# Patient Record
Sex: FEMALE | Race: WHITE | HISPANIC OR LATINO | Employment: PART TIME | URBAN - METROPOLITAN AREA
[De-identification: names, ages, dates, MRNs, and addresses within clinical notes are randomized per-mention and may not be internally consistent; named-entity substitution may affect disease eponyms.]

---

## 2017-03-29 DIAGNOSIS — Z12.31 OTHER SCREENING MAMMOGRAM: ICD-10-CM

## 2017-12-08 ENCOUNTER — OFFICE VISIT (OUTPATIENT)
Dept: URGENT CARE | Facility: CLINIC | Age: 42
End: 2017-12-08
Payer: MEDICAID

## 2017-12-08 VITALS
RESPIRATION RATE: 18 BRPM | OXYGEN SATURATION: 98 % | DIASTOLIC BLOOD PRESSURE: 85 MMHG | BODY MASS INDEX: 40.48 KG/M2 | HEART RATE: 77 BPM | SYSTOLIC BLOOD PRESSURE: 129 MMHG | HEIGHT: 62 IN | WEIGHT: 220 LBS | TEMPERATURE: 99 F

## 2017-12-08 DIAGNOSIS — B34.9 VIRAL SYNDROME: Primary | ICD-10-CM

## 2017-12-08 DIAGNOSIS — R50.9 FEVER, UNSPECIFIED FEVER CAUSE: ICD-10-CM

## 2017-12-08 DIAGNOSIS — R05.9 COUGH: ICD-10-CM

## 2017-12-08 DIAGNOSIS — R52 BODY ACHES: ICD-10-CM

## 2017-12-08 LAB
CTP QC/QA: YES
FLUAV AG NPH QL: NEGATIVE
FLUBV AG NPH QL: NEGATIVE

## 2017-12-08 PROCEDURE — 87804 INFLUENZA ASSAY W/OPTIC: CPT | Mod: QW,S$GLB,, | Performed by: PHYSICIAN ASSISTANT

## 2017-12-08 PROCEDURE — 99214 OFFICE O/P EST MOD 30 MIN: CPT | Mod: S$GLB,,, | Performed by: PHYSICIAN ASSISTANT

## 2017-12-08 RX ORDER — BENZONATATE 100 MG/1
100 CAPSULE ORAL EVERY 6 HOURS PRN
Qty: 30 CAPSULE | Refills: 1 | Status: SHIPPED | OUTPATIENT
Start: 2017-12-08 | End: 2018-12-08

## 2017-12-08 RX ORDER — PROGESTERONE 100 MG/1
100 CAPSULE ORAL DAILY
COMMUNITY
End: 2019-01-17

## 2017-12-08 NOTE — PROGRESS NOTES
"Subjective:       Patient ID: Marianne Go is a 42 y.o. female.    Vitals:  height is 5' 2" (1.575 m) and weight is 99.8 kg (220 lb). Her tympanic temperature is 98.7 °F (37.1 °C). Her blood pressure is 129/85 and her pulse is 77. Her respiration is 18 and oxygen saturation is 98%.     Chief Complaint: Influenza    Influenza   This is a new problem. The current episode started yesterday. The problem occurs constantly. The problem has been gradually worsening. Associated symptoms include congestion, coughing, a fever, myalgias and a sore throat. Pertinent negatives include no abdominal pain, chest pain, chills, headaches or nausea. Nothing aggravates the symptoms. She has tried acetaminophen for the symptoms. The treatment provided mild relief.     Review of Systems   Constitution: Positive for fever and malaise/fatigue. Negative for chills.   HENT: Positive for congestion and sore throat. Negative for ear pain and hoarse voice.    Eyes: Negative for discharge and redness.   Cardiovascular: Negative for chest pain, dyspnea on exertion and leg swelling.   Respiratory: Positive for cough and sputum production. Negative for shortness of breath and wheezing.    Musculoskeletal: Positive for myalgias.   Gastrointestinal: Negative for abdominal pain and nausea.   Neurological: Negative for headaches.       Objective:      Physical Exam   Constitutional: She is oriented to person, place, and time. She appears well-developed and well-nourished.   HENT:   Head: Normocephalic and atraumatic.   Right Ear: Hearing, tympanic membrane, external ear and ear canal normal.   Left Ear: Hearing, tympanic membrane, external ear and ear canal normal.   Nose: Mucosal edema and rhinorrhea present. Right sinus exhibits no maxillary sinus tenderness and no frontal sinus tenderness. Left sinus exhibits no maxillary sinus tenderness and no frontal sinus tenderness.   Mouth/Throat: Uvula is midline and oropharynx is clear and moist.   Eyes: " Conjunctivae are normal.   Neck: Normal range of motion. Neck supple. No thyromegaly present.   Cardiovascular: Normal rate and regular rhythm.  Exam reveals no gallop and no friction rub.    No murmur heard.  Pulmonary/Chest: Effort normal and breath sounds normal. She has no wheezes. She has no rales.   Musculoskeletal: Normal range of motion.   Lymphadenopathy:     She has no cervical adenopathy.   Neurological: She is alert and oriented to person, place, and time.   Skin: Skin is warm and dry. No rash noted. No erythema.   Psychiatric: She has a normal mood and affect. Her behavior is normal. Judgment and thought content normal.   Nursing note and vitals reviewed.      Assessment:       1. Viral syndrome    2. Fever, unspecified fever cause    3. Body aches    4. Cough        Plan:         Viral syndrome    Fever, unspecified fever cause  -     POCT Influenza A/B    Body aches  -     POCT Influenza A/B    Cough  -     POCT Influenza A/B  -     benzonatate (TESSALON PERLES) 100 MG capsule; Take 1 capsule (100 mg total) by mouth every 6 (six) hours as needed for Cough.  Dispense: 30 capsule; Refill: 1      Marianne was seen today for influenza.    Diagnoses and all orders for this visit:    Viral syndrome    Fever, unspecified fever cause  -     POCT Influenza A/B    Body aches  -     POCT Influenza A/B    Cough  -     POCT Influenza A/B  -     benzonatate (TESSALON PERLES) 100 MG capsule; Take 1 capsule (100 mg total) by mouth every 6 (six) hours as needed for Cough.      Patient Instructions   - Rest.    - Drink plenty of fluids.    - Tylenol or Ibuprofen as directed as needed for fever/pain.    - Take over-the-counter claritin, zyrtec, allegra, or xyzal as directed.  - Use over the counter Flonase as directed for sinus congestion and postnasal drip.  - use nasal saline prior to Flonase.  - Antibiotics are not needed at this time.  - Usual course of cold symptom is 10-14 days, but longer if patient is a smoker.  "  - Use salt water gargle for sore throat.   - Follow up with your PCP or specialty clinic as directed in the next 1-2 weeks if not improved or as needed.  You can call [phone] to schedule an appointment with the appropriate provider.    - Go to the ED if your symptoms worsen.    Viral Syndrome (Adult)  A viral illness may cause a number of symptoms. The symptoms depend on the part of the body that the virus affects. If it settles in your nose, throat, and lungs, it may cause cough, sore throat, congestion, and sometimes headache. If it settles in your stomach and intestinal tract, it may cause vomiting and diarrhea. Sometimes it causes vague symptoms like "aching all over," feeling tired, loss of appetite, or fever.  A viral illness usually lasts 1 to 2 weeks, but sometimes it lasts longer. In some cases, a more serious infection can look like a viral syndrome in the first few days of the illness. You may need another exam and additional tests to know the difference. Watch for the warning signs listed below.  Home care  Follow these guidelines for taking care of yourself at home:  · If symptoms are severe, rest at home for the first 2 to 3 days.  · Stay away from cigarette smoke - both your smoke and the smoke from others.  · You may use over-the-counter acetaminophen or ibuprofen for fever, muscle aching, and headache, unless another medicine was prescribed for this. If you have chronic liver or kidney disease or ever had a stomach ulcer or GI bleeding, talk with your doctor before using these medicines. No one who is younger than 18 and ill with a fever should take aspirin. It may cause severe disease or death.  · Your appetite may be poor, so a light diet is fine. Avoid dehydration by drinking 8 to 12 8-ounce glasses of fluids each day. This may include water; orange juice; lemonade; apple, grape, and cranberry juice; clear fruit drinks; electrolyte replacement and sports drinks; and decaffeinated teas " and coffee. If you have been diagnosed with a kidney disease, ask your doctor how much and what types of fluids you should drink to prevent dehydration. If you have kidney disease, drinking too much fluid can cause it build up in the your body and be dangerous to your health.  · Over-the-counter remedies won't shorten the length of the illness but may be helpful for cough, sore throat; and nasal and sinus congestion. Don't use decongestants if you have high blood pressure.  Follow-up care  Follow up with your healthcare provider if you do not improve over the next week.  Call 911  Get emergency medical care if any of the following occur:  · Convulsion  · Feeling weak, dizzy, or like you are going to faint  · Chest pain, shortness of breath, wheezing, or difficulty breathing  When to seek medical advice  Call your healthcare provider right away if any of these occur:  · Cough with lots of colored sputum (mucus) or blood in your sputum  · Chest pain, shortness of breath, wheezing, or difficulty breathing  · Severe headache; face, neck, or ear pain  · Severe, constant pain in the lower right side of your belly (abdominal)  · Continued vomiting (cant keep liquids down)  · Frequent diarrhea (more than 5 times a day); blood (red or black color) or mucus in diarrhea  · Feeling weak, dizzy, or like you are going to faint  · Extreme thirst  · Fever of 100.4°F (38°C) or higher, or as directed by your healthcare provider  Date Last Reviewed: 9/25/2015  © 2412-9816 [employer]. [address] [place] [address], [place] [address]. All rights reserved. This information is not intended as a substitute for professional medical care. Always follow your healthcare professional's instructions.

## 2017-12-08 NOTE — PATIENT INSTRUCTIONS
"- Rest.    - Drink plenty of fluids.    - Tylenol or Ibuprofen as directed as needed for fever/pain.    - Take over-the-counter claritin, zyrtec, allegra, or xyzal as directed.  - Use over the counter Flonase as directed for sinus congestion and postnasal drip.  - use nasal saline prior to Flonase.  - Antibiotics are not needed at this time.  - Usual course of cold symptom is 10-14 days, but longer if patient is a smoker.   - Use salt water gargle for sore throat.   - Follow up with your PCP or specialty clinic as directed in the next 1-2 weeks if not improved or as needed.  You can call [phone] to schedule an appointment with the appropriate provider.    - Go to the ED if your symptoms worsen.    Viral Syndrome (Adult)  A viral illness may cause a number of symptoms. The symptoms depend on the part of the body that the virus affects. If it settles in your nose, throat, and lungs, it may cause cough, sore throat, congestion, and sometimes headache. If it settles in your stomach and intestinal tract, it may cause vomiting and diarrhea. Sometimes it causes vague symptoms like "aching all over," feeling tired, loss of appetite, or fever.  A viral illness usually lasts 1 to 2 weeks, but sometimes it lasts longer. In some cases, a more serious infection can look like a viral syndrome in the first few days of the illness. You may need another exam and additional tests to know the difference. Watch for the warning signs listed below.  Home care  Follow these guidelines for taking care of yourself at home:  · If symptoms are severe, rest at home for the first 2 to 3 days.  · Stay away from cigarette smoke - both your smoke and the smoke from others.  · You may use over-the-counter acetaminophen or ibuprofen for fever, muscle aching, and headache, unless another medicine was prescribed for this. If you have chronic liver or kidney disease or ever had a stomach ulcer or GI bleeding, talk with your doctor before using " these medicines. No one who is younger than 18 and ill with a fever should take aspirin. It may cause severe disease or death.  · Your appetite may be poor, so a light diet is fine. Avoid dehydration by drinking 8 to 12 8-ounce glasses of fluids each day. This may include water; orange juice; lemonade; apple, grape, and cranberry juice; clear fruit drinks; electrolyte replacement and sports drinks; and decaffeinated teas and coffee. If you have been diagnosed with a kidney disease, ask your doctor how much and what types of fluids you should drink to prevent dehydration. If you have kidney disease, drinking too much fluid can cause it build up in the your body and be dangerous to your health.  · Over-the-counter remedies won't shorten the length of the illness but may be helpful for cough, sore throat; and nasal and sinus congestion. Don't use decongestants if you have high blood pressure.  Follow-up care  Follow up with your healthcare provider if you do not improve over the next week.  Call 911  Get emergency medical care if any of the following occur:  · Convulsion  · Feeling weak, dizzy, or like you are going to faint  · Chest pain, shortness of breath, wheezing, or difficulty breathing  When to seek medical advice  Call your healthcare provider right away if any of these occur:  · Cough with lots of colored sputum (mucus) or blood in your sputum  · Chest pain, shortness of breath, wheezing, or difficulty breathing  · Severe headache; face, neck, or ear pain  · Severe, constant pain in the lower right side of your belly (abdominal)  · Continued vomiting (cant keep liquids down)  · Frequent diarrhea (more than 5 times a day); blood (red or black color) or mucus in diarrhea  · Feeling weak, dizzy, or like you are going to faint  · Extreme thirst  · Fever of 100.4°F (38°C) or higher, or as directed by your healthcare provider  Date Last Reviewed: 9/25/2015  © 1640-5621 [employer]. [address] [place] [address]  Road, [place] [address]. All rights reserved. This information is not intended as a substitute for professional medical care. Always follow your healthcare professional's instructions.

## 2018-10-01 ENCOUNTER — OFFICE VISIT (OUTPATIENT)
Dept: URGENT CARE | Facility: CLINIC | Age: 43
End: 2018-10-01
Payer: MEDICAID

## 2018-10-01 VITALS
OXYGEN SATURATION: 97 % | HEART RATE: 76 BPM | WEIGHT: 232 LBS | BODY MASS INDEX: 42.69 KG/M2 | RESPIRATION RATE: 18 BRPM | HEIGHT: 62 IN | DIASTOLIC BLOOD PRESSURE: 84 MMHG | SYSTOLIC BLOOD PRESSURE: 135 MMHG

## 2018-10-01 DIAGNOSIS — K08.89 TOOTH PAIN: Primary | ICD-10-CM

## 2018-10-01 PROCEDURE — 99214 OFFICE O/P EST MOD 30 MIN: CPT | Mod: S$GLB,,, | Performed by: PHYSICIAN ASSISTANT

## 2018-10-01 RX ORDER — AMOXICILLIN AND CLAVULANATE POTASSIUM 875; 125 MG/1; MG/1
1 TABLET, FILM COATED ORAL 2 TIMES DAILY
Qty: 14 TABLET | Refills: 0 | Status: SHIPPED | OUTPATIENT
Start: 2018-10-01 | End: 2018-10-08

## 2018-10-01 RX ORDER — ESCITALOPRAM OXALATE 10 MG/1
TABLET ORAL
Refills: 0 | COMMUNITY
Start: 2018-08-31 | End: 2021-01-14

## 2018-10-01 NOTE — PATIENT INSTRUCTIONS
"Please take antibiotic to completion and follow up with your dentist as soon as possible.    Please follow up with your primary care provider within 2-5 days if your signs and symptoms have not resolved or worsen.     If your condition worsens or fails to improve we recommend that you receive another evaluation at the emergency room immediately or contact your primary medical clinic to discuss your concerns.   You must understand that you have received an Urgent Care treatment only and that you may be released before all of your medical problems are known or treated. You, the patient, will arrange for follow up care as instructed.         Dental Pain    A crack or cavity in a tooth can cause tooth pain. This is because the crack or cavity exposes the sensitive inner area of the tooth. An infection in the gum or the root of the tooth can cause pain and swelling. The pain is often made worse when you drink hot or cold beverages. It can also be worse when you bite on hard foods. Pain may spread from the tooth to your ear or the area of the jaw on the same side.  Home care  Follow these tips when caring for yourself at home:  · Avoid hot and cold foods and drinks. Your tooth may be sensitive to changes in temperature.  · Use toothpaste made for sensitive teeth. Brush gently up and down instead of sideways. Brushing sideways can wear away root surfaces if they are exposed.  · If your tooth is chipped or cracked, or if there is a large open cavity, put oil of cloves directly on the tooth to relieve pain. You can buy oil of cloves at drugstores. Some pharmacies carry an over-the-counter "toothache kit." This contains a paste that you can put on the exposed tooth to make it less sensitive.  · Put a cold pack on your jaw over the sore area to help reduce pain.  · You may use over-the-counter medicine to ease pain, unless your doctor prescribed another medicine. If you have chronic liver or kidney disease, talk with your " healthcare provider before using acetaminophen or ibuprofen. Also talk with your provider if youve had a stomach ulcer or GI bleeding.  · If you have signs of an infection, you will be given an antibiotic. Take it as directed.  Follow-up care  Follow up with your dentist, or as advised. Your pain may go away with the treatment given today. But only a dentist can fully look at and treat the cause of your pain. This will keep the pain from coming back.  Call 911  Call 911 if any of these occur:  · Unusual drowsiness  · Headache or stiff neck  · Weakness or fainting  · Difficulty swallowing or breathing  When to seek medical advice  Call your health care provider right away if any of these occur:  · Your face becomes swollen or red  · Pain gets worse or spreads to your neck  · Fever of 100.4º F (38.0º C) or higher, or as directed by your healthcare provider  · Pus drains from the tooth  Date Last Reviewed: 10/1/2016  © 9926-7700 [employer]. [address] [place] [address], [place] [address]. All rights reserved. This information is not intended as a substitute for professional medical care. Always follow your healthcare professional's instructions.

## 2018-10-01 NOTE — PROGRESS NOTES
"Subjective:       Patient ID: Marianne Go is a 43 y.o. female.    Vitals:  height is 5' 2" (1.575 m) and weight is 105.2 kg (232 lb). Her blood pressure is 135/84 and her pulse is 76. Her respiration is 18 and oxygen saturation is 97%.     Chief Complaint: Otalgia    Left ear pain. Pain going down to lt side of jaw.  Patient states she had dental work done a week ago and she has had this pain since.      Otalgia    There is pain in the left ear. This is a new problem. Episode onset: 2 Days. The problem has been gradually worsening. There has been no fever. The pain is at a severity of 3/10. The pain is mild. Pertinent negatives include no abdominal pain, coughing, headaches or sore throat. She has tried NSAIDs and acetaminophen for the symptoms. The treatment provided mild relief.     Review of Systems   Constitution: Negative for chills, fever and malaise/fatigue.   HENT: Positive for ear pain. Negative for congestion, hoarse voice and sore throat.    Eyes: Negative for discharge and redness.   Cardiovascular: Negative for chest pain, dyspnea on exertion and leg swelling.   Respiratory: Negative for cough, shortness of breath, sputum production and wheezing.    Musculoskeletal: Negative for myalgias.   Gastrointestinal: Negative for abdominal pain and nausea.   Neurological: Negative for headaches.       Objective:      Physical Exam   Constitutional: She is oriented to person, place, and time. Vital signs are normal. She appears well-developed and well-nourished. She does not appear ill. No distress.   HENT:   Head: Normocephalic and atraumatic.       Right Ear: Hearing, tympanic membrane, external ear and ear canal normal.   Left Ear: Hearing, tympanic membrane, external ear and ear canal normal.   Nose: Nose normal. No mucosal edema. Right sinus exhibits no maxillary sinus tenderness and no frontal sinus tenderness. Left sinus exhibits no maxillary sinus tenderness and no frontal sinus tenderness. "   Mouth/Throat: Uvula is midline. She does not have dentures. No oral lesions. No dental abscesses, uvula swelling, lacerations or dental caries. No oropharyngeal exudate, posterior oropharyngeal edema or posterior oropharyngeal erythema.   Tenderness to palpation of left cheek; no palpable fluctuance, induration, or mass; no gingival irritation, discoloration, or edema; no buccal abnormalities   Eyes: Conjunctivae, EOM and lids are normal. Right eye exhibits no discharge. Left eye exhibits no discharge.   Neck: Normal range of motion. Neck supple.   Cardiovascular: Normal rate, regular rhythm and normal heart sounds. Exam reveals no gallop and no friction rub.   No murmur heard.  Pulmonary/Chest: Effort normal and breath sounds normal. No respiratory distress. She has no wheezes. She has no rales.   Musculoskeletal: Normal range of motion.   Lymphadenopathy:        Head (right side): No submandibular and no tonsillar adenopathy present.        Head (left side): No submandibular and no tonsillar adenopathy present.   Neurological: She is alert and oriented to person, place, and time.   Skin: Skin is warm and dry. No rash noted. She is not diaphoretic. No erythema.   Psychiatric: She has a normal mood and affect. Her behavior is normal.   Nursing note and vitals reviewed.      Assessment:       1. Tooth pain        Plan:       Advised patient to follow up with her dentist.  Tooth pain  -     amoxicillin-clavulanate 875-125mg (AUGMENTIN) 875-125 mg per tablet; Take 1 tablet by mouth 2 (two) times daily. for 7 days  Dispense: 14 tablet; Refill: 0      Patient Instructions   Please take antibiotic to completion and follow up with your dentist as soon as possible.    Please follow up with your primary care provider within 2-5 days if your signs and symptoms have not resolved or worsen.     If your condition worsens or fails to improve we recommend that you receive another evaluation at the emergency room immediately or  "contact your primary medical clinic to discuss your concerns.   You must understand that you have received an Urgent Care treatment only and that you may be released before all of your medical problems are known or treated. You, the patient, will arrange for follow up care as instructed.         Dental Pain    A crack or cavity in a tooth can cause tooth pain. This is because the crack or cavity exposes the sensitive inner area of the tooth. An infection in the gum or the root of the tooth can cause pain and swelling. The pain is often made worse when you drink hot or cold beverages. It can also be worse when you bite on hard foods. Pain may spread from the tooth to your ear or the area of the jaw on the same side.  Home care  Follow these tips when caring for yourself at home:  · Avoid hot and cold foods and drinks. Your tooth may be sensitive to changes in temperature.  · Use toothpaste made for sensitive teeth. Brush gently up and down instead of sideways. Brushing sideways can wear away root surfaces if they are exposed.  · If your tooth is chipped or cracked, or if there is a large open cavity, put oil of cloves directly on the tooth to relieve pain. You can buy oil of cloves at [employer]. Some pharmacies carry an over-the-counter "toothache kit." This contains a paste that you can put on the exposed tooth to make it less sensitive.  · Put a cold pack on your jaw over the sore area to help reduce pain.  · You may use over-the-counter medicine to ease pain, unless your doctor prescribed another medicine. If you have chronic liver or kidney disease, talk with your healthcare provider before using acetaminophen or ibuprofen. Also talk with your provider if youve had a stomach ulcer or GI bleeding.  · If you have signs of an infection, you will be given an antibiotic. Take it as directed.  Follow-up care  Follow up with your dentist, or as advised. Your pain may go away with the treatment given today. But only a " dentist can fully look at and treat the cause of your pain. This will keep the pain from coming back.  Call 911  Call 911 if any of these occur:  · Unusual drowsiness  · Headache or stiff neck  · Weakness or fainting  · Difficulty swallowing or breathing  When to seek medical advice  Call your health care provider right away if any of these occur:  · Your face becomes swollen or red  · Pain gets worse or spreads to your neck  · Fever of 100.4º F (38.0º C) or higher, or as directed by your healthcare provider  · Pus drains from the tooth  Date Last Reviewed: 10/1/2016  © 3020-9273 [employer]. [address] [place] [address], [place] [address]. All rights reserved. This information is not intended as a substitute for professional medical care. Always follow your healthcare professional's instructions.

## 2019-01-17 ENCOUNTER — OFFICE VISIT (OUTPATIENT)
Dept: URGENT CARE | Facility: CLINIC | Age: 44
End: 2019-01-17
Payer: MEDICAID

## 2019-01-17 VITALS
BODY MASS INDEX: 42.69 KG/M2 | DIASTOLIC BLOOD PRESSURE: 77 MMHG | SYSTOLIC BLOOD PRESSURE: 129 MMHG | HEIGHT: 62 IN | HEART RATE: 66 BPM | TEMPERATURE: 98 F | WEIGHT: 232 LBS | OXYGEN SATURATION: 100 % | RESPIRATION RATE: 16 BRPM

## 2019-01-17 DIAGNOSIS — M76.51 PATELLAR TENDONITIS OF RIGHT KNEE: Primary | ICD-10-CM

## 2019-01-17 DIAGNOSIS — G89.29 CHRONIC PAIN OF RIGHT KNEE: ICD-10-CM

## 2019-01-17 DIAGNOSIS — M25.561 CHRONIC PAIN OF RIGHT KNEE: ICD-10-CM

## 2019-01-17 PROCEDURE — 99214 OFFICE O/P EST MOD 30 MIN: CPT | Mod: S$GLB,,, | Performed by: PHYSICIAN ASSISTANT

## 2019-01-17 PROCEDURE — 99214 PR OFFICE/OUTPT VISIT, EST, LEVL IV, 30-39 MIN: ICD-10-PCS | Mod: S$GLB,,, | Performed by: PHYSICIAN ASSISTANT

## 2019-01-17 RX ORDER — NAPROXEN 500 MG/1
500 TABLET ORAL 2 TIMES DAILY WITH MEALS
Qty: 30 TABLET | Refills: 0 | Status: SHIPPED | OUTPATIENT
Start: 2019-01-17 | End: 2019-01-27

## 2019-01-17 RX ORDER — CETIRIZINE HYDROCHLORIDE 10 MG/1
10 TABLET ORAL DAILY
COMMUNITY
End: 2019-04-15

## 2019-01-17 NOTE — PATIENT INSTRUCTIONS
Reducing Knee Pain and Swelling    Many treatments can help reduce pain and swelling in your knee. Your healthcare provider or physical therapist may suggest one or more of the following treatments:  · Icing your knee helps reduce swelling. You may be asked to ice your knee once a day or more. Apply ice for about 15 to 20 minutes at a time, with at least 40 minutes between sessions. Always keep a towel between the ice and your skin.   · Keeping your leg raised above your heart helps excess fluid flow out of your knee joint. This reduces swelling.  · Compression means wrapping an elastic bandage or neoprene sleeve snugly around your knees. This keeps fluid from collecting in your knee joint.  · Electrical stimulation, done by a physical therapist or , can help reduce excess fluid in your knee joint.  · Anti-inflammatory medicines may be prescribed by your healthcare provider. You may take pills or receive injections in your knee.  · Isometric (norris) exercises strengthen the muscles that support your knee joint. They also help reduce excess fluid in your knee.  · Massage helps fluid drain away from your knee.  Date Last Reviewed: 10/13/2015  © 3940-4686 [employer]. [address] [place] [address]. All rights reserved. This information is not intended as a substitute for professional medical care. Always follow your healthcare professional's instructions.        Tendonitis  A tendon is the thick fibrous cord that joins muscle to bone and allows joints to move. When a tendon becomes inflamed, it is called tendonitis. This can occur from overuse, injury, or infection. This usually involves the shoulders, forearm, wrist, hands and foot. Symptoms include pain, swelling and tenderness to the touch. Moving the joint increases the pain.  It takes 4 to 6 weeks for tendonitis to heal. It is treated by preventing motion of the tendon with a splint or brace and the use of  anti-inflammatory medicine.  Home care  · Some people find relief with ice packs. These can be crushed or cubed ice in a plastic bag or a bag of frozen vegetables wrapped in a thin towel. Other people get better relief with heat. This can include a hot shower, hot bath, or a moist towel warmed in a microwave. Try each and use the method that feels best, for 15 to 20 minutes several times a day.  · Rest the inflamed joint and protect it from movement.  · You may use over-the-counter ibuprofen or naproxen to treat pain and inflammation, unless another medicine was prescribed. If you can't take these medicines, acetaminophen may help with the pain, but does not treat inflammation. If you have chronic liver or kidney disease or ever had a stomach ulcer or gastrointestinal bleeding, talk with your doctor before using these medicines.  · As your symptoms improve, begin gradual motion at the involved joint.  Follow-up care  Follow up with your healthcare provider if you are not improving after 5 days of treatment.  When to seek medical advice  Call your healthcare provider right away if any of these occur:  · Redness over the painful area  · Increasing pain or swelling at the joint  · Fever (1 degree above your normal temperature) lasting 24 to 48 hours Or, whatever your healthcare provider told you to report based on your condition  Date Last Reviewed: 11/21/2015  © 2893-0923 [email or web address] [address] [place] [address], [place] [address]. All rights reserved. This information is not intended as a substitute for professional medical care. Always follow your healthcare professional's instructions.      Please follow up with your Primary care provider within 2-5 days if your signs and symptoms have not resolved or worsen.     If your condition worsens or fails to improve we recommend that you receive another evaluation at the emergency room immediately or contact your primary medical clinic to discuss your concerns.    You must understand that you have received an Urgent Care treatment only and that you may be released before all of your medical problems are known or treated. You, the patient, will arrange for follow up care as instructed.     RED FLAGS/WARNING SYMPTOMS DISCUSSED WITH PATIENT THAT WOULD WARRANT EMERGENT MEDICAL ATTENTION. PATIENT VERBALIZED UNDERSTANDING.

## 2019-01-17 NOTE — PROGRESS NOTES
"Subjective:       Patient ID: Marianne Go is a 43 y.o. female.    Vitals:  height is 5' 2" (1.575 m) and weight is 105.2 kg (232 lb). Her oral temperature is 98.4 °F (36.9 °C). Her blood pressure is 129/77 and her pulse is 66. Her respiration is 16 and oxygen saturation is 100%.     Chief Complaint: Knee Pain (Right)    Patient states that she started working after 16 years of staying home and is having pain and swelling in her right knee.      Knee Pain    There was no injury mechanism. The pain is present in the right knee. The quality of the pain is described as stabbing. The pain is at a severity of 6/10. Associated symptoms include numbness. She reports no foreign bodies present. The symptoms are aggravated by movement, palpation and weight bearing. She has tried ice, elevation and NSAIDs for the symptoms. The treatment provided no relief.       Constitution: Negative for chills, fatigue and fever.   HENT: Negative for congestion and sore throat.    Neck: Negative for painful lymph nodes.   Cardiovascular: Negative for chest pain and leg swelling.   Eyes: Negative for double vision and blurred vision.   Respiratory: Negative for cough and shortness of breath.    Gastrointestinal: Negative for nausea, vomiting and diarrhea.   Genitourinary: Negative for dysuria, frequency, urgency and history of kidney stones.   Musculoskeletal: Positive for joint pain and joint swelling. Negative for muscle cramps and muscle ache.   Skin: Negative for color change, pale, rash and bruising.   Allergic/Immunologic: Negative for seasonal allergies.   Neurological: Positive for numbness. Negative for dizziness, history of vertigo, light-headedness, passing out and headaches.   Hematologic/Lymphatic: Negative for swollen lymph nodes.   Psychiatric/Behavioral: Negative for nervous/anxious, sleep disturbance and depression. The patient is not nervous/anxious.        Objective:      Physical Exam   Constitutional: She is oriented " to person, place, and time. She appears well-developed and well-nourished. She is cooperative.  Non-toxic appearance. She does not appear ill. No distress.   HENT:   Head: Normocephalic and atraumatic. Head is without abrasion, without contusion and without laceration.   Right Ear: Hearing, tympanic membrane, external ear and ear canal normal. No hemotympanum.   Left Ear: Hearing, tympanic membrane, external ear and ear canal normal. No hemotympanum.   Nose: Nose normal. No mucosal edema, rhinorrhea or nasal deformity. No epistaxis. Right sinus exhibits no maxillary sinus tenderness and no frontal sinus tenderness. Left sinus exhibits no maxillary sinus tenderness and no frontal sinus tenderness.   Mouth/Throat: Uvula is midline, oropharynx is clear and moist and mucous membranes are normal. No trismus in the jaw. Normal dentition. No uvula swelling. No posterior oropharyngeal erythema.   Eyes: Conjunctivae, EOM and lids are normal. Pupils are equal, round, and reactive to light. Right eye exhibits no discharge. Left eye exhibits no discharge. No scleral icterus.   Sclera clear bilat   Neck: Trachea normal, normal range of motion, full passive range of motion without pain and phonation normal. Neck supple. No spinous process tenderness and no muscular tenderness present. No neck rigidity. No tracheal deviation present.   Cardiovascular: Normal rate, regular rhythm, normal heart sounds, intact distal pulses and normal pulses.   Pulmonary/Chest: Effort normal and breath sounds normal. No respiratory distress.   Abdominal: Soft. Normal appearance and bowel sounds are normal. She exhibits no distension, no pulsatile midline mass and no mass. There is no tenderness.   Musculoskeletal: She exhibits no edema or deformity.        Right hip: She exhibits normal range of motion, normal strength, no tenderness, no bony tenderness, no swelling, no crepitus, no deformity and no laceration.        Right knee: She exhibits  decreased range of motion and swelling. She exhibits no effusion, no ecchymosis, no deformity, no laceration, no erythema, normal alignment, no LCL laxity, normal patellar mobility, no bony tenderness, normal meniscus and no MCL laxity. Tenderness found. Patellar tendon tenderness noted. No medial joint line, no lateral joint line, no MCL and no LCL tenderness noted.        Left knee: She exhibits normal range of motion, no swelling, no effusion, no ecchymosis, no deformity, no laceration, no erythema, normal alignment, no LCL laxity, normal patellar mobility, no bony tenderness, normal meniscus and no MCL laxity. No tenderness found.        Right ankle: She exhibits normal range of motion, no swelling, no ecchymosis, no deformity, no laceration and normal pulse. No tenderness. Achilles tendon normal.        Left ankle: She exhibits normal range of motion, no swelling, no ecchymosis, no deformity, no laceration and normal pulse. No tenderness. Achilles tendon normal.        Right upper leg: She exhibits no tenderness, no bony tenderness, no swelling, no edema, no deformity and no laceration.        Right lower leg: She exhibits no tenderness, no bony tenderness, no swelling, no edema, no deformity and no laceration.        Legs:       Right foot: There is normal range of motion, no tenderness, no bony tenderness, no swelling, no crepitus, no deformity and no laceration.   Neurological: She is alert and oriented to person, place, and time. She has normal strength. No cranial nerve deficit or sensory deficit. She exhibits normal muscle tone. She displays no seizure activity. Coordination normal. GCS eye subscore is 4. GCS verbal subscore is 5. GCS motor subscore is 6.   Skin: Skin is warm, dry and intact. Capillary refill takes less than 2 seconds. No abrasion, no bruising, no burn, no ecchymosis and no laceration noted. She is not diaphoretic. No pallor.   Psychiatric: She has a normal mood and affect. Her speech is  normal and behavior is normal. Judgment and thought content normal. Cognition and memory are normal.   Nursing note and vitals reviewed.      Assessment:       1. Patellar tendonitis of right knee    2. Chronic pain of right knee        Plan:         Patellar tendonitis of right knee  -     naproxen (NAPROSYN) 500 MG tablet; Take 1 tablet (500 mg total) by mouth 2 (two) times daily with meals. for 10 days  Dispense: 30 tablet; Refill: 0  -     Ambulatory referral to Orthopedics    Chronic pain of right knee          Reducing Knee Pain and Swelling    Many treatments can help reduce pain and swelling in your knee. Your healthcare provider or physical therapist may suggest one or more of the following treatments:  · Icing your knee helps reduce swelling. You may be asked to ice your knee once a day or more. Apply ice for about 15 to 20 minutes at a time, with at least 40 minutes between sessions. Always keep a towel between the ice and your skin.   · Keeping your leg raised above your heart helps excess fluid flow out of your knee joint. This reduces swelling.  · Compression means wrapping an elastic bandage or neoprene sleeve snugly around your knees. This keeps fluid from collecting in your knee joint.  · Electrical stimulation, done by a physical therapist or , can help reduce excess fluid in your knee joint.  · Anti-inflammatory medicines may be prescribed by your healthcare provider. You may take pills or receive injections in your knee.  · Isometric (norris) exercises strengthen the muscles that support your knee joint. They also help reduce excess fluid in your knee.  · Massage helps fluid drain away from your knee.  Date Last Reviewed: 10/13/2015  © 8214-5923 [employer]. [address] [place] [address], [place] [address]. All rights reserved. This information is not intended as a substitute for professional medical care. Always follow your healthcare professional's  instructions.        Tendonitis  A tendon is the thick fibrous cord that joins muscle to bone and allows joints to move. When a tendon becomes inflamed, it is called tendonitis. This can occur from overuse, injury, or infection. This usually involves the shoulders, forearm, wrist, hands and foot. Symptoms include pain, swelling and tenderness to the touch. Moving the joint increases the pain.  It takes 4 to 6 weeks for tendonitis to heal. It is treated by preventing motion of the tendon with a splint or brace and the use of anti-inflammatory medicine.  Home care  · Some people find relief with ice packs. These can be crushed or cubed ice in a plastic bag or a bag of frozen vegetables wrapped in a thin towel. Other people get better relief with heat. This can include a hot shower, hot bath, or a moist towel warmed in a microwave. Try each and use the method that feels best, for 15 to 20 minutes several times a day.  · Rest the inflamed joint and protect it from movement.  · You may use over-the-counter ibuprofen or naproxen to treat pain and inflammation, unless another medicine was prescribed. If you can't take these medicines, acetaminophen may help with the pain, but does not treat inflammation. If you have chronic liver or kidney disease or ever had a stomach ulcer or gastrointestinal bleeding, talk with your doctor before using these medicines.  · As your symptoms improve, begin gradual motion at the involved joint.  Follow-up care  Follow up with your healthcare provider if you are not improving after 5 days of treatment.  When to seek medical advice  Call your healthcare provider right away if any of these occur:  · Redness over the painful area  · Increasing pain or swelling at the joint  · Fever (1 degree above your normal temperature) lasting 24 to 48 hours Or, whatever your healthcare provider told you to report based on your condition  Date Last Reviewed: 11/21/2015  © 6184-3793 [employer]  [address] [place] [address]. All rights reserved. This information is not intended as a substitute for professional medical care. Always follow your healthcare professional's instructions.      Please follow up with your Primary care provider within 2-5 days if your signs and symptoms have not resolved or worsen.     If your condition worsens or fails to improve we recommend that you receive another evaluation at the emergency room immediately or contact your primary medical clinic to discuss your concerns.   You must understand that you have received an Urgent Care treatment only and that you may be released before all of your medical problems are known or treated. You, the patient, will arrange for follow up care as instructed.     RED FLAGS/WARNING SYMPTOMS DISCUSSED WITH PATIENT THAT WOULD WARRANT EMERGENT MEDICAL ATTENTION. PATIENT VERBALIZED UNDERSTANDING.

## 2019-02-14 ENCOUNTER — OFFICE VISIT (OUTPATIENT)
Dept: URGENT CARE | Facility: CLINIC | Age: 44
End: 2019-02-14
Payer: MEDICAID

## 2019-02-14 VITALS
HEART RATE: 77 BPM | BODY MASS INDEX: 42.69 KG/M2 | OXYGEN SATURATION: 97 % | WEIGHT: 232 LBS | DIASTOLIC BLOOD PRESSURE: 89 MMHG | RESPIRATION RATE: 18 BRPM | TEMPERATURE: 99 F | HEIGHT: 62 IN | SYSTOLIC BLOOD PRESSURE: 138 MMHG

## 2019-02-14 DIAGNOSIS — R52 BODY ACHES: Primary | ICD-10-CM

## 2019-02-14 DIAGNOSIS — J11.1 FLU SYNDROME: ICD-10-CM

## 2019-02-14 LAB
CTP QC/QA: YES
FLUAV AG NPH QL: NEGATIVE
FLUBV AG NPH QL: NEGATIVE

## 2019-02-14 PROCEDURE — 99213 PR OFFICE/OUTPT VISIT, EST, LEVL III, 20-29 MIN: ICD-10-PCS | Mod: S$GLB,,, | Performed by: FAMILY MEDICINE

## 2019-02-14 PROCEDURE — 87804 INFLUENZA ASSAY W/OPTIC: CPT | Mod: QW,S$GLB,, | Performed by: FAMILY MEDICINE

## 2019-02-14 PROCEDURE — 99213 OFFICE O/P EST LOW 20 MIN: CPT | Mod: S$GLB,,, | Performed by: FAMILY MEDICINE

## 2019-02-14 PROCEDURE — 87804 POCT INFLUENZA A/B: ICD-10-PCS | Mod: 59,QW,S$GLB, | Performed by: FAMILY MEDICINE

## 2019-02-14 RX ORDER — PROMETHAZINE HYDROCHLORIDE AND DEXTROMETHORPHAN HYDROBROMIDE 6.25; 15 MG/5ML; MG/5ML
SYRUP ORAL
Qty: 180 ML | Refills: 0 | Status: SHIPPED | OUTPATIENT
Start: 2019-02-14 | End: 2021-01-14

## 2019-02-14 RX ORDER — OSELTAMIVIR PHOSPHATE 75 MG/1
75 CAPSULE ORAL 2 TIMES DAILY
Qty: 10 CAPSULE | Refills: 0 | Status: SHIPPED | OUTPATIENT
Start: 2019-02-14 | End: 2019-02-19

## 2019-02-14 NOTE — PROGRESS NOTES
"Subjective:       Patient ID: Marianne Go is a 43 y.o. female.    Vitals:  height is 5' 2" (1.575 m) and weight is 105.2 kg (232 lb). Her temperature is 98.7 °F (37.1 °C). Her blood pressure is 138/89 and her pulse is 77. Her respiration is 18 and oxygen saturation is 97%.     Chief Complaint: Generalized Body Aches (body ache, chills, headaches x this morning )    C/o feeling achy and feverish since this am. Also having some cough and congestion, head feels heavy, volunteers at school, some cough and congestion, did not get flu vaccine      Cough   This is a new problem. The current episode started today. The problem has been unchanged. The problem occurs constantly. The cough is non-productive. Associated symptoms include chills, headaches and myalgias. Pertinent negatives include no ear pain, eye redness, fever, hemoptysis, rash, sore throat, shortness of breath or wheezing. Nothing aggravates the symptoms. Treatments tried: Tylenol, Motrin. The treatment provided no relief.       Constitution: Positive for chills, sweating and fatigue. Negative for fever.   HENT: Positive for congestion. Negative for ear pain, sinus pain, sinus pressure, sore throat and voice change.    Neck: Negative for painful lymph nodes.   Eyes: Negative for eye redness.   Respiratory: Positive for cough. Negative for chest tightness, sputum production, bloody sputum, COPD, shortness of breath, stridor, wheezing and asthma.    Gastrointestinal: Negative for nausea and vomiting.   Musculoskeletal: Positive for muscle ache.   Skin: Negative for rash.   Allergic/Immunologic: Negative for seasonal allergies and asthma.   Neurological: Positive for headaches.   Hematologic/Lymphatic: Negative for swollen lymph nodes.       Objective:      Physical Exam   Constitutional: She is oriented to person, place, and time. She appears well-developed and well-nourished. She is cooperative.  Non-toxic appearance. She does not appear ill.   HENT:   Head: " Normocephalic and atraumatic.   Right Ear: Hearing, tympanic membrane, external ear and ear canal normal.   Left Ear: Hearing, tympanic membrane, external ear and ear canal normal.   Nose: Nose normal. No mucosal edema, rhinorrhea or nasal deformity. No epistaxis. Right sinus exhibits no maxillary sinus tenderness and no frontal sinus tenderness. Left sinus exhibits no maxillary sinus tenderness and no frontal sinus tenderness.   Mouth/Throat: Uvula is midline, oropharynx is clear and moist and mucous membranes are normal. No trismus in the jaw. Normal dentition. No uvula swelling. No oropharyngeal exudate or posterior oropharyngeal erythema.   Eyes: Conjunctivae and lids are normal. Right eye exhibits no discharge. Left eye exhibits no discharge. No scleral icterus.   Sclera clear bilat   Neck: Trachea normal, normal range of motion, full passive range of motion without pain and phonation normal. Neck supple. No JVD present.   Cardiovascular: Normal rate, regular rhythm, normal heart sounds, intact distal pulses and normal pulses. Exam reveals no gallop and no friction rub.   No murmur heard.  Pulmonary/Chest: Effort normal and breath sounds normal. No stridor. She has no wheezes. She has no rales.   Abdominal: Soft. Normal appearance and bowel sounds are normal. She exhibits no distension, no pulsatile midline mass and no mass. There is no tenderness.   Genitourinary: Rectal exam shows guaiac negative stool. No vaginal discharge found.   Musculoskeletal: Normal range of motion. She exhibits no edema or deformity.   Lymphadenopathy:     She has no cervical adenopathy.   Neurological: She is alert and oriented to person, place, and time. She exhibits normal muscle tone. Coordination normal.   Skin: Skin is warm, dry and intact. She is not diaphoretic. No pallor.   Psychiatric: She has a normal mood and affect. Her speech is normal and behavior is normal. Judgment and thought content normal. Cognition and memory are  normal.   Nursing note and vitals reviewed.      Assessment:       1. Body aches    2. Flu syndrome        Plan:         Body aches  -     POCT Influenza A/B    Flu syndrome  -     oseltamivir (TAMIFLU) 75 MG capsule; Take 1 capsule (75 mg total) by mouth 2 (two) times daily. for 5 days  Dispense: 10 capsule; Refill: 0  -     promethazine-dextromethorphan (PROMETHAZINE-DM) 6.25-15 mg/5 mL Syrp; Take one tsp po q 6 hrs prn cough  Dispense: 180 mL; Refill: 0

## 2019-04-01 ENCOUNTER — HOSPITAL ENCOUNTER (EMERGENCY)
Facility: HOSPITAL | Age: 44
Discharge: HOME | End: 2019-04-01
Attending: EMERGENCY MEDICINE
Payer: MEDICAID

## 2019-04-01 VITALS
BODY MASS INDEX: 41.41 KG/M2 | DIASTOLIC BLOOD PRESSURE: 64 MMHG | WEIGHT: 225 LBS | RESPIRATION RATE: 16 BRPM | HEIGHT: 62 IN | OXYGEN SATURATION: 99 % | TEMPERATURE: 98 F | HEART RATE: 60 BPM | SYSTOLIC BLOOD PRESSURE: 137 MMHG

## 2019-04-01 DIAGNOSIS — N13.30 HYDRONEPHROSIS, UNSPECIFIED HYDRONEPHROSIS TYPE: ICD-10-CM

## 2019-04-01 DIAGNOSIS — N13.30 HYDROURETERONEPHROSIS: ICD-10-CM

## 2019-04-01 DIAGNOSIS — N20.1 URETEROLITHIASIS: Primary | ICD-10-CM

## 2019-04-01 LAB
ANION GAP SERPL CALC-SCNC: 6 MMOL/L (ref 8–16)
BACTERIA #/AREA URNS AUTO: ABNORMAL /HPF
BASOPHILS # BLD AUTO: 0.08 K/UL (ref 0–0.2)
BASOPHILS NFR BLD: 1 % (ref 0–1.9)
BILIRUB UR QL STRIP: NEGATIVE
BUN SERPL-MCNC: 11 MG/DL (ref 6–20)
CALCIUM SERPL-MCNC: 9.6 MG/DL (ref 8.7–10.5)
CHLORIDE SERPL-SCNC: 107 MMOL/L (ref 95–110)
CLARITY UR REFRACT.AUTO: ABNORMAL
CO2 SERPL-SCNC: 26 MMOL/L (ref 23–29)
COLOR UR AUTO: YELLOW
CREAT SERPL-MCNC: 0.8 MG/DL (ref 0.5–1.4)
DIFFERENTIAL METHOD: ABNORMAL
EOSINOPHIL # BLD AUTO: 0.4 K/UL (ref 0–0.5)
EOSINOPHIL NFR BLD: 5.1 % (ref 0–8)
ERYTHROCYTE [DISTWIDTH] IN BLOOD BY AUTOMATED COUNT: 12 % (ref 11.5–14.5)
EST. GFR  (AFRICAN AMERICAN): >60 ML/MIN/1.73 M^2
EST. GFR  (NON AFRICAN AMERICAN): >60 ML/MIN/1.73 M^2
GLUCOSE SERPL-MCNC: 94 MG/DL (ref 70–110)
GLUCOSE UR QL STRIP: NEGATIVE
HCT VFR BLD AUTO: 45.6 % (ref 37–48.5)
HGB BLD-MCNC: 15.5 G/DL (ref 12–16)
HGB UR QL STRIP: ABNORMAL
HYALINE CASTS UR QL AUTO: 0 /LPF
IMM GRANULOCYTES # BLD AUTO: 0.02 K/UL (ref 0–0.04)
IMM GRANULOCYTES NFR BLD AUTO: 0.3 % (ref 0–0.5)
KETONES UR QL STRIP: NEGATIVE
LEUKOCYTE ESTERASE UR QL STRIP: NEGATIVE
LYMPHOCYTES # BLD AUTO: 2.5 K/UL (ref 1–4.8)
LYMPHOCYTES NFR BLD: 31.7 % (ref 18–48)
MCH RBC QN AUTO: 32.6 PG (ref 27–31)
MCHC RBC AUTO-ENTMCNC: 34 G/DL (ref 32–36)
MCV RBC AUTO: 96 FL (ref 82–98)
MICROSCOPIC COMMENT: ABNORMAL
MONOCYTES # BLD AUTO: 0.4 K/UL (ref 0.3–1)
MONOCYTES NFR BLD: 4.9 % (ref 4–15)
NEUTROPHILS # BLD AUTO: 4.4 K/UL (ref 1.8–7.7)
NEUTROPHILS NFR BLD: 57 % (ref 38–73)
NITRITE UR QL STRIP: NEGATIVE
NRBC BLD-RTO: 0 /100 WBC
PH UR STRIP: 7 [PH] (ref 5–8)
PLATELET # BLD AUTO: 332 K/UL (ref 150–350)
PMV BLD AUTO: 10.1 FL (ref 9.2–12.9)
POTASSIUM SERPL-SCNC: 4.3 MMOL/L (ref 3.5–5.1)
PROT UR QL STRIP: ABNORMAL
RBC # BLD AUTO: 4.75 M/UL (ref 4–5.4)
RBC #/AREA URNS AUTO: >100 /HPF (ref 0–4)
SODIUM SERPL-SCNC: 139 MMOL/L (ref 136–145)
SP GR UR STRIP: 1.02 (ref 1–1.03)
SQUAMOUS #/AREA URNS AUTO: 6 /HPF
URN SPEC COLLECT METH UR: ABNORMAL
WBC # BLD AUTO: 7.79 K/UL (ref 3.9–12.7)
WBC #/AREA URNS AUTO: 2 /HPF (ref 0–5)

## 2019-04-01 PROCEDURE — 85025 COMPLETE CBC W/AUTO DIFF WBC: CPT

## 2019-04-01 PROCEDURE — 63600175 PHARM REV CODE 636 W HCPCS: Performed by: EMERGENCY MEDICINE

## 2019-04-01 PROCEDURE — 99285 PR EMERGENCY DEPT VISIT,LEVEL V: ICD-10-PCS | Mod: ,,, | Performed by: EMERGENCY MEDICINE

## 2019-04-01 PROCEDURE — 25000003 PHARM REV CODE 250: Performed by: EMERGENCY MEDICINE

## 2019-04-01 PROCEDURE — 96375 TX/PRO/DX INJ NEW DRUG ADDON: CPT | Mod: 59

## 2019-04-01 PROCEDURE — 80048 BASIC METABOLIC PNL TOTAL CA: CPT

## 2019-04-01 PROCEDURE — 96374 THER/PROPH/DIAG INJ IV PUSH: CPT

## 2019-04-01 PROCEDURE — 99285 EMERGENCY DEPT VISIT HI MDM: CPT | Mod: ,,, | Performed by: EMERGENCY MEDICINE

## 2019-04-01 PROCEDURE — 96361 HYDRATE IV INFUSION ADD-ON: CPT

## 2019-04-01 PROCEDURE — 81001 URINALYSIS AUTO W/SCOPE: CPT

## 2019-04-01 PROCEDURE — 99284 EMERGENCY DEPT VISIT MOD MDM: CPT | Mod: 25

## 2019-04-01 RX ORDER — HYDROCODONE BITARTRATE AND ACETAMINOPHEN 5; 325 MG/1; MG/1
1 TABLET ORAL EVERY 6 HOURS PRN
Qty: 12 TABLET | Refills: 0 | Status: SHIPPED | OUTPATIENT
Start: 2019-04-01 | End: 2021-01-14

## 2019-04-01 RX ORDER — ONDANSETRON 4 MG/1
4 TABLET, ORALLY DISINTEGRATING ORAL EVERY 8 HOURS PRN
Qty: 20 TABLET | Refills: 2 | Status: SHIPPED | OUTPATIENT
Start: 2019-04-01 | End: 2019-04-15

## 2019-04-01 RX ORDER — NAPROXEN 500 MG/1
500 TABLET ORAL 2 TIMES DAILY WITH MEALS
Qty: 14 TABLET | Refills: 0 | Status: SHIPPED | OUTPATIENT
Start: 2019-04-01 | End: 2019-04-08

## 2019-04-01 RX ORDER — KETOROLAC TROMETHAMINE 30 MG/ML
15 INJECTION, SOLUTION INTRAMUSCULAR; INTRAVENOUS
Status: COMPLETED | OUTPATIENT
Start: 2019-04-01 | End: 2019-04-01

## 2019-04-01 RX ORDER — ONDANSETRON 2 MG/ML
4 INJECTION INTRAMUSCULAR; INTRAVENOUS
Status: COMPLETED | OUTPATIENT
Start: 2019-04-01 | End: 2019-04-01

## 2019-04-01 RX ADMIN — SODIUM CHLORIDE 1000 ML: 0.9 INJECTION, SOLUTION INTRAVENOUS at 09:04

## 2019-04-01 RX ADMIN — ONDANSETRON 4 MG: 2 INJECTION INTRAMUSCULAR; INTRAVENOUS at 10:04

## 2019-04-01 RX ADMIN — KETOROLAC TROMETHAMINE 15 MG: 30 INJECTION, SOLUTION INTRAMUSCULAR at 10:04

## 2019-04-01 NOTE — ED NOTES
"Patient returned from CT.  at bedside. Fluids infusing. Reported feeling much better. Pain rated at a 2/10, stated pain was a, "20/10" when she first came in.  "

## 2019-04-01 NOTE — ED PROVIDER NOTES
Encounter Date: 4/1/2019    SCRIBE #1 NOTE: I, Joslyn Dickinson, am scribing for, and in the presence of,  Elpidio Davis MD. I have scribed the following portions of the note - Other sections scribed: HPI ROS PE.       History     Chief Complaint   Patient presents with    Flank Pain     hx stones, screaming in triage     Time patient was seen by the provider: 9:52 AM      Ms. Go is a 43 y.o. female with history of Nephrolithiasis, kidney calculi, who presents to the ED with a complaint of right flank pain. Patient states she stood up with a sudden onset of severe right flank pain that started today. She reports the pain radiates to the front. She reports previous episodes were gradual onset that would resolve after she drinks cranberry juice. Associated symptom of abdominal pain and nausea. Denies fever, vomiting, diaphoresis, vaginal discharge/bleeding, diarrhea, constipation. Lower right back. Stood up and then went down Patient has a past surgical history of lithotripsy 8 years ago.     The history is provided by the patient.     Review of patient's allergies indicates:  No Known Allergies  Past Medical History:   Diagnosis Date    Deep vein thrombosis 1990    pregnancy related     Kidney calculi     Kidney stones     Kidney stones     Migraines     perimenstrual      Past Surgical History:   Procedure Laterality Date    BLADDER SUSPENSION      CYSTOSCOPY W/ LASER LITHOTRIPSY      PELVIC LAPAROSCOPY      ablation of endometriosis    TONSILLECTOMY      [facility]UB[facility]  2011    [facility]      Family History   Problem Relation Age of Onset    Hyperlipidemia Father     Hypothyroidism Father     Cancer Father         lung cancer     Allergic rhinitis Father     Asthma Son     Hyperlipidemia Mother     Hypertension Mother     Diabetes Mother     Hypothyroidism Mother     Breast cancer Neg Hx     Colon cancer Neg Hx     Ovarian cancer Neg Hx      Social History     Tobacco Use    Smoking  status: Former Smoker     Packs/day: 0.25     Years: 13.00     Pack years: 3.25     Last attempt to quit: 2003     Years since quittin.2    Smokeless tobacco: Never Used    Tobacco comment: quit 11 years ago   Substance Use Topics    Alcohol use: Yes     Comment: rarely    Drug use: No     Review of Systems   Constitutional: Negative for fever.   HENT: Negative for sore throat.    Respiratory: Negative for shortness of breath.    Cardiovascular: Negative for chest pain.   Gastrointestinal: Positive for abdominal pain and nausea.   Genitourinary: Positive for flank pain. Negative for dysuria.   Musculoskeletal: Negative for back pain.   Skin: Negative for rash.   Neurological: Negative for weakness.   Hematological: Does not bruise/bleed easily.       Physical Exam     Initial Vitals [19 0937]   BP Pulse Resp Temp SpO2   132/61 72 20 97.6 °F (36.4 °C) 100 %      MAP       --         Physical Exam    Nursing note and vitals reviewed.  Constitutional: She appears well-developed and well-nourished. She is not diaphoretic. She appears distressed.   HENT:   Head: Normocephalic and atraumatic.   Right Ear: External ear normal.   Left Ear: External ear normal.   Neck: Neck supple.   Cardiovascular: Normal rate, regular rhythm, normal heart sounds and intact distal pulses.   Pulmonary/Chest: Breath sounds normal. No respiratory distress. She has no wheezes. She has no rhonchi. She has no rales.   Abdominal: Soft. She exhibits no distension. There is tenderness (to palpation) in the right lower quadrant. There is no rigidity, no rebound and no guarding.   No CVA TTP.   Musculoskeletal:   No paraspinal muscular TTP or lumbar TTP.   Neurological: She is alert and oriented to person, place, and time. GCS score is 15. GCS eye subscore is 4. GCS verbal subscore is 5. GCS motor subscore is 6.   Skin: Skin is warm. Capillary refill takes less than 2 seconds. No rash noted.   Psychiatric: She has a normal mood and  affect.         ED Course   Procedures  Labs Reviewed   URINALYSIS, REFLEX TO URINE CULTURE - Abnormal; Notable for the following components:       Result Value    Appearance, UA Hazy (*)     Protein, UA 1+ (*)     Occult Blood UA 3+ (*)     All other components within normal limits    Narrative:     Preferred Collection Type->Urine, Clean Catch   CBC W/ AUTO DIFFERENTIAL - Abnormal; Notable for the following components:    MCH 32.6 (*)     All other components within normal limits   BASIC METABOLIC PANEL - Abnormal; Notable for the following components:    Anion Gap 6 (*)     All other components within normal limits   URINALYSIS MICROSCOPIC - Abnormal; Notable for the following components:    RBC, UA >100 (*)     Bacteria, UA Few (*)     All other components within normal limits    Narrative:     Preferred Collection Type->Urine, Clean Catch          Imaging Results          CT Renal Stone Study ABD Pelvis WO (Final result)  Result time 04/01/19 12:24:03    Final result by Chester Reinoso Jr., MD (04/01/19 12:24:03)                 Impression:      There is a mild-to-moderate degree of hydronephrosis involving the right intrarenal collecting system and ureter.  There is an approximate 4 mm obstructing stone in the distal right ureter just proximal to the UVJ.    There may be a punctate nonobstructing stone in the anterior inferior aspect of the left kidney as seen on the coronal images.    Mild hepatomegaly.      Electronically signed by: Chester Reinoso MD  Date:    04/01/2019  Time:    12:24             Narrative:    EXAMINATION:  CT RENAL STONE STUDY ABD PELVIS WO    CLINICAL HISTORY:  Flank pain, stone disease suspected;    TECHNIQUE:  Low dose axial images, sagittal and coronal reformations were obtained from the lung bases to the pubic symphysis.  Contrast was not administered.    COMPARISON:  November 2015.    FINDINGS:  In the chest no significant pericardial or pleural fluid.  There is some vague  ground-glass opacity at the left base may be related to some atelectasis or small airways disease.  No confluent consolidation.  No focal lung masses.    In the abdomen liver is similar in size.  Focal mass lesions on this noncontrast study.  Gallbladder is not distended.  Pancreas and spleen unremarkable.  No adrenal masses.    The kidneys demonstrate significant distention of the right renal collecting system with a least mild hydronephrosis.  There is dilatation of the right ureter just proximal to the bladder where there is a 4 mm distal ureteral stone.  Left ureter is normal in caliber.  No additional intrarenal stones seen.    Aorta tapers normally.  No significant para-aortic adenopathy.    In pelvis no pelvic adenopathy.  Uterus not well visualized perhaps.  Bladder is decompressed.    Evaluation of bowel demonstrates no focal dilatation.  Stomach is not distended.  Appendix is unremarkable.    Bone windows demonstrate no lytic or blastic lesions.  Mild degenerative changes noted.                                 Medical Decision Making:   History:   Old Medical Records: I decided to obtain old medical records.  Old Records Summarized: records from clinic visits.       <> Summary of Records: Summarized in HPI  Clinical Tests:   Lab Tests: Ordered and Reviewed  Radiological Study: Ordered and Reviewed  ED Management:  Vitals normal. Afebrile. Here in severe pain in right flank. Has had this in past; feels just like prior renal stones. CBC, BMP, UA, CT renal protocol obtained. Fluid bolus, zofran, and toradol given.    Toradol resolved pain. Began to feel much better very quickly.  Labs grossly WNL w/o actionable values. UA w/ significant hematuria; no evidence of infection.  CT w/ 4mm stone just proximal to R UVJ with moderate hydronephrosis.   Tolerating PO in ED. Repeat abd exam w/o TTP.  No indication for emergent urology evaluation. Will attempt trial of passage.  rx for scheduled naproxen, few norco, and  prn zofran provided.  Advised to f/u w/ urology in 1 week.    Stable for discharge at this time. Return precautions discussed.             Scribe Attestation:   Scribe #1: I performed the above scribed service and the documentation accurately describes the services I performed. I attest to the accuracy of the note.               Clinical Impression:       ICD-10-CM ICD-9-CM   1. Ureterolithiasis N20.1 592.1   2. Hydronephrosis, unspecified hydronephrosis type N13.30 591   3. Hydroureteronephrosis N13.30 591         Disposition:   Disposition: Discharged  Condition: Stable                        Elpidio Davis MD  04/01/19 2018

## 2019-04-01 NOTE — ED NOTES
.  Patient identifiers for Marianne Go 43 y.o. female checked and correct.  Chief Complaint   Patient presents with    Flank Pain     hx stones, screaming in triage     Past Medical History:   Diagnosis Date    Deep vein thrombosis 1990    pregnancy related     Kidney calculi     Kidney stones     Kidney stones     Migraines     perimenstrual      Allergies reported: Review of patient's allergies indicates:  No Known Allergies      LOC: Patient is awake, alert, and aware of environment with an appropriate affect. Patient is oriented x 3 and speaking appropriately.  APPEARANCE: Patient resting comfortably and in no acute distress. Patient is clean and well groomed, patient's clothing is properly fastened.  SKIN: The skin is warm and dry. Patient has normal skin turgor and moist mucus membranes. Skin is intact; no bruising or breakdown noted.  MUSKULOSKELETAL: Patient is moving all extremities well, no obvious deformities noted. Pulses intact.   RESPIRATORY: Airway is open and patent. Respirations are spontaneous and non-labored with normal effort and rate, BBS=clear  CARDIAC: Patient has a normal rate and rhythm. No peripheral edema noted.   ABDOMEN: No distention noted. Pt c/o right flank pain, right lower abdominal pain and nausea, reports pain started 15 mins pta this am, suddenly, has not ate yet, slight nausea.   NEUROLOGICAL: PERRL. Facial expression is symmetrical. Hand grasps are equal bilaterally. Normal sensation in all extremities when touched with finger.

## 2019-04-01 NOTE — ED TRIAGE NOTES
Pt presents with c/o right sided flank pain and nausea that started approx 15 mins pta. Pt thinks she has a kidney stone, hx of kidney stones, last one being 8 yrs ago.

## 2019-04-01 NOTE — ED NOTES
Patient stated pain rating 2/10 and reported feeling better. Discharged to home with . Discharge instructions given and patient verbalized understanding.

## 2019-04-01 NOTE — ED NOTES
Patient resting in bed with  sitting at the bedside. Covered patient with a warm blanket. Will continue to monitor.

## 2019-04-14 ENCOUNTER — OFFICE VISIT (OUTPATIENT)
Dept: URGENT CARE | Facility: CLINIC | Age: 44
End: 2019-04-14
Payer: MEDICAID

## 2019-04-14 DIAGNOSIS — H65.192 ACUTE EFFUSION OF LEFT EAR: ICD-10-CM

## 2019-04-14 DIAGNOSIS — R05.9 COUGH: ICD-10-CM

## 2019-04-14 DIAGNOSIS — J32.9 SINUSITIS, UNSPECIFIED CHRONICITY, UNSPECIFIED LOCATION: Primary | ICD-10-CM

## 2019-04-14 PROCEDURE — 99214 OFFICE O/P EST MOD 30 MIN: CPT | Mod: S$GLB,,, | Performed by: PHYSICIAN ASSISTANT

## 2019-04-14 PROCEDURE — 99214 PR OFFICE/OUTPT VISIT, EST, LEVL IV, 30-39 MIN: ICD-10-PCS | Mod: S$GLB,,, | Performed by: PHYSICIAN ASSISTANT

## 2019-04-15 NOTE — PROGRESS NOTES
"Subjective:       Patient ID: Marianne Go is a 43 y.o. female.    Vitals:  height is 5' 2" (1.575 m) and weight is 102.1 kg (225 lb). Her temperature is 97.6 °F (36.4 °C). Her blood pressure is 129/76 and her pulse is 90. Her oxygen saturation is 96%.     Chief Complaint: Cough    Cough   This is a new problem. The current episode started in the past 7 days. The problem has been gradually worsening. The cough is non-productive. Associated symptoms include ear pain, nasal congestion, a sore throat and shortness of breath. Pertinent negatives include no chills, eye redness, fever, hemoptysis, myalgias, rash or wheezing. Nothing aggravates the symptoms. She has tried OTC cough suppressant for the symptoms. The treatment provided no relief. There is no history of asthma, bronchitis, COPD or pneumonia.       Constitution: Negative for chills, sweating, fatigue and fever.   HENT: Positive for ear pain and sore throat. Negative for congestion, sinus pain, sinus pressure and voice change.    Neck: Negative for painful lymph nodes.   Eyes: Negative for eye redness.   Respiratory: Positive for cough and shortness of breath. Negative for chest tightness, sputum production, bloody sputum, COPD, stridor, wheezing and asthma.    Gastrointestinal: Negative for nausea and vomiting.   Musculoskeletal: Negative for muscle ache.   Skin: Negative for rash.   Allergic/Immunologic: Negative for seasonal allergies and asthma.   Hematologic/Lymphatic: Negative for swollen lymph nodes.       Objective:      Physical Exam   Constitutional: She is oriented to person, place, and time. She appears well-developed and well-nourished. She is cooperative.  Non-toxic appearance. She does not appear ill. No distress.   HENT:   Head: Normocephalic and atraumatic.   Right Ear: Hearing, tympanic membrane, external ear and ear canal normal.   Left Ear: Hearing, external ear and ear canal normal. A middle ear effusion is present.   Nose: Mucosal edema " and rhinorrhea present. No nasal deformity. No epistaxis. Right sinus exhibits no maxillary sinus tenderness and no frontal sinus tenderness. Left sinus exhibits no maxillary sinus tenderness and no frontal sinus tenderness.   Mouth/Throat: Uvula is midline, oropharynx is clear and moist and mucous membranes are normal. No trismus in the jaw. Normal dentition. No uvula swelling. No posterior oropharyngeal erythema.   PND noted   Eyes: Conjunctivae and lids are normal. No scleral icterus.   Sclera clear bilat   Neck: Trachea normal, full passive range of motion without pain and phonation normal. Neck supple.   Cardiovascular: Normal rate, regular rhythm, normal heart sounds, intact distal pulses and normal pulses.   Pulmonary/Chest: Effort normal and breath sounds normal. No accessory muscle usage or stridor. No respiratory distress. She has no decreased breath sounds. She has no wheezes. She has no rhonchi. She has no rales.   Abdominal: Soft. Normal appearance and bowel sounds are normal. She exhibits no distension. There is no tenderness.   Musculoskeletal: Normal range of motion. She exhibits no edema or deformity.   Neurological: She is alert and oriented to person, place, and time. She exhibits normal muscle tone. Coordination normal.   Skin: Skin is warm, dry and intact. She is not diaphoretic. No pallor.   Psychiatric: She has a normal mood and affect. Her speech is normal and behavior is normal. Judgment and thought content normal. Cognition and memory are normal.   Nursing note and vitals reviewed.      Assessment:       1. Sinusitis, unspecified chronicity, unspecified location    2. Cough    3. Acute effusion of left ear        Plan:         Sinusitis, unspecified chronicity, unspecified location    Cough    Acute effusion of left ear        USE YOUR INHALER AND ZYRTEC AS DISCUSSED    RX prednisone, flonase, and guiafen/codeine given.     Sinusitis (No Antibiotics)    The sinuses are air-filled spaces  within the bones of the face. They connect to the inside of the nose. Sinusitis is an inflammation of the tissue lining the sinus cavity. Sinus inflammation can occur during a cold. It can also be due to allergies to pollens and other particles in the air. It can cause symptoms such as sinus congestion, headache, sore throat, facial swelling and fullness. It may also cause a low-grade fever. No infection is present, and no antibiotic treatment is needed.  Home care  · Drink plenty of water, hot tea, and other liquids. This may help thin mucus. It also may promote sinus drainage.  · Heat may help soothe painful areas of the face. Use a towel soaked in hot water. Or,  the shower and direct the hot spray onto your face. Using a vaporizer along with a menthol rub at night may also help.   · An expectorant containing guaifenesin may help thin the mucus and promote drainage from the sinuses.  · Over-the-counter decongestants may be used unless a similar medicine was prescribed. Nasal sprays work the fastest. Use one that contains phenylephrine or oxymetazoline. First blow the nose gently. Then use the spray. Do not use these medicines more often than directed on the label or symptoms may get worse. You may also use tablets containing pseudoephedrine. Avoid products that combine ingredients, because side effects may be increased. Read labels. You can also ask the pharmacist for help. (NOTE: Persons with high blood pressure should not use decongestants. They can raise blood pressure.)  · Over-the-counter antihistamines may help if allergies contributed to your sinusitis.    · Use acetaminophen or ibuprofen to control pain, unless another pain medicine was prescribed. (If you have chronic liver or kidney disease or ever had a stomach ulcer, talk with your doctor before using these medicines. Aspirin should never be used in anyone under 18 years of age who is ill with a fever. It may cause severe liver damage.)  · Use  nasal rinses or irrigation as instructed by your health care provider.  · Don't smoke. This can worsen symptoms.  Follow-up care  Follow up with your healthcare provider or our staff if you are not improving within the next week.  When to seek medical advice  Call your healthcare provider if any of these occur:  · Green or yellow discharge from the nose or into the throat  · Facial pain or headache becoming more severe  · Stiff neck  · Unusual drowsiness or confusion  · Swelling of the forehead or eyelids  · Vision problems, including blurred or double vision  · Fever of 100.4ºF (38ºC) or higher, or as directed by your healthcare provider  · Seizure  · Breathing problems  · Symptoms not resolving within 10 days  Date Last Reviewed: 4/13/2015  © 8497-9445 [employer]. [address] [place] [address], [place] [address]. All rights reserved. This information is not intended as a substitute for professional medical care. Always follow your healthcare professional's instructions.      Patient Instructions   -Below are suggestions for symptomatic relief:              -Tylenol every 4 hours OR ibuprofen every 6 hours as needed for pain/fever.              -Salt water gargles to soothe throat pain.              -Chloroseptic spray also helps to numb throat pain.              -Nasal saline spray reduces inflammation and dryness.              -Warm face compresses to help with facial sinus pain/pressure.              -Vicks vapor rub at night.              -Flonase OTC or Nasacort OTC for nasal congestion.              -Simple foods like chicken noodle soup.              -Delsym helps with coughing at night              -Zyrtec/Claritin during the day & Benadryl at night may help with allergies.                If you DO NOT have Hypertension or any history of palpitations, it is ok to take over the counter Sudafed or Mucinex D or Allegra-D or Claritin-D or Zyrtec-D.  If you do take one of the above, it is ok to combine  that with plain over the counter Mucinex or Allegra or Claritin or Zyrtec. If, for example, you are taking Zyrtec -D, you can combine that with Mucinex, but not Mucinex-D.  If you are taking Mucinex-D, you can combine that with plain Allegra or Claritin or Zyrtec.   If you DO have Hypertension or palpitations, it is safe to take Coricidin HBP for relief of sinus symptoms.    Please follow up with your Primary care provider within 2-5 days if your signs and symptoms have not resolved or worsen.     If your condition worsens or fails to improve we recommend that you receive another evaluation at the emergency room immediately or contact your primary medical clinic to discuss your concerns.   You must understand that you have received an Urgent Care treatment only and that you may be released before all of your medical problems are known or treated. You, the patient, will arrange for follow up care as instructed.     RED FLAGS/WARNING SYMPTOMS DISCUSSED WITH PATIENT THAT WOULD WARRANT EMERGENT MEDICAL ATTENTION. PATIENT VERBALIZED UNDERSTANDING.

## 2019-04-16 VITALS
WEIGHT: 225 LBS | BODY MASS INDEX: 41.41 KG/M2 | DIASTOLIC BLOOD PRESSURE: 76 MMHG | SYSTOLIC BLOOD PRESSURE: 129 MMHG | TEMPERATURE: 98 F | HEART RATE: 90 BPM | OXYGEN SATURATION: 96 % | HEIGHT: 62 IN

## 2019-04-16 NOTE — PATIENT INSTRUCTIONS
Sinusitis (No Antibiotics)    The sinuses are air-filled spaces within the bones of the face. They connect to the inside of the nose. Sinusitis is an inflammation of the tissue lining the sinus cavity. Sinus inflammation can occur during a cold. It can also be due to allergies to pollens and other particles in the air. It can cause symptoms such as sinus congestion, headache, sore throat, facial swelling and fullness. It may also cause a low-grade fever. No infection is present, and no antibiotic treatment is needed.  Home care  · Drink plenty of water, hot tea, and other liquids. This may help thin mucus. It also may promote sinus drainage.  · Heat may help soothe painful areas of the face. Use a towel soaked in hot water. Or,  the shower and direct the hot spray onto your face. Using a vaporizer along with a menthol rub at night may also help.   · An expectorant containing guaifenesin may help thin the mucus and promote drainage from the sinuses.  · Over-the-counter decongestants may be used unless a similar medicine was prescribed. Nasal sprays work the fastest. Use one that contains phenylephrine or oxymetazoline. First blow the nose gently. Then use the spray. Do not use these medicines more often than directed on the label or symptoms may get worse. You may also use tablets containing pseudoephedrine. Avoid products that combine ingredients, because side effects may be increased. Read labels. You can also ask the pharmacist for help. (NOTE: Persons with high blood pressure should not use decongestants. They can raise blood pressure.)  · Over-the-counter antihistamines may help if allergies contributed to your sinusitis.    · Use acetaminophen or ibuprofen to control pain, unless another pain medicine was prescribed. (If you have chronic liver or kidney disease or ever had a stomach ulcer, talk with your doctor before using these medicines. Aspirin should never be used in anyone under 18 years of age  who is ill with a fever. It may cause severe liver damage.)  · Use nasal rinses or irrigation as instructed by your health care provider.  · Don't smoke. This can worsen symptoms.  Follow-up care  Follow up with your healthcare provider or our staff if you are not improving within the next week.  When to seek medical advice  Call your healthcare provider if any of these occur:  · Green or yellow discharge from the nose or into the throat  · Facial pain or headache becoming more severe  · Stiff neck  · Unusual drowsiness or confusion  · Swelling of the forehead or eyelids  · Vision problems, including blurred or double vision  · Fever of 100.4ºF (38ºC) or higher, or as directed by your healthcare provider  · Seizure  · Breathing problems  · Symptoms not resolving within 10 days  Date Last Reviewed: 4/13/2015  © 7268-7550 [employer]. [address] [place] [address]. All rights reserved. This information is not intended as a substitute for professional medical care. Always follow your healthcare professional's instructions.      Patient Instructions   -Below are suggestions for symptomatic relief:              -Tylenol every 4 hours OR ibuprofen every 6 hours as needed for pain/fever.              -Salt water gargles to soothe throat pain.              -Chloroseptic spray also helps to numb throat pain.              -Nasal saline spray reduces inflammation and dryness.              -Warm face compresses to help with facial sinus pain/pressure.              -Vicks vapor rub at night.              -Flonase OTC or Nasacort OTC for nasal congestion.              -Simple foods like chicken noodle soup.              -Delsym helps with coughing at night              -Zyrtec/Claritin during the day & Benadryl at night may help with allergies.                If you DO NOT have Hypertension or any history of palpitations, it is ok to take over the counter Sudafed or Mucinex D or Allegra-D or Claritin-D  or Zyrtec-D.  If you do take one of the above, it is ok to combine that with plain over the counter Mucinex or Allegra or Claritin or Zyrtec. If, for example, you are taking Zyrtec -D, you can combine that with Mucinex, but not Mucinex-D.  If you are taking Mucinex-D, you can combine that with plain Allegra or Claritin or Zyrtec.   If you DO have Hypertension or palpitations, it is safe to take Coricidin HBP for relief of sinus symptoms.    Please follow up with your Primary care provider within 2-5 days if your signs and symptoms have not resolved or worsen.     If your condition worsens or fails to improve we recommend that you receive another evaluation at the emergency room immediately or contact your primary medical clinic to discuss your concerns.   You must understand that you have received an Urgent Care treatment only and that you may be released before all of your medical problems are known or treated. You, the patient, will arrange for follow up care as instructed.     RED FLAGS/WARNING SYMPTOMS DISCUSSED WITH PATIENT THAT WOULD WARRANT EMERGENT MEDICAL ATTENTION. PATIENT VERBALIZED UNDERSTANDING.

## 2019-08-26 ENCOUNTER — OFFICE VISIT (OUTPATIENT)
Dept: URGENT CARE | Facility: CLINIC | Age: 44
End: 2019-08-26
Payer: MEDICAID

## 2019-08-26 VITALS
HEIGHT: 62 IN | RESPIRATION RATE: 18 BRPM | TEMPERATURE: 99 F | SYSTOLIC BLOOD PRESSURE: 147 MMHG | BODY MASS INDEX: 41.41 KG/M2 | DIASTOLIC BLOOD PRESSURE: 108 MMHG | OXYGEN SATURATION: 97 % | WEIGHT: 225 LBS | HEART RATE: 76 BPM

## 2019-08-26 DIAGNOSIS — J02.9 SORE THROAT: ICD-10-CM

## 2019-08-26 DIAGNOSIS — R03.0 ELEVATED BLOOD PRESSURE READING: ICD-10-CM

## 2019-08-26 DIAGNOSIS — J06.9 VIRAL URI WITH COUGH: Primary | ICD-10-CM

## 2019-08-26 LAB
CTP QC/QA: YES
S PYO RRNA THROAT QL PROBE: NEGATIVE

## 2019-08-26 PROCEDURE — 87880 POCT RAPID STREP A: ICD-10-PCS | Mod: QW,S$GLB,, | Performed by: PHYSICIAN ASSISTANT

## 2019-08-26 PROCEDURE — 99214 OFFICE O/P EST MOD 30 MIN: CPT | Mod: S$GLB,,, | Performed by: PHYSICIAN ASSISTANT

## 2019-08-26 PROCEDURE — 99214 PR OFFICE/OUTPT VISIT, EST, LEVL IV, 30-39 MIN: ICD-10-PCS | Mod: S$GLB,,, | Performed by: PHYSICIAN ASSISTANT

## 2019-08-26 PROCEDURE — 87880 STREP A ASSAY W/OPTIC: CPT | Mod: QW,S$GLB,, | Performed by: PHYSICIAN ASSISTANT

## 2019-08-26 RX ORDER — LORATADINE 10 MG/1
10 TABLET ORAL DAILY
Qty: 30 TABLET | Refills: 0 | Status: SHIPPED | OUTPATIENT
Start: 2019-08-26 | End: 2021-01-14

## 2019-08-26 RX ORDER — PROMETHAZINE HYDROCHLORIDE AND DEXTROMETHORPHAN HYDROBROMIDE 6.25; 15 MG/5ML; MG/5ML
5 SYRUP ORAL EVERY 6 HOURS
Qty: 120 ML | Refills: 0 | Status: SHIPPED | OUTPATIENT
Start: 2019-08-26 | End: 2021-01-14

## 2019-08-26 RX ORDER — PREDNISONE 10 MG/1
20 TABLET ORAL DAILY
Qty: 6 TABLET | Refills: 0 | Status: SHIPPED | OUTPATIENT
Start: 2019-08-26 | End: 2019-08-29

## 2019-08-26 RX ORDER — BENZONATATE 100 MG/1
200 CAPSULE ORAL 3 TIMES DAILY PRN
Qty: 30 CAPSULE | Refills: 0 | Status: SHIPPED | OUTPATIENT
Start: 2019-08-26 | End: 2019-09-05

## 2019-08-26 RX ORDER — FLUTICASONE PROPIONATE 50 MCG
1 SPRAY, SUSPENSION (ML) NASAL DAILY PRN
Qty: 1 BOTTLE | Refills: 0 | Status: SHIPPED | OUTPATIENT
Start: 2019-08-26 | End: 2021-01-14

## 2019-08-26 NOTE — PATIENT INSTRUCTIONS
If you were prescribed a narcotic or controlled medication, do not drive or operate heavy equipment or machinery while taking these medications.  You must understand that you've received an Urgent Care treatment only and that you may be released before all your medical problems are known or treated. You, the patient, will arrange for follow up care as instructed.  Follow up with your PCP or specialty clinic as directed if not improved or as needed. You can call [phone] to schedule an appointment with the appropriate provider.  If your condition worsens we recommend that you receive another evaluation at the Emergency Department for any concerns or worsening of condition.  Patient aware and verbalized understanding.    You tested NEGATIVE for strep today.  Patient aware and verbalized understanding.    Your symptoms are viral in nature.  Prednisone RX as prescribed to help reduce swelling/inflammation.  Flonase Nasal Spray RX as prescribed for nasal congestion/seasonal allergies.  Claritin RX as prescribed daily for seasonal allergies.  Tessalon Perles RX as prescribed for daytime cough.  Cough Syrup RX as prescribed for nighttime cough - will make you drowsy.  Increase fluids: Cool liquids as much as possible. Avoid any foods or beverages that may cause irritation to the throat (spicy, acidic, rough).  Rest is important.  Avoid contact with sick individuals.  Humidifier use at home.  OTC Claritin or Zyrtec daily as directed for seasonal allergies/nasal congestion.  Flonase Nasal [place] as directed for nasal congestion/seasonal allergies.  OTC Tylenol or Motrin every 4 - 6 hours as needed for fever or pain.  Follow-up with your PCP in the next 48hrs or sooner for re-evaluation especially if no improvement in symptoms.  Follow-up in the ER for any worsening of symptoms such as new fever, increasing ear pain, neck stiffness, shortness of breath, etc.  Patient aware and verbalized understanding.    Self-Care for  Sore Throats    Sore throats happen for many reasons, such as colds, allergies, and infections caused by viruses or bacteria. In any case, your throat becomes red and sore. Your goal for self-care is to reduce your discomfort while giving your throat a chance to heal.  Moisten and soothe your throat  Tips include the following:  · Try a sip of water first thing after waking up.  · Keep your throat moist by drinking 6 or more glasses of clear liquids every day.  · Run a cool-air humidifier in your room overnight.  · Avoid cigarette smoke.   · Suck on throat lozenges, cough drops, hard candy, ice chips, or frozen fruit-juice bars. Use the sugar-free versions if your diet or medical condition requires them.  Gargle to ease irritation  Gargling every hour or 2 can ease irritation. Try gargling with 1 of these solutions:  · 1/4 teaspoon of salt in 1/2 cup of warm water  · An over-the-counter anesthetic gargle  Use medicine for more relief  Over-the-counter medicine can reduce sore throat symptoms. Ask your pharmacist if you have questions about which medicine to use:  · Ease pain with anesthetic sprays. Aspirin or an aspirin substitute also helps. Remember, never give aspirin to anyone 18 or younger, or if you are already taking blood thinners.   · For sore throats caused by allergies, try antihistamines to block the allergic reaction.  · Remember: unless a sore throat is caused by a bacterial infection, antibiotics wont help you.  Prevent future sore throats  Prevention tips include the following:  · Stop smoking or reduce contact with secondhand smoke. Smoke irritates the tender throat lining.  · Limit contact with pets and with allergy-causing substances, such as pollen and mold.  · When youre around someone with a sore throat or cold, wash your hands often to keep viruses or bacteria from spreading.  · Dont strain your vocal cords.  Call your healthcare provider  Contact your healthcare provider if you have:  · A  temperature over 101°F (38.3°C)  · White spots on the throat  · Great difficulty swallowing  · Trouble breathing  · A skin rash  · Recent exposure to someone else with strep bacteria  · Severe hoarseness and swollen glands in the neck or jaw   Date Last Reviewed: 8/1/2016  © 2538-7750 [employer]. [address] [place] [address], [place] [address]. All rights reserved. This information is not intended as a substitute for professional medical care. Always follow your healthcare professional's instructions.

## 2019-08-26 NOTE — LETTER
August 26, 2019      [facility]  [place] [address]  Phone: [phone]  Fax: [phone]       Patient: Marianne Go   YOB: 1975  Date of Visit: 08/26/2019    To Whom It May Concern:    Rod Go  was at [facility] on 08/26/2019. She may return to work/school on 08/29/2019 with no restrictions. If you have any questions or concerns, or if I can be of further assistance, please do not hesitate to contact me.    Sincerely,    Charity Talamantes PA-C

## 2019-08-26 NOTE — PROGRESS NOTES
"Subjective:       Patient ID: Marianne Go is a 43 y.o. female.    Vitals:  height is 5' 2" (1.575 m) and weight is 102.1 kg (225 lb). Her temperature is 98.6 °F (37 °C). Her blood pressure is 147/108 (abnormal) and her pulse is 76. Her respiration is 18 and oxygen saturation is 97%.     Chief Complaint: Cough    Cough   This is a new problem. The current episode started yesterday. The problem has been gradually worsening. The problem occurs constantly. The cough is productive of sputum. Associated symptoms include headaches and a sore throat. Pertinent negatives include no chest pain, chills, ear pain, eye redness, fever, heartburn, hemoptysis, myalgias, postnasal drip, rash, shortness of breath or wheezing. Nothing aggravates the symptoms. Treatments tried: tylonel. The treatment provided no relief.       Constitution: Negative for chills, sweating, fatigue and fever.   HENT: Positive for congestion, sore throat and trouble swallowing. Negative for ear pain, nosebleeds, foreign body in nose, postnasal drip, sinus pain, sinus pressure and voice change.    Neck: Negative for neck pain, neck stiffness, painful lymph nodes and neck swelling.   Cardiovascular: Negative for chest pain, leg swelling, palpitations, sob on exertion and passing out.   Eyes: Negative for eye pain, eye redness, photophobia, double vision, blurred vision and eyelid swelling.   Respiratory: Positive for cough and sputum production. Negative for chest tightness, bloody sputum, shortness of breath, stridor and wheezing.    Gastrointestinal: Negative for abdominal pain, abdominal bloating, nausea, vomiting, constipation, diarrhea and heartburn.   Musculoskeletal: Negative for joint pain, joint swelling, muscle cramps and muscle ache.   Skin: Negative for rash.   Allergic/Immunologic: Negative for seasonal allergies.   Neurological: Positive for headaches. Negative for dizziness, light-headedness, passing out, loss of balance, altered mental " status, loss of consciousness and seizures.   Hematologic/Lymphatic: Negative for swollen lymph nodes.   Psychiatric/Behavioral: Negative for altered mental status and nervous/anxious. The patient is not nervous/anxious.        Objective:      Physical Exam   Constitutional: She is oriented to person, place, and time. She appears well-developed and well-nourished. She is cooperative.  Non-toxic appearance. She does not appear ill. No distress.   HENT:   Head: Normocephalic and atraumatic.   Right Ear: Hearing, tympanic membrane, external ear and ear canal normal.   Left Ear: Hearing, tympanic membrane, external ear and ear canal normal.   Nose: Mucosal edema and rhinorrhea present. No nasal deformity. No epistaxis. Right sinus exhibits no maxillary sinus tenderness and no frontal sinus tenderness. Left sinus exhibits no maxillary sinus tenderness and no frontal sinus tenderness.   Mouth/Throat: Uvula is midline and mucous membranes are normal. No trismus in the jaw. Normal dentition. No uvula swelling. Posterior oropharyngeal erythema present. No oropharyngeal exudate or posterior oropharyngeal edema.   Eyes: Conjunctivae and lids are normal. No scleral icterus.   Sclera clear bilat   Neck: Trachea normal, full passive range of motion without pain and phonation normal. Neck supple.   Cardiovascular: Normal rate, regular rhythm, normal heart sounds, intact distal pulses and normal pulses.   Pulmonary/Chest: Effort normal and breath sounds normal. No respiratory distress.   Abdominal: Soft. Normal appearance and bowel sounds are normal. She exhibits no distension. There is no tenderness.   Musculoskeletal: Normal range of motion. She exhibits no edema or deformity.   Lymphadenopathy:     She has no cervical adenopathy.   Neurological: She is alert and oriented to person, place, and time. She exhibits normal muscle tone. Coordination normal.   Skin: Skin is warm, dry and intact. Capillary refill takes less than 2  seconds. No rash noted. She is not diaphoretic. No pallor.   Psychiatric: She has a normal mood and affect. Her speech is normal and behavior is normal. Judgment and thought content normal. Cognition and memory are normal.   Nursing note and vitals reviewed.      Results for orders placed or performed in visit on 08/26/19   POCT rapid strep A   Result Value Ref Range    Rapid Strep A Screen Negative Negative     Acceptable Yes      Assessment:       1. Viral URI with cough    2. Sore throat    3. Elevated blood pressure reading        Plan:         Viral URI with cough  -     loratadine (CLARITIN) 10 mg tablet; Take 1 tablet (10 mg total) by mouth once daily.  Dispense: 30 tablet; Refill: 0  -     fluticasone propionate (FLONASE ALLERGY RELIEF) 50 mcg/actuation nasal spray; 1 spray (50 mcg total) by Each Nostril route daily as needed.  Dispense: 1 Bottle; Refill: 0  -     predniSONE (DELTASONE) 10 MG tablet; Take 2 tablets (20 mg total) by mouth once daily. for 3 days  Dispense: 6 tablet; Refill: 0  -     promethazine-dextromethorphan (PROMETHAZINE-DM) 6.25-15 mg/5 mL Syrp; Take 5 mLs by mouth every 6 (six) hours.  Dispense: 120 mL; Refill: 0  -     benzonatate (TESSALON PERLES) 100 MG capsule; Take 2 capsules (200 mg total) by mouth 3 (three) times daily as needed for Cough.  Dispense: 30 capsule; Refill: 0    Sore throat  -     POCT rapid strep A    Elevated blood pressure reading      Patient Instructions     If you were prescribed a narcotic or controlled medication, do not drive or operate heavy equipment or machinery while taking these medications.  You must understand that you've received an Urgent Care treatment only and that you may be released before all your medical problems are known or treated. You, the patient, will arrange for follow up care as instructed.  Follow up with your PCP or specialty clinic as directed if not improved or as needed. You can call [phone] to schedule an  appointment with the appropriate provider.  If your condition worsens we recommend that you receive another evaluation at the Emergency Department for any concerns or worsening of condition.  Patient aware and verbalized understanding.    You tested NEGATIVE for strep today.  Patient aware and verbalized understanding.    Your symptoms are viral in nature.  Prednisone RX as prescribed to help reduce swelling/inflammation.  Flonase Nasal Spray RX as prescribed for nasal congestion/seasonal allergies.  Claritin RX as prescribed daily for seasonal allergies.  Tessalon Perles RX as prescribed for daytime cough.  Cough Syrup RX as prescribed for nighttime cough - will make you drowsy.  Increase fluids: Cool liquids as much as possible. Avoid any foods or beverages that may cause irritation to the throat (spicy, acidic, rough).  Rest is important.  Avoid contact with sick individuals.  Humidifier use at home.  OTC Claritin or Zyrtec daily as directed for seasonal allergies/nasal congestion.  Flonase Nasal [place] as directed for nasal congestion/seasonal allergies.  OTC Tylenol or Motrin every 4 - 6 hours as needed for fever or pain.  Follow-up with your PCP in the next 48hrs or sooner for re-evaluation especially if no improvement in symptoms.  Follow-up in the ER for any worsening of symptoms such as new fever, increasing ear pain, neck stiffness, shortness of breath, etc.  Patient aware and verbalized understanding.    Self-Care for Sore Throats    Sore throats happen for many reasons, such as colds, allergies, and infections caused by viruses or bacteria. In any case, your throat becomes red and sore. Your goal for self-care is to reduce your discomfort while giving your throat a chance to heal.  Moisten and soothe your throat  Tips include the following:  · Try a sip of water first thing after waking up.  · Keep your throat moist by drinking 6 or more glasses of clear liquids every day.  · Run a cool-air humidifier in  your room overnight.  · Avoid cigarette smoke.   · Suck on throat lozenges, cough drops, hard candy, ice chips, or frozen fruit-juice bars. Use the sugar-free versions if your diet or medical condition requires them.  Gargle to ease irritation  Gargling every hour or 2 can ease irritation. Try gargling with 1 of these solutions:  · 1/4 teaspoon of salt in 1/2 cup of warm water  · An over-the-counter anesthetic gargle  Use medicine for more relief  Over-the-counter medicine can reduce sore throat symptoms. Ask your pharmacist if you have questions about which medicine to use:  · Ease pain with anesthetic sprays. Aspirin or an aspirin substitute also helps. Remember, never give aspirin to anyone 18 or younger, or if you are already taking blood thinners.   · For sore throats caused by allergies, try antihistamines to block the allergic reaction.  · Remember: unless a sore throat is caused by a bacterial infection, antibiotics wont help you.  Prevent future sore throats  Prevention tips include the following:  · Stop smoking or reduce contact with secondhand smoke. Smoke irritates the tender throat lining.  · Limit contact with pets and with allergy-causing substances, such as pollen and mold.  · When youre around someone with a sore throat or cold, wash your hands often to keep viruses or bacteria from spreading.  · Dont strain your vocal cords.  Call your healthcare provider  Contact your healthcare provider if you have:  · A temperature over 101°F (38.3°C)  · White spots on the throat  · Great difficulty swallowing  · Trouble breathing  · A skin rash  · Recent exposure to someone else with strep bacteria  · Severe hoarseness and swollen glands in the neck or jaw   Date Last Reviewed: 8/1/2016  © 4493-8832 [employer]. [address] [place] [address], [place] [address]. All rights reserved. This information is not intended as a substitute for professional medical care. Always follow your healthcare  professional's instructions.

## 2020-01-04 ENCOUNTER — HOSPITAL ENCOUNTER (EMERGENCY)
Facility: HOSPITAL | Age: 45
Discharge: HOME | End: 2020-01-04
Attending: EMERGENCY MEDICINE
Payer: MEDICAID

## 2020-01-04 VITALS
TEMPERATURE: 98 F | DIASTOLIC BLOOD PRESSURE: 77 MMHG | HEIGHT: 62 IN | BODY MASS INDEX: 38.64 KG/M2 | WEIGHT: 210 LBS | OXYGEN SATURATION: 99 % | SYSTOLIC BLOOD PRESSURE: 143 MMHG | RESPIRATION RATE: 19 BRPM | HEART RATE: 50 BPM

## 2020-01-04 DIAGNOSIS — R10.9 FLANK PAIN: Primary | ICD-10-CM

## 2020-01-04 LAB
ALBUMIN SERPL BCP-MCNC: 3.6 G/DL (ref 3.5–5.2)
ALP SERPL-CCNC: 85 U/L (ref 55–135)
ALT SERPL W/O P-5'-P-CCNC: 14 U/L (ref 10–44)
ANION GAP SERPL CALC-SCNC: 10 MMOL/L (ref 8–16)
AST SERPL-CCNC: 12 U/L (ref 10–40)
BASOPHILS # BLD AUTO: 0.08 K/UL (ref 0–0.2)
BASOPHILS NFR BLD: 1.1 % (ref 0–1.9)
BILIRUB SERPL-MCNC: 0.5 MG/DL (ref 0.1–1)
BILIRUB UR QL STRIP: NEGATIVE
BUN SERPL-MCNC: 12 MG/DL (ref 6–20)
CALCIUM SERPL-MCNC: 9.3 MG/DL (ref 8.7–10.5)
CHLORIDE SERPL-SCNC: 107 MMOL/L (ref 95–110)
CLARITY UR REFRACT.AUTO: ABNORMAL
CO2 SERPL-SCNC: 24 MMOL/L (ref 23–29)
COLOR UR AUTO: YELLOW
CREAT SERPL-MCNC: 0.8 MG/DL (ref 0.5–1.4)
DIFFERENTIAL METHOD: ABNORMAL
EOSINOPHIL # BLD AUTO: 0.2 K/UL (ref 0–0.5)
EOSINOPHIL NFR BLD: 3.3 % (ref 0–8)
ERYTHROCYTE [DISTWIDTH] IN BLOOD BY AUTOMATED COUNT: 11.9 % (ref 11.5–14.5)
EST. GFR  (AFRICAN AMERICAN): >60 ML/MIN/1.73 M^2
EST. GFR  (NON AFRICAN AMERICAN): >60 ML/MIN/1.73 M^2
GLUCOSE SERPL-MCNC: 89 MG/DL (ref 70–110)
GLUCOSE UR QL STRIP: NEGATIVE
HCT VFR BLD AUTO: 41.4 % (ref 37–48.5)
HGB BLD-MCNC: 13.7 G/DL (ref 12–16)
HGB UR QL STRIP: NEGATIVE
IMM GRANULOCYTES # BLD AUTO: 0.02 K/UL (ref 0–0.04)
IMM GRANULOCYTES NFR BLD AUTO: 0.3 % (ref 0–0.5)
KETONES UR QL STRIP: NEGATIVE
LEUKOCYTE ESTERASE UR QL STRIP: NEGATIVE
LIPASE SERPL-CCNC: 34 U/L (ref 4–60)
LYMPHOCYTES # BLD AUTO: 2 K/UL (ref 1–4.8)
LYMPHOCYTES NFR BLD: 27.1 % (ref 18–48)
MCH RBC QN AUTO: 32.2 PG (ref 27–31)
MCHC RBC AUTO-ENTMCNC: 33.1 G/DL (ref 32–36)
MCV RBC AUTO: 97 FL (ref 82–98)
MONOCYTES # BLD AUTO: 0.4 K/UL (ref 0.3–1)
MONOCYTES NFR BLD: 5.8 % (ref 4–15)
NEUTROPHILS # BLD AUTO: 4.5 K/UL (ref 1.8–7.7)
NEUTROPHILS NFR BLD: 62.4 % (ref 38–73)
NITRITE UR QL STRIP: NEGATIVE
NRBC BLD-RTO: 0 /100 WBC
PH UR STRIP: 6 [PH] (ref 5–8)
PLATELET # BLD AUTO: 281 K/UL (ref 150–350)
PMV BLD AUTO: 10.3 FL (ref 9.2–12.9)
POTASSIUM SERPL-SCNC: 4.1 MMOL/L (ref 3.5–5.1)
PROT SERPL-MCNC: 7 G/DL (ref 6–8.4)
PROT UR QL STRIP: NEGATIVE
RBC # BLD AUTO: 4.26 M/UL (ref 4–5.4)
SODIUM SERPL-SCNC: 141 MMOL/L (ref 136–145)
SP GR UR STRIP: 1.02 (ref 1–1.03)
URN SPEC COLLECT METH UR: ABNORMAL
WBC # BLD AUTO: 7.26 K/UL (ref 3.9–12.7)

## 2020-01-04 PROCEDURE — 83690 ASSAY OF LIPASE: CPT

## 2020-01-04 PROCEDURE — 99284 EMERGENCY DEPT VISIT MOD MDM: CPT | Mod: 25

## 2020-01-04 PROCEDURE — 96361 HYDRATE IV INFUSION ADD-ON: CPT

## 2020-01-04 PROCEDURE — 96374 THER/PROPH/DIAG INJ IV PUSH: CPT

## 2020-01-04 PROCEDURE — 80053 COMPREHEN METABOLIC PANEL: CPT

## 2020-01-04 PROCEDURE — 99285 EMERGENCY DEPT VISIT HI MDM: CPT | Mod: ,,, | Performed by: EMERGENCY MEDICINE

## 2020-01-04 PROCEDURE — 81003 URINALYSIS AUTO W/O SCOPE: CPT

## 2020-01-04 PROCEDURE — 63600175 PHARM REV CODE 636 W HCPCS: Performed by: EMERGENCY MEDICINE

## 2020-01-04 PROCEDURE — 85025 COMPLETE CBC W/AUTO DIFF WBC: CPT

## 2020-01-04 PROCEDURE — 99285 PR EMERGENCY DEPT VISIT,LEVEL V: ICD-10-PCS | Mod: ,,, | Performed by: EMERGENCY MEDICINE

## 2020-01-04 RX ORDER — MORPHINE SULFATE 4 MG/ML
4 INJECTION, SOLUTION INTRAMUSCULAR; INTRAVENOUS
Status: COMPLETED | OUTPATIENT
Start: 2020-01-04 | End: 2020-01-04

## 2020-01-04 RX ORDER — CETIRIZINE HYDROCHLORIDE 10 MG/1
10 TABLET ORAL DAILY
COMMUNITY
End: 2021-01-27 | Stop reason: SDUPTHER

## 2020-01-04 RX ORDER — ONDANSETRON 4 MG/1
4 TABLET, ORALLY DISINTEGRATING ORAL EVERY 6 HOURS PRN
Qty: 20 TABLET | Refills: 0 | Status: SHIPPED | OUTPATIENT
Start: 2020-01-04 | End: 2021-01-14

## 2020-01-04 RX ORDER — NAPROXEN 500 MG/1
500 TABLET ORAL 2 TIMES DAILY WITH MEALS
Qty: 30 TABLET | Refills: 0 | Status: SHIPPED | OUTPATIENT
Start: 2020-01-04 | End: 2021-01-14

## 2020-01-04 RX ADMIN — MORPHINE SULFATE 4 MG: 4 INJECTION, SOLUTION INTRAMUSCULAR; INTRAVENOUS at 12:01

## 2020-01-04 RX ADMIN — SODIUM CHLORIDE 1000 ML: 0.9 INJECTION, SOLUTION INTRAVENOUS at 12:01

## 2020-01-04 NOTE — ED TRIAGE NOTES
Arrives per EMS for  Pain in   Rt flank that began suddenly  PTA , having n/v. Denies SOB or fever or diarrhea. Received fentanyl and zofran PTA per EMS

## 2020-01-04 NOTE — ED PROVIDER NOTES
Encounter Date: 1/4/2020    SCRIBE #1 NOTE: I, Joslyn Dickinson, am scribing for, and in the presence of,  Alexsander Henderson MD. I have scribed the following portions of the note - Other sections scribed: HPI ROS PE.       History     Chief Complaint   Patient presents with    Flank Pain     Right flank pain that began today.      Time patient was seen by the provider: 11:47 AM      The patient is a 44 y.o. female with co-morbidities including: kidney stones, kidney calculi, who presents to the ED via EMS with a complaint of right sided flank pain with associated nausea and vomiting. Patient reports pain was a sudden onset approximately 45 minutes ago. She reports pain is a 3/10 upon evaluation. Patient was given fentanyl by EMS prior to arrival. She reports similar flank pain in the past. However, pain is normally a gradual onset. Denies diarrhea, fever, cough, shortness of breath, chest pain, abdominal pain, or dysuria. Past surgical history of cystoscopy w/ lithotripsy and urethral stents. A ten point review systems was completed and is negative except as document above. Patient denies any other acute medical complaint. The patient's available PMH, PSH, social history, medications, allergies, and triage vital signs were reviewed just prior to their medical evaluation.     The history is provided by the patient.     Review of patient's allergies indicates:  No Known Allergies  Past Medical History:   Diagnosis Date    Deep vein thrombosis 1990    pregnancy related     Kidney calculi     Kidney stones     Kidney stones     Migraines     perimenstrual      Past Surgical History:   Procedure Laterality Date    BLADDER SUSPENSION      CYSTOSCOPY W/ LASER LITHOTRIPSY      PELVIC LAPAROSCOPY      ablation of endometriosis    TONSILLECTOMY      TUBAL LIGATION  2011    [facility]      Family History   Problem Relation Age of Onset    Hyperlipidemia Father     Hypothyroidism Father     Cancer Father         lung  cancer     Allergic rhinitis Father     Asthma Son     Hyperlipidemia Mother     Hypertension Mother     Diabetes Mother     Hypothyroidism Mother     Breast cancer Neg Hx     Colon cancer Neg Hx     Ovarian cancer Neg Hx      Social History     Tobacco Use    Smoking status: Former Smoker     Packs/day: 0.25     Years: 13.00     Pack years: 3.25     Last attempt to quit: 2003     Years since quittin.0    Smokeless tobacco: Never Used    Tobacco comment: quit 11 years ago   Substance Use Topics    Alcohol use: Yes     Comment: rarely    Drug use: No     Review of Systems   Constitutional: Negative for fever.   HENT: Negative for sore throat.    Eyes: Negative for visual disturbance.   Respiratory: Negative for cough and shortness of breath.    Cardiovascular: Negative for chest pain and leg swelling.   Gastrointestinal: Positive for nausea and vomiting. Negative for abdominal pain, constipation and diarrhea.   Genitourinary: Positive for flank pain. Negative for dysuria.   Musculoskeletal: Negative for neck pain and neck stiffness.   Skin: Negative for rash and wound.   Allergic/Immunologic: Negative for immunocompromised state.   Neurological: Negative for syncope.   Psychiatric/Behavioral: Negative for confusion.   All other systems reviewed and are negative.      Physical Exam     Initial Vitals [20 1137]   BP Pulse Resp Temp SpO2   (!) 156/88 96 19 97.9 °F (36.6 °C) 100 %      MAP       --         Physical Exam    Nursing note and vitals reviewed.  Constitutional: She appears well-developed and well-nourished. She is diaphoretic. No distress.   HENT:   Head: Normocephalic and atraumatic.   Nose: Nose normal.   Eyes: Conjunctivae are normal. Right eye exhibits no discharge. Left eye exhibits no discharge.   Neck: Normal range of motion. Neck supple.   Cardiovascular: Normal rate, regular rhythm and normal heart sounds. Exam reveals no gallop and no friction rub.    No murmur  heard.  Pulmonary/Chest: Breath sounds normal. No respiratory distress. She has no wheezes. She has no rhonchi. She has no rales. She exhibits no tenderness.   Abdominal: Soft. She exhibits no distension and no mass. There is no tenderness. There is CVA tenderness. There is no rebound and no guarding.   Right CVA tenderness   Musculoskeletal: Normal range of motion. She exhibits no edema or tenderness.   Neurological: She is alert and oriented to person, place, and time. She has normal strength. GCS score is 15. GCS eye subscore is 4. GCS verbal subscore is 5. GCS motor subscore is 6.   Skin: Skin is warm. No rash noted. No erythema.   Psychiatric: She has a normal mood and affect. Her behavior is normal. Judgment and thought content normal.         ED Course   Procedures  Labs Reviewed   CBC W/ AUTO DIFFERENTIAL - Abnormal; Notable for the following components:       Result Value    Mean Corpuscular Hemoglobin 32.2 (*)     All other components within normal limits   URINALYSIS, REFLEX TO URINE CULTURE - Abnormal; Notable for the following components:    Appearance, UA Hazy (*)     All other components within normal limits    Narrative:     Preferred Collection Type->Urine, Clean Catch   COMPREHENSIVE METABOLIC PANEL   LIPASE          Imaging Results          CT Abdomen Pelvis  Without Contrast (Final result)  Result time 01/04/20 15:41:12    Final result by Nader Jarrett MD (01/04/20 15:41:12)                 Impression:      Punctate nonobstructing stone in midpole of the left kidney.  No hydronephrosis.    Mild hepatomegaly.    Electronically signed by resident: Vasile Choe  Date:    01/04/2020  Time:    15:18    Electronically signed by: Nader Jarrett MD  Date:    01/04/2020  Time:    15:41             Narrative:    EXAMINATION:  CT ABDOMEN PELVIS WITHOUT CONTRAST    CLINICAL HISTORY:  Flank pain, recurrent stone disease suspected;    TECHNIQUE:  Low dose axial images, sagittal and coronal  reformations were obtained from the lung bases to the pubic symphysis, Oral contrast was not administered.    COMPARISON:  CT renal stone study 04/01/2019    FINDINGS:  Heart: Normal in size. No pericardial effusion.    Lung Bases: Well aerated, without consolidation or pleural fluid.  Bandlike opacities at the bilateral bases which could represent subsegmental atelectasis.    Liver: Enlarged with normal attenuation, with no focal hepatic lesions.    Gallbladder: No calcified gallstones.    Bile Ducts: No evidence of dilated ducts.    Pancreas: No mass or peripancreatic fat stranding.    Spleen: Unremarkable.    Adrenals: Unremarkable.    Kidneys/ Ureters: Punctate calcification within the left kidney.  Right kidney does not reveal renal calculi.  Ureters demonstrate no evidence of stones.  A few phleboliths are noted.    Bladder: No evidence of wall thickening.    Reproductive organs: No evidence of adnexal mass.    GI Tract/Mesentery: No evidence of bowel obstruction or inflammation.  Normal appendix.    Peritoneal Space: No ascites. No free air.    Retroperitoneum: No significant adenopathy.    Abdominal wall: Unremarkable.    Vasculature: No significant atherosclerosis or aneurysm.    Bones: No acute fracture.                              X-Rays:   Independently Interpreted Readings:   Other Readings:  Reviewed CT images, no acute giovanni or stone on RIGHT seen    Medical Decision Making:   History:   Old Medical Records: I decided to obtain old medical records.  Independently Interpreted Test(s):   I have ordered and independently interpreted X-rays - see prior notes.  Clinical Tests:   Lab Tests: Ordered and Reviewed  Radiological Study: Ordered and Reviewed  ED Management:  44-year-old female with a history of kidney stones presents with acute onset of right flank pain.  Vitals with slight hypertension.  Physical exam as above.  Labs unremarkable. CT unremarkable. Patient has had several urine output and  improved while in the ED.  It is possible she has passed a small kidney stone.  Doubt cholecystitis, appendicitis, pancreatitis, SBO, diverticulitis, or any other acute abdominal surgical emergency.  Will discharge with naproxen and Zofran.  She will call her primary on Monday to arrange close follow-up.  Patient will return to ED for worsening symptoms, inability to eat/drink, fever greater than 100.4, or any other concerns.  Did bedside teaching with return precautions.  All questions answered.  The patient acknowledges understanding.  Gave verbal discharge instructions.            Scribe Attestation:   Scribe #1: I performed the above scribed service and the documentation accurately describes the services I performed. I attest to the accuracy of the note.                          Clinical Impression:       ICD-10-CM ICD-9-CM   1. Flank pain R10.9 789.09         Disposition:   Disposition: Discharged  Condition: Stable      Level of Complexity:  High, level 5.               Alexsander Henderson MD  01/04/20 [id number]

## 2020-01-04 NOTE — DISCHARGE INSTRUCTIONS
Do not take ibuprofen.  Take Tylenol over the counter as directed on packaging as needed for pain along with prescribed naproxen.    Our goal in the emergency department is to always give you outstanding care and exceptional service. You may receive a survey by mail or e-mail in the next week regarding your experience in our ED. We would greatly appreciate your completing and returning the survey. Your feedback provides us with a way to recognize our staff who give very good care and it helps us learn how to improve when your experience was below our aspiration of excellence.

## 2020-01-04 NOTE — ED NOTES
LOC: The patient is awake and alert; oriented x 3 and speaking appropriately.  APPEARANCE: Patient resting comfortably, patient is clean and well groomed  SKIN: warm and dry, normal skin turgor & moist mucus membranes, skin intact, no breakdown noted.  MUSCULOSKELETAL: Patient moving all extremities well, no obvious swelling or deformities noted  RESPIRATORY: Airway is open and patent,  respirations are spontaneous, normal effort and rate  CARDIAC: Patient has a normal rate, no peripheral edema noted, capillary refill < 3 seconds; No complaints of chest pain   ABDOMEN: Soft and non tender to palpation, no distention noted. Bowel sounds present x 4, pain in rt flank

## 2021-01-14 ENCOUNTER — LAB VISIT (OUTPATIENT)
Dept: LAB | Facility: HOSPITAL | Age: 46
End: 2021-01-14
Attending: NURSE PRACTITIONER
Payer: COMMERCIAL

## 2021-01-14 ENCOUNTER — OFFICE VISIT (OUTPATIENT)
Dept: INTERNAL MEDICINE | Facility: CLINIC | Age: 46
End: 2021-01-14
Payer: COMMERCIAL

## 2021-01-14 VITALS
BODY MASS INDEX: 44.38 KG/M2 | DIASTOLIC BLOOD PRESSURE: 70 MMHG | HEIGHT: 62 IN | RESPIRATION RATE: 15 BRPM | TEMPERATURE: 98 F | HEART RATE: 80 BPM | WEIGHT: 241.19 LBS | SYSTOLIC BLOOD PRESSURE: 132 MMHG

## 2021-01-14 DIAGNOSIS — F41.9 ANXIETY AND DEPRESSION: ICD-10-CM

## 2021-01-14 DIAGNOSIS — Z12.4 SCREENING FOR CERVICAL CANCER: ICD-10-CM

## 2021-01-14 DIAGNOSIS — R51.9 NONINTRACTABLE HEADACHE, UNSPECIFIED CHRONICITY PATTERN, UNSPECIFIED HEADACHE TYPE: ICD-10-CM

## 2021-01-14 DIAGNOSIS — M25.561 CHRONIC PAIN OF BOTH KNEES: ICD-10-CM

## 2021-01-14 DIAGNOSIS — Z83.49 FAMILY HISTORY OF HYPOTHYROIDISM: ICD-10-CM

## 2021-01-14 DIAGNOSIS — F32.A ANXIETY AND DEPRESSION: ICD-10-CM

## 2021-01-14 DIAGNOSIS — Z00.00 ENCOUNTER FOR MEDICAL EXAMINATION TO ESTABLISH CARE: ICD-10-CM

## 2021-01-14 DIAGNOSIS — Z13.1 SCREENING FOR DIABETES MELLITUS: ICD-10-CM

## 2021-01-14 DIAGNOSIS — Z13.29 SCREENING FOR THYROID DISORDER: ICD-10-CM

## 2021-01-14 DIAGNOSIS — Z01.419 WELL WOMAN EXAM: ICD-10-CM

## 2021-01-14 DIAGNOSIS — R21 RASH OF BACK: ICD-10-CM

## 2021-01-14 DIAGNOSIS — Z80.3 FAMILY HISTORY OF BREAST CANCER: ICD-10-CM

## 2021-01-14 DIAGNOSIS — Z83.3 FAMILY HISTORY OF DIABETES MELLITUS: ICD-10-CM

## 2021-01-14 DIAGNOSIS — Z12.39 ENCOUNTER FOR SCREENING FOR MALIGNANT NEOPLASM OF BREAST, UNSPECIFIED SCREENING MODALITY: ICD-10-CM

## 2021-01-14 DIAGNOSIS — Z87.442 HISTORY OF KIDNEY STONES: ICD-10-CM

## 2021-01-14 DIAGNOSIS — Z00.00 ENCOUNTER FOR MEDICAL EXAMINATION TO ESTABLISH CARE: Primary | ICD-10-CM

## 2021-01-14 DIAGNOSIS — M25.562 CHRONIC PAIN OF BOTH KNEES: ICD-10-CM

## 2021-01-14 DIAGNOSIS — G89.29 CHRONIC PAIN OF BOTH KNEES: ICD-10-CM

## 2021-01-14 LAB
BASOPHILS # BLD AUTO: 0.08 K/UL (ref 0–0.2)
BASOPHILS NFR BLD: 1.1 % (ref 0–1.9)
DIFFERENTIAL METHOD: ABNORMAL
EOSINOPHIL # BLD AUTO: 0.3 K/UL (ref 0–0.5)
EOSINOPHIL NFR BLD: 3.8 % (ref 0–8)
ERYTHROCYTE [DISTWIDTH] IN BLOOD BY AUTOMATED COUNT: 12.1 % (ref 11.5–14.5)
HCT VFR BLD AUTO: 47.2 % (ref 37–48.5)
HGB BLD-MCNC: 15 G/DL (ref 12–16)
IMM GRANULOCYTES # BLD AUTO: 0.02 K/UL (ref 0–0.04)
IMM GRANULOCYTES NFR BLD AUTO: 0.3 % (ref 0–0.5)
LYMPHOCYTES # BLD AUTO: 2.3 K/UL (ref 1–4.8)
LYMPHOCYTES NFR BLD: 31.3 % (ref 18–48)
MCH RBC QN AUTO: 32.5 PG (ref 27–31)
MCHC RBC AUTO-ENTMCNC: 31.8 G/DL (ref 32–36)
MCV RBC AUTO: 102 FL (ref 82–98)
MONOCYTES # BLD AUTO: 0.4 K/UL (ref 0.3–1)
MONOCYTES NFR BLD: 5.1 % (ref 4–15)
NEUTROPHILS # BLD AUTO: 4.4 K/UL (ref 1.8–7.7)
NEUTROPHILS NFR BLD: 58.4 % (ref 38–73)
NRBC BLD-RTO: 0 /100 WBC
PLATELET # BLD AUTO: 339 K/UL (ref 150–350)
PMV BLD AUTO: 10.8 FL (ref 9.2–12.9)
RBC # BLD AUTO: 4.61 M/UL (ref 4–5.4)
WBC # BLD AUTO: 7.44 K/UL (ref 3.9–12.7)

## 2021-01-14 PROCEDURE — 99999 PR PBB SHADOW E&M-EST. PATIENT-LVL IV: CPT | Mod: PBBFAC,,, | Performed by: NURSE PRACTITIONER

## 2021-01-14 PROCEDURE — 83036 HEMOGLOBIN GLYCOSYLATED A1C: CPT

## 2021-01-14 PROCEDURE — 90471 FLU VACCINE (QUAD) GREATER THAN OR EQUAL TO 3YO PRESERVATIVE FREE IM: ICD-10-PCS | Mod: S$GLB,,, | Performed by: NURSE PRACTITIONER

## 2021-01-14 PROCEDURE — 80061 LIPID PANEL: CPT

## 2021-01-14 PROCEDURE — 99214 OFFICE O/P EST MOD 30 MIN: CPT | Mod: 25,S$GLB,, | Performed by: NURSE PRACTITIONER

## 2021-01-14 PROCEDURE — 1125F AMNT PAIN NOTED PAIN PRSNT: CPT | Mod: S$GLB,,, | Performed by: NURSE PRACTITIONER

## 2021-01-14 PROCEDURE — 90686 FLU VACCINE (QUAD) GREATER THAN OR EQUAL TO 3YO PRESERVATIVE FREE IM: ICD-10-PCS | Mod: S$GLB,,, | Performed by: NURSE PRACTITIONER

## 2021-01-14 PROCEDURE — 85025 COMPLETE CBC W/AUTO DIFF WBC: CPT

## 2021-01-14 PROCEDURE — 90686 IIV4 VACC NO PRSV 0.5 ML IM: CPT | Mod: S$GLB,,, | Performed by: NURSE PRACTITIONER

## 2021-01-14 PROCEDURE — 1125F PR PAIN SEVERITY QUANTIFIED, PAIN PRESENT: ICD-10-PCS | Mod: S$GLB,,, | Performed by: NURSE PRACTITIONER

## 2021-01-14 PROCEDURE — 99214 PR OFFICE/OUTPT VISIT, EST, LEVL IV, 30-39 MIN: ICD-10-PCS | Mod: 25,S$GLB,, | Performed by: NURSE PRACTITIONER

## 2021-01-14 PROCEDURE — 99999 PR PBB SHADOW E&M-EST. PATIENT-LVL IV: ICD-10-PCS | Mod: PBBFAC,,, | Performed by: NURSE PRACTITIONER

## 2021-01-14 PROCEDURE — 36415 COLL VENOUS BLD VENIPUNCTURE: CPT | Mod: PO

## 2021-01-14 PROCEDURE — 90471 IMMUNIZATION ADMIN: CPT | Mod: S$GLB,,, | Performed by: NURSE PRACTITIONER

## 2021-01-14 PROCEDURE — 3008F PR BODY MASS INDEX (BMI) DOCUMENTED: ICD-10-PCS | Mod: CPTII,S$GLB,, | Performed by: NURSE PRACTITIONER

## 2021-01-14 PROCEDURE — 3008F BODY MASS INDEX DOCD: CPT | Mod: CPTII,S$GLB,, | Performed by: NURSE PRACTITIONER

## 2021-01-14 PROCEDURE — 84443 ASSAY THYROID STIM HORMONE: CPT

## 2021-01-14 PROCEDURE — 84439 ASSAY OF FREE THYROXINE: CPT

## 2021-01-14 PROCEDURE — 80053 COMPREHEN METABOLIC PANEL: CPT

## 2021-01-14 RX ORDER — CETIRIZINE HYDROCHLORIDE 10 MG/1
10 TABLET ORAL
COMMUNITY
End: 2022-01-10

## 2021-01-15 ENCOUNTER — OFFICE VISIT (OUTPATIENT)
Dept: DERMATOLOGY | Facility: CLINIC | Age: 46
End: 2021-01-15
Payer: COMMERCIAL

## 2021-01-15 ENCOUNTER — PATIENT MESSAGE (OUTPATIENT)
Dept: INTERNAL MEDICINE | Facility: CLINIC | Age: 46
End: 2021-01-15

## 2021-01-15 VITALS — WEIGHT: 241 LBS | BODY MASS INDEX: 44.08 KG/M2

## 2021-01-15 DIAGNOSIS — E78.2 MODERATE MIXED HYPERLIPIDEMIA NOT REQUIRING STATIN THERAPY: Primary | ICD-10-CM

## 2021-01-15 DIAGNOSIS — R21 RASH OF BACK: ICD-10-CM

## 2021-01-15 DIAGNOSIS — D18.01 CHERRY ANGIOMA: ICD-10-CM

## 2021-01-15 DIAGNOSIS — L73.9 FOLLICULITIS: Primary | ICD-10-CM

## 2021-01-15 DIAGNOSIS — D22.9 NEVUS OF MULTIPLE SITES: ICD-10-CM

## 2021-01-15 LAB
ALBUMIN SERPL BCP-MCNC: 4.1 G/DL (ref 3.5–5.2)
ALP SERPL-CCNC: 76 U/L (ref 55–135)
ALT SERPL W/O P-5'-P-CCNC: 15 U/L (ref 10–44)
ANION GAP SERPL CALC-SCNC: 12 MMOL/L (ref 8–16)
AST SERPL-CCNC: 14 U/L (ref 10–40)
BILIRUB SERPL-MCNC: 0.5 MG/DL (ref 0.1–1)
BUN SERPL-MCNC: 13 MG/DL (ref 6–20)
CALCIUM SERPL-MCNC: 9.5 MG/DL (ref 8.7–10.5)
CHLORIDE SERPL-SCNC: 104 MMOL/L (ref 95–110)
CHOLEST SERPL-MCNC: 244 MG/DL (ref 120–199)
CHOLEST/HDLC SERPL: 6.6 {RATIO} (ref 2–5)
CO2 SERPL-SCNC: 25 MMOL/L (ref 23–29)
CREAT SERPL-MCNC: 0.8 MG/DL (ref 0.5–1.4)
EST. GFR  (AFRICAN AMERICAN): >60 ML/MIN/1.73 M^2
EST. GFR  (NON AFRICAN AMERICAN): >60 ML/MIN/1.73 M^2
ESTIMATED AVG GLUCOSE: 94 MG/DL (ref 68–131)
GLUCOSE SERPL-MCNC: 69 MG/DL (ref 70–110)
HBA1C MFR BLD HPLC: 4.9 % (ref 4–5.6)
HDLC SERPL-MCNC: 37 MG/DL (ref 40–75)
HDLC SERPL: 15.2 % (ref 20–50)
LDLC SERPL CALC-MCNC: 160.4 MG/DL (ref 63–159)
NONHDLC SERPL-MCNC: 207 MG/DL
POTASSIUM SERPL-SCNC: 4.7 MMOL/L (ref 3.5–5.1)
PROT SERPL-MCNC: 7.6 G/DL (ref 6–8.4)
SODIUM SERPL-SCNC: 141 MMOL/L (ref 136–145)
T4 FREE SERPL-MCNC: 0.99 NG/DL (ref 0.71–1.51)
TRIGL SERPL-MCNC: 233 MG/DL (ref 30–150)
TSH SERPL DL<=0.005 MIU/L-ACNC: 2.15 UIU/ML (ref 0.4–4)

## 2021-01-15 PROCEDURE — 3008F PR BODY MASS INDEX (BMI) DOCUMENTED: ICD-10-PCS | Mod: CPTII,S$GLB,, | Performed by: DERMATOLOGY

## 2021-01-15 PROCEDURE — 1125F AMNT PAIN NOTED PAIN PRSNT: CPT | Mod: S$GLB,,, | Performed by: DERMATOLOGY

## 2021-01-15 PROCEDURE — 99203 OFFICE O/P NEW LOW 30 MIN: CPT | Mod: S$GLB,,, | Performed by: DERMATOLOGY

## 2021-01-15 PROCEDURE — 99999 PR PBB SHADOW E&M-EST. PATIENT-LVL III: ICD-10-PCS | Mod: PBBFAC,,, | Performed by: DERMATOLOGY

## 2021-01-15 PROCEDURE — 1125F PR PAIN SEVERITY QUANTIFIED, PAIN PRESENT: ICD-10-PCS | Mod: S$GLB,,, | Performed by: DERMATOLOGY

## 2021-01-15 PROCEDURE — 99203 PR OFFICE/OUTPT VISIT, NEW, LEVL III, 30-44 MIN: ICD-10-PCS | Mod: S$GLB,,, | Performed by: DERMATOLOGY

## 2021-01-15 PROCEDURE — 99999 PR PBB SHADOW E&M-EST. PATIENT-LVL III: CPT | Mod: PBBFAC,,, | Performed by: DERMATOLOGY

## 2021-01-15 PROCEDURE — 3008F BODY MASS INDEX DOCD: CPT | Mod: CPTII,S$GLB,, | Performed by: DERMATOLOGY

## 2021-01-15 RX ORDER — DOXYCYCLINE HYCLATE 100 MG
TABLET ORAL
Qty: 30 TABLET | Refills: 6 | Status: SHIPPED | OUTPATIENT
Start: 2021-01-15 | End: 2022-03-24

## 2021-01-15 RX ORDER — ATORVASTATIN CALCIUM 10 MG/1
10 TABLET, FILM COATED ORAL DAILY
Qty: 90 TABLET | Refills: 3 | Status: SHIPPED | OUTPATIENT
Start: 2021-01-15 | End: 2021-10-25

## 2021-01-25 ENCOUNTER — PATIENT MESSAGE (OUTPATIENT)
Dept: OBSTETRICS AND GYNECOLOGY | Facility: CLINIC | Age: 46
End: 2021-01-25

## 2021-01-25 ENCOUNTER — LAB VISIT (OUTPATIENT)
Dept: LAB | Facility: HOSPITAL | Age: 46
End: 2021-01-25
Attending: STUDENT IN AN ORGANIZED HEALTH CARE EDUCATION/TRAINING PROGRAM
Payer: COMMERCIAL

## 2021-01-25 ENCOUNTER — OFFICE VISIT (OUTPATIENT)
Dept: OBSTETRICS AND GYNECOLOGY | Facility: CLINIC | Age: 46
End: 2021-01-25
Attending: STUDENT IN AN ORGANIZED HEALTH CARE EDUCATION/TRAINING PROGRAM
Payer: COMMERCIAL

## 2021-01-25 VITALS
SYSTOLIC BLOOD PRESSURE: 126 MMHG | HEIGHT: 62 IN | BODY MASS INDEX: 44.79 KG/M2 | DIASTOLIC BLOOD PRESSURE: 86 MMHG | WEIGHT: 243.38 LBS

## 2021-01-25 DIAGNOSIS — N95.1 HOT FLUSHES, PERIMENOPAUSAL: Primary | ICD-10-CM

## 2021-01-25 DIAGNOSIS — Z01.419 WELL WOMAN EXAM: ICD-10-CM

## 2021-01-25 DIAGNOSIS — Z12.39 ENCOUNTER FOR SCREENING FOR MALIGNANT NEOPLASM OF BREAST, UNSPECIFIED SCREENING MODALITY: ICD-10-CM

## 2021-01-25 DIAGNOSIS — N95.1 HOT FLUSHES, PERIMENOPAUSAL: ICD-10-CM

## 2021-01-25 LAB
ESTRADIOL SERPL-MCNC: 192 PG/ML
FSH SERPL-ACNC: 3.6 MIU/ML

## 2021-01-25 PROCEDURE — 83001 ASSAY OF GONADOTROPIN (FSH): CPT

## 2021-01-25 PROCEDURE — 1126F PR PAIN SEVERITY QUANTIFIED, NO PAIN PRESENT: ICD-10-PCS | Mod: S$GLB,,, | Performed by: STUDENT IN AN ORGANIZED HEALTH CARE EDUCATION/TRAINING PROGRAM

## 2021-01-25 PROCEDURE — 3008F PR BODY MASS INDEX (BMI) DOCUMENTED: ICD-10-PCS | Mod: CPTII,S$GLB,, | Performed by: STUDENT IN AN ORGANIZED HEALTH CARE EDUCATION/TRAINING PROGRAM

## 2021-01-25 PROCEDURE — 99999 PR PBB SHADOW E&M-EST. PATIENT-LVL IV: CPT | Mod: PBBFAC,,, | Performed by: STUDENT IN AN ORGANIZED HEALTH CARE EDUCATION/TRAINING PROGRAM

## 2021-01-25 PROCEDURE — 82670 ASSAY OF TOTAL ESTRADIOL: CPT

## 2021-01-25 PROCEDURE — 99999 PR PBB SHADOW E&M-EST. PATIENT-LVL IV: ICD-10-PCS | Mod: PBBFAC,,, | Performed by: STUDENT IN AN ORGANIZED HEALTH CARE EDUCATION/TRAINING PROGRAM

## 2021-01-25 PROCEDURE — 3008F BODY MASS INDEX DOCD: CPT | Mod: CPTII,S$GLB,, | Performed by: STUDENT IN AN ORGANIZED HEALTH CARE EDUCATION/TRAINING PROGRAM

## 2021-01-25 PROCEDURE — 99386 PREV VISIT NEW AGE 40-64: CPT | Mod: S$GLB,,, | Performed by: STUDENT IN AN ORGANIZED HEALTH CARE EDUCATION/TRAINING PROGRAM

## 2021-01-25 PROCEDURE — 1126F AMNT PAIN NOTED NONE PRSNT: CPT | Mod: S$GLB,,, | Performed by: STUDENT IN AN ORGANIZED HEALTH CARE EDUCATION/TRAINING PROGRAM

## 2021-01-25 PROCEDURE — 99386 PR PREVENTIVE VISIT,NEW,40-64: ICD-10-PCS | Mod: S$GLB,,, | Performed by: STUDENT IN AN ORGANIZED HEALTH CARE EDUCATION/TRAINING PROGRAM

## 2021-01-25 RX ORDER — PAROXETINE 10 MG/1
10 TABLET, FILM COATED ORAL DAILY
Qty: 30 TABLET | Refills: 2 | Status: SHIPPED | OUTPATIENT
Start: 2021-01-25 | End: 2021-04-05 | Stop reason: SDUPTHER

## 2021-01-25 RX ORDER — PAROXETINE 7.5 MG/1
7.5 CAPSULE ORAL DAILY
Qty: 30 CAPSULE | Refills: 6 | Status: SHIPPED | OUTPATIENT
Start: 2021-01-25 | End: 2021-01-25

## 2021-01-26 ENCOUNTER — PATIENT MESSAGE (OUTPATIENT)
Dept: SPORTS MEDICINE | Facility: CLINIC | Age: 46
End: 2021-01-26

## 2021-01-26 ENCOUNTER — TELEPHONE (OUTPATIENT)
Dept: SPORTS MEDICINE | Facility: CLINIC | Age: 46
End: 2021-01-26

## 2021-01-26 ENCOUNTER — HOSPITAL ENCOUNTER (OUTPATIENT)
Dept: RADIOLOGY | Facility: HOSPITAL | Age: 46
Discharge: HOME | End: 2021-01-26
Attending: NURSE PRACTITIONER
Payer: COMMERCIAL

## 2021-01-26 DIAGNOSIS — M25.562 BILATERAL KNEE PAIN: Primary | ICD-10-CM

## 2021-01-26 DIAGNOSIS — M25.561 BILATERAL KNEE PAIN: Primary | ICD-10-CM

## 2021-01-26 DIAGNOSIS — Z12.39 ENCOUNTER FOR SCREENING FOR MALIGNANT NEOPLASM OF BREAST, UNSPECIFIED SCREENING MODALITY: ICD-10-CM

## 2021-01-26 DIAGNOSIS — Z80.3 FAMILY HISTORY OF BREAST CANCER: ICD-10-CM

## 2021-01-26 PROCEDURE — 77063 MAMMO DIGITAL SCREENING BILAT WITH TOMO: ICD-10-PCS | Mod: 26,,, | Performed by: RADIOLOGY

## 2021-01-26 PROCEDURE — 77067 SCR MAMMO BI INCL CAD: CPT | Mod: TC,PO

## 2021-01-26 PROCEDURE — 77067 SCR MAMMO BI INCL CAD: CPT | Mod: 26,,, | Performed by: RADIOLOGY

## 2021-01-26 PROCEDURE — 77067 MAMMO DIGITAL SCREENING BILAT WITH TOMO: ICD-10-PCS | Mod: 26,,, | Performed by: RADIOLOGY

## 2021-01-26 PROCEDURE — 77063 BREAST TOMOSYNTHESIS BI: CPT | Mod: 26,,, | Performed by: RADIOLOGY

## 2021-01-27 ENCOUNTER — OFFICE VISIT (OUTPATIENT)
Dept: SPORTS MEDICINE | Facility: CLINIC | Age: 46
End: 2021-01-27
Payer: COMMERCIAL

## 2021-01-27 ENCOUNTER — HOSPITAL ENCOUNTER (OUTPATIENT)
Dept: RADIOLOGY | Facility: HOSPITAL | Age: 46
Discharge: HOME | End: 2021-01-27
Attending: NEUROMUSCULOSKELETAL MEDICINE & OMM
Payer: COMMERCIAL

## 2021-01-27 VITALS
BODY MASS INDEX: 44.79 KG/M2 | SYSTOLIC BLOOD PRESSURE: 134 MMHG | WEIGHT: 243.38 LBS | DIASTOLIC BLOOD PRESSURE: 92 MMHG | HEIGHT: 62 IN

## 2021-01-27 DIAGNOSIS — M17.11 PRIMARY OSTEOARTHRITIS OF RIGHT KNEE: Primary | ICD-10-CM

## 2021-01-27 DIAGNOSIS — M25.561 CHRONIC PAIN OF BOTH KNEES: ICD-10-CM

## 2021-01-27 DIAGNOSIS — M99.05 SOMATIC DYSFUNCTION OF PELVIC REGION: ICD-10-CM

## 2021-01-27 DIAGNOSIS — M99.03 SOMATIC DYSFUNCTION OF LUMBAR REGION: ICD-10-CM

## 2021-01-27 DIAGNOSIS — M99.04 SACRAL REGION SOMATIC DYSFUNCTION: ICD-10-CM

## 2021-01-27 DIAGNOSIS — M25.562 BILATERAL KNEE PAIN: ICD-10-CM

## 2021-01-27 DIAGNOSIS — M25.561 BILATERAL KNEE PAIN: ICD-10-CM

## 2021-01-27 DIAGNOSIS — M17.12 PRIMARY OSTEOARTHRITIS OF LEFT KNEE: ICD-10-CM

## 2021-01-27 DIAGNOSIS — M25.562 CHRONIC PAIN OF BOTH KNEES: ICD-10-CM

## 2021-01-27 DIAGNOSIS — M79.10 MYALGIA: ICD-10-CM

## 2021-01-27 DIAGNOSIS — M99.06 SOMATIC DYSFUNCTION OF LOWER EXTREMITY: ICD-10-CM

## 2021-01-27 DIAGNOSIS — G89.29 CHRONIC PAIN OF BOTH KNEES: ICD-10-CM

## 2021-01-27 DIAGNOSIS — M99.02 SOMATIC DYSFUNCTION OF THORACIC REGION: ICD-10-CM

## 2021-01-27 PROCEDURE — 97110 THERAPEUTIC EXERCISES: CPT | Mod: S$GLB,,, | Performed by: NEUROMUSCULOSKELETAL MEDICINE & OMM

## 2021-01-27 PROCEDURE — 99999 PR PBB SHADOW E&M-EST. PATIENT-LVL III: CPT | Mod: PBBFAC,,, | Performed by: NEUROMUSCULOSKELETAL MEDICINE & OMM

## 2021-01-27 PROCEDURE — 98927 PR OSTEOPATHIC MANIP,5-6 BODY REGN: ICD-10-PCS | Mod: S$GLB,,, | Performed by: NEUROMUSCULOSKELETAL MEDICINE & OMM

## 2021-01-27 PROCEDURE — 73564 XR KNEE ORTHO BILAT WITH FLEXION: ICD-10-PCS | Mod: 26,,, | Performed by: RADIOLOGY

## 2021-01-27 PROCEDURE — 73564 X-RAY EXAM KNEE 4 OR MORE: CPT | Mod: TC,50,PO

## 2021-01-27 PROCEDURE — 98927 OSTEOPATH MANJ 5-6 REGIONS: CPT | Mod: S$GLB,,, | Performed by: NEUROMUSCULOSKELETAL MEDICINE & OMM

## 2021-01-27 PROCEDURE — 97110 PR THERAPEUTIC EXERCISES: ICD-10-PCS | Mod: S$GLB,,, | Performed by: NEUROMUSCULOSKELETAL MEDICINE & OMM

## 2021-01-27 PROCEDURE — 99999 PR PBB SHADOW E&M-EST. PATIENT-LVL III: ICD-10-PCS | Mod: PBBFAC,,, | Performed by: NEUROMUSCULOSKELETAL MEDICINE & OMM

## 2021-01-27 PROCEDURE — 99204 OFFICE O/P NEW MOD 45 MIN: CPT | Mod: 25,S$GLB,, | Performed by: NEUROMUSCULOSKELETAL MEDICINE & OMM

## 2021-01-27 PROCEDURE — 99204 PR OFFICE/OUTPT VISIT, NEW, LEVL IV, 45-59 MIN: ICD-10-PCS | Mod: 25,S$GLB,, | Performed by: NEUROMUSCULOSKELETAL MEDICINE & OMM

## 2021-01-27 PROCEDURE — 73564 X-RAY EXAM KNEE 4 OR MORE: CPT | Mod: 26,,, | Performed by: RADIOLOGY

## 2021-01-27 RX ORDER — MELOXICAM 15 MG/1
15 TABLET ORAL DAILY
Qty: 30 TABLET | Refills: 0 | Status: SHIPPED | OUTPATIENT
Start: 2021-01-27 | End: 2021-02-23

## 2021-01-28 ENCOUNTER — TELEPHONE (OUTPATIENT)
Dept: OBSTETRICS AND GYNECOLOGY | Facility: CLINIC | Age: 46
End: 2021-01-28

## 2021-01-29 ENCOUNTER — PATIENT MESSAGE (OUTPATIENT)
Dept: INTERNAL MEDICINE | Facility: CLINIC | Age: 46
End: 2021-01-29

## 2021-04-05 ENCOUNTER — OFFICE VISIT (OUTPATIENT)
Dept: OBSTETRICS AND GYNECOLOGY | Facility: CLINIC | Age: 46
End: 2021-04-05
Attending: STUDENT IN AN ORGANIZED HEALTH CARE EDUCATION/TRAINING PROGRAM
Payer: COMMERCIAL

## 2021-04-05 VITALS
BODY MASS INDEX: 45.11 KG/M2 | WEIGHT: 245.13 LBS | HEIGHT: 62 IN | SYSTOLIC BLOOD PRESSURE: 114 MMHG | DIASTOLIC BLOOD PRESSURE: 84 MMHG

## 2021-04-05 DIAGNOSIS — N95.1 MENOPAUSAL HOT FLUSHES: Primary | ICD-10-CM

## 2021-04-05 PROCEDURE — 1126F PR PAIN SEVERITY QUANTIFIED, NO PAIN PRESENT: ICD-10-PCS | Mod: S$GLB,,, | Performed by: STUDENT IN AN ORGANIZED HEALTH CARE EDUCATION/TRAINING PROGRAM

## 2021-04-05 PROCEDURE — 99999 PR PBB SHADOW E&M-EST. PATIENT-LVL III: CPT | Mod: PBBFAC,,, | Performed by: STUDENT IN AN ORGANIZED HEALTH CARE EDUCATION/TRAINING PROGRAM

## 2021-04-05 PROCEDURE — 99999 PR PBB SHADOW E&M-EST. PATIENT-LVL III: ICD-10-PCS | Mod: PBBFAC,,, | Performed by: STUDENT IN AN ORGANIZED HEALTH CARE EDUCATION/TRAINING PROGRAM

## 2021-04-05 PROCEDURE — 99213 OFFICE O/P EST LOW 20 MIN: CPT | Mod: S$GLB,,, | Performed by: STUDENT IN AN ORGANIZED HEALTH CARE EDUCATION/TRAINING PROGRAM

## 2021-04-05 PROCEDURE — 3008F PR BODY MASS INDEX (BMI) DOCUMENTED: ICD-10-PCS | Mod: CPTII,S$GLB,, | Performed by: STUDENT IN AN ORGANIZED HEALTH CARE EDUCATION/TRAINING PROGRAM

## 2021-04-05 PROCEDURE — 1126F AMNT PAIN NOTED NONE PRSNT: CPT | Mod: S$GLB,,, | Performed by: STUDENT IN AN ORGANIZED HEALTH CARE EDUCATION/TRAINING PROGRAM

## 2021-04-05 PROCEDURE — 99213 PR OFFICE/OUTPT VISIT, EST, LEVL III, 20-29 MIN: ICD-10-PCS | Mod: S$GLB,,, | Performed by: STUDENT IN AN ORGANIZED HEALTH CARE EDUCATION/TRAINING PROGRAM

## 2021-04-05 PROCEDURE — 3008F BODY MASS INDEX DOCD: CPT | Mod: CPTII,S$GLB,, | Performed by: STUDENT IN AN ORGANIZED HEALTH CARE EDUCATION/TRAINING PROGRAM

## 2021-04-05 RX ORDER — PAROXETINE 10 MG/1
10 TABLET, FILM COATED ORAL DAILY
Qty: 30 TABLET | Refills: 8 | Status: SHIPPED | OUTPATIENT
Start: 2021-04-05 | End: 2021-04-05 | Stop reason: SDUPTHER

## 2021-04-05 RX ORDER — PAROXETINE 10 MG/1
10 TABLET, FILM COATED ORAL DAILY
Qty: 30 TABLET | Refills: 8 | Status: SHIPPED | OUTPATIENT
Start: 2021-04-05 | End: 2021-04-26

## 2021-04-15 ENCOUNTER — PATIENT MESSAGE (OUTPATIENT)
Dept: RESEARCH | Facility: HOSPITAL | Age: 46
End: 2021-04-15

## 2021-06-01 ENCOUNTER — TELEPHONE (OUTPATIENT)
Dept: INTERNAL MEDICINE | Facility: CLINIC | Age: 46
End: 2021-06-01

## 2021-06-08 ENCOUNTER — OFFICE VISIT (OUTPATIENT)
Dept: INTERNAL MEDICINE | Facility: CLINIC | Age: 46
End: 2021-06-08
Payer: COMMERCIAL

## 2021-06-08 ENCOUNTER — LAB VISIT (OUTPATIENT)
Dept: LAB | Facility: HOSPITAL | Age: 46
End: 2021-06-08
Attending: HOSPITALIST
Payer: COMMERCIAL

## 2021-06-08 VITALS
SYSTOLIC BLOOD PRESSURE: 130 MMHG | HEART RATE: 69 BPM | OXYGEN SATURATION: 97 % | DIASTOLIC BLOOD PRESSURE: 80 MMHG | WEIGHT: 240.75 LBS | TEMPERATURE: 98 F | HEIGHT: 62 IN | BODY MASS INDEX: 44.3 KG/M2

## 2021-06-08 DIAGNOSIS — R10.9 ABDOMINAL PAIN, UNSPECIFIED ABDOMINAL LOCATION: ICD-10-CM

## 2021-06-08 DIAGNOSIS — M17.11 PRIMARY OSTEOARTHRITIS OF RIGHT KNEE: ICD-10-CM

## 2021-06-08 DIAGNOSIS — R19.7 DIARRHEA, UNSPECIFIED TYPE: ICD-10-CM

## 2021-06-08 DIAGNOSIS — G47.00 INSOMNIA, UNSPECIFIED TYPE: ICD-10-CM

## 2021-06-08 DIAGNOSIS — F32.A ANXIETY AND DEPRESSION: ICD-10-CM

## 2021-06-08 DIAGNOSIS — F41.9 ANXIETY AND DEPRESSION: ICD-10-CM

## 2021-06-08 DIAGNOSIS — Z00.00 ANNUAL PHYSICAL EXAM: Primary | ICD-10-CM

## 2021-06-08 DIAGNOSIS — Z00.00 ANNUAL PHYSICAL EXAM: ICD-10-CM

## 2021-06-08 DIAGNOSIS — G43.109 MIGRAINE WITH AURA AND WITHOUT STATUS MIGRAINOSUS, NOT INTRACTABLE: ICD-10-CM

## 2021-06-08 LAB
ALBUMIN SERPL BCP-MCNC: 3.8 G/DL (ref 3.5–5.2)
ALP SERPL-CCNC: 89 U/L (ref 55–135)
ALT SERPL W/O P-5'-P-CCNC: 17 U/L (ref 10–44)
ANION GAP SERPL CALC-SCNC: 13 MMOL/L (ref 8–16)
AST SERPL-CCNC: 11 U/L (ref 10–40)
BASOPHILS # BLD AUTO: 0.09 K/UL (ref 0–0.2)
BASOPHILS NFR BLD: 1 % (ref 0–1.9)
BILIRUB SERPL-MCNC: 0.7 MG/DL (ref 0.1–1)
BUN SERPL-MCNC: 11 MG/DL (ref 6–20)
CALCIUM SERPL-MCNC: 9.6 MG/DL (ref 8.7–10.5)
CHLORIDE SERPL-SCNC: 106 MMOL/L (ref 95–110)
CHOLEST SERPL-MCNC: 195 MG/DL (ref 120–199)
CHOLEST/HDLC SERPL: 5 {RATIO} (ref 2–5)
CO2 SERPL-SCNC: 20 MMOL/L (ref 23–29)
CREAT SERPL-MCNC: 0.8 MG/DL (ref 0.5–1.4)
DIFFERENTIAL METHOD: ABNORMAL
EOSINOPHIL # BLD AUTO: 0.3 K/UL (ref 0–0.5)
EOSINOPHIL NFR BLD: 3.8 % (ref 0–8)
ERYTHROCYTE [DISTWIDTH] IN BLOOD BY AUTOMATED COUNT: 12.3 % (ref 11.5–14.5)
EST. GFR  (AFRICAN AMERICAN): >60 ML/MIN/1.73 M^2
EST. GFR  (NON AFRICAN AMERICAN): >60 ML/MIN/1.73 M^2
ESTIMATED AVG GLUCOSE: 103 MG/DL (ref 68–131)
GLUCOSE SERPL-MCNC: 95 MG/DL (ref 70–110)
HBA1C MFR BLD: 5.2 % (ref 4–5.6)
HCT VFR BLD AUTO: 43.9 % (ref 37–48.5)
HDLC SERPL-MCNC: 39 MG/DL (ref 40–75)
HDLC SERPL: 20 % (ref 20–50)
HGB BLD-MCNC: 15 G/DL (ref 12–16)
IMM GRANULOCYTES # BLD AUTO: 0.03 K/UL (ref 0–0.04)
IMM GRANULOCYTES NFR BLD AUTO: 0.3 % (ref 0–0.5)
LDLC SERPL CALC-MCNC: 115.8 MG/DL (ref 63–159)
LYMPHOCYTES # BLD AUTO: 2.9 K/UL (ref 1–4.8)
LYMPHOCYTES NFR BLD: 32.8 % (ref 18–48)
MCH RBC QN AUTO: 33.2 PG (ref 27–31)
MCHC RBC AUTO-ENTMCNC: 34.2 G/DL (ref 32–36)
MCV RBC AUTO: 97 FL (ref 82–98)
MONOCYTES # BLD AUTO: 0.5 K/UL (ref 0.3–1)
MONOCYTES NFR BLD: 5.4 % (ref 4–15)
NEUTROPHILS # BLD AUTO: 5.1 K/UL (ref 1.8–7.7)
NEUTROPHILS NFR BLD: 56.7 % (ref 38–73)
NONHDLC SERPL-MCNC: 156 MG/DL
NRBC BLD-RTO: 0 /100 WBC
PLATELET # BLD AUTO: 341 K/UL (ref 150–450)
PMV BLD AUTO: 10.9 FL (ref 9.2–12.9)
POTASSIUM SERPL-SCNC: 4.2 MMOL/L (ref 3.5–5.1)
PROT SERPL-MCNC: 7.5 G/DL (ref 6–8.4)
RBC # BLD AUTO: 4.52 M/UL (ref 4–5.4)
SODIUM SERPL-SCNC: 139 MMOL/L (ref 136–145)
TRIGL SERPL-MCNC: 201 MG/DL (ref 30–150)
TSH SERPL DL<=0.005 MIU/L-ACNC: 2.81 UIU/ML (ref 0.4–4)
WBC # BLD AUTO: 8.97 K/UL (ref 3.9–12.7)

## 2021-06-08 PROCEDURE — 90714 TD VACCINE GREATER THAN OR EQUAL TO 7YO WITH PRESERVATIVE IM: ICD-10-PCS | Mod: S$GLB,,, | Performed by: HOSPITALIST

## 2021-06-08 PROCEDURE — 99999 PR PBB SHADOW E&M-EST. PATIENT-LVL IV: ICD-10-PCS | Mod: PBBFAC,,, | Performed by: HOSPITALIST

## 2021-06-08 PROCEDURE — 84443 ASSAY THYROID STIM HORMONE: CPT | Performed by: HOSPITALIST

## 2021-06-08 PROCEDURE — 90714 TD VACC NO PRESV 7 YRS+ IM: CPT | Mod: S$GLB,,, | Performed by: HOSPITALIST

## 2021-06-08 PROCEDURE — 36415 COLL VENOUS BLD VENIPUNCTURE: CPT | Mod: PO | Performed by: HOSPITALIST

## 2021-06-08 PROCEDURE — 1126F PR PAIN SEVERITY QUANTIFIED, NO PAIN PRESENT: ICD-10-PCS | Mod: S$GLB,,, | Performed by: HOSPITALIST

## 2021-06-08 PROCEDURE — 3008F PR BODY MASS INDEX (BMI) DOCUMENTED: ICD-10-PCS | Mod: CPTII,S$GLB,, | Performed by: HOSPITALIST

## 2021-06-08 PROCEDURE — 99396 PR PREVENTIVE VISIT,EST,40-64: ICD-10-PCS | Mod: 25,S$GLB,, | Performed by: HOSPITALIST

## 2021-06-08 PROCEDURE — 3008F BODY MASS INDEX DOCD: CPT | Mod: CPTII,S$GLB,, | Performed by: HOSPITALIST

## 2021-06-08 PROCEDURE — 90471 IMMUNIZATION ADMIN: CPT | Mod: S$GLB,,, | Performed by: HOSPITALIST

## 2021-06-08 PROCEDURE — 99999 PR PBB SHADOW E&M-EST. PATIENT-LVL IV: CPT | Mod: PBBFAC,,, | Performed by: HOSPITALIST

## 2021-06-08 PROCEDURE — 90471 TD VACCINE GREATER THAN OR EQUAL TO 7YO WITH PRESERVATIVE IM: ICD-10-PCS | Mod: S$GLB,,, | Performed by: HOSPITALIST

## 2021-06-08 PROCEDURE — 80061 LIPID PANEL: CPT | Performed by: HOSPITALIST

## 2021-06-08 PROCEDURE — 80053 COMPREHEN METABOLIC PANEL: CPT | Performed by: HOSPITALIST

## 2021-06-08 PROCEDURE — 99396 PREV VISIT EST AGE 40-64: CPT | Mod: 25,S$GLB,, | Performed by: HOSPITALIST

## 2021-06-08 PROCEDURE — 1126F AMNT PAIN NOTED NONE PRSNT: CPT | Mod: S$GLB,,, | Performed by: HOSPITALIST

## 2021-06-08 PROCEDURE — 85025 COMPLETE CBC W/AUTO DIFF WBC: CPT | Performed by: HOSPITALIST

## 2021-06-08 PROCEDURE — 83036 HEMOGLOBIN GLYCOSYLATED A1C: CPT | Performed by: HOSPITALIST

## 2021-06-08 RX ORDER — MELOXICAM 15 MG/1
15 TABLET ORAL DAILY
Qty: 90 TABLET | Refills: 3 | Status: SHIPPED | OUTPATIENT
Start: 2021-06-08 | End: 2022-05-16

## 2021-06-08 RX ORDER — SUMATRIPTAN 50 MG/1
50 TABLET, FILM COATED ORAL
Qty: 9 TABLET | Refills: 2 | Status: SHIPPED | OUTPATIENT
Start: 2021-06-08 | End: 2022-03-24

## 2021-06-08 RX ORDER — TRAZODONE HYDROCHLORIDE 50 MG/1
50 TABLET ORAL NIGHTLY PRN
Qty: 30 TABLET | Refills: 5 | Status: SHIPPED | OUTPATIENT
Start: 2021-06-08 | End: 2022-03-24

## 2021-06-23 ENCOUNTER — PATIENT MESSAGE (OUTPATIENT)
Dept: INTERNAL MEDICINE | Facility: CLINIC | Age: 46
End: 2021-06-23

## 2021-07-27 ENCOUNTER — OFFICE VISIT (OUTPATIENT)
Dept: GASTROENTEROLOGY | Facility: CLINIC | Age: 46
End: 2021-07-27
Payer: COMMERCIAL

## 2021-07-27 ENCOUNTER — LAB VISIT (OUTPATIENT)
Dept: LAB | Facility: HOSPITAL | Age: 46
End: 2021-07-27
Payer: COMMERCIAL

## 2021-07-27 VITALS
WEIGHT: 247.13 LBS | DIASTOLIC BLOOD PRESSURE: 89 MMHG | HEART RATE: 67 BPM | SYSTOLIC BLOOD PRESSURE: 145 MMHG | BODY MASS INDEX: 45.48 KG/M2 | HEIGHT: 62 IN

## 2021-07-27 DIAGNOSIS — R19.7 DIARRHEA, UNSPECIFIED TYPE: ICD-10-CM

## 2021-07-27 DIAGNOSIS — R10.9 ABDOMINAL PAIN, UNSPECIFIED ABDOMINAL LOCATION: ICD-10-CM

## 2021-07-27 LAB
CRP SERPL-MCNC: 9.4 MG/L (ref 0–8.2)
IGA SERPL-MCNC: 127 MG/DL (ref 40–350)

## 2021-07-27 PROCEDURE — 3008F PR BODY MASS INDEX (BMI) DOCUMENTED: ICD-10-PCS | Mod: CPTII,S$GLB,, | Performed by: NURSE PRACTITIONER

## 2021-07-27 PROCEDURE — 99999 PR PBB SHADOW E&M-EST. PATIENT-LVL IV: ICD-10-PCS | Mod: PBBFAC,,, | Performed by: NURSE PRACTITIONER

## 2021-07-27 PROCEDURE — 99204 PR OFFICE/OUTPT VISIT, NEW, LEVL IV, 45-59 MIN: ICD-10-PCS | Mod: S$GLB,,, | Performed by: NURSE PRACTITIONER

## 2021-07-27 PROCEDURE — 1160F PR REVIEW ALL MEDS BY PRESCRIBER/CLIN PHARMACIST DOCUMENTED: ICD-10-PCS | Mod: CPTII,S$GLB,, | Performed by: NURSE PRACTITIONER

## 2021-07-27 PROCEDURE — 82784 ASSAY IGA/IGD/IGG/IGM EACH: CPT | Performed by: NURSE PRACTITIONER

## 2021-07-27 PROCEDURE — 99999 PR PBB SHADOW E&M-EST. PATIENT-LVL IV: CPT | Mod: PBBFAC,,, | Performed by: NURSE PRACTITIONER

## 2021-07-27 PROCEDURE — 86140 C-REACTIVE PROTEIN: CPT | Performed by: NURSE PRACTITIONER

## 2021-07-27 PROCEDURE — 83516 IMMUNOASSAY NONANTIBODY: CPT | Performed by: NURSE PRACTITIONER

## 2021-07-27 PROCEDURE — 1125F AMNT PAIN NOTED PAIN PRSNT: CPT | Mod: CPTII,S$GLB,, | Performed by: NURSE PRACTITIONER

## 2021-07-27 PROCEDURE — 99204 OFFICE O/P NEW MOD 45 MIN: CPT | Mod: S$GLB,,, | Performed by: NURSE PRACTITIONER

## 2021-07-27 PROCEDURE — 1159F PR MEDICATION LIST DOCUMENTED IN MEDICAL RECORD: ICD-10-PCS | Mod: CPTII,S$GLB,, | Performed by: NURSE PRACTITIONER

## 2021-07-27 PROCEDURE — 1159F MED LIST DOCD IN RCRD: CPT | Mod: CPTII,S$GLB,, | Performed by: NURSE PRACTITIONER

## 2021-07-27 PROCEDURE — 3008F BODY MASS INDEX DOCD: CPT | Mod: CPTII,S$GLB,, | Performed by: NURSE PRACTITIONER

## 2021-07-27 PROCEDURE — 1160F RVW MEDS BY RX/DR IN RCRD: CPT | Mod: CPTII,S$GLB,, | Performed by: NURSE PRACTITIONER

## 2021-07-27 PROCEDURE — 1125F PR PAIN SEVERITY QUANTIFIED, PAIN PRESENT: ICD-10-PCS | Mod: CPTII,S$GLB,, | Performed by: NURSE PRACTITIONER

## 2021-07-27 PROCEDURE — 36415 COLL VENOUS BLD VENIPUNCTURE: CPT | Performed by: NURSE PRACTITIONER

## 2021-07-27 RX ORDER — HYOSCYAMINE SULFATE 0.12 MG/1
0.12 TABLET SUBLINGUAL EVERY 6 HOURS PRN
Qty: 90 TABLET | Refills: 0 | Status: SHIPPED | OUTPATIENT
Start: 2021-07-27 | End: 2021-07-27

## 2021-07-30 LAB — TTG IGA SER-ACNC: 5 UNITS

## 2021-08-03 ENCOUNTER — LAB VISIT (OUTPATIENT)
Dept: LAB | Facility: HOSPITAL | Age: 46
End: 2021-08-03
Payer: COMMERCIAL

## 2021-08-03 DIAGNOSIS — R19.7 DIARRHEA, UNSPECIFIED TYPE: ICD-10-CM

## 2021-08-03 PROCEDURE — 87449 NOS EACH ORGANISM AG IA: CPT | Performed by: NURSE PRACTITIONER

## 2021-08-03 PROCEDURE — 87324 CLOSTRIDIUM AG IA: CPT | Performed by: NURSE PRACTITIONER

## 2021-08-04 ENCOUNTER — PATIENT MESSAGE (OUTPATIENT)
Dept: GASTROENTEROLOGY | Facility: CLINIC | Age: 46
End: 2021-08-04

## 2021-08-04 LAB
C DIFF GDH STL QL: NEGATIVE
C DIFF TOX A+B STL QL IA: NEGATIVE

## 2022-01-10 ENCOUNTER — PATIENT MESSAGE (OUTPATIENT)
Dept: ADMINISTRATIVE | Facility: OTHER | Age: 47
End: 2022-01-10
Payer: COMMERCIAL

## 2022-01-10 ENCOUNTER — OFFICE VISIT (OUTPATIENT)
Dept: URGENT CARE | Facility: CLINIC | Age: 47
End: 2022-01-10
Payer: COMMERCIAL

## 2022-01-10 VITALS
BODY MASS INDEX: 45.45 KG/M2 | HEIGHT: 62 IN | DIASTOLIC BLOOD PRESSURE: 89 MMHG | WEIGHT: 247 LBS | HEART RATE: 82 BPM | RESPIRATION RATE: 20 BRPM | OXYGEN SATURATION: 95 % | SYSTOLIC BLOOD PRESSURE: 115 MMHG | TEMPERATURE: 98 F

## 2022-01-10 DIAGNOSIS — Z20.822 ENCOUNTER FOR LABORATORY TESTING FOR COVID-19 VIRUS: Primary | ICD-10-CM

## 2022-01-10 DIAGNOSIS — U07.1 COVID-19: ICD-10-CM

## 2022-01-10 LAB
CTP QC/QA: YES
SARS-COV-2 RDRP RESP QL NAA+PROBE: POSITIVE

## 2022-01-10 PROCEDURE — 1160F RVW MEDS BY RX/DR IN RCRD: CPT | Mod: CPTII,S$GLB,,

## 2022-01-10 PROCEDURE — 3079F DIAST BP 80-89 MM HG: CPT | Mod: CPTII,S$GLB,,

## 2022-01-10 PROCEDURE — 1159F MED LIST DOCD IN RCRD: CPT | Mod: CPTII,S$GLB,,

## 2022-01-10 PROCEDURE — 3074F SYST BP LT 130 MM HG: CPT | Mod: CPTII,S$GLB,,

## 2022-01-10 PROCEDURE — U0002 COVID-19 LAB TEST NON-CDC: HCPCS | Mod: QW,CR,S$GLB,

## 2022-01-10 PROCEDURE — 3008F PR BODY MASS INDEX (BMI) DOCUMENTED: ICD-10-PCS | Mod: CPTII,S$GLB,,

## 2022-01-10 PROCEDURE — 99213 PR OFFICE/OUTPT VISIT, EST, LEVL III, 20-29 MIN: ICD-10-PCS | Mod: S$GLB,CS,,

## 2022-01-10 PROCEDURE — U0002: ICD-10-PCS | Mod: QW,CR,S$GLB,

## 2022-01-10 PROCEDURE — 3008F BODY MASS INDEX DOCD: CPT | Mod: CPTII,S$GLB,,

## 2022-01-10 PROCEDURE — 1159F PR MEDICATION LIST DOCUMENTED IN MEDICAL RECORD: ICD-10-PCS | Mod: CPTII,S$GLB,,

## 2022-01-10 PROCEDURE — 1160F PR REVIEW ALL MEDS BY PRESCRIBER/CLIN PHARMACIST DOCUMENTED: ICD-10-PCS | Mod: CPTII,S$GLB,,

## 2022-01-10 PROCEDURE — 3079F PR MOST RECENT DIASTOLIC BLOOD PRESSURE 80-89 MM HG: ICD-10-PCS | Mod: CPTII,S$GLB,,

## 2022-01-10 PROCEDURE — 99213 OFFICE O/P EST LOW 20 MIN: CPT | Mod: S$GLB,CS,,

## 2022-01-10 PROCEDURE — 3074F PR MOST RECENT SYSTOLIC BLOOD PRESSURE < 130 MM HG: ICD-10-PCS | Mod: CPTII,S$GLB,,

## 2022-01-10 RX ORDER — BENZONATATE 200 MG/1
200 CAPSULE ORAL 3 TIMES DAILY PRN
Qty: 30 CAPSULE | Refills: 0 | Status: SHIPPED | OUTPATIENT
Start: 2022-01-10 | End: 2022-01-20

## 2022-01-10 RX ORDER — IPRATROPIUM BROMIDE 42 UG/1
2 SPRAY, METERED NASAL 4 TIMES DAILY
Qty: 15 ML | Refills: 0 | Status: SHIPPED | OUTPATIENT
Start: 2022-01-10 | End: 2022-01-17

## 2022-01-10 RX ORDER — CETIRIZINE HYDROCHLORIDE 5 MG/1
5 TABLET ORAL DAILY
Qty: 30 TABLET | Refills: 0 | Status: SHIPPED | OUTPATIENT
Start: 2022-01-10 | End: 2023-01-31

## 2022-01-10 NOTE — PROGRESS NOTES
"Subjective:       Patient ID: Marianne Go is a 46 y.o. female.    Vitals:  height is 5' 2" (1.575 m) and weight is 112 kg (247 lb). Her temperature is 98.3 °F (36.8 °C). Her blood pressure is 115/89 and her pulse is 82. Her respiration is 20 and oxygen saturation is 95%.     Chief Complaint: Cough    Fully vaccinated   Close exposure to COVID from      C/o cough, body aches, teeth pain, HA (occipital and frontal regions), sore throat, post nasal drip, sweating that started yesterday.  Who states that she has been taking Mucinex for symptoms not has not helped.  She states that she has been exposed her fiance he tested positive for COVID.  She is fully vaccinated.  She denies any chest pain, shortness of breath.  She is having body aches and chills.  She does have history of DVT in 2015 due to the birth control.  She is not currently on any blood thinners and she no longer takes birth control. She denies change in vision.    Cough  This is a new problem. The current episode started in the past 7 days. The problem has been unchanged. The cough is non-productive. Associated symptoms include chills, headaches, myalgias, postnasal drip, rhinorrhea, a sore throat and sweats. Pertinent negatives include no chest pain, ear congestion, ear pain, eye redness, fever, heartburn, hemoptysis, nasal congestion, rash, shortness of breath, weight loss or wheezing. She has tried OTC cough suppressant for the symptoms. Her past medical history is significant for asthma, bronchitis and environmental allergies. There is no history of bronchiectasis, COPD, emphysema or pneumonia.       Constitution: Positive for chills, sweating and fatigue. Negative for fever.   HENT: Positive for congestion, postnasal drip, sinus pain, sinus pressure and sore throat. Negative for ear pain, foreign body in ear and tinnitus.    Neck: Negative for neck pain and neck stiffness.   Cardiovascular: Negative for chest trauma, chest pain, leg " swelling, palpitations and sob on exertion.   Eyes: Negative for eye itching, eye pain, eye redness and photophobia.   Respiratory: Positive for cough and sputum production. Negative for bloody sputum, shortness of breath and wheezing.    Gastrointestinal: Negative for nausea, vomiting, constipation, diarrhea and heartburn.   Musculoskeletal: Positive for muscle ache.   Skin: Negative for rash.   Allergic/Immunologic: Positive for environmental allergies.   Neurological: Positive for headaches. Negative for disorientation and altered mental status.   Psychiatric/Behavioral: Negative for altered mental status, disorientation and confusion.       Objective:      Physical Exam   Constitutional: She is oriented to person, place, and time. She appears well-developed and well-nourished. She is cooperative.  Non-toxic appearance. She does not have a sickly appearance. She does not appear ill. No distress.      Comments:Patient sitting erect in exam chair.  She is able answer questions in complete sentences.  She does not show any signs of respiratory distress or discoloration. overweightawake  HENT:   Head: Normocephalic and atraumatic.   Ears:   Right Ear: Hearing, tympanic membrane, external ear and ear canal normal.   Left Ear: Hearing, tympanic membrane, external ear and ear canal normal.   Nose: Rhinorrhea and congestion present. No mucosal edema or nasal deformity. No epistaxis. Right sinus exhibits maxillary sinus tenderness and frontal sinus tenderness. Left sinus exhibits maxillary sinus tenderness and frontal sinus tenderness.   Mouth/Throat: Uvula is midline, oropharynx is clear and moist and mucous membranes are normal. No trismus in the jaw. Normal dentition. No uvula swelling. No oropharyngeal exudate, posterior oropharyngeal edema or posterior oropharyngeal erythema.   Eyes: Conjunctivae and lids are normal. Right eye exhibits no discharge and no hordeolum. No foreign body present in the right eye. Left eye  exhibits no discharge and no hordeolum. No foreign body present in the left eye. No scleral icterus. Right eye exhibits normal extraocular motion and no nystagmus. Left eye exhibits normal extraocular motion and no nystagmus.   gaze aligned appropriately      Comments: PERRLA   Neck: Trachea normal and phonation normal. Neck supple. No edema present. No erythema present. No neck rigidity present.   Cardiovascular: Normal rate, regular rhythm, normal heart sounds, intact distal pulses and normal pulses.   Pulmonary/Chest: Effort normal and breath sounds normal. No accessory muscle usage or stridor. No apnea, no tachypnea and no bradypnea. No respiratory distress. She has no decreased breath sounds. She has no wheezes. She has no rhonchi. She has no rales.   Abdominal: Normal appearance.   Musculoskeletal: Normal range of motion.         General: No deformity or edema. Normal range of motion.   Lymphadenopathy:     She has no cervical adenopathy.        Right cervical: No superficial cervical, no deep cervical and no posterior cervical adenopathy present.       Left cervical: No superficial cervical, no deep cervical and no posterior cervical adenopathy present.   Neurological: no focal deficit. She is alert and oriented to person, place, and time. She has normal motor skills, normal sensation and intact cranial nerves. She exhibits normal muscle tone. Gait and coordination normal. Coordination normal. GCS eye subscore is 4. GCS verbal subscore is 5. GCS motor subscore is 6.   Skin: Skin is warm, dry, intact, not diaphoretic and not pale.   Psychiatric: She has a normal mood and affect. Her speech is normal and behavior is normal. Judgment and thought content normal. Cognition and memory  Nursing note and vitals reviewed.        Results for orders placed or performed in visit on 01/10/22   POCT COVID-19 Rapid Screening   Result Value Ref Range    POC Rapid COVID Positive (A) Negative     Acceptable Yes         Assessment:       1. Encounter for laboratory testing for COVID-19 virus    2. COVID-19          Plan:         Encounter for laboratory testing for COVID-19 virus  -     POCT COVID-19 Rapid Screening    COVID-19  -     cetirizine (ZYRTEC) 5 MG tablet; Take 1 tablet (5 mg total) by mouth once daily.  Dispense: 30 tablet; Refill: 0  -     benzonatate (TESSALON) 200 MG capsule; Take 1 capsule (200 mg total) by mouth 3 (three) times daily as needed for Cough.  Dispense: 30 capsule; Refill: 0  -     ipratropium (ATROVENT) 42 mcg (0.06 %) nasal spray; 2 sprays by Nasal route 4 (four) times daily. for 7 days  Dispense: 15 mL; Refill: 0                  Patient Instructions    You have tested positive for COVID-19 today.       ISOLATION  If you tested positive and do not have symptoms, you must isolate for 5 days starting on the day of the positive test. I     If you tested positive and have symptoms, you must isolate for 5 days starting on the day of the first symptoms,  not the day of the positive test.     This is the most important part, both the CDC and the [facility] emphasize that you do not test out of isolation.     After 5 days, if your symptoms have improved and you have not had fever on day 5, you can return to the community on day 6- NO TESTING REQUIRED!      In fact, we do not retest if you were positive in the last 90 days.     After your 5 days of isolation are completed, the CDC recommends strict mask use for the first 5 days that you come out of isolation.      CDC Testing and Quarantine Guidelines for patients with exposure to a known-positive COVID-19 person:  ·     A 'close exposure' is defined as anyone who has had an exposure (masked or unmasked) to a known COVID -19 positive person          within 6 feet of someone          for a cumulative total of 15 minutes or more over a 24-hour period.  ·     vaccinated Have been boosted or completed the primary series of Pfizer or Moderna vaccine within the last  6 months or completed the primary series of J&J vaccine within the last 2 months and/or had a positive test within 90 days          do NOT need to quarantine after contact with someone who had COVID-19 unless they have symptoms.          fully vaccinated people who have not had a positive test within 90 days, should get tested 3-5 days after their exposure, even if they don't have symptoms and wear a mask indoors in public for 10 days following exposure or until their test result is negative on day 5.          If you develop symptoms test and quarantine.     ·     Unvaccinated, or are more than six months out from their second mRNA dose (or more than 2 months after the J&J vaccine) and not yet boosted,  and/or NOT had a positive test within 90 days and meet 'close exposure'  you are required by CDC guidelines to quarantine for at least 5 days from time of exposure followed by 5 days of strict masking. It is recommended, but not required to test after 5 days, unless you develop symptoms, in which case you should test at that time.  If you do decide to test at 5 days and are asymptomatic, the risk is that if you test without symptoms on Day 5 for example) and you are positive, your 5 day isolation begins on that day, and you turned your 5 day quarantine into 10 days.          If your exposure does not meet the above definition, you can return to your normal daily activities to include social distancing, wearing a mask and frequent handwashing.  Alternatively, if a 5-day quarantine is not feasible, it is imperative that an exposed person wear a well-fitting mask at all times when around others for 10 days after exposure.     - Rest.    - Drink plenty of fluids.  - Viral upper respiratory infections typically run their course in 10-14 days.      - You can take over-the-counter claritin, zyrtec, allegra, or xyzal as directed. These are antihistamines that can help with runny nose, nasal congestion, sneezing, and helps to  dry up post-nasal drip, which usually causes sore throat and cough.               - If you do NOT have high blood pressure, you may use a decongestant form (D)  of this medication (ie. Claritin- D, zyrtec-D, allegra-D) or if you do not take the D form, you can take sudafed (pseudoephedrine) over the counter, which is a decongestant. Do NOT take two decongestant (D) medications at the same time (such as mucinex-D and claritin-D or plain sudafed and claritin D). Dextromethorphan (DM) is a cough suppressant over the counter (ie. mucinex DM, robitussin, delsym; dayquil/nyquil has DM as well.)     - You can use Flonase (fluticasone) nasal spray as directed for sinus congestion and postnasal drip. This is a steroid nasal spray that works locally over time to decrease the inflammation in your nose/sinuses and help with allergic symptoms. This is not an quick- relief spray like afrin, but it works well if used daily.  Discontinue if you develop nose bleed  - Use nasal saline prior to Flonase.  - Use Ocean Spray Nasal Saline 1-3 puffs each nostril every 2-3 hours then blow out onto tissue. This is to irrigate the nasal passage way to clear the sinus openings. Use until sinus problem resolved.     - You can take plain Mucinex (guaifenesin) 1200 mg twice a day to help loosen mucous.      - Warm salt water gargles can help with sore throat     - Warm tea with honey can help with cough. Honey is a natural cough suppressant.           - Follow up with your PCP or specialty clinic as directed in the next 1-2 weeks if not improved or as needed.  You can call [phone] to schedule an appointment with the appropriate provider.           - Rest.    - Drink plenty of fluids.    - Acetaminophen (tylenol) or Ibuprofen (advil,motrin) as directed as needed for fever/pain. Avoid tylenol if you have a history of liver disease. Do not take ibuprofen if you have a history of GI bleeding, kidney disease, or if you take blood thinners.      - You must understand that you have received an Urgent Care treatment only and that you may be released before all of your medical problems are known or treated.   - You, the patient, will arrange for follow up care as instructed.   - If your condition worsens or fails to improve we recommend that you receive another evaluation at the ER immediately or contact your PCP to discuss your concerns or return here.   - Follow up with your PCP or specialty clinic as directed in the next 1-2 weeks if not improved or as needed.  You can call [phone] to schedule an appointment with the appropriate provider.      If your symptoms do not improve or worsen, go to the emergency room immediately.    Patient Education       COVID-19 Discharge Instructions   About this topic   Coronavirus disease 2019 is also known as COVID-19. It is a viral illness that infects the lungs. It is caused by a virus called SARS-associated coronavirus (SARS-CoV-2).  The signs of COVID-19 most often start a few days after you have been infected. In some people, it takes longer to show signs. Others never show signs of the infection. You may have a cough, fever, shaking chills and it may be hard to breathe. You may be very tired, have muscle aches, a headache or sore throat. Some people have an upset stomach or loose stools. Others lose their sense of smell or taste. You may not have these signs all the time and they may come and go while you are sick.  The virus spreads easily through droplets when you talk, sneeze, or cough. You can pass the virus to others when you are talking close together, singing, hugging, sharing food, or shaking hands. Doctors believe the germs also survive on surfaces like tables, door handles, and telephones. However, this is not a common way that COVID-19 spreads. Doctors believe you can also spread the infection even if you dont have any symptoms, but they do not know how that happens. This is why getting  vaccinated is one of the best ways to keep you healthy and slow the spread of the virus.  Some people have a mild case of COVID-19 and are able to stay at home and away from others until they feel better. Others may need to be in the hospital if they are very sick. Some people with COVID-19 can have some symptoms for weeks or months. People with COVID-19 must isolate themselves. You can start to be around others when your doctor says it is safe to do so.       What care is needed at home?   · Ask your doctor what you need to do when you go home. Make sure you ask questions if you do not understand what the doctor says.  · Drink lots of water, juice, or broth to replace fluids lost from a fever.  · You may use cool mist humidifiers to help ease congestion and coughing.  · Use 2 to 3 pillows to prop yourself up when you lie down to make it easier to breathe and sleep.  · Do not smoke and do not drink beer, wine, and mixed drinks (alcohol).  · To lower the chance of passing the infection to others, get a COVID-19 vaccine after your infection has resolved.  · If you have not been fully vaccinated:  ? Wear a mask over your mouth and nose if you are around others who are not sick. Cloth masks work best if they have more than one layer of fabric.  ? Wash your hands often.  ? Stay home in a separate room, if possible, away from others. Only go out to get medical care.  ? Use a separate bathroom if possible.  ? Do not make food for others.  What follow-up care is needed?   · Your doctor may ask you to make visits to the office to check on your progress. Be sure to keep these visits. Make sure you wear a mask at these visits.  · If you can, tell the staff you have COVID-19 ahead of time so they can take extra care to stop the disease from spreading.  · It may take a few weeks before your health returns to normal.  What drugs may be needed?   The doctor may order drugs to:  · Help with breathing  · Help with fever  · Help with  swelling in your airways and lungs  · Control coughing  · Ease a sore throat  · Help a runny or stuffy nose  Will physical activity be limited?   You may have to limit your physical activity. Talk to your doctor about the right amount of activity for you. If you have been very sick with COVID-19, it can take some time to get your strength back.  Will there be any other care needed?   Doctors do not know how long you can pass the virus on to others after you are sick. This is why it is important to stay in a separate room, if possible, when you are sick. For now, doctors are giving general guidelines for you to follow after you have been sick. Before you go around other people, you should:  · Be fever free for 24 hours without taking any drugs to lower the fever  · Have no symptoms of cough or shortness of breath  · Wait at least 10 days after first having symptoms or your first positive test, and you need to be symptom free as above. Some experts suggest waiting 20 days if you have had a more severe infection.  Talk with your doctor about getting a COVID-19 vaccine.  What problems could happen?   · Fluid loss. This is dehydration.  · Short-term or long-term lung damage  · Heart problems  · Death  When do I need to call the doctor?   · You are having so much trouble breathing that you can only say one or two words at a time.  · You need to sit upright at all times to be able to breathe and/or cannot lie down.  · You are very confused or cannot stay awake.  · Your lips or skin start to turn blue or grey.  · You think you might be having a medical emergency. Some examples of medical emergencies are:  ? Severe chest pain.  ? Not able to speak or move normally.  · You have trouble breathing when talking or sitting still.  · You have new shortness of breath.  · You become weak or dizzy.  · You have very dark urine or do not pass urine for more than 8 hours.  · You have new or worsening COVID-19 symptoms  like:  ? Fever  ? Cough  ? Feeling very tired  ? Shaking chills  ? Headache  ? Trouble swallowing  ? Throwing up  ? Loose stools  ? Reddish purple spots on your fingers or toes  Teach Back: Helping You Understand   The Teach Back Method helps you understand the information we are giving you. After you talk with the staff, tell them in your own words what you learned. This helps to make sure the staff has described each thing clearly. It also helps to explain things that may have been confusing. Before going home, make sure you can do these:  · I can tell you about my condition.  · I can tell you what may help ease my breathing.  · I can tell you what I can do to help avoid passing the infection to others.  · I can tell you what I will do if I have trouble breathing; feel sleepy or confused; or my fingertips, fingernails, skin, or lips are blue.  Where can I learn more?   Centers for Disease Control and Prevention  https://www.cdc.gov/coronavirus/2019-ncov/about/index.html   Centers for Disease Control and Prevention  https://www.cdc.gov/coronavirus/2019-ncov/hcp/disposition-in-home-patients.html   World Health Organization  https://www.who.int/news-room/q-a-detail/p-j-ivnnqpjpatdji   Last Reviewed Date   2021-10-05  Consumer Information Use and Disclaimer   This information is not specific medical advice and does not replace information you receive from your health care provider. This is only a brief summary of general information. It does NOT include all information about conditions, illnesses, injuries, tests, procedures, treatments, therapies, discharge instructions or life-style choices that may apply to you. You must talk with your health care provider for complete information about your health and treatment options. This information should not be used to decide whether or not to accept your health care providers advice, instructions or recommendations. Only your health care provider has the knowledge and  training to provide advice that is right for you.  Copyright   Copyright © 2021 [employer] and its affiliates and/or licensors. All rights reserved.

## 2022-01-10 NOTE — PATIENT INSTRUCTIONS
You have tested positive for COVID-19 today.       ISOLATION  If you tested positive and do not have symptoms, you must isolate for 5 days starting on the day of the positive test. I     If you tested positive and have symptoms, you must isolate for 5 days starting on the day of the first symptoms,  not the day of the positive test.     This is the most important part, both the CDC and the [facility] emphasize that you do not test out of isolation.     After 5 days, if your symptoms have improved and you have not had fever on day 5, you can return to the community on day 6- NO TESTING REQUIRED!      In fact, we do not retest if you were positive in the last 90 days.     After your 5 days of isolation are completed, the CDC recommends strict mask use for the first 5 days that you come out of isolation.      CDC Testing and Quarantine Guidelines for patients with exposure to a known-positive COVID-19 person:  ·     A 'close exposure' is defined as anyone who has had an exposure (masked or unmasked) to a known COVID -19 positive person          within 6 feet of someone          for a cumulative total of 15 minutes or more over a 24-hour period.  ·     vaccinated Have been boosted or completed the primary series of Pfizer or Moderna vaccine within the last 6 months or completed the primary series of J&J vaccine within the last 2 months and/or had a positive test within 90 days          do NOT need to quarantine after contact with someone who had COVID-19 unless they have symptoms.          fully vaccinated people who have not had a positive test within 90 days, should get tested 3-5 days after their exposure, even if they don't have symptoms and wear a mask indoors in public for 10 days following exposure or until their test result is negative on day 5.          If you develop symptoms test and quarantine.     ·     Unvaccinated, or are more than six months out from their second mRNA dose (or more than 2 months after the J&J  vaccine) and not yet boosted,  and/or NOT had a positive test within 90 days and meet 'close exposure'  you are required by CDC guidelines to quarantine for at least 5 days from time of exposure followed by 5 days of strict masking. It is recommended, but not required to test after 5 days, unless you develop symptoms, in which case you should test at that time.  If you do decide to test at 5 days and are asymptomatic, the risk is that if you test without symptoms on Day 5 for example) and you are positive, your 5 day isolation begins on that day, and you turned your 5 day quarantine into 10 days.          If your exposure does not meet the above definition, you can return to your normal daily activities to include social distancing, wearing a mask and frequent handwashing.  Alternatively, if a 5-day quarantine is not feasible, it is imperative that an exposed person wear a well-fitting mask at all times when around others for 10 days after exposure.     - Rest.    - Drink plenty of fluids.  - Viral upper respiratory infections typically run their course in 10-14 days.      - You can take over-the-counter claritin, zyrtec, allegra, or xyzal as directed. These are antihistamines that can help with runny nose, nasal congestion, sneezing, and helps to dry up post-nasal drip, which usually causes sore throat and cough.               - If you do NOT have high blood pressure, you may use a decongestant form (D)  of this medication (ie. Claritin- D, zyrtec-D, allegra-D) or if you do not take the D form, you can take sudafed (pseudoephedrine) over the counter, which is a decongestant. Do NOT take two decongestant (D) medications at the same time (such as mucinex-D and claritin-D or plain sudafed and claritin D). Dextromethorphan (DM) is a cough suppressant over the counter (ie. mucinex DM, robitussin, delsym; dayquil/nyquil has DM as well.)     - You can use Flonase (fluticasone) nasal spray as directed for sinus congestion  and postnasal drip. This is a steroid nasal spray that works locally over time to decrease the inflammation in your nose/sinuses and help with allergic symptoms. This is not an quick- relief spray like afrin, but it works well if used daily.  Discontinue if you develop nose bleed  - Use nasal saline prior to Flonase.  - Use Ocean Spray Nasal Saline 1-3 puffs each nostril every 2-3 hours then blow out onto tissue. This is to irrigate the nasal passage way to clear the sinus openings. Use until sinus problem resolved.     - You can take plain Mucinex (guaifenesin) 1200 mg twice a day to help loosen mucous.      - Warm salt water gargles can help with sore throat     - Warm tea with honey can help with cough. Honey is a natural cough suppressant.           - Follow up with your PCP or specialty clinic as directed in the next 1-2 weeks if not improved or as needed.  You can call [phone] to schedule an appointment with the appropriate provider.           - Rest.    - Drink plenty of fluids.    - Acetaminophen (tylenol) or Ibuprofen (advil,motrin) as directed as needed for fever/pain. Avoid tylenol if you have a history of liver disease. Do not take ibuprofen if you have a history of GI bleeding, kidney disease, or if you take blood thinners.     - You must understand that you have received an Urgent Care treatment only and that you may be released before all of your medical problems are known or treated.   - You, the patient, will arrange for follow up care as instructed.   - If your condition worsens or fails to improve we recommend that you receive another evaluation at the ER immediately or contact your PCP to discuss your concerns or return here.   - Follow up with your PCP or specialty clinic as directed in the next 1-2 weeks if not improved or as needed.  You can call [phone] to schedule an appointment with the appropriate provider.      If your symptoms do not improve or worsen, go to the emergency  room immediately.    Patient Education       COVID-19 Discharge Instructions   About this topic   Coronavirus disease 2019 is also known as COVID-19. It is a viral illness that infects the lungs. It is caused by a virus called SARS-associated coronavirus (SARS-CoV-2).  The signs of COVID-19 most often start a few days after you have been infected. In some people, it takes longer to show signs. Others never show signs of the infection. You may have a cough, fever, shaking chills and it may be hard to breathe. You may be very tired, have muscle aches, a headache or sore throat. Some people have an upset stomach or loose stools. Others lose their sense of smell or taste. You may not have these signs all the time and they may come and go while you are sick.  The virus spreads easily through droplets when you talk, sneeze, or cough. You can pass the virus to others when you are talking close together, singing, hugging, sharing food, or shaking hands. Doctors believe the germs also survive on surfaces like tables, door handles, and telephones. However, this is not a common way that COVID-19 spreads. Doctors believe you can also spread the infection even if you dont have any symptoms, but they do not know how that happens. This is why getting vaccinated is one of the best ways to keep you healthy and slow the spread of the virus.  Some people have a mild case of COVID-19 and are able to stay at home and away from others until they feel better. Others may need to be in the hospital if they are very sick. Some people with COVID-19 can have some symptoms for weeks or months. People with COVID-19 must isolate themselves. You can start to be around others when your doctor says it is safe to do so.       What care is needed at home?   · Ask your doctor what you need to do when you go home. Make sure you ask questions if you do not understand what the doctor says.  · Drink lots of water, juice, or broth to replace fluids lost  from a fever.  · You may use cool mist humidifiers to help ease congestion and coughing.  · Use 2 to 3 pillows to prop yourself up when you lie down to make it easier to breathe and sleep.  · Do not smoke and do not drink beer, wine, and mixed drinks (alcohol).  · To lower the chance of passing the infection to others, get a COVID-19 vaccine after your infection has resolved.  · If you have not been fully vaccinated:  ? Wear a mask over your mouth and nose if you are around others who are not sick. Cloth masks work best if they have more than one layer of fabric.  ? Wash your hands often.  ? Stay home in a separate room, if possible, away from others. Only go out to get medical care.  ? Use a separate bathroom if possible.  ? Do not make food for others.  What follow-up care is needed?   · Your doctor may ask you to make visits to the office to check on your progress. Be sure to keep these visits. Make sure you wear a mask at these visits.  · If you can, tell the staff you have COVID-19 ahead of time so they can take extra care to stop the disease from spreading.  · It may take a few weeks before your health returns to normal.  What drugs may be needed?   The doctor may order drugs to:  · Help with breathing  · Help with fever  · Help with swelling in your airways and lungs  · Control coughing  · Ease a sore throat  · Help a runny or stuffy nose  Will physical activity be limited?   You may have to limit your physical activity. Talk to your doctor about the right amount of activity for you. If you have been very sick with COVID-19, it can take some time to get your strength back.  Will there be any other care needed?   Doctors do not know how long you can pass the virus on to others after you are sick. This is why it is important to stay in a separate room, if possible, when you are sick. For now, doctors are giving general guidelines for you to follow after you have been sick. Before you go around other people, you  should:  · Be fever free for 24 hours without taking any drugs to lower the fever  · Have no symptoms of cough or shortness of breath  · Wait at least 10 days after first having symptoms or your first positive test, and you need to be symptom free as above. Some experts suggest waiting 20 days if you have had a more severe infection.  Talk with your doctor about getting a COVID-19 vaccine.  What problems could happen?   · Fluid loss. This is dehydration.  · Short-term or long-term lung damage  · Heart problems  · Death  When do I need to call the doctor?   · You are having so much trouble breathing that you can only say one or two words at a time.  · You need to sit upright at all times to be able to breathe and/or cannot lie down.  · You are very confused or cannot stay awake.  · Your lips or skin start to turn blue or grey.  · You think you might be having a medical emergency. Some examples of medical emergencies are:  ? Severe chest pain.  ? Not able to speak or move normally.  · You have trouble breathing when talking or sitting still.  · You have new shortness of breath.  · You become weak or dizzy.  · You have very dark urine or do not pass urine for more than 8 hours.  · You have new or worsening COVID-19 symptoms like:  ? Fever  ? Cough  ? Feeling very tired  ? Shaking chills  ? Headache  ? Trouble swallowing  ? Throwing up  ? Loose stools  ? Reddish purple spots on your fingers or toes  Teach Back: Helping You Understand   The Teach Back Method helps you understand the information we are giving you. After you talk with the staff, tell them in your own words what you learned. This helps to make sure the staff has described each thing clearly. It also helps to explain things that may have been confusing. Before going home, make sure you can do these:  · I can tell you about my condition.  · I can tell you what may help ease my breathing.  · I can tell you what I can do to help avoid passing the infection to  others.  · I can tell you what I will do if I have trouble breathing; feel sleepy or confused; or my fingertips, fingernails, skin, or lips are blue.  Where can I learn more?   Centers for Disease Control and Prevention  https://www.cdc.gov/coronavirus/2019-ncov/about/index.html   Centers for Disease Control and Prevention  https://www.cdc.gov/coronavirus/2019-ncov/hcp/disposition-in-home-patients.html   World Health Organization  https://www.who.int/news-room/q-a-detail/a-m-muftannvlctvy   Last Reviewed Date   2021-10-05  Consumer Information Use and Disclaimer   This information is not specific medical advice and does not replace information you receive from your health care provider. This is only a brief summary of general information. It does NOT include all information about conditions, illnesses, injuries, tests, procedures, treatments, therapies, discharge instructions or life-style choices that may apply to you. You must talk with your health care provider for complete information about your health and treatment options. This information should not be used to decide whether or not to accept your health care providers advice, instructions or recommendations. Only your health care provider has the knowledge and training to provide advice that is right for you.  Copyright   Copyright © 2021 [employer] and its affiliates and/or licensors. All rights reserved.

## 2022-01-19 ENCOUNTER — HOSPITAL ENCOUNTER (EMERGENCY)
Facility: HOSPITAL | Age: 47
Discharge: HOME | End: 2022-01-19
Attending: EMERGENCY MEDICINE
Payer: COMMERCIAL

## 2022-01-19 ENCOUNTER — TELEPHONE (OUTPATIENT)
Dept: INTERNAL MEDICINE | Facility: CLINIC | Age: 47
End: 2022-01-19
Payer: COMMERCIAL

## 2022-01-19 VITALS
HEART RATE: 61 BPM | OXYGEN SATURATION: 95 % | HEIGHT: 62 IN | RESPIRATION RATE: 18 BRPM | WEIGHT: 225 LBS | TEMPERATURE: 99 F | DIASTOLIC BLOOD PRESSURE: 79 MMHG | SYSTOLIC BLOOD PRESSURE: 136 MMHG | BODY MASS INDEX: 41.41 KG/M2

## 2022-01-19 DIAGNOSIS — U07.1 COVID-19 VIRUS INFECTION: Primary | ICD-10-CM

## 2022-01-19 DIAGNOSIS — S09.90XA TRAUMATIC INJURY OF HEAD, INITIAL ENCOUNTER: ICD-10-CM

## 2022-01-19 DIAGNOSIS — R51.9 NONINTRACTABLE HEADACHE, UNSPECIFIED CHRONICITY PATTERN, UNSPECIFIED HEADACHE TYPE: ICD-10-CM

## 2022-01-19 PROCEDURE — 63600175 PHARM REV CODE 636 W HCPCS: Performed by: PHYSICIAN ASSISTANT

## 2022-01-19 PROCEDURE — 96361 HYDRATE IV INFUSION ADD-ON: CPT

## 2022-01-19 PROCEDURE — 99284 EMERGENCY DEPT VISIT MOD MDM: CPT | Mod: ,,, | Performed by: PHYSICIAN ASSISTANT

## 2022-01-19 PROCEDURE — 96374 THER/PROPH/DIAG INJ IV PUSH: CPT

## 2022-01-19 PROCEDURE — 99284 PR EMERGENCY DEPT VISIT,LEVEL IV: ICD-10-PCS | Mod: ,,, | Performed by: PHYSICIAN ASSISTANT

## 2022-01-19 PROCEDURE — 99285 EMERGENCY DEPT VISIT HI MDM: CPT | Mod: 25

## 2022-01-19 RX ORDER — METOCLOPRAMIDE 5 MG/1
5 TABLET ORAL 3 TIMES DAILY PRN
Qty: 7 TABLET | Refills: 0 | Status: SHIPPED | OUTPATIENT
Start: 2022-01-19 | End: 2022-02-08

## 2022-01-19 RX ORDER — NAPROXEN 500 MG/1
500 TABLET ORAL 2 TIMES DAILY WITH MEALS
Qty: 20 TABLET | Refills: 0 | Status: SHIPPED | OUTPATIENT
Start: 2022-01-19 | End: 2022-03-24

## 2022-01-19 RX ORDER — METOCLOPRAMIDE HYDROCHLORIDE 5 MG/ML
10 INJECTION INTRAMUSCULAR; INTRAVENOUS
Status: COMPLETED | OUTPATIENT
Start: 2022-01-19 | End: 2022-01-19

## 2022-01-19 RX ADMIN — METOCLOPRAMIDE 10 MG: 5 INJECTION, SOLUTION INTRAMUSCULAR; INTRAVENOUS at 06:01

## 2022-01-19 RX ADMIN — SODIUM CHLORIDE, SODIUM LACTATE, POTASSIUM CHLORIDE, AND CALCIUM CHLORIDE 1000 ML: .6; .31; .03; .02 INJECTION, SOLUTION INTRAVENOUS at 06:01

## 2022-01-19 NOTE — TELEPHONE ENCOUNTER
----- Message from Lorna Tom MA sent at 2022  2:52 PM CST -----  Regarding: appt  Sent:  11:16 AM  To: [facility]     Marianne Go  MRN: [id number] : 1975  Pt Home: [phone]    Entered: [phone]        Message    Appointment Request From: Marianne Go    With Provider: Carlee Mcneil MD [[facility]]    Preferred Date Range: Any    Preferred Times: Any Time    Reason for visit: Office Visit    Comments:  Headache after hitting my head

## 2022-01-19 NOTE — TELEPHONE ENCOUNTER
The patient informed to go to the ER. She hit her head on the corner of her dresser over the weekend. The spot is getting more tender. She is having a constant H/a. Some blurred vision. The patient verbalized understanding.

## 2022-01-19 NOTE — ED NOTES
Pt complains of left sided forehead and scalp pain since Saturday night. Pt states that she reached down to pick something off the floor and accidentally struck her head on edge of cabinet-- Pt denies loss of consciousness, no obvious wound or bruising/ Pt states headache since Saturday- States diagnosed with Covid last Monday

## 2022-01-19 NOTE — Clinical Note
"Marianne Meyermin Go was seen and treated in our emergency department on 1/19/2022.  She may return to work on 01/22/2022.       If you have any questions or concerns, please don't hesitate to call.      Wild Morris PA-C"

## 2022-01-19 NOTE — ED PROVIDER NOTES
Encounter Date: 1/19/2022       History     Chief Complaint   Patient presents with    Covid positive     Headache, but hit L side of head on dresser on sat , still having headaches, no loc     The history is provided by the patient and medical records. No  was used.      Marianne Go is a 46 y.o. female with medical history of DVT (1990), Nephrolithiasis, Migraines, Anxiety presenting to the ED with the chief complaint of head trauma.    Patient accidentally hit herself in her left upper forehead 4 day ago on a dresser at home after bending down to pick something up off the ground. No LOC or blood thinner use. Reports having persistent headache since the episode. Reports associated nausea, dizziness, and blurry vision if she turns her head too fast. No speech changes, numbness, paresthesias, extremity weakness, chest pain, SOB, abdominal pain, vomiting, urinary or bowel movement changes. She has been taking Tylenol for pain.     COVID+ test on 1/10/22.    Review of patient's allergies indicates:   Allergen Reactions    Topiramate Itching     Past Medical History:   Diagnosis Date    Deep vein thrombosis 1990    pregnancy related     Kidney calculi     Kidney stones     Kidney stones     Migraines     perimenstrual      Past Surgical History:   Procedure Laterality Date    BLADDER SUSPENSION      CYSTOSCOPY W/ LASER LITHOTRIPSY      HYSTERECTOMY      PELVIC LAPAROSCOPY      ablation of endometriosis    TONSILLECTOMY      TUBAL LIGATION  2011    [facility]      Family History   Problem Relation Age of Onset    Hyperlipidemia Father     Hypothyroidism Father     Cancer Father         lung cancer     Allergic rhinitis Father     Asthma Son     Hyperlipidemia Mother     Hypertension Mother     Diabetes Mother     Hypothyroidism Mother     Breast cancer Mother         bilateral mastectomy 2018,2019    Colon cancer Maternal Uncle         40s     Ovarian cancer Neg Hx      Social  History     Tobacco Use    Smoking status: Former Smoker     Packs/day: 0.25     Years: 13.00     Pack years: 3.25     Quit date: 2003     Years since quittin.0    Smokeless tobacco: Never Used    Tobacco comment: quit 11 years ago   Substance Use Topics    Alcohol use: Yes     Comment: rarely    Drug use: No     Review of Systems   Constitutional: Negative for fever.   HENT: Negative for sore throat.    Eyes: Negative for pain.   Respiratory: Negative for shortness of breath.    Cardiovascular: Negative for chest pain.   Gastrointestinal: Positive for nausea.   Genitourinary: Negative for dysuria.   Musculoskeletal: Negative for back pain.   Skin: Negative for rash.   Neurological: Positive for dizziness and headaches. Negative for weakness.   Hematological: Does not bruise/bleed easily.       Physical Exam     Initial Vitals [22 1657]   BP Pulse Resp Temp SpO2   (!) 140/76 78 18 99 °F (37.2 °C) 97 %      MAP       --         Physical Exam    Constitutional: She appears well-developed and well-nourished. She is not diaphoretic. No distress.   HENT:   Head: Normocephalic and atraumatic.   Very small hematoma with TTP to L frontal scalp. No laceration or skin break.   Eyes: EOM are normal. Pupils are equal, round, and reactive to light.   No nystagmus   Neck: Neck supple.   Normal range of motion.  Cardiovascular: Normal rate and regular rhythm.   Pulmonary/Chest: No respiratory distress.   Speaking full sentences without difficulty. No accessory muscle use.   Musculoskeletal:         General: Normal range of motion.      Cervical back: Normal range of motion and neck supple.      Comments: No midline spinal tenderness     Neurological: She is alert and oriented to person, place, and time.   Follows commands appropriately. Ambulates without difficulty. No dysmetria, dysdiadochokinesia, peripheral vision deficits. Negative pronator drift.     Skin: Skin is warm and dry. No rash noted.       ED  Course   Procedures  Labs Reviewed - No data to display       Imaging Results          CT Head Without Contrast (Final result)  Result time 01/19/22 19:08:41    Final result by Uriah Guzmán MD (01/19/22 19:08:41)                 Impression:      No acute intracranial pathology.    Electronically signed by resident: Adan Cabrera  Date:    01/19/2022  Time:    18:51    Electronically signed by: Uriah Guzmán MD  Date:    01/19/2022  Time:    19:08             Narrative:    EXAMINATION:  CT HEAD WITHOUT CONTRAST    CLINICAL HISTORY:  Head trauma, moderate-severe;    TECHNIQUE:  Low dose axial CT images obtained throughout the head without the use of intravenous contrast.  Axial, sagittal and coronal reconstructions were performed.    COMPARISON:  None.    FINDINGS:  Intracranial compartment:    The ventricles and sulci are normal in size for age without evidence of hydrocephalus.    The brain parenchyma appears within normal limits.  No parenchymal mass, hemorrhage, edema or major vascular distribution infarct. No extra-axial blood or fluid collections.    Skull/extracranial contents (limited evaluation):    No fracture. Mastoid air cells and paranasal sinuses are essentially clear.                                 Medications   lactated ringers bolus 1,000 mL (0 mLs Intravenous Stopped 1/19/22 1931)   metoclopramide HCl injection 10 mg (10 mg Intravenous Given 1/19/22 1825)     Medical Decision Making:   History:   Old Medical Records: I decided to obtain old medical records.  Old Records Summarized: records from clinic visits.  Clinical Tests:   Radiological Study: Ordered and Reviewed       APC / Resident Notes:   46 y.o. female with medical history of DVT (1990), Nephrolithiasis, Migraines, Anxiety presenting to the ED c/o persistent headache, nausea, dizziness beginning 4 days ago after head trauma. DDx includes but not limited to post-concussion syndrome, head contusion, migraine, traumatic brain injury, skull  fracture, BPPV, COVID complication.        CT head without acute intracranial findings. She is feeling much better after IVF and Reglan. Discussed possibility of post-concussion syndrome and to follow-up in clinic for further management. RX for Reglan and Naprosyn provided for further management. Patient expresses understanding and agreeable to the plan. Return to ED precautions given for new, worsening, or concerning symptoms.        ED Course as of 01/20/22 0155 Wed Jan 19, 2022   1917 Ambulatory POx 95% [BA]      ED Course User Index  [BA] Wild Morris PA-C              Clinical Impression:   Final diagnoses:  [U07.1] COVID-19 virus infection (Primary)  [S09.90XA] Traumatic injury of head, initial encounter  [R51.9] Nonintractable headache, unspecified chronicity pattern, unspecified headache type          ED Disposition Condition    Discharge Stable        ED Prescriptions     Medication Sig Dispense Start Date End Date Auth. Provider    metoclopramide HCl (REGLAN) 5 MG tablet Take 1 tablet (5 mg total) by mouth 3 (three) times daily as needed (headache). 7 tablet 1/19/2022  Wild Morris PA-C    naproxen (NAPROSYN) 500 MG tablet Take 1 tablet (500 mg total) by mouth 2 (two) times daily with meals. 20 tablet 1/19/2022  Wild Morris PA-C        Follow-up Information     Follow up With Specialties Details Why Contact Info    [facility]    [address]  [facility] [address]  [phone]             Wild Morris PA-C  01/20/22 0155

## 2022-01-20 NOTE — DISCHARGE INSTRUCTIONS
Follow-up with our concussion clinic for further evaluation of your headache and dizziness if you continue to have symptoms.    Use the prescribed Reglan and Naprosyn for your pain and dizziness. Hydrate by drinking plenty of water.    Return to the emergency room for new, worsening, or concerning symptoms.     No future appointments.

## 2022-01-31 ENCOUNTER — TELEPHONE (OUTPATIENT)
Dept: PHYSICAL MEDICINE AND REHAB | Facility: CLINIC | Age: 47
End: 2022-01-31
Payer: COMMERCIAL

## 2022-01-31 NOTE — TELEPHONE ENCOUNTER
Spoke to the patient to offer her an appt with Dr Henderson for concussion. I offered her an appt for May 2022 which was my soonest appt. Said she will check with her PCP  because she was still having problems.

## 2022-02-08 ENCOUNTER — OFFICE VISIT (OUTPATIENT)
Dept: INTERNAL MEDICINE | Facility: CLINIC | Age: 47
End: 2022-02-08
Payer: COMMERCIAL

## 2022-02-08 VITALS
OXYGEN SATURATION: 98 % | HEIGHT: 62 IN | TEMPERATURE: 98 F | WEIGHT: 234.13 LBS | BODY MASS INDEX: 43.08 KG/M2 | SYSTOLIC BLOOD PRESSURE: 128 MMHG | HEART RATE: 75 BPM | DIASTOLIC BLOOD PRESSURE: 72 MMHG

## 2022-02-08 DIAGNOSIS — G43.909 MIGRAINE WITHOUT STATUS MIGRAINOSUS, NOT INTRACTABLE, UNSPECIFIED MIGRAINE TYPE: Primary | ICD-10-CM

## 2022-02-08 DIAGNOSIS — Z87.820 HISTORY OF BRAIN CONCUSSION: ICD-10-CM

## 2022-02-08 DIAGNOSIS — R11.0 NAUSEA: ICD-10-CM

## 2022-02-08 PROCEDURE — 1159F MED LIST DOCD IN RCRD: CPT | Mod: CPTII,S$GLB,, | Performed by: NURSE PRACTITIONER

## 2022-02-08 PROCEDURE — 3008F PR BODY MASS INDEX (BMI) DOCUMENTED: ICD-10-PCS | Mod: CPTII,S$GLB,, | Performed by: NURSE PRACTITIONER

## 2022-02-08 PROCEDURE — 1159F PR MEDICATION LIST DOCUMENTED IN MEDICAL RECORD: ICD-10-PCS | Mod: CPTII,S$GLB,, | Performed by: NURSE PRACTITIONER

## 2022-02-08 PROCEDURE — 3078F PR MOST RECENT DIASTOLIC BLOOD PRESSURE < 80 MM HG: ICD-10-PCS | Mod: CPTII,S$GLB,, | Performed by: NURSE PRACTITIONER

## 2022-02-08 PROCEDURE — 3008F BODY MASS INDEX DOCD: CPT | Mod: CPTII,S$GLB,, | Performed by: NURSE PRACTITIONER

## 2022-02-08 PROCEDURE — 99214 OFFICE O/P EST MOD 30 MIN: CPT | Mod: S$GLB,,, | Performed by: NURSE PRACTITIONER

## 2022-02-08 PROCEDURE — 3078F DIAST BP <80 MM HG: CPT | Mod: CPTII,S$GLB,, | Performed by: NURSE PRACTITIONER

## 2022-02-08 PROCEDURE — 3074F SYST BP LT 130 MM HG: CPT | Mod: CPTII,S$GLB,, | Performed by: NURSE PRACTITIONER

## 2022-02-08 PROCEDURE — 99999 PR PBB SHADOW E&M-EST. PATIENT-LVL III: ICD-10-PCS | Mod: PBBFAC,,, | Performed by: NURSE PRACTITIONER

## 2022-02-08 PROCEDURE — 3074F PR MOST RECENT SYSTOLIC BLOOD PRESSURE < 130 MM HG: ICD-10-PCS | Mod: CPTII,S$GLB,, | Performed by: NURSE PRACTITIONER

## 2022-02-08 PROCEDURE — 99999 PR PBB SHADOW E&M-EST. PATIENT-LVL III: CPT | Mod: PBBFAC,,, | Performed by: NURSE PRACTITIONER

## 2022-02-08 PROCEDURE — 99214 PR OFFICE/OUTPT VISIT, EST, LEVL IV, 30-39 MIN: ICD-10-PCS | Mod: S$GLB,,, | Performed by: NURSE PRACTITIONER

## 2022-02-08 RX ORDER — ONDANSETRON 4 MG/1
4 TABLET, FILM COATED ORAL EVERY 8 HOURS PRN
Qty: 30 TABLET | Refills: 0 | Status: SHIPPED | OUTPATIENT
Start: 2022-02-08 | End: 2022-03-24

## 2022-02-08 RX ORDER — BUTALBITAL, ACETAMINOPHEN AND CAFFEINE 50; 325; 40 MG/1; MG/1; MG/1
1 TABLET ORAL EVERY 4 HOURS PRN
Qty: 30 TABLET | Refills: 0 | Status: ON HOLD | OUTPATIENT
Start: 2022-02-08 | End: 2023-05-05 | Stop reason: HOSPADM

## 2022-02-08 NOTE — PROGRESS NOTES
[facility]    Chief Complaint      Chief Complaint   Patient presents with    Headache     Hx of concussion 2 weeks ago     History of Present Illness      Marianne Go is a 46 y.o. female patient of Dr. Dorsey who presents today for headaches from concussion and ER f/u from 1/19/22. Still feeling dizzy, off balance, cough. Gets nausea and still has HA. Is taking tylenol and applying ice. Swelling has resolved. No c/o visual changes, vomiting, loc, confusion, trouble finding words, sob, cp or weakness.    ER note:  Patient accidentally hit herself in her left upper forehead 4 day ago on a dresser at home after bending down to pick something up off the ground. No LOC or blood thinner use. Reports having persistent headache since the episode. Reports associated nausea, dizziness, and blurry vision if she turns her head too fast. No speech changes, numbness, paresthesias, extremity weakness, chest pain, SOB, abdominal pain, vomiting, urinary or bowel movement changes. She has been taking Tylenol for pain.      covid + on 1/10/22    Health Maintenance   Topic Date Due    Mammogram  01/26/2022    Lipid Panel  06/08/2026    TETANUS VACCINE  06/08/2031    Hepatitis C Screening  Completed       Past Medical History:   Diagnosis Date    Deep vein thrombosis 1990    pregnancy related     Kidney calculi     Kidney stones     Kidney stones     Migraines     perimenstrual        Past Surgical History:   Procedure Laterality Date    BLADDER SUSPENSION      CYSTOSCOPY W/ LASER LITHOTRIPSY      HYSTERECTOMY      PELVIC LAPAROSCOPY      ablation of endometriosis    TONSILLECTOMY      TUBAL LIGATION  2011    essure        family history includes Allergic rhinitis in her father; Asthma in her son; Breast cancer in her mother; Cancer in her father; Colon cancer in her maternal uncle; Diabetes in her mother; Hyperlipidemia in her father and mother; Hypertension in her mother; Hypothyroidism in  her father and mother.    Social History     Tobacco Use    Smoking status: Former Smoker     Packs/day: 0.25     Years: 13.00     Pack years: 3.25     Quit date: 2003     Years since quittin.1    Smokeless tobacco: Never Used    Tobacco comment: quit 11 years ago   Substance Use Topics    Alcohol use: Yes     Comment: rarely    Drug use: No       Review of Systems   Constitutional: Negative for chills and fever.   HENT: Negative for hearing loss and tinnitus.    Eyes: Negative for blurred vision and double vision.   Respiratory: Negative for shortness of breath.    Cardiovascular: Negative for chest pain.   Gastrointestinal: Positive for nausea.   Neurological: Positive for dizziness and headaches. Negative for speech change and loss of consciousness.   Psychiatric/Behavioral: Negative for memory loss. The patient does not have insomnia.         Outpatient Encounter Medications as of 2022   Medication Sig Dispense Refill    atorvastatin (LIPITOR) 10 MG tablet TAKE 1 TABLET(10 MG) BY MOUTH EVERY DAY 90 tablet 3    cetirizine (ZYRTEC) 5 MG tablet Take 1 tablet (5 mg total) by mouth once daily. 30 tablet 0    meloxicam (MOBIC) 15 MG tablet Take 1 tablet (15 mg total) by mouth once daily. 90 tablet 3    multivitamin capsule Take 1 capsule by mouth once daily.      paroxetine (PAXIL) 10 MG tablet TAKE 1 TABLET(10 MG) BY MOUTH EVERY DAY 30 tablet 8    [DISCONTINUED] metoclopramide HCl (REGLAN) 5 MG tablet Take 1 tablet (5 mg total) by mouth 3 (three) times daily as needed (headache). 7 tablet 0    butalbital-acetaminophen-caffeine -40 mg (FIORICET, ESGIC) -40 mg per tablet Take 1 tablet by mouth every 4 (four) hours as needed for Pain. 30 tablet 0    doxycycline (VIBRA-TABS) 100 MG tablet Take one qd with food (Patient not taking: Reported on 2022) 30 tablet 6    hyoscyamine (LEVSIN/SL) 0.125 mg Subl DISSOLVE 1 TABLET(0.125 MG) UNDER THE TONGUE EVERY 6 HOURS AS NEEDED FOR  "ABDOMINAL CRAMPS (Patient not taking: Reported on 2/8/2022) 368 tablet 0    naproxen (NAPROSYN) 500 MG tablet Take 1 tablet (500 mg total) by mouth 2 (two) times daily with meals. (Patient not taking: Reported on 2/8/2022) 20 tablet 0    ondansetron (ZOFRAN) 4 MG tablet Take 1 tablet (4 mg total) by mouth every 8 (eight) hours as needed for Nausea. 30 tablet 0    sumatriptan (IMITREX) 50 MG tablet Take 1 tablet (50 mg total) by mouth every 2 (two) hours as needed for Migraine (no more than 4 doses in 1 day). (Patient not taking: Reported on 2/8/2022) 9 tablet 2    traZODone (DESYREL) 50 MG tablet Take 1 tablet (50 mg total) by mouth nightly as needed for Insomnia. (Patient not taking: Reported on 2/8/2022) 30 tablet 5     No facility-administered encounter medications on file as of 2/8/2022.        Review of patient's allergies indicates:   Allergen Reactions    Topiramate Itching       Physical Exam      Vital Signs  Temp: 98 °F (36.7 °C)  Temp src: Oral  Pulse: 75  SpO2: 98 %  BP: 128/72  BP Location: Left arm  Patient Position: Sitting  Pain Score:   6  Height and Weight  Height: 5' 2" (157.5 cm)  Weight: 106.2 kg (234 lb 2.1 oz)  BSA (Calculated - sq m): 2.16 sq meters  BMI (Calculated): 42.8  Weight in (lb) to have BMI = 25: 136.4    Physical Exam  Vitals and nursing note reviewed.   Constitutional:       General: She is not in acute distress.     Appearance: Normal appearance. She is not ill-appearing.   HENT:      Head: Normocephalic and atraumatic.   Eyes:      Pupils: Pupils are equal, round, and reactive to light.   Cardiovascular:      Rate and Rhythm: Normal rate and regular rhythm.   Pulmonary:      Effort: Pulmonary effort is normal. No respiratory distress.   Skin:     General: Skin is warm and dry.   Neurological:      General: No focal deficit present.      Mental Status: She is alert and oriented to person, place, and time.      Motor: No weakness.   Psychiatric:         Mood and Affect: Mood " normal.         Behavior: Behavior normal.         Thought Content: Thought content normal.         Judgment: Judgment normal.          Laboratory:  CBC:  Lab Results   Component Value Date    WBC 8.97 06/08/2021    RBC 4.52 06/08/2021    HGB 15.0 06/08/2021    HCT 43.9 06/08/2021     06/08/2021    MCV 97 06/08/2021    MCH 33.2 (H) 06/08/2021    MCHC 34.2 06/08/2021    MCHC 31.8 (L) 01/14/2021    MCHC 33.1 01/04/2020     CMP:  Lab Results   Component Value Date    GLU 95 06/08/2021    CALCIUM 9.6 06/08/2021    ALBUMIN 3.8 06/08/2021    PROT 7.5 06/08/2021     06/08/2021    K 4.2 06/08/2021    CO2 20 (L) 06/08/2021     06/08/2021    BUN 11 06/08/2021    ALKPHOS 89 06/08/2021    ALT 17 06/08/2021    AST 11 06/08/2021    BILITOT 0.7 06/08/2021    BILITOT 0.5 01/14/2021    BILITOT 0.5 01/04/2020     URINALYSIS:  Lab Results   Component Value Date    COLORU Yellow 06/08/2021    SPECGRAV 1.025 06/08/2021    PHUR 5.0 06/08/2021    PROTEINUA Negative 06/08/2021    BACTERIA Few (A) 04/01/2019    NITRITE Negative 06/08/2021    LEUKOCYTESUR Negative 06/08/2021    UROBILINOGEN normal 11/18/2015    HYALINECASTS 0 04/01/2019      LIPIDS:  Lab Results   Component Value Date    TSH 2.808 06/08/2021    TSH 2.151 01/14/2021    TSH 2.382 05/02/2015    HDL 39 (L) 06/08/2021    HDL 37 (L) 01/14/2021    HDL 33 (L) 05/02/2015    CHOL 195 06/08/2021    CHOL 244 (H) 01/14/2021    CHOL 193 05/02/2015    TRIG 201 (H) 06/08/2021    TRIG 233 (H) 01/14/2021    TRIG 121 05/02/2015    LDLCALC 115.8 06/08/2021    LDLCALC 160.4 (H) 01/14/2021    LDLCALC 135.8 05/02/2015    CHOLHDL 20.0 06/08/2021    CHOLHDL 15.2 (L) 01/14/2021    CHOLHDL 17.1 (L) 05/02/2015    NONHDLCHOL 156 06/08/2021    NONHDLCHOL 207 01/14/2021    NONHDLCHOL 160 05/02/2015    TOTALCHOLEST 5.0 06/08/2021    TOTALCHOLEST 6.6 (H) 01/14/2021    TOTALCHOLEST 5.8 (H) 05/02/2015     TSH:  Lab Results   Component Value Date    TSH 2.808 06/08/2021    TSH 2.151  01/14/2021    TSH 2.382 05/02/2015     A1C:  Lab Results   Component Value Date    HGBA1C 5.2 06/08/2021    HGBA1C 4.9 01/14/2021    HGBA1C 4.8 05/25/2015         Assessment/Plan     Marianne Go is a 46 y.o.female with:    Migraine without status migrainosus, not intractable, unspecified migraine type  -     butalbital-acetaminophen-caffeine -40 mg (FIORICET, ESGIC) -40 mg per tablet; Take 1 tablet by mouth every 4 (four) hours as needed for Pain.  Dispense: 30 tablet; Refill: 0  -     ondansetron (ZOFRAN) 4 MG tablet; Take 1 tablet (4 mg total) by mouth every 8 (eight) hours as needed for Nausea.  Dispense: 30 tablet; Refill: 0    History of brain concussion  -     butalbital-acetaminophen-caffeine -40 mg (FIORICET, ESGIC) -40 mg per tablet; Take 1 tablet by mouth every 4 (four) hours as needed for Pain.  Dispense: 30 tablet; Refill: 0  -     ondansetron (ZOFRAN) 4 MG tablet; Take 1 tablet (4 mg total) by mouth every 8 (eight) hours as needed for Nausea.  Dispense: 30 tablet; Refill: 0    Nausea  -     butalbital-acetaminophen-caffeine -40 mg (FIORICET, ESGIC) -40 mg per tablet; Take 1 tablet by mouth every 4 (four) hours as needed for Pain.  Dispense: 30 tablet; Refill: 0  -     ondansetron (ZOFRAN) 4 MG tablet; Take 1 tablet (4 mg total) by mouth every 8 (eight) hours as needed for Nausea.  Dispense: 30 tablet; Refill: 0          I spent 30 minutes on the day of this encounter for preparing for, evaluating, treating, and managing this patient.      -Continue current medications and maintain follow up with specialists.  Return to clinic as needed for any concerns   No follow-ups on file.       FANY De Los Santos  [facility] [place]

## 2022-03-11 ENCOUNTER — APPOINTMENT (OUTPATIENT)
Dept: RADIOLOGY | Facility: OTHER | Age: 47
End: 2022-03-11
Attending: STUDENT IN AN ORGANIZED HEALTH CARE EDUCATION/TRAINING PROGRAM
Payer: COMMERCIAL

## 2022-03-11 VITALS — HEIGHT: 62 IN | WEIGHT: 234.13 LBS | BODY MASS INDEX: 43.08 KG/M2

## 2022-03-11 DIAGNOSIS — Z12.31 VISIT FOR SCREENING MAMMOGRAM: ICD-10-CM

## 2022-03-11 DIAGNOSIS — Z12.39 ENCOUNTER FOR SCREENING FOR MALIGNANT NEOPLASM OF BREAST, UNSPECIFIED SCREENING MODALITY: ICD-10-CM

## 2022-03-11 DIAGNOSIS — Z01.419 WELL WOMAN EXAM: ICD-10-CM

## 2022-03-11 PROCEDURE — 77067 SCR MAMMO BI INCL CAD: CPT | Mod: 26,,, | Performed by: RADIOLOGY

## 2022-03-11 PROCEDURE — 77063 BREAST TOMOSYNTHESIS BI: CPT | Mod: 26,,, | Performed by: RADIOLOGY

## 2022-03-11 PROCEDURE — 77067 MAMMO DIGITAL SCREENING BILAT WITH TOMO: ICD-10-PCS | Mod: 26,,, | Performed by: RADIOLOGY

## 2022-03-11 PROCEDURE — 77067 SCR MAMMO BI INCL CAD: CPT | Mod: TC,PN

## 2022-03-11 PROCEDURE — 77063 MAMMO DIGITAL SCREENING BILAT WITH TOMO: ICD-10-PCS | Mod: 26,,, | Performed by: RADIOLOGY

## 2022-03-22 ENCOUNTER — OFFICE VISIT (OUTPATIENT)
Dept: INTERNAL MEDICINE | Facility: CLINIC | Age: 47
End: 2022-03-22
Payer: COMMERCIAL

## 2022-03-22 VITALS
RESPIRATION RATE: 16 BRPM | OXYGEN SATURATION: 97 % | BODY MASS INDEX: 43.02 KG/M2 | DIASTOLIC BLOOD PRESSURE: 92 MMHG | TEMPERATURE: 99 F | SYSTOLIC BLOOD PRESSURE: 128 MMHG | HEART RATE: 63 BPM | HEIGHT: 62 IN | WEIGHT: 233.81 LBS

## 2022-03-22 DIAGNOSIS — H92.01 RIGHT EAR PAIN: Primary | ICD-10-CM

## 2022-03-22 PROCEDURE — 99213 OFFICE O/P EST LOW 20 MIN: CPT | Mod: S$GLB,,, | Performed by: NURSE PRACTITIONER

## 2022-03-22 PROCEDURE — 1160F PR REVIEW ALL MEDS BY PRESCRIBER/CLIN PHARMACIST DOCUMENTED: ICD-10-PCS | Mod: CPTII,S$GLB,, | Performed by: NURSE PRACTITIONER

## 2022-03-22 PROCEDURE — 1159F MED LIST DOCD IN RCRD: CPT | Mod: CPTII,S$GLB,, | Performed by: NURSE PRACTITIONER

## 2022-03-22 PROCEDURE — 3074F SYST BP LT 130 MM HG: CPT | Mod: CPTII,S$GLB,, | Performed by: NURSE PRACTITIONER

## 2022-03-22 PROCEDURE — 99999 PR PBB SHADOW E&M-EST. PATIENT-LVL III: ICD-10-PCS | Mod: PBBFAC,,, | Performed by: NURSE PRACTITIONER

## 2022-03-22 PROCEDURE — 1160F RVW MEDS BY RX/DR IN RCRD: CPT | Mod: CPTII,S$GLB,, | Performed by: NURSE PRACTITIONER

## 2022-03-22 PROCEDURE — 99999 PR PBB SHADOW E&M-EST. PATIENT-LVL III: CPT | Mod: PBBFAC,,, | Performed by: NURSE PRACTITIONER

## 2022-03-22 PROCEDURE — 3074F PR MOST RECENT SYSTOLIC BLOOD PRESSURE < 130 MM HG: ICD-10-PCS | Mod: CPTII,S$GLB,, | Performed by: NURSE PRACTITIONER

## 2022-03-22 PROCEDURE — 1159F PR MEDICATION LIST DOCUMENTED IN MEDICAL RECORD: ICD-10-PCS | Mod: CPTII,S$GLB,, | Performed by: NURSE PRACTITIONER

## 2022-03-22 PROCEDURE — 3008F PR BODY MASS INDEX (BMI) DOCUMENTED: ICD-10-PCS | Mod: CPTII,S$GLB,, | Performed by: NURSE PRACTITIONER

## 2022-03-22 PROCEDURE — 99213 PR OFFICE/OUTPT VISIT, EST, LEVL III, 20-29 MIN: ICD-10-PCS | Mod: S$GLB,,, | Performed by: NURSE PRACTITIONER

## 2022-03-22 PROCEDURE — 3080F DIAST BP >= 90 MM HG: CPT | Mod: CPTII,S$GLB,, | Performed by: NURSE PRACTITIONER

## 2022-03-22 PROCEDURE — 3080F PR MOST RECENT DIASTOLIC BLOOD PRESSURE >= 90 MM HG: ICD-10-PCS | Mod: CPTII,S$GLB,, | Performed by: NURSE PRACTITIONER

## 2022-03-22 PROCEDURE — 3008F BODY MASS INDEX DOCD: CPT | Mod: CPTII,S$GLB,, | Performed by: NURSE PRACTITIONER

## 2022-03-22 NOTE — PROGRESS NOTES
[facility]    Chief Complaint      Chief Complaint   Patient presents with    Headache    Otalgia     History of Present Illness      Marianne Go is a 46 y.o. female patient of Dr. Dorsey who presents today for right ear pain after getting hit with a soccer ball to the right side of her head yesterday. She reports immediate ringing, HA, and nausea, ice was applied which did subside symptoms. No syncope, visual changes, unbalanced episodes or hearing loss. Does have pain to right ear and neck/throat today.     Health Maintenance   Topic Date Due    Mammogram  2023    Lipid Panel  2026    TETANUS VACCINE  2031    Hepatitis C Screening  Completed       Past Medical History:   Diagnosis Date    Deep vein thrombosis     pregnancy related     Kidney calculi     Kidney stones     Kidney stones     Migraines     perimenstrual        Past Surgical History:   Procedure Laterality Date    BLADDER SUSPENSION      CYSTOSCOPY W/ LASER LITHOTRIPSY      HYSTERECTOMY      PELVIC LAPAROSCOPY      ablation of endometriosis    TONSILLECTOMY      TUBAL LIGATION  2011    essure        family history includes Allergic rhinitis in her father; Asthma in her son; Breast cancer in her mother; Cancer in her father; Colon cancer in her maternal uncle; Diabetes in her mother; Hyperlipidemia in her father and mother; Hypertension in her mother; Hypothyroidism in her father and mother.    Social History     Tobacco Use    Smoking status: Former Smoker     Packs/day: 0.25     Years: 13.00     Pack years: 3.25     Quit date: 2003     Years since quittin.2    Smokeless tobacco: Never Used    Tobacco comment: quit 11 years ago   Substance Use Topics    Alcohol use: Yes     Comment: rarely    Drug use: No       Review of Systems   Constitutional: Negative for chills and fever.   HENT: Positive for ear pain and sore throat. Negative for congestion, hearing loss, sinus pain  and tinnitus.    Eyes: Negative for blurred vision.   Respiratory: Negative for shortness of breath.    Cardiovascular: Negative for palpitations.   Gastrointestinal: Negative for nausea and vomiting.   Neurological: Negative for dizziness and headaches.        Outpatient Encounter Medications as of 3/22/2022   Medication Sig Dispense Refill    atorvastatin (LIPITOR) 10 MG tablet TAKE 1 TABLET(10 MG) BY MOUTH EVERY DAY 90 tablet 3    butalbital-acetaminophen-caffeine -40 mg (FIORICET, ESGIC) -40 mg per tablet Take 1 tablet by mouth every 4 (four) hours as needed for Pain. 30 tablet 0    meloxicam (MOBIC) 15 MG tablet Take 1 tablet (15 mg total) by mouth once daily. 90 tablet 3    multivitamin capsule Take 1 capsule by mouth once daily.      paroxetine (PAXIL) 10 MG tablet TAKE 1 TABLET(10 MG) BY MOUTH EVERY DAY 30 tablet 8    cetirizine (ZYRTEC) 5 MG tablet Take 1 tablet (5 mg total) by mouth once daily. 30 tablet 0    [DISCONTINUED] doxycycline (VIBRA-TABS) 100 MG tablet Take one qd with food (Patient not taking: No sig reported) 30 tablet 6    [DISCONTINUED] hyoscyamine (LEVSIN/SL) 0.125 mg Subl DISSOLVE 1 TABLET(0.125 MG) UNDER THE TONGUE EVERY 6 HOURS AS NEEDED FOR ABDOMINAL CRAMPS (Patient not taking: No sig reported) 368 tablet 0    [DISCONTINUED] naproxen (NAPROSYN) 500 MG tablet Take 1 tablet (500 mg total) by mouth 2 (two) times daily with meals. (Patient not taking: No sig reported) 20 tablet 0    [DISCONTINUED] ondansetron (ZOFRAN) 4 MG tablet Take 1 tablet (4 mg total) by mouth every 8 (eight) hours as needed for Nausea. (Patient not taking: Reported on 3/22/2022) 30 tablet 0    [DISCONTINUED] sumatriptan (IMITREX) 50 MG tablet Take 1 tablet (50 mg total) by mouth every 2 (two) hours as needed for Migraine (no more than 4 doses in 1 day). (Patient not taking: Reported on 2/8/2022) 9 tablet 2    [DISCONTINUED] traZODone (DESYREL) 50 MG tablet Take 1 tablet (50 mg total) by mouth  "nightly as needed for Insomnia. (Patient not taking: No sig reported) 30 tablet 5     No facility-administered encounter medications on file as of 3/22/2022.        Review of patient's allergies indicates:   Allergen Reactions    Topiramate Itching       Physical Exam      Vital Signs  Temp: 98.9 °F (37.2 °C)  Temp src: Oral  Pulse: 63  Resp: 16  SpO2: 97 %  BP: (!) 128/92  BP Location: Right arm  Patient Position: Sitting  Pain Score:   4  Height and Weight  Height: 5' 2" (157.5 cm)  Weight: 106.1 kg (233 lb 12.8 oz)  BSA (Calculated - sq m): 2.15 sq meters  BMI (Calculated): 42.8  Weight in (lb) to have BMI = 25: 136.4    Physical Exam  Vitals and nursing note reviewed.   Constitutional:       General: She is not in acute distress.     Appearance: Normal appearance. She is ill-appearing.   HENT:      Head: Normocephalic and atraumatic.      Ears:      Comments: Trace redness to right ear canal, no effusion noted     Mouth/Throat:      Mouth: Mucous membranes are moist.      Pharynx: Posterior oropharyngeal erythema present.   Eyes:      Pupils: Pupils are equal, round, and reactive to light.   Cardiovascular:      Rate and Rhythm: Normal rate and regular rhythm.      Heart sounds: Normal heart sounds.   Pulmonary:      Effort: Respiratory distress present.   Musculoskeletal:      Cervical back: Tenderness present. No rigidity.   Lymphadenopathy:      Cervical: No cervical adenopathy.   Skin:     General: Skin is warm and dry.   Neurological:      Mental Status: She is alert and oriented to person, place, and time.   Psychiatric:         Mood and Affect: Mood normal.         Behavior: Behavior normal.         Thought Content: Thought content normal.         Judgment: Judgment normal.          Laboratory:  CBC:  Lab Results   Component Value Date    WBC 8.97 06/08/2021    RBC 4.52 06/08/2021    HGB 15.0 06/08/2021    HCT 43.9 06/08/2021     06/08/2021    MCV 97 06/08/2021    MCH 33.2 (H) 06/08/2021    [facility] " 34.2 06/08/2021    MCHC 31.8 (L) 01/14/2021    MCHC 33.1 01/04/2020     CMP:  Lab Results   Component Value Date    GLU 95 06/08/2021    CALCIUM 9.6 06/08/2021    ALBUMIN 3.8 06/08/2021    PROT 7.5 06/08/2021     06/08/2021    K 4.2 06/08/2021    CO2 20 (L) 06/08/2021     06/08/2021    BUN 11 06/08/2021    ALKPHOS 89 06/08/2021    ALT 17 06/08/2021    AST 11 06/08/2021    BILITOT 0.7 06/08/2021    BILITOT 0.5 01/14/2021    BILITOT 0.5 01/04/2020     URINALYSIS:  Lab Results   Component Value Date    COLORU Yellow 06/08/2021    SPECGRAV 1.025 06/08/2021    PHUR 5.0 06/08/2021    PROTEINUA Negative 06/08/2021    BACTERIA Few (A) 04/01/2019    NITRITE Negative 06/08/2021    LEUKOCYTESUR Negative 06/08/2021    UROBILINOGEN normal 11/18/2015    HYALINECASTS 0 04/01/2019      LIPIDS:  Lab Results   Component Value Date    TSH 2.808 06/08/2021    TSH 2.151 01/14/2021    TSH 2.382 05/02/2015    HDL 39 (L) 06/08/2021    HDL 37 (L) 01/14/2021    HDL 33 (L) 05/02/2015    CHOL 195 06/08/2021    CHOL 244 (H) 01/14/2021    CHOL 193 05/02/2015    TRIG 201 (H) 06/08/2021    TRIG 233 (H) 01/14/2021    TRIG 121 05/02/2015    LDLCALC 115.8 06/08/2021    LDLCALC 160.4 (H) 01/14/2021    LDLCALC 135.8 05/02/2015    CHOLHDL 20.0 06/08/2021    CHOLHDL 15.2 (L) 01/14/2021    CHOLHDL 17.1 (L) 05/02/2015    NONHDLCHOL 156 06/08/2021    NONHDLCHOL 207 01/14/2021    NONHDLCHOL 160 05/02/2015    TOTALCHOLEST 5.0 06/08/2021    TOTALCHOLEST 6.6 (H) 01/14/2021    TOTALCHOLEST 5.8 (H) 05/02/2015     TSH:  Lab Results   Component Value Date    TSH 2.808 06/08/2021    TSH 2.151 01/14/2021    TSH 2.382 05/02/2015     A1C:  Lab Results   Component Value Date    HGBA1C 5.2 06/08/2021    HGBA1C 4.9 01/14/2021    HGBA1C 4.8 05/25/2015         Assessment/Plan     Marianne Go is a 46 y.o.female with:    Right ear pain     can continue cool compresses  Continue fioricet for HA and head pain     I spent 30 minutes on the day of this encounter  for preparing for, evaluating, treating, and managing this patient.      -Continue current medications and maintain follow up with specialists.  Return to clinic as needed for any concerns   No follow-ups on file.       NELSON De Los SantosC  [facility]

## 2022-03-24 ENCOUNTER — PATIENT MESSAGE (OUTPATIENT)
Dept: ADMINISTRATIVE | Facility: HOSPITAL | Age: 47
End: 2022-03-24
Payer: COMMERCIAL

## 2022-03-24 ENCOUNTER — PATIENT OUTREACH (OUTPATIENT)
Dept: ADMINISTRATIVE | Facility: HOSPITAL | Age: 47
End: 2022-03-24
Payer: COMMERCIAL

## 2022-03-28 ENCOUNTER — TELEPHONE (OUTPATIENT)
Dept: INTERNAL MEDICINE | Facility: CLINIC | Age: 47
End: 2022-03-28

## 2022-03-28 DIAGNOSIS — Z12.11 SCREENING FOR COLORECTAL CANCER: Primary | ICD-10-CM

## 2022-03-28 DIAGNOSIS — Z12.12 SCREENING FOR COLORECTAL CANCER: Primary | ICD-10-CM

## 2022-03-28 NOTE — TELEPHONE ENCOUNTER
----- Message from Janeen Cagle MA sent at 3/24/2022 11:34 AM CDT -----  Regarding: colonoscopy.  Pt wishes to have colonoscopy. Needs order

## 2022-04-06 RX ORDER — PAROXETINE 10 MG/1
10 TABLET, FILM COATED ORAL DAILY
Qty: 30 TABLET | Refills: 2 | Status: SHIPPED | OUTPATIENT
Start: 2022-04-06 | End: 2022-06-17

## 2022-05-16 DIAGNOSIS — M17.11 PRIMARY OSTEOARTHRITIS OF RIGHT KNEE: ICD-10-CM

## 2022-05-16 RX ORDER — MELOXICAM 15 MG/1
TABLET ORAL
Qty: 90 TABLET | Refills: 1 | Status: SHIPPED | OUTPATIENT
Start: 2022-05-16 | End: 2022-12-26

## 2022-06-09 ENCOUNTER — OFFICE VISIT (OUTPATIENT)
Dept: INTERNAL MEDICINE | Facility: CLINIC | Age: 47
End: 2022-06-09
Payer: COMMERCIAL

## 2022-06-09 VITALS
BODY MASS INDEX: 42.82 KG/M2 | WEIGHT: 234.13 LBS | OXYGEN SATURATION: 98 % | DIASTOLIC BLOOD PRESSURE: 82 MMHG | HEART RATE: 82 BPM | SYSTOLIC BLOOD PRESSURE: 128 MMHG | TEMPERATURE: 99 F

## 2022-06-09 DIAGNOSIS — T70.20XD EFFECTS OF HIGH ALTITUDE, SUBSEQUENT ENCOUNTER: Primary | ICD-10-CM

## 2022-06-09 DIAGNOSIS — G44.219 EPISODIC TENSION-TYPE HEADACHE, NOT INTRACTABLE: ICD-10-CM

## 2022-06-09 PROCEDURE — 3008F BODY MASS INDEX DOCD: CPT | Mod: CPTII,S$GLB,, | Performed by: NURSE PRACTITIONER

## 2022-06-09 PROCEDURE — 99213 PR OFFICE/OUTPT VISIT, EST, LEVL III, 20-29 MIN: ICD-10-PCS | Mod: 25,S$GLB,, | Performed by: NURSE PRACTITIONER

## 2022-06-09 PROCEDURE — 3079F PR MOST RECENT DIASTOLIC BLOOD PRESSURE 80-89 MM HG: ICD-10-PCS | Mod: CPTII,S$GLB,, | Performed by: NURSE PRACTITIONER

## 2022-06-09 PROCEDURE — 96372 PR INJECTION,THERAP/PROPH/DIAG2ST, IM OR SUBCUT: ICD-10-PCS | Mod: S$GLB,,, | Performed by: NURSE PRACTITIONER

## 2022-06-09 PROCEDURE — 96372 THER/PROPH/DIAG INJ SC/IM: CPT | Mod: S$GLB,,, | Performed by: NURSE PRACTITIONER

## 2022-06-09 PROCEDURE — 99213 OFFICE O/P EST LOW 20 MIN: CPT | Mod: 25,S$GLB,, | Performed by: NURSE PRACTITIONER

## 2022-06-09 PROCEDURE — 3008F PR BODY MASS INDEX (BMI) DOCUMENTED: ICD-10-PCS | Mod: CPTII,S$GLB,, | Performed by: NURSE PRACTITIONER

## 2022-06-09 PROCEDURE — 3074F SYST BP LT 130 MM HG: CPT | Mod: CPTII,S$GLB,, | Performed by: NURSE PRACTITIONER

## 2022-06-09 PROCEDURE — 99999 PR PBB SHADOW E&M-EST. PATIENT-LVL III: ICD-10-PCS | Mod: PBBFAC,,, | Performed by: NURSE PRACTITIONER

## 2022-06-09 PROCEDURE — 99999 PR PBB SHADOW E&M-EST. PATIENT-LVL III: CPT | Mod: PBBFAC,,, | Performed by: NURSE PRACTITIONER

## 2022-06-09 PROCEDURE — 3074F PR MOST RECENT SYSTOLIC BLOOD PRESSURE < 130 MM HG: ICD-10-PCS | Mod: CPTII,S$GLB,, | Performed by: NURSE PRACTITIONER

## 2022-06-09 PROCEDURE — 3079F DIAST BP 80-89 MM HG: CPT | Mod: CPTII,S$GLB,, | Performed by: NURSE PRACTITIONER

## 2022-06-09 PROCEDURE — 1159F PR MEDICATION LIST DOCUMENTED IN MEDICAL RECORD: ICD-10-PCS | Mod: CPTII,S$GLB,, | Performed by: NURSE PRACTITIONER

## 2022-06-09 PROCEDURE — 1159F MED LIST DOCD IN RCRD: CPT | Mod: CPTII,S$GLB,, | Performed by: NURSE PRACTITIONER

## 2022-06-09 RX ORDER — KETOROLAC TROMETHAMINE 30 MG/ML
15 INJECTION, SOLUTION INTRAMUSCULAR; INTRAVENOUS ONCE
Status: COMPLETED | OUTPATIENT
Start: 2022-06-09 | End: 2022-06-09

## 2022-06-09 RX ORDER — SUMATRIPTAN 50 MG/1
TABLET, FILM COATED ORAL
Qty: 10 TABLET | Refills: 0 | Status: ON HOLD | OUTPATIENT
Start: 2022-06-09 | End: 2023-05-05 | Stop reason: HOSPADM

## 2022-06-09 RX ADMIN — KETOROLAC TROMETHAMINE 15 MG: 30 INJECTION, SOLUTION INTRAMUSCULAR; INTRAVENOUS at 03:06

## 2022-06-09 NOTE — PROGRESS NOTES
[facility] Note    Chief Complaint      Chief Complaint   Patient presents with    Headache     History of Present Illness      Marianne Go is a 46 y.o. female patient of Dr. Dorsey who presents today for f/u from ER in [place] a few days ago. Pt reports was diagnosed with Altitude sickness, was given IVFs in ER and treated. Feeling much better, still with slight HA's  but are resolving. Staying hydrated. SOB has resolved, is able to jignesh without Dyspnea    HR- 82, O2 on RA-98%, T-98.5    Health Maintenance   Topic Date Due    Mammogram  2023    Lipid Panel  2026    TETANUS VACCINE  2031    Hepatitis C Screening  Completed       Past Medical History:   Diagnosis Date    Deep vein thrombosis     pregnancy related     Kidney calculi     Kidney stones     Kidney stones     Migraines     perimenstrual        Past Surgical History:   Procedure Laterality Date    BLADDER SUSPENSION      CYSTOSCOPY W/ LASER LITHOTRIPSY      HYSTERECTOMY      PELVIC LAPAROSCOPY      ablation of endometriosis    TONSILLECTOMY      TUBAL LIGATION      essure        family history includes Allergic rhinitis in her father; Asthma in her son; Breast cancer in her mother; Cancer in her father; Colon cancer in her maternal uncle; Diabetes in her mother; Hyperlipidemia in her father and mother; Hypertension in her mother; Hypothyroidism in her father and mother.    Social History     Tobacco Use    Smoking status: Former Smoker     Packs/day: 0.25     Years: 13.00     Pack years: 3.25     Quit date: 2003     Years since quittin.4    Smokeless tobacco: Never Used    Tobacco comment: quit 11 years ago   Substance Use Topics    Alcohol use: Yes     Comment: rarely    Drug use: No       Review of Systems   Constitutional: Positive for malaise/fatigue. Negative for chills and fever.   Eyes: Negative for blurred vision.   Respiratory: Positive for shortness of breath.     Cardiovascular: Negative for chest pain and palpitations.   Gastrointestinal: Negative for diarrhea, nausea and vomiting.   Genitourinary: Negative for dysuria.   Neurological: Positive for headaches. Negative for dizziness.        Outpatient Encounter Medications as of 2022   Medication Sig Dispense Refill    atorvastatin (LIPITOR) 10 MG tablet TAKE 1 TABLET(10 MG) BY MOUTH EVERY DAY 90 tablet 3    butalbital-acetaminophen-caffeine -40 mg (FIORICET, ESGIC) -40 mg per tablet Take 1 tablet by mouth every 4 (four) hours as needed for Pain. 30 tablet 0    meloxicam (MOBIC) 15 MG tablet TAKE 1 TABLET(15 MG) BY MOUTH EVERY DAY 90 tablet 1    multivitamin capsule Take 1 capsule by mouth once daily.      paroxetine (PAXIL) 10 MG tablet Take 1 tablet (10 mg total) by mouth once daily. 30 tablet 2    cetirizine (ZYRTEC) 5 MG tablet Take 1 tablet (5 mg total) by mouth once daily. 30 tablet 0    sumatriptan (IMITREX) 50 MG tablet Take 1 tab by mouth at first symptom of headache, then may repeat x 1 an hour later 10 tablet 0    [] ketorolac injection 15 mg        No facility-administered encounter medications on file as of 2022.        Review of patient's allergies indicates:   Allergen Reactions    Topiramate Itching       Physical Exam      Vital Signs  Temp: 98.5 °F (36.9 °C)  Pulse: 82  SpO2: 98 %  BP: 128/82  Pain Score: 0-No pain  Height and Weight  Weight: 106.2 kg (234 lb 2.1 oz)    Physical Exam  Vitals and nursing note reviewed.   Constitutional:       General: She is not in acute distress.     Appearance: Normal appearance. She is obese.   HENT:      Head: Normocephalic and atraumatic.   Eyes:      Pupils: Pupils are equal, round, and reactive to light.   Cardiovascular:      Rate and Rhythm: Normal rate and regular rhythm.      Heart sounds: Normal heart sounds.   Pulmonary:      Effort: Pulmonary effort is normal. No respiratory distress.   Musculoskeletal:      Cervical back:  Normal range of motion and neck supple.   Skin:     General: Skin is warm and dry.   Neurological:      Mental Status: She is alert and oriented to person, place, and time.   Psychiatric:         Mood and Affect: Mood normal.         Behavior: Behavior normal.         Thought Content: Thought content normal.         Judgment: Judgment normal.          Laboratory:  CBC:  Lab Results   Component Value Date    WBC 8.97 06/08/2021    RBC 4.52 06/08/2021    HGB 15.0 06/08/2021    HCT 43.9 06/08/2021     06/08/2021    MCV 97 06/08/2021    MCH 33.2 (H) 06/08/2021    MCHC 34.2 06/08/2021    MCHC 31.8 (L) 01/14/2021    MCHC 33.1 01/04/2020     CMP:  Lab Results   Component Value Date    GLU 95 06/08/2021    CALCIUM 9.6 06/08/2021    ALBUMIN 3.8 06/08/2021    PROT 7.5 06/08/2021     06/08/2021    K 4.2 06/08/2021    CO2 20 (L) 06/08/2021     06/08/2021    BUN 11 06/08/2021    ALKPHOS 89 06/08/2021    ALT 17 06/08/2021    AST 11 06/08/2021    BILITOT 0.7 06/08/2021    BILITOT 0.5 01/14/2021    BILITOT 0.5 01/04/2020     URINALYSIS:  Lab Results   Component Value Date    COLORU Yellow 06/08/2021    SPECGRAV 1.025 06/08/2021    PHUR 5.0 06/08/2021    PROTEINUA Negative 06/08/2021    BACTERIA Few (A) 04/01/2019    NITRITE Negative 06/08/2021    LEUKOCYTESUR Negative 06/08/2021    UROBILINOGEN normal 11/18/2015    HYALINECASTS 0 04/01/2019      LIPIDS:  Lab Results   Component Value Date    TSH 2.808 06/08/2021    TSH 2.151 01/14/2021    TSH 2.382 05/02/2015    HDL 39 (L) 06/08/2021    HDL 37 (L) 01/14/2021    HDL 33 (L) 05/02/2015    CHOL 195 06/08/2021    CHOL 244 (H) 01/14/2021    CHOL 193 05/02/2015    TRIG 201 (H) 06/08/2021    TRIG 233 (H) 01/14/2021    TRIG 121 05/02/2015    LDLCALC 115.8 06/08/2021    LDLCALC 160.4 (H) 01/14/2021    LDLCALC 135.8 05/02/2015    CHOLHDL 20.0 06/08/2021    CHOLHDL 15.2 (L) 01/14/2021    CHOLHDL 17.1 (L) 05/02/2015    NONHDLCHOL 156 06/08/2021    NONHDLCHOL 207 01/14/2021     NONHDLCHOL 160 05/02/2015    TOTALCHOLEST 5.0 06/08/2021    TOTALCHOLEST 6.6 (H) 01/14/2021    TOTALCHOLEST 5.8 (H) 05/02/2015     TSH:  Lab Results   Component Value Date    TSH 2.808 06/08/2021    TSH 2.151 01/14/2021    TSH 2.382 05/02/2015     A1C:  Lab Results   Component Value Date    HGBA1C 5.2 06/08/2021    HGBA1C 4.9 01/14/2021    HGBA1C 4.8 05/25/2015         Assessment/Plan     Marianne Go is a 46 y.o.female with:    Effects of high altitude, subsequent encounter  -     sumatriptan (IMITREX) 50 MG tablet; Take 1 tab by mouth at first symptom of headache, then may repeat x 1 an hour later  Dispense: 10 tablet; Refill: 0    Episodic tension-type headache, not intractable  -     sumatriptan (IMITREX) 50 MG tablet; Take 1 tab by mouth at first symptom of headache, then may repeat x 1 an hour later  Dispense: 10 tablet; Refill: 0  -     ketorolac injection 15 mg    Stay hydrated, with water  Increase activity as tolerated      I spent 30 minutes on the day of this encounter for preparing for, evaluating, treating, and managing this patient.      -Continue current medications and maintain follow up with specialists.  Return to clinic as needed for any concerns   No follow-ups on file.       FANY De Los Santos  [facility]

## 2022-06-29 ENCOUNTER — TELEPHONE (OUTPATIENT)
Dept: OBSTETRICS AND GYNECOLOGY | Facility: CLINIC | Age: 47
End: 2022-06-29
Payer: COMMERCIAL

## 2022-06-29 NOTE — TELEPHONE ENCOUNTER
----- Message from Tejal Schultz MA sent at 6/27/2022  4:24 PM CDT -----  Please schedule for annual.  Thanks      Left message for patient to give the office a call back to schedule an annual.

## 2022-06-30 ENCOUNTER — TELEPHONE (OUTPATIENT)
Dept: INTERNAL MEDICINE | Facility: CLINIC | Age: 47
End: 2022-06-30
Payer: COMMERCIAL

## 2022-06-30 DIAGNOSIS — E78.2 MODERATE MIXED HYPERLIPIDEMIA NOT REQUIRING STATIN THERAPY: ICD-10-CM

## 2022-06-30 DIAGNOSIS — Z83.3 FAMILY HISTORY OF DIABETES MELLITUS: ICD-10-CM

## 2022-06-30 DIAGNOSIS — Z00.00 ANNUAL PHYSICAL EXAM: Primary | ICD-10-CM

## 2022-06-30 DIAGNOSIS — Z83.49 FAMILY HISTORY OF HYPOTHYROIDISM: ICD-10-CM

## 2022-07-18 ENCOUNTER — OFFICE VISIT (OUTPATIENT)
Dept: URGENT CARE | Facility: CLINIC | Age: 47
End: 2022-07-18
Payer: COMMERCIAL

## 2022-07-18 VITALS
TEMPERATURE: 98 F | OXYGEN SATURATION: 97 % | DIASTOLIC BLOOD PRESSURE: 83 MMHG | BODY MASS INDEX: 43.24 KG/M2 | SYSTOLIC BLOOD PRESSURE: 135 MMHG | RESPIRATION RATE: 18 BRPM | WEIGHT: 235 LBS | HEART RATE: 61 BPM | HEIGHT: 62 IN

## 2022-07-18 DIAGNOSIS — H81.13 BPPV (BENIGN PAROXYSMAL POSITIONAL VERTIGO), BILATERAL: ICD-10-CM

## 2022-07-18 DIAGNOSIS — R42 DIZZINESS: Primary | ICD-10-CM

## 2022-07-18 DIAGNOSIS — G43.009 ATYPICAL MIGRAINE: ICD-10-CM

## 2022-07-18 LAB
CTP QC/QA: YES
SARS-COV-2 RDRP RESP QL NAA+PROBE: NEGATIVE

## 2022-07-18 PROCEDURE — 3075F PR MOST RECENT SYSTOLIC BLOOD PRESS GE 130-139MM HG: ICD-10-PCS | Mod: CPTII,S$GLB,, | Performed by: FAMILY MEDICINE

## 2022-07-18 PROCEDURE — 3075F SYST BP GE 130 - 139MM HG: CPT | Mod: CPTII,S$GLB,, | Performed by: FAMILY MEDICINE

## 2022-07-18 PROCEDURE — 99213 OFFICE O/P EST LOW 20 MIN: CPT | Mod: S$GLB,,, | Performed by: FAMILY MEDICINE

## 2022-07-18 PROCEDURE — 3008F BODY MASS INDEX DOCD: CPT | Mod: CPTII,S$GLB,, | Performed by: FAMILY MEDICINE

## 2022-07-18 PROCEDURE — U0002 COVID-19 LAB TEST NON-CDC: HCPCS | Mod: QW,S$GLB,, | Performed by: FAMILY MEDICINE

## 2022-07-18 PROCEDURE — 1160F PR REVIEW ALL MEDS BY PRESCRIBER/CLIN PHARMACIST DOCUMENTED: ICD-10-PCS | Mod: CPTII,S$GLB,, | Performed by: FAMILY MEDICINE

## 2022-07-18 PROCEDURE — 3079F DIAST BP 80-89 MM HG: CPT | Mod: CPTII,S$GLB,, | Performed by: FAMILY MEDICINE

## 2022-07-18 PROCEDURE — 3079F PR MOST RECENT DIASTOLIC BLOOD PRESSURE 80-89 MM HG: ICD-10-PCS | Mod: CPTII,S$GLB,, | Performed by: FAMILY MEDICINE

## 2022-07-18 PROCEDURE — 1159F MED LIST DOCD IN RCRD: CPT | Mod: CPTII,S$GLB,, | Performed by: FAMILY MEDICINE

## 2022-07-18 PROCEDURE — 1160F RVW MEDS BY RX/DR IN RCRD: CPT | Mod: CPTII,S$GLB,, | Performed by: FAMILY MEDICINE

## 2022-07-18 PROCEDURE — 99213 PR OFFICE/OUTPT VISIT, EST, LEVL III, 20-29 MIN: ICD-10-PCS | Mod: S$GLB,,, | Performed by: FAMILY MEDICINE

## 2022-07-18 PROCEDURE — U0002: ICD-10-PCS | Mod: QW,S$GLB,, | Performed by: FAMILY MEDICINE

## 2022-07-18 PROCEDURE — 3008F PR BODY MASS INDEX (BMI) DOCUMENTED: ICD-10-PCS | Mod: CPTII,S$GLB,, | Performed by: FAMILY MEDICINE

## 2022-07-18 PROCEDURE — 1159F PR MEDICATION LIST DOCUMENTED IN MEDICAL RECORD: ICD-10-PCS | Mod: CPTII,S$GLB,, | Performed by: FAMILY MEDICINE

## 2022-07-18 RX ORDER — MECLIZINE HYDROCHLORIDE 25 MG/1
25 TABLET ORAL 3 TIMES DAILY PRN
Qty: 30 TABLET | Refills: 0 | Status: SHIPPED | OUTPATIENT
Start: 2022-07-18 | End: 2023-01-31

## 2022-07-18 NOTE — LETTER
July 18, 2022      [facility]  [address]  [place] [address]  Phone: [phone]  Fax: [phone]       Patient: Marianne Go   YOB: 1975  Date of Visit: 07/18/2022    To Whom It May Concern:    Rod Go  was at [facility] on 07/18/2022. She has an acute illness which may take up to 3-5 days to improve. The patient may return to work within 3-5 days * with no restrictions. If you have any questions or concerns, or if I can be of further assistance, please do not hesitate to contact me.    Sincerely,    Eva Womack MD

## 2022-07-18 NOTE — PROGRESS NOTES
"Subjective:       Patient ID: Marianne Go is a 46 y.o. female.    Vitals:  height is 5' 2" (1.575 m) and weight is 106.6 kg (235 lb). Her temperature is 98.2 °F (36.8 °C). Her blood pressure is 135/83 and her pulse is 61. Her respiration is 18 and oxygen saturation is 97%.     Chief Complaint: Dizziness    This is a 46 y.o. female who presents today with a chief complaint of Dizziness x 1 day. Symptoms started last night. She was feeling better this morning but then started getting worse during the day prior to presentation. She feels off balance and gets dizzy when looking anywhere but downwards. Took zyrtec, which helped her sleep. She has a history of migraine but today's migraine appears to be associated with dizziness.  No speech deficit.  No vision disturbance.  No weakness or paralysis.    Had a concussion 2 months ago.     Dizziness:   Chronicity:  New  Onset:  Yesterday  Progression since onset:  Gradually worsening  Frequency:  Constantly  Pain Scale:  0/10  Duration:  Off/on all day  Dizziness characteristics:  Spacial disorientation, off-balance and sensation of movement  Frequency of Spells:  Hourly   Associated symptoms: headaches.no fever, no tinnitus, no nausea, no vomiting, no weakness, no palpitations and no chest pain.  Aggravated by:  Getting up and exertion  Treatments tried: zyrtec.  Improvements on treatment:  Mild   PMH includes: head trauma, head trauma and anxiety.      Constitution: Positive for fatigue. Negative for activity change, appetite change, fever and generalized weakness.   HENT: Negative for tinnitus.    Neck: Negative for neck pain.   Cardiovascular: Negative for chest pain and palpitations.   Eyes: Negative for eye pain.   Respiratory: Negative for cough and COPD.    Gastrointestinal: Negative for nausea and vomiting.   Neurological: Positive for dizziness and headaches. Negative for disorientation and altered mental status.   Psychiatric/Behavioral: Negative for altered " "mental status, disorientation, confusion and agitation.       Objective:       Vitals:    07/18/22 1325   BP: 135/83   Pulse: 61   Resp: 18   Temp: 98.2 °F (36.8 °C)   SpO2: 97%   Weight: 106.6 kg (235 lb)   Height: 5' 2" (1.575 m)     Physical Exam   Constitutional: She is oriented to person, place, and time. obesity  HENT:   Head: Atraumatic.      Comments: No nystagmus.  Eyes: Conjunctivae are normal. No scleral icterus.   Neck: Neck supple.   Cardiovascular: Normal rate, regular rhythm, normal heart sounds and normal pulses.   Pulmonary/Chest: Effort normal and breath sounds normal.   Neurological: no focal deficit. She is alert and oriented to person, place, and time. No sensory deficit. Coordination normal.      Comments: Negative Romberg's.  No dysdiadochokinesia.  Unable to perform Eric-Hallpike maneuver since patient is dizzy and not able to walk to examination table for ideal positioning. (Able to stand to assess Romberg's).   Psychiatric: Judgment and thought content normal.         Assessment:       1. Dizziness    2. BPPV (benign paroxysmal positional vertigo), bilateral    3. Atypical migraine          Plan:         1. Dizziness  -     POCT COVID-19 Rapid Screening  Results for orders placed or performed in visit on 07/18/22   POCT COVID-19 Rapid Screening   Result Value Ref Range    POC Rapid COVID Negative Negative     Acceptable Yes      2. BPPV (benign paroxysmal positional vertigo), bilateral  -     meclizine (ANTIVERT) 25 mg tablet; Take 1 tablet (25 mg total) by mouth 3 (three) times daily as needed for Dizziness.  Dispense: 30 tablet; Refill: 0    3. Atypical migraine  No neurological deficit requiring ED presentation or acute imaging.    Patient Instructions   Follow up with your primary care provider if no improvement or worsening symptoms in 3-5 days.    Seek immediate care in the emergency room in the event of severe abdominal pain, chest pain, respiratory distress, fevers, " dehydration, loss of consciousness.

## 2022-07-18 NOTE — PATIENT INSTRUCTIONS
Follow up with your primary care provider if no improvement or worsening symptoms in 3-5 days.    Seek immediate care in the emergency room in the event of severe abdominal pain, chest pain, respiratory distress, fevers, dehydration, loss of consciousness.

## 2022-08-26 ENCOUNTER — LAB VISIT (OUTPATIENT)
Dept: LAB | Facility: HOSPITAL | Age: 47
End: 2022-08-26
Attending: HOSPITALIST
Payer: MEDICAID

## 2022-08-26 DIAGNOSIS — Z00.00 ANNUAL PHYSICAL EXAM: ICD-10-CM

## 2022-08-26 DIAGNOSIS — Z83.49 FAMILY HISTORY OF HYPOTHYROIDISM: ICD-10-CM

## 2022-08-26 DIAGNOSIS — E78.2 MODERATE MIXED HYPERLIPIDEMIA NOT REQUIRING STATIN THERAPY: ICD-10-CM

## 2022-08-26 DIAGNOSIS — Z83.3 FAMILY HISTORY OF DIABETES MELLITUS: ICD-10-CM

## 2022-08-26 LAB
ALBUMIN SERPL BCP-MCNC: 3.9 G/DL (ref 3.5–5.2)
ALP SERPL-CCNC: 78 U/L (ref 55–135)
ALT SERPL W/O P-5'-P-CCNC: 18 U/L (ref 10–44)
ANION GAP SERPL CALC-SCNC: 6 MMOL/L (ref 8–16)
AST SERPL-CCNC: 14 U/L (ref 10–40)
BASOPHILS # BLD AUTO: 0.07 K/UL (ref 0–0.2)
BASOPHILS NFR BLD: 0.9 % (ref 0–1.9)
BILIRUB SERPL-MCNC: 0.6 MG/DL (ref 0.1–1)
BUN SERPL-MCNC: 15 MG/DL (ref 6–20)
CALCIUM SERPL-MCNC: 9.1 MG/DL (ref 8.7–10.5)
CHLORIDE SERPL-SCNC: 105 MMOL/L (ref 95–110)
CHOLEST SERPL-MCNC: 203 MG/DL (ref 120–199)
CHOLEST/HDLC SERPL: 4.7 {RATIO} (ref 2–5)
CO2 SERPL-SCNC: 27 MMOL/L (ref 23–29)
CREAT SERPL-MCNC: 0.7 MG/DL (ref 0.5–1.4)
DIFFERENTIAL METHOD: ABNORMAL
EOSINOPHIL # BLD AUTO: 0.3 K/UL (ref 0–0.5)
EOSINOPHIL NFR BLD: 3.2 % (ref 0–8)
ERYTHROCYTE [DISTWIDTH] IN BLOOD BY AUTOMATED COUNT: 11.9 % (ref 11.5–14.5)
EST. GFR  (NO RACE VARIABLE): >60 ML/MIN/1.73 M^2
ESTIMATED AVG GLUCOSE: 94 MG/DL (ref 68–131)
GLUCOSE SERPL-MCNC: 93 MG/DL (ref 70–110)
HBA1C MFR BLD: 4.9 % (ref 4–5.6)
HCT VFR BLD AUTO: 46.2 % (ref 37–48.5)
HDLC SERPL-MCNC: 43 MG/DL (ref 40–75)
HDLC SERPL: 21.2 % (ref 20–50)
HGB BLD-MCNC: 15 G/DL (ref 12–16)
IMM GRANULOCYTES # BLD AUTO: 0.02 K/UL (ref 0–0.04)
IMM GRANULOCYTES NFR BLD AUTO: 0.2 % (ref 0–0.5)
LDLC SERPL CALC-MCNC: 122.8 MG/DL (ref 63–159)
LYMPHOCYTES # BLD AUTO: 3 K/UL (ref 1–4.8)
LYMPHOCYTES NFR BLD: 37 % (ref 18–48)
MCH RBC QN AUTO: 32.5 PG (ref 27–31)
MCHC RBC AUTO-ENTMCNC: 32.5 G/DL (ref 32–36)
MCV RBC AUTO: 100 FL (ref 82–98)
MONOCYTES # BLD AUTO: 0.6 K/UL (ref 0.3–1)
MONOCYTES NFR BLD: 6.7 % (ref 4–15)
NEUTROPHILS # BLD AUTO: 4.3 K/UL (ref 1.8–7.7)
NEUTROPHILS NFR BLD: 52 % (ref 38–73)
NONHDLC SERPL-MCNC: 160 MG/DL
NRBC BLD-RTO: 0 /100 WBC
PLATELET # BLD AUTO: 337 K/UL (ref 150–450)
PMV BLD AUTO: 10.7 FL (ref 9.2–12.9)
POTASSIUM SERPL-SCNC: 4.6 MMOL/L (ref 3.5–5.1)
PROT SERPL-MCNC: 7.1 G/DL (ref 6–8.4)
RBC # BLD AUTO: 4.61 M/UL (ref 4–5.4)
SODIUM SERPL-SCNC: 138 MMOL/L (ref 136–145)
TRIGL SERPL-MCNC: 186 MG/DL (ref 30–150)
TSH SERPL DL<=0.005 MIU/L-ACNC: 3.51 UIU/ML (ref 0.4–4)
WBC # BLD AUTO: 8.18 K/UL (ref 3.9–12.7)

## 2022-08-26 PROCEDURE — 80053 COMPREHEN METABOLIC PANEL: CPT | Performed by: HOSPITALIST

## 2022-08-26 PROCEDURE — 83036 HEMOGLOBIN GLYCOSYLATED A1C: CPT | Performed by: HOSPITALIST

## 2022-08-26 PROCEDURE — 80061 LIPID PANEL: CPT | Performed by: HOSPITALIST

## 2022-08-26 PROCEDURE — 84443 ASSAY THYROID STIM HORMONE: CPT | Performed by: HOSPITALIST

## 2022-08-26 PROCEDURE — 36415 COLL VENOUS BLD VENIPUNCTURE: CPT | Mod: PO | Performed by: HOSPITALIST

## 2022-08-26 PROCEDURE — 85025 COMPLETE CBC W/AUTO DIFF WBC: CPT | Performed by: HOSPITALIST

## 2022-08-31 ENCOUNTER — OFFICE VISIT (OUTPATIENT)
Dept: INTERNAL MEDICINE | Facility: CLINIC | Age: 47
End: 2022-08-31
Payer: MEDICAID

## 2022-08-31 VITALS
WEIGHT: 247.38 LBS | TEMPERATURE: 97 F | HEIGHT: 62 IN | BODY MASS INDEX: 45.52 KG/M2 | RESPIRATION RATE: 20 BRPM | SYSTOLIC BLOOD PRESSURE: 122 MMHG | OXYGEN SATURATION: 96 % | HEART RATE: 93 BPM | DIASTOLIC BLOOD PRESSURE: 82 MMHG

## 2022-08-31 DIAGNOSIS — Z00.00 ANNUAL PHYSICAL EXAM: Primary | ICD-10-CM

## 2022-08-31 DIAGNOSIS — G43.109 MIGRAINE WITH AURA AND WITHOUT STATUS MIGRAINOSUS, NOT INTRACTABLE: ICD-10-CM

## 2022-08-31 DIAGNOSIS — R53.83 FATIGUE, UNSPECIFIED TYPE: ICD-10-CM

## 2022-08-31 DIAGNOSIS — E78.5 HYPERLIPIDEMIA, UNSPECIFIED HYPERLIPIDEMIA TYPE: ICD-10-CM

## 2022-08-31 DIAGNOSIS — Z12.11 SCREENING FOR COLORECTAL CANCER: ICD-10-CM

## 2022-08-31 DIAGNOSIS — Z12.12 SCREENING FOR COLORECTAL CANCER: ICD-10-CM

## 2022-08-31 DIAGNOSIS — Z91.89 RISK FACTORS FOR OBSTRUCTIVE SLEEP APNEA: ICD-10-CM

## 2022-08-31 PROCEDURE — 99396 PREV VISIT EST AGE 40-64: CPT | Mod: S$PBB,,, | Performed by: HOSPITALIST

## 2022-08-31 PROCEDURE — 99215 OFFICE O/P EST HI 40 MIN: CPT | Mod: PBBFAC,PO | Performed by: HOSPITALIST

## 2022-08-31 PROCEDURE — 99999 PR PBB SHADOW E&M-EST. PATIENT-LVL V: ICD-10-PCS | Mod: PBBFAC,,, | Performed by: HOSPITALIST

## 2022-08-31 PROCEDURE — 99396 PR PREVENTIVE VISIT,EST,40-64: ICD-10-PCS | Mod: S$PBB,,, | Performed by: HOSPITALIST

## 2022-08-31 PROCEDURE — 99999 PR PBB SHADOW E&M-EST. PATIENT-LVL V: CPT | Mod: PBBFAC,,, | Performed by: HOSPITALIST

## 2022-08-31 NOTE — PROGRESS NOTES
Subjective:     @Patient ID: Marianne Go is a 46 y.o. female.    Chief Complaint: Annual Exam    HPI       46 y.o. female here for annual exam. Pt has pmhx of anxiety, depression, history of DVT, HA migraines. Reports having fatigue/low energy, not sleeping well. Sometimes snores. Reports also having hard time losing weight despite working on her diet and exercising     Lipid disorders/ASCVD risk (ages >/= 45 or >/= 20 if increased risk ): ordered  DM (>45y yearly or if obese, HTN): A1c ordered     Eye exam:    Breast Cancer (40-50y discretion of pt, 50-74y every 1-2 years): Mammogram done    Colorectal Cancer (normal risk 50-75yr): Colonoscopy  due         Vaccines:   Influenza (yearly) n/a   Tetanus (every 10 yrs - 1st tdap) due  Covid19: will get booster         Exercise: works out few times a week   Diet: reg    Review of Systems   Constitutional:  Positive for fatigue. Negative for chills and fever.   HENT:  Negative for congestion and sore throat.    Eyes:  Negative for pain and visual disturbance.   Respiratory:  Negative for cough and shortness of breath.    Cardiovascular:  Negative for chest pain and leg swelling.   Gastrointestinal:  Negative for abdominal pain, nausea and vomiting.   Endocrine: Negative for polydipsia and polyuria.   Genitourinary:  Negative for difficulty urinating and dysuria.   Musculoskeletal:  Negative for arthralgias and back pain.   Skin:  Negative for rash and wound.   Neurological:  Negative for dizziness, weakness and headaches.   Psychiatric/Behavioral:  Negative for agitation and confusion.    Past medical history, surgical history, and family medical history reviewed and updated as appropriate.    Medications and allergies reviewed.     Objective:     Vitals:    08/31/22 1411   BP: 122/82   BP Location: Left arm   Patient Position: Sitting   BP Method: Large (Manual)   Pulse: 93   Resp: 20   Temp: 97 °F (36.1 °C)   TempSrc: Temporal   SpO2: 96%   Weight: 112.2 kg (247 lb  "5.7 oz)   Height: 5' 2" (1.575 m)     Body mass index is 45.24 kg/m².  Physical Exam  Vitals reviewed.   Constitutional:       General: She is not in acute distress.     Appearance: She is well-developed.   HENT:      Head: Normocephalic and atraumatic.      Right Ear: Tympanic membrane normal.      Left Ear: Tympanic membrane normal.      Mouth/Throat:      Mouth: Mucous membranes are moist.      Pharynx: No oropharyngeal exudate.   Eyes:      General:         Right eye: No discharge.         Left eye: No discharge.      Conjunctiva/sclera: Conjunctivae normal.   Cardiovascular:      Rate and Rhythm: Normal rate and regular rhythm.      Heart sounds: No murmur heard.    No friction rub.   Pulmonary:      Effort: Pulmonary effort is normal.      Breath sounds: Normal breath sounds.   Abdominal:      General: Bowel sounds are normal. There is no distension.      Palpations: Abdomen is soft.      Tenderness: There is no abdominal tenderness. There is no guarding.   Musculoskeletal:         General: Normal range of motion.      Cervical back: Normal range of motion and neck supple.      Right lower leg: No edema.      Left lower leg: No edema.   Lymphadenopathy:      Cervical: No cervical adenopathy.   Skin:     General: Skin is warm and dry.   Neurological:      Mental Status: She is alert and oriented to person, place, and time.   Psychiatric:         Mood and Affect: Mood normal.         Behavior: Behavior normal.       Lab Results   Component Value Date    WBC 8.18 08/26/2022    HGB 15.0 08/26/2022    HCT 46.2 08/26/2022     08/26/2022    CHOL 203 (H) 08/26/2022    TRIG 186 (H) 08/26/2022    HDL 43 08/26/2022    ALT 18 08/26/2022    AST 14 08/26/2022     08/26/2022    K 4.6 08/26/2022     08/26/2022    CREATININE 0.7 08/26/2022    BUN 15 08/26/2022    CO2 27 08/26/2022    TSH 3.506 08/26/2022    INR 1.0 04/25/2011    HGBA1C 4.9 08/26/2022       Assessment:     1. Annual physical exam    2. " Fatigue, unspecified type    3. Risk factors for obstructive sleep apnea    4. BMI 45.0-49.9, adult    5. Migraine with aura and without status migrainosus, not intractable    6. Hyperlipidemia, unspecified hyperlipidemia type    7. Screening for colorectal cancer      Plan:   Marianne was seen today for annual exam.    Diagnoses and all orders for this visit:    Annual physical exam  - reviewed lab results with pt in clinic     Fatigue, unspecified type  -     Ambulatory referral/consult to Sleep Disorders; Future    Risk factors for obstructive sleep apnea  -     Ambulatory referral/consult to Sleep Disorders; Future    BMI 45.0-49.9, adult  -     Ambulatory referral/consult to Sleep Disorders; Future  -     Ambulatory referral/consult to Bariatric Medicine; Future    Migraine with aura and without status migrainosus, not intractable  - suspect pt has elmer. May improve with treatment if elmer is confirmed   - continue imitrex prn    Hyperlipidemia, unspecified hyperlipidemia type  - stable. Continue statin     Screening for colorectal cancer  - cscope ordered     Rtc 1 year and prn     Carlee Mcneil MD  Internal Medicine    8/31/2022

## 2022-09-12 ENCOUNTER — OFFICE VISIT (OUTPATIENT)
Dept: OBSTETRICS AND GYNECOLOGY | Facility: CLINIC | Age: 47
End: 2022-09-12
Attending: STUDENT IN AN ORGANIZED HEALTH CARE EDUCATION/TRAINING PROGRAM
Payer: MEDICAID

## 2022-09-12 VITALS
SYSTOLIC BLOOD PRESSURE: 102 MMHG | WEIGHT: 252 LBS | BODY MASS INDEX: 46.38 KG/M2 | HEIGHT: 62 IN | DIASTOLIC BLOOD PRESSURE: 68 MMHG

## 2022-09-12 DIAGNOSIS — Z01.419 WELL WOMAN EXAM: Primary | ICD-10-CM

## 2022-09-12 PROCEDURE — 99213 OFFICE O/P EST LOW 20 MIN: CPT | Mod: PBBFAC,PN | Performed by: STUDENT IN AN ORGANIZED HEALTH CARE EDUCATION/TRAINING PROGRAM

## 2022-09-12 PROCEDURE — 99999 PR PBB SHADOW E&M-EST. PATIENT-LVL III: CPT | Mod: PBBFAC,,, | Performed by: STUDENT IN AN ORGANIZED HEALTH CARE EDUCATION/TRAINING PROGRAM

## 2022-09-12 PROCEDURE — 99396 PR PREVENTIVE VISIT,EST,40-64: ICD-10-PCS | Mod: S$PBB,,, | Performed by: STUDENT IN AN ORGANIZED HEALTH CARE EDUCATION/TRAINING PROGRAM

## 2022-09-12 PROCEDURE — 99999 PR PBB SHADOW E&M-EST. PATIENT-LVL III: ICD-10-PCS | Mod: PBBFAC,,, | Performed by: STUDENT IN AN ORGANIZED HEALTH CARE EDUCATION/TRAINING PROGRAM

## 2022-09-12 PROCEDURE — 99396 PREV VISIT EST AGE 40-64: CPT | Mod: S$PBB,,, | Performed by: STUDENT IN AN ORGANIZED HEALTH CARE EDUCATION/TRAINING PROGRAM

## 2022-09-12 RX ORDER — VENLAFAXINE HYDROCHLORIDE 37.5 MG/1
37.5 CAPSULE, EXTENDED RELEASE ORAL DAILY
Qty: 30 CAPSULE | Refills: 3 | Status: SHIPPED | OUTPATIENT
Start: 2022-09-12 | End: 2022-12-15

## 2022-09-12 NOTE — PROGRESS NOTES
Chief Complaint: Well Woman Exam     HPI:      Marianne Go is a 46 y.o.  who presents today for well woman exam.  LMP: Patient's last menstrual period was 10/13/2017 (approximate).  S/p TVH.  She does report some continued hot flushes despite paroxetine.  Also reports some worsening anxiety and depression.  Symptoms improved initially with the paxil but have returned.  Also has some weight gain and difficulty losing weight.   No other issues, problems, or complaints.  Specifically, patient denies vaginal bleeding, discharge, pelvic pain, urinary problems, or changes in appetite.   Ms. Go is currently sexually active with a single male partner.      Previous Pap: Normal per patient.  Now s/p hysterecotmy.  Previous Mammogram:  Birads 1    PMHx:  DVT while on OCPs.       OB History          5    Para   3    Term   3            AB   2    Living   3         SAB   2    IAB        Ectopic        Multiple        Live Births   3           Obstetric Comments   Menarche age 11             Past Medical History:   Diagnosis Date    Deep vein thrombosis     pregnancy related     Kidney calculi     Kidney stones     Kidney stones     Migraines     perimenstrual      Past Surgical History:   Procedure Laterality Date    BLADDER SUSPENSION      CYSTOSCOPY W/ LASER LITHOTRIPSY      HYSTERECTOMY      PELVIC LAPAROSCOPY      ablation of endometriosis    TONSILLECTOMY      TUBAL LIGATION      [facility]      Social History     Socioeconomic History    Marital status: Significant Other     Spouse name: 3   Occupational History    Occupation: stay at home mom     Employer: NOT EMPLOYED   Tobacco Use    Smoking status: Former     Packs/day: 0.25     Years: 13.00     Pack years: 3.25     Types: Cigarettes     Quit date: 2003     Years since quittin.7    Smokeless tobacco: Never    Tobacco comments:     quit 11 years ago   Substance and Sexual Activity    Alcohol use: Yes     Comment: rarely     Drug use: No    Sexual activity: Yes     Partners: Male     Birth control/protection: See Surgical Hx     Family History   Problem Relation Age of Onset    Hyperlipidemia Father     Hypothyroidism Father     Cancer Father         lung cancer     Allergic rhinitis Father     Asthma Son     Hyperlipidemia Mother     Hypertension Mother     Diabetes Mother     Hypothyroidism Mother     Breast cancer Mother         bilateral mastectomy 2018,2019    Colon cancer Maternal Uncle         40s     Ovarian cancer Neg Hx        Current Outpatient Medications:     atorvastatin (LIPITOR) 10 MG tablet, TAKE 1 TABLET(10 MG) BY MOUTH EVERY DAY, Disp: 90 tablet, Rfl: 3    butalbital-acetaminophen-caffeine -40 mg (FIORICET, ESGIC) -40 mg per tablet, Take 1 tablet by mouth every 4 (four) hours as needed for Pain., Disp: 30 tablet, Rfl: 0    cetirizine (ZYRTEC) 5 MG tablet, Take 1 tablet (5 mg total) by mouth once daily., Disp: 30 tablet, Rfl: 0    meclizine (ANTIVERT) 25 mg tablet, Take 1 tablet (25 mg total) by mouth 3 (three) times daily as needed for Dizziness., Disp: 30 tablet, Rfl: 0    meloxicam (MOBIC) 15 MG tablet, TAKE 1 TABLET(15 MG) BY MOUTH EVERY DAY, Disp: 90 tablet, Rfl: 1    multivitamin capsule, Take 1 capsule by mouth once daily., Disp: , Rfl:     sumatriptan (IMITREX) 50 MG tablet, Take 1 tab by mouth at first symptom of headache, then may repeat x 1 an hour later, Disp: 10 tablet, Rfl: 0    venlafaxine (EFFEXOR-XR) 37.5 MG 24 hr capsule, Take 1 capsule (37.5 mg total) by mouth once daily., Disp: 30 capsule, Rfl: 3    ROS:     GENERAL: Denies unintentional weight gain or weight loss. Feeling well overall.   SKIN: Denies rash or lesions.   HEENT: Denies headaches, or vision changes.   CARDIOVASCULAR: Denies palpitations or chest pain.   RESPIRATORY: Denies shortness of breath or dyspnea on exertion.  BREASTS: Denies pain, lumps, or nipple discharge.   ABDOMEN: Denies abdominal pain, constipation, diarrhea,  "nausea, vomiting, change in appetite.  URINARY: Denies frequency, dysuria, hematuria.  NEUROLOGIC: Denies syncope or weakness.   PSYCHIATRIC: Denies depression, anxiety or mood swings.    Physical Exam:      PHYSICAL EXAM:  /68 (BP Location: Left arm, Patient Position: Sitting)   Ht 5' 2" (1.575 m)   Wt 114.3 kg (251 lb 15.8 oz)   LMP 10/13/2017 (Approximate)   BMI 46.09 kg/m²   Body mass index is 46.09 kg/m².     APPEARANCE: Well nourished, well developed, in no acute distress.  PSYCH: Appropriate mood and affect.  SKIN: No acne or hirsutism.  NECK: Neck symmetric without masses or thyromegaly.  NODES: No inguinal, axillary, or supraclavicular lymph node enlargement.  CHEST: Normal respiratory effort.    CARDIOVASCULAR:  Regular rate and rhythm.  LUNGS:  Clear to auscultation bilaterally.  BREASTS: Symmetrical, no skin changes or visible lesions.  No palpable masses or nipple discharge bilaterally.  ABDOMEN: Soft.  No tenderness or masses.    PELVIC: Normal external genitalia without lesions.  Normal hair distribution.  Adequate perineal body, normal urethral meatus.  Vagina moist and well rugated without lesions or discharge.  Cervix and uterus surgically absent.  Adnexa without masses or tenderness.    EXTREMITIES: No edema.  No tenderness to palpation.    Assessment/Plan:     46 y.o.     Well woman exam    Other orders  -     venlafaxine (EFFEXOR-XR) 37.5 MG 24 hr capsule; Take 1 capsule (37.5 mg total) by mouth once daily.  Dispense: 30 capsule; Refill: 3    Counselin.  Annual exam performed today without difficulty.  Patient was counseled today on current ASCCP pap guidelines, the recommendation for yearly pelvic exams, healthy diet and exercise routines, annual mammograms.  Mammogram ordered.  Pap smear no longer indicated.  All questions answered.    2.  Hot flushes:   Likely due to menopausal transition.  Will avoid estrogen secondary to history of DVT.  Alternatives to hormonal " therapies, such as SSRIs/SNRIs were discussed again.  Discussed increasing dose or switching to effexor.  Will try effexor.  R/B/A reviewed and possible side effects.  All questions answered.  She will call if symptoms worsen or do not improve.    3. Follow up with PCP for other health maintenance.  4. RTC in 3 months for follow up.       Use of the [facility] Patient Portal discussed and encouraged during today's visit.

## 2022-10-02 ENCOUNTER — PATIENT MESSAGE (OUTPATIENT)
Dept: INTERNAL MEDICINE | Facility: CLINIC | Age: 47
End: 2022-10-02
Payer: MEDICAID

## 2022-10-04 ENCOUNTER — OFFICE VISIT (OUTPATIENT)
Dept: INTERNAL MEDICINE | Facility: CLINIC | Age: 47
End: 2022-10-04
Payer: MEDICAID

## 2022-10-04 VITALS
OXYGEN SATURATION: 96 % | DIASTOLIC BLOOD PRESSURE: 92 MMHG | HEART RATE: 92 BPM | RESPIRATION RATE: 18 BRPM | HEIGHT: 62 IN | TEMPERATURE: 98 F | WEIGHT: 251.13 LBS | SYSTOLIC BLOOD PRESSURE: 138 MMHG | BODY MASS INDEX: 46.21 KG/M2

## 2022-10-04 DIAGNOSIS — N93.9 VAGINAL BLEEDING: Primary | ICD-10-CM

## 2022-10-04 DIAGNOSIS — R03.0 ELEVATED BP WITHOUT DIAGNOSIS OF HYPERTENSION: ICD-10-CM

## 2022-10-04 DIAGNOSIS — Z23 NEEDS FLU SHOT: ICD-10-CM

## 2022-10-04 PROCEDURE — 3075F SYST BP GE 130 - 139MM HG: CPT | Mod: CPTII,,, | Performed by: HOSPITALIST

## 2022-10-04 PROCEDURE — 3044F PR MOST RECENT HEMOGLOBIN A1C LEVEL <7.0%: ICD-10-PCS | Mod: CPTII,,, | Performed by: HOSPITALIST

## 2022-10-04 PROCEDURE — 1160F RVW MEDS BY RX/DR IN RCRD: CPT | Mod: CPTII,,, | Performed by: HOSPITALIST

## 2022-10-04 PROCEDURE — 3080F DIAST BP >= 90 MM HG: CPT | Mod: CPTII,,, | Performed by: HOSPITALIST

## 2022-10-04 PROCEDURE — 3008F BODY MASS INDEX DOCD: CPT | Mod: CPTII,,, | Performed by: HOSPITALIST

## 2022-10-04 PROCEDURE — 1159F PR MEDICATION LIST DOCUMENTED IN MEDICAL RECORD: ICD-10-PCS | Mod: CPTII,,, | Performed by: HOSPITALIST

## 2022-10-04 PROCEDURE — 99999 PR PBB SHADOW E&M-EST. PATIENT-LVL V: ICD-10-PCS | Mod: PBBFAC,,, | Performed by: HOSPITALIST

## 2022-10-04 PROCEDURE — 3044F HG A1C LEVEL LT 7.0%: CPT | Mod: CPTII,,, | Performed by: HOSPITALIST

## 2022-10-04 PROCEDURE — 1159F MED LIST DOCD IN RCRD: CPT | Mod: CPTII,,, | Performed by: HOSPITALIST

## 2022-10-04 PROCEDURE — 99215 OFFICE O/P EST HI 40 MIN: CPT | Mod: PBBFAC,PO | Performed by: HOSPITALIST

## 2022-10-04 PROCEDURE — 99999 PR PBB SHADOW E&M-EST. PATIENT-LVL V: CPT | Mod: PBBFAC,,, | Performed by: HOSPITALIST

## 2022-10-04 PROCEDURE — 99214 PR OFFICE/OUTPT VISIT, EST, LEVL IV, 30-39 MIN: ICD-10-PCS | Mod: S$PBB,,, | Performed by: HOSPITALIST

## 2022-10-04 PROCEDURE — 3075F PR MOST RECENT SYSTOLIC BLOOD PRESS GE 130-139MM HG: ICD-10-PCS | Mod: CPTII,,, | Performed by: HOSPITALIST

## 2022-10-04 PROCEDURE — 99214 OFFICE O/P EST MOD 30 MIN: CPT | Mod: S$PBB,,, | Performed by: HOSPITALIST

## 2022-10-04 PROCEDURE — 90471 IMMUNIZATION ADMIN: CPT | Mod: PBBFAC,PO

## 2022-10-04 PROCEDURE — 3080F PR MOST RECENT DIASTOLIC BLOOD PRESSURE >= 90 MM HG: ICD-10-PCS | Mod: CPTII,,, | Performed by: HOSPITALIST

## 2022-10-04 PROCEDURE — 1160F PR REVIEW ALL MEDS BY PRESCRIBER/CLIN PHARMACIST DOCUMENTED: ICD-10-PCS | Mod: CPTII,,, | Performed by: HOSPITALIST

## 2022-10-04 PROCEDURE — 3008F PR BODY MASS INDEX (BMI) DOCUMENTED: ICD-10-PCS | Mod: CPTII,,, | Performed by: HOSPITALIST

## 2022-10-04 NOTE — PROGRESS NOTES
"Subjective:     @Patient ID: Marianne Go is a 47 y.o. female.    Chief Complaint: Vaginal Bleeding (Pt state only when wiping; no burning w/urination or abd pain)    HPI    46 yo F presents for urgent visit with c/o blood when wiping vaginal area. Reported started 4-5 days ago.   Initially had large blood clot  No dysuria, pelvic pressure.   Did have lower back pain of R Flank few days ago. States was not severe, does not think kidney pain related     Review of Systems   Constitutional:  Negative for chills and fever.   Respiratory:  Negative for cough and shortness of breath.    Cardiovascular:  Negative for chest pain and leg swelling.   Gastrointestinal:  Negative for abdominal pain, blood in stool, nausea and vomiting.   Genitourinary:  Negative for difficulty urinating and dysuria.        + vaginal blood    Neurological:  Negative for weakness and headaches.   Psychiatric/Behavioral:  Negative for agitation and confusion.    Past medical history, surgical history, and family medical history reviewed and updated as appropriate.    Medications and allergies reviewed.     Objective:     Vitals:    10/04/22 1310 10/04/22 1407   BP: (!) 128/92 (!) 138/92   BP Location: Left arm    Patient Position: Sitting    BP Method: Large (Manual)    Pulse: 92    Resp: 18    Temp: 97.5 °F (36.4 °C)    TempSrc: Temporal    SpO2: 96%    Weight: 113.9 kg (251 lb 1.7 oz)    Height: 5' 2" (1.575 m)      There is no height or weight on file to calculate BMI.  Physical Exam  Constitutional:       Appearance: Normal appearance.   HENT:      Head: Normocephalic and atraumatic.   Eyes:      General:         Right eye: No discharge.         Left eye: No discharge.      Conjunctiva/sclera: Conjunctivae normal.   Cardiovascular:      Rate and Rhythm: Normal rate and regular rhythm.   Pulmonary:      Effort: Pulmonary effort is normal.      Breath sounds: Normal breath sounds.   Abdominal:      Tenderness: There is no right CVA tenderness " or left CVA tenderness.   Musculoskeletal:         General: Normal range of motion.      Cervical back: Normal range of motion and neck supple.   Skin:     General: Skin is warm and dry.   Neurological:      Mental Status: She is alert and oriented to person, place, and time.   Psychiatric:         Mood and Affect: Mood normal.         Behavior: Behavior normal.       Lab Results   Component Value Date    WBC 8.18 08/26/2022    HGB 15.0 08/26/2022    HCT 46.2 08/26/2022     08/26/2022    CHOL 203 (H) 08/26/2022    TRIG 186 (H) 08/26/2022    HDL 43 08/26/2022    ALT 18 08/26/2022    AST 14 08/26/2022     08/26/2022    K 4.6 08/26/2022     08/26/2022    CREATININE 0.7 08/26/2022    BUN 15 08/26/2022    CO2 27 08/26/2022    TSH 3.506 08/26/2022    INR 1.0 04/25/2011    HGBA1C 4.9 08/26/2022       Assessment:     1. Vaginal bleeding    2. Elevated BP without diagnosis of hypertension    3. Needs flu shot      Plan:   Marianne was seen today for vaginal bleeding.    Diagnoses and all orders for this visit:    Vaginal bleeding  - Unclear etiology. Check urine and urine culture. Also recommend vaginal exam. Will refer to ob/gyn  -     Urine culture; Future  -     Urinalysis; Future  -     Ambulatory referral/consult to Obstetrics / Gynecology; Future    Elevated BP without diagnosis of hypertension  - bp remains elevated during visit   - recommend keep bp log, monitor diet and f/u in 2 weeks for bp check     Needs flu shot    Other orders  -     Influenza - Quadrivalent (PF)        No follow-ups on file.    Carlee Mcneil MD  Internal Medicine    10/4/2022

## 2022-10-12 ENCOUNTER — CLINICAL SUPPORT (OUTPATIENT)
Dept: ENDOSCOPY | Facility: HOSPITAL | Age: 47
End: 2022-10-12
Attending: HOSPITALIST
Payer: MEDICAID

## 2022-10-12 VITALS — HEIGHT: 62 IN | BODY MASS INDEX: 43.24 KG/M2 | WEIGHT: 235 LBS

## 2022-10-12 DIAGNOSIS — Z12.11 SCREENING FOR COLORECTAL CANCER: ICD-10-CM

## 2022-10-12 DIAGNOSIS — Z12.12 SCREENING FOR COLORECTAL CANCER: ICD-10-CM

## 2022-10-12 RX ORDER — POLYETHYLENE GLYCOL 3350, SODIUM SULFATE ANHYDROUS, SODIUM BICARBONATE, SODIUM CHLORIDE, POTASSIUM CHLORIDE 236; 22.74; 6.74; 5.86; 2.97 G/4L; G/4L; G/4L; G/4L; G/4L
4 POWDER, FOR SOLUTION ORAL ONCE
Qty: 4000 ML | Refills: 0 | Status: SHIPPED | OUTPATIENT
Start: 2022-10-12 | End: 2022-10-12

## 2022-10-13 ENCOUNTER — TELEPHONE (OUTPATIENT)
Dept: OBSTETRICS AND GYNECOLOGY | Facility: CLINIC | Age: 47
End: 2022-10-13
Payer: MEDICAID

## 2022-10-13 NOTE — TELEPHONE ENCOUNTER
----- Message from Yanira Hebert sent at 10/13/2022  8:33 AM CDT -----  Type:  Needs Medical Advice    Who Called: pt  Symptoms (please be specific): pt was scheduled for 10/13/22@8:30 pt is stuck in traffic and needs to reschedule       Would the patient rather a call back or a response via [facility]? call  Best Call Back Number: [phone]  Additional Information:

## 2022-10-17 ENCOUNTER — PATIENT MESSAGE (OUTPATIENT)
Dept: INTERNAL MEDICINE | Facility: CLINIC | Age: 47
End: 2022-10-17
Payer: MEDICAID

## 2022-10-17 DIAGNOSIS — Z12.11 SCREENING FOR COLORECTAL CANCER: ICD-10-CM

## 2022-10-17 DIAGNOSIS — Z13.9 SCREENING PROCEDURE: Primary | ICD-10-CM

## 2022-10-17 DIAGNOSIS — Z12.12 SCREENING FOR COLORECTAL CANCER: ICD-10-CM

## 2022-10-24 ENCOUNTER — TELEPHONE (OUTPATIENT)
Dept: BARIATRICS | Facility: CLINIC | Age: 47
End: 2022-10-24
Payer: MEDICAID

## 2022-10-25 ENCOUNTER — OFFICE VISIT (OUTPATIENT)
Dept: INTERNAL MEDICINE | Facility: CLINIC | Age: 47
End: 2022-10-25
Payer: MEDICAID

## 2022-10-25 VITALS
RESPIRATION RATE: 20 BRPM | DIASTOLIC BLOOD PRESSURE: 80 MMHG | SYSTOLIC BLOOD PRESSURE: 120 MMHG | HEART RATE: 66 BPM | TEMPERATURE: 98 F | HEIGHT: 62 IN | BODY MASS INDEX: 45.92 KG/M2 | OXYGEN SATURATION: 97 % | WEIGHT: 249.56 LBS

## 2022-10-25 DIAGNOSIS — Z01.30 BP CHECK: Primary | ICD-10-CM

## 2022-10-25 PROCEDURE — 3044F HG A1C LEVEL LT 7.0%: CPT | Mod: CPTII,,, | Performed by: HOSPITALIST

## 2022-10-25 PROCEDURE — 99213 OFFICE O/P EST LOW 20 MIN: CPT | Mod: S$PBB,,, | Performed by: HOSPITALIST

## 2022-10-25 PROCEDURE — 3079F PR MOST RECENT DIASTOLIC BLOOD PRESSURE 80-89 MM HG: ICD-10-PCS | Mod: CPTII,,, | Performed by: HOSPITALIST

## 2022-10-25 PROCEDURE — 99999 PR PBB SHADOW E&M-EST. PATIENT-LVL IV: ICD-10-PCS | Mod: PBBFAC,,, | Performed by: HOSPITALIST

## 2022-10-25 PROCEDURE — 1159F MED LIST DOCD IN RCRD: CPT | Mod: CPTII,,, | Performed by: HOSPITALIST

## 2022-10-25 PROCEDURE — 3044F PR MOST RECENT HEMOGLOBIN A1C LEVEL <7.0%: ICD-10-PCS | Mod: CPTII,,, | Performed by: HOSPITALIST

## 2022-10-25 PROCEDURE — 1159F PR MEDICATION LIST DOCUMENTED IN MEDICAL RECORD: ICD-10-PCS | Mod: CPTII,,, | Performed by: HOSPITALIST

## 2022-10-25 PROCEDURE — 99213 PR OFFICE/OUTPT VISIT, EST, LEVL III, 20-29 MIN: ICD-10-PCS | Mod: S$PBB,,, | Performed by: HOSPITALIST

## 2022-10-25 PROCEDURE — 1160F PR REVIEW ALL MEDS BY PRESCRIBER/CLIN PHARMACIST DOCUMENTED: ICD-10-PCS | Mod: CPTII,,, | Performed by: HOSPITALIST

## 2022-10-25 PROCEDURE — 3079F DIAST BP 80-89 MM HG: CPT | Mod: CPTII,,, | Performed by: HOSPITALIST

## 2022-10-25 PROCEDURE — 99999 PR PBB SHADOW E&M-EST. PATIENT-LVL IV: CPT | Mod: PBBFAC,,, | Performed by: HOSPITALIST

## 2022-10-25 PROCEDURE — 3074F SYST BP LT 130 MM HG: CPT | Mod: CPTII,,, | Performed by: HOSPITALIST

## 2022-10-25 PROCEDURE — 1160F RVW MEDS BY RX/DR IN RCRD: CPT | Mod: CPTII,,, | Performed by: HOSPITALIST

## 2022-10-25 PROCEDURE — 99214 OFFICE O/P EST MOD 30 MIN: CPT | Mod: PBBFAC,PO | Performed by: HOSPITALIST

## 2022-10-25 PROCEDURE — 3074F PR MOST RECENT SYSTOLIC BLOOD PRESSURE < 130 MM HG: ICD-10-PCS | Mod: CPTII,,, | Performed by: HOSPITALIST

## 2022-10-25 NOTE — PROGRESS NOTES
"Subjective:     @Patient ID: Marianne Go is a 47 y.o. female.    Chief Complaint: Blood Pressure Check    HPI    46 yo F presents for bp check. Last visit on 10/4 bp elevated at 138/92. Pt brought bp log.  BP in 130-140s/90 on average. Today in clinic bp controlled.   Pt reports also had recent gastroenteritis and unable to do cscope. Will reschedule.     Review of Systems   Constitutional:  Negative for chills and fever.   HENT:  Negative for congestion and sore throat.    Respiratory:  Negative for cough and shortness of breath.    Cardiovascular:  Negative for chest pain and leg swelling.   Gastrointestinal:  Negative for abdominal pain, nausea and vomiting.   Genitourinary:  Negative for difficulty urinating and dysuria.   Neurological:  Negative for dizziness, weakness and headaches.   Psychiatric/Behavioral:  Negative for agitation and confusion.      Past medical history, surgical history, and family medical history reviewed and updated as appropriate.    Medications and allergies reviewed.     Objective:     Vitals:    10/25/22 0813 10/25/22 0829   BP: 116/82 120/80   BP Location: Right arm    Patient Position: Sitting    BP Method: Large (Manual)    Pulse: 66    Resp: 20    Temp: 97.7 °F (36.5 °C)    TempSrc: Temporal    SpO2: 97%    Weight: 113.2 kg (249 lb 9 oz)    Height: 5' 2" (1.575 m)      There is no height or weight on file to calculate BMI.  Physical Exam  Constitutional:       Appearance: Normal appearance.   HENT:      Head: Normocephalic and atraumatic.   Eyes:      General:         Right eye: No discharge.         Left eye: No discharge.      Conjunctiva/sclera: Conjunctivae normal.   Pulmonary:      Effort: Pulmonary effort is normal.   Musculoskeletal:         General: Normal range of motion.      Cervical back: Normal range of motion and neck supple.   Skin:     General: Skin is warm and dry.   Neurological:      Mental Status: She is alert and oriented to person, place, and time. "   Psychiatric:         Mood and Affect: Mood normal.         Behavior: Behavior normal.       Lab Results   Component Value Date    WBC 8.18 08/26/2022    HGB 15.0 08/26/2022    HCT 46.2 08/26/2022     08/26/2022    CHOL 203 (H) 08/26/2022    TRIG 186 (H) 08/26/2022    HDL 43 08/26/2022    ALT 18 08/26/2022    AST 14 08/26/2022     08/26/2022    K 4.6 08/26/2022     08/26/2022    CREATININE 0.7 08/26/2022    BUN 15 08/26/2022    CO2 27 08/26/2022    TSH 3.506 08/26/2022    INR 1.0 04/25/2011    HGBA1C 4.9 08/26/2022       Assessment:     1. BP check      Plan:   Marianne was seen today for blood pressure check.    Diagnoses and all orders for this visit:    BP check  Bp controlled in clinic today. Recommend nurse visit on 11/14 @ 9 AM for bp check and pt to bring in home machine.   Also unclear if new medication effexor has increased her bp as well          Carlee Mcneil MD  Internal Medicine    10/25/2022

## 2022-10-27 ENCOUNTER — OFFICE VISIT (OUTPATIENT)
Dept: SLEEP MEDICINE | Facility: CLINIC | Age: 47
End: 2022-10-27
Payer: MEDICAID

## 2022-10-27 VITALS — WEIGHT: 240 LBS | BODY MASS INDEX: 44.16 KG/M2 | HEIGHT: 62 IN

## 2022-10-27 DIAGNOSIS — Z91.89 RISK FACTORS FOR OBSTRUCTIVE SLEEP APNEA: ICD-10-CM

## 2022-10-27 DIAGNOSIS — R53.83 FATIGUE, UNSPECIFIED TYPE: ICD-10-CM

## 2022-10-27 DIAGNOSIS — G47.30 SLEEP APNEA, UNSPECIFIED TYPE: Primary | ICD-10-CM

## 2022-10-27 PROCEDURE — 99203 OFFICE O/P NEW LOW 30 MIN: CPT | Mod: 95,,, | Performed by: NURSE PRACTITIONER

## 2022-10-27 PROCEDURE — 3044F HG A1C LEVEL LT 7.0%: CPT | Mod: CPTII,95,, | Performed by: NURSE PRACTITIONER

## 2022-10-27 PROCEDURE — 3044F PR MOST RECENT HEMOGLOBIN A1C LEVEL <7.0%: ICD-10-PCS | Mod: CPTII,95,, | Performed by: NURSE PRACTITIONER

## 2022-10-27 PROCEDURE — 99203 PR OFFICE/OUTPT VISIT, NEW, LEVL III, 30-44 MIN: ICD-10-PCS | Mod: 95,,, | Performed by: NURSE PRACTITIONER

## 2022-10-27 NOTE — PROGRESS NOTES
"The patient location is:[place]  The chief complaint leading to consultation is: sleep evaluation    Visit type: audiovisual    Face to Face time with patient: 11minutes of total time spent on the encounter, which includes face to face time and non-face to face time preparing to see the patient (eg, review of tests), Obtaining and/or reviewing separately obtained history, Documenting clinical information in the electronic or other health record, Independently interpreting results (not separately reported) and communicating results to the patient/family/caregiver, or Care coordination (not separately reported). Each patient to whom he or she provides medical services by telemedicine is:  (1) informed of the relationship between the physician and patient and the respective role of any other health care provider with respect to management of the patient; and (2) notified that he or she may decline to receive medical services by telemedicine and may withdraw from such care at any time.    Referred by Dr Mcneil    HISTORY OF PRESENT ILLNESS:She has never had a sleep study. Wakes up multiple times per night and having ongoing headaches progressively worsening, 3-6mos. Tired and feels exhausted. +snores and + witnessed apneic pauses by guillaume. Has not energy to exercise as regularly as she used to.     Denies symptoms of restless legs    On todays Kelford Sleepiness Scale the patient scores a 7/24.        FAMILY HISTORY: parents +JOSIAS  SOCIAL HISTORY: engaged,  special needs child/age 19, Magnetic     EXAM:   Ht 5' 2" (1.575 m)   Wt 108.9 kg (240 lb)   LMP 10/13/2017 (Approximate)   BMI 43.90 kg/m²       ASSESSMENT:   Unspecified Sleep Apnea, with symptoms of disruptive snoring, witnessed apneic pauses, un-refreshing disrupted sleep and excessive daytime sleepiness, with medical comorbidities of morbid obesity, migraine. Warrants further investigation for untreated sleep apnea. "     PLAN:   1.  Home Sleep Study, discussed plan of care (if + plan apap/nose with close adherence monitoing)   2. Discussed etiology of JOSIAS and potential ramifications of untreated JOSIAS, including stroke, heart disease, HTN.  We discussed potential treatment options, which could include weight loss, continuous positive airway pressure (CPAP-definitive), mandibular advancement splint by dentist,        Thank you for allowing me the opportunity to participate in the care of your patient

## 2022-10-30 ENCOUNTER — HOSPITAL ENCOUNTER (EMERGENCY)
Facility: HOSPITAL | Age: 47
Discharge: HOME | End: 2022-10-30
Attending: EMERGENCY MEDICINE
Payer: MEDICAID

## 2022-10-30 VITALS
TEMPERATURE: 98 F | WEIGHT: 240 LBS | SYSTOLIC BLOOD PRESSURE: 128 MMHG | BODY MASS INDEX: 44.16 KG/M2 | HEIGHT: 62 IN | DIASTOLIC BLOOD PRESSURE: 62 MMHG | HEART RATE: 76 BPM | OXYGEN SATURATION: 96 % | RESPIRATION RATE: 17 BRPM

## 2022-10-30 DIAGNOSIS — N76.4 VULVAR ABSCESS: Primary | ICD-10-CM

## 2022-10-30 PROCEDURE — 10060 I&D ABSCESS SIMPLE/SINGLE: CPT

## 2022-10-30 PROCEDURE — 25000003 PHARM REV CODE 250: Performed by: PHYSICIAN ASSISTANT

## 2022-10-30 PROCEDURE — 99284 EMERGENCY DEPT VISIT MOD MDM: CPT | Mod: 25,,, | Performed by: PHYSICIAN ASSISTANT

## 2022-10-30 PROCEDURE — 99284 PR EMERGENCY DEPT VISIT,LEVEL IV: ICD-10-PCS | Mod: 25,,, | Performed by: PHYSICIAN ASSISTANT

## 2022-10-30 PROCEDURE — 96374 THER/PROPH/DIAG INJ IV PUSH: CPT

## 2022-10-30 PROCEDURE — 63600175 PHARM REV CODE 636 W HCPCS: Performed by: PHYSICIAN ASSISTANT

## 2022-10-30 PROCEDURE — 76857 PR US PELVIS, NON-OB: ICD-10-PCS | Mod: 26,,, | Performed by: PHYSICIAN ASSISTANT

## 2022-10-30 PROCEDURE — 56405 I&D VULVA/PERINEAL ABSCESS: CPT | Mod: ,,, | Performed by: PHYSICIAN ASSISTANT

## 2022-10-30 PROCEDURE — 56405 PR I&D OF VULVA/PERINEUM ABSCESS: ICD-10-PCS | Mod: ,,, | Performed by: PHYSICIAN ASSISTANT

## 2022-10-30 PROCEDURE — 56405 I&D VULVA/PERINEAL ABSCESS: CPT

## 2022-10-30 PROCEDURE — 99284 EMERGENCY DEPT VISIT MOD MDM: CPT | Mod: 25

## 2022-10-30 PROCEDURE — 76857 US EXAM PELVIC LIMITED: CPT | Mod: 26,,, | Performed by: PHYSICIAN ASSISTANT

## 2022-10-30 RX ORDER — KETOROLAC TROMETHAMINE 30 MG/ML
10 INJECTION, SOLUTION INTRAMUSCULAR; INTRAVENOUS
Status: COMPLETED | OUTPATIENT
Start: 2022-10-30 | End: 2022-10-30

## 2022-10-30 RX ORDER — LIDOCAINE HYDROCHLORIDE 10 MG/ML
10 INJECTION, SOLUTION EPIDURAL; INFILTRATION; INTRACAUDAL; PERINEURAL
Status: COMPLETED | OUTPATIENT
Start: 2022-10-30 | End: 2022-10-30

## 2022-10-30 RX ADMIN — KETOROLAC TROMETHAMINE 10 MG: 30 INJECTION, SOLUTION INTRAMUSCULAR; INTRAVENOUS at 01:10

## 2022-10-30 RX ADMIN — LIDOCAINE HYDROCHLORIDE 100 MG: 10 INJECTION, SOLUTION EPIDURAL; INFILTRATION; INTRACAUDAL; PERINEURAL at 01:10

## 2022-10-30 NOTE — ED PROVIDER NOTES
Encounter Date: 10/30/2022       History     Chief Complaint   Patient presents with    Abscess     Multiple on labia, vagina and inner thigh.      47-year-old female with history of hypercholesterolemia presents emergency department for labial lesion.  States in May she was told she had a cyst on her labial.  Noticed 4 days ago she had another lesion on her labia which has become painful and last night noted that the lesion was self draining with blood on her sheets but did not notice any purulent drainage.  Denies any fevers/chills.    The history is provided by the patient.   Review of patient's allergies indicates:   Allergen Reactions    Topiramate Itching     Past Medical History:   Diagnosis Date    Deep vein thrombosis     pregnancy related     Kidney calculi     Kidney stones     Kidney stones     Migraines     perimenstrual      Past Surgical History:   Procedure Laterality Date    BLADDER SUSPENSION      CYSTOSCOPY W/ LASER LITHOTRIPSY      HYSTERECTOMY      PELVIC LAPAROSCOPY      ablation of endometriosis    TONSILLECTOMY      [facility]      Family History   Problem Relation Age of Onset    Hyperlipidemia Father     Hypothyroidism Father     Cancer Father         lung cancer     Allergic rhinitis Father     Asthma Son     Hyperlipidemia Mother     Hypertension Mother     Diabetes Mother     Hypothyroidism Mother     Breast cancer Mother         bilateral mastectomy ,    Colon cancer Maternal Uncle         40s     Ovarian cancer Neg Hx      Social History     Tobacco Use    Smoking status: Former     Packs/day: 0.25     Years: 13.00     Pack years: 3.25     Types: Cigarettes     Quit date: 2003     Years since quittin.8    Smokeless tobacco: Never    Tobacco comments:     quit 11 years ago   Substance Use Topics    Alcohol use: Yes     Comment: rarely    Drug use: No     Review of Systems   Constitutional:  Negative for chills and fever.   HENT:  Negative for  sore throat.    Respiratory:  Negative for cough and shortness of breath.    Cardiovascular:  Negative for chest pain.   Gastrointestinal:  Negative for abdominal pain, nausea and vomiting.   Genitourinary:  Negative for difficulty urinating and dysuria.   Musculoskeletal: Negative.    Skin:  Positive for color change.   Neurological:  Negative for weakness.   Psychiatric/Behavioral:  Negative for confusion.      Physical Exam     Initial Vitals [10/30/22 1131]   BP Pulse Resp Temp SpO2   (!) 122/58 73 16 97.7 °F (36.5 °C) 95 %      MAP       --         Physical Exam    Nursing note and vitals reviewed.  Constitutional: She appears well-developed and well-nourished.   HENT:   Head: Normocephalic and atraumatic.   Eyes: Pupils are equal, round, and reactive to light.   Neck: Neck supple.   Normal range of motion.  Cardiovascular:  Normal rate, regular rhythm, normal heart sounds and intact distal pulses.     Exam reveals no gallop and no friction rub.       No murmur heard.  Pulmonary/Chest: Breath sounds normal.   Abdominal: Abdomen is soft. Bowel sounds are normal. There is no abdominal tenderness.   Genitourinary:    Genitourinary Comments: Chaperone present for exam and procedure.  2 cm Fluctuant abscess to the right superior her fall.  With a self draining 1 cm ulcer centrally.     Musculoskeletal:         General: Normal range of motion.      Cervical back: Normal range of motion and neck supple.     Neurological: She is alert and oriented to person, place, and time. GCS score is 15. GCS eye subscore is 4. GCS verbal subscore is 5. GCS motor subscore is 6.   Skin: Skin is warm. Capillary refill takes less than 2 seconds.   Psychiatric: She has a normal mood and affect.       ED Course   I & D - Incision and Drainage    Date/Time: 10/30/2022 1:30 PM  Location procedure was performed: [facility] EMERGENCY DEPARTMENT  Performed by: Shital Rai PA-C  Authorized by: Cecille Salazar MD   Type: abscess  Body area:  anogenital  Location details: vulva  Anesthesia: local infiltration    Anesthesia:  Local Anesthetic: lidocaine 1% without epinephrine  Scalpel size: 11  Incision type: single straight  Complexity: simple  Drainage: purulent and bloody  Drainage amount: moderate  Wound treatment: wound packed and expression of material  Packing material: 1/2 in iodoform gauze  Complications: No  Specimens: No  Implants: No  Patient tolerance: Patient tolerated the procedure well with no immediate complications      Labs Reviewed - No data to display       Imaging Results    None          Medications   ketorolac injection 9.999 mg (9.999 mg Intravenous Given 10/30/22 1323)   LIDOcaine (PF) 10 mg/ml (1%) injection 100 mg (100 mg Infiltration Given by Other 10/30/22 1315)           APC / Resident Notes:   47-year-old female presents to the ED for vulvar abscess.  Denies any systemic symptoms no fever chills, nausea vomiting.  Vital signs are reassuring.  Bedside ultrasound shows fluctuant drainable pocket.  I&D was performed at bedside with moderate amount of bloody thick purulent material.  Wound is packed with half-inch iodoform gauze and dressed.  Patient tolerated procedure well with no immediate complications.  Discussed that packing will need to be removed in 48 hours.  Did not appear to have overlying cellulitis.  Suspect infected sebaceous cyst.  Will discharge home.  Discussed wound care instructions.  Discussed return to ED precautions for any new or worsening symptoms.     Attending Attestation:     Physician Attestation Statement for NP/PA:   I have conducted a face to face encounter with this patient in addition to the NP/PA, due to Medical Complexity    Other NP/PA Attestation Additions:      Medical Decision Makin yo F with right vulvar area of swelling and pain    Bedside US with round superficial fluid collection                        Clinical Impression:   Final diagnoses:  [N76.4] Vulvar abscess (Primary)       ED Disposition Condition    Discharge Stable          ED Prescriptions    None       Follow-up Information    None          Shital Rai PA-C  10/30/22 1358       Shital Rai PA-C  10/30/22 3002       Shital Rai PA-C  10/30/22 4151

## 2022-10-30 NOTE — DISCHARGE INSTRUCTIONS
You were seen in the emergency department for an abscess.  This was drained at bedside and wound packing was placed.  The packing will need to be removed in 2 days.  Please return to the ED for developing new or worsening symptoms.

## 2022-10-30 NOTE — ED TRIAGE NOTES
"Marianne Go, emile 47 y.o. female presents to the ED w/ complaint of Vaginal pain "boil"     Triage note: Patient states that she noticed a "cyst" on her vaginal area Thursday, area has become more painful and pain is now radiating into lower pelvic region. Patient states "cyst" began to drain this morning. Patient denies N/V/D fever or chills.   Chief Complaint   Patient presents with    Abscess     Multiple on labia, vagina and inner thigh.      Review of patient's allergies indicates:   Allergen Reactions    Topiramate Itching     Past Medical History:   Diagnosis Date    Deep vein thrombosis 1990    pregnancy related     Kidney calculi     Kidney stones     Kidney stones     Migraines     perimenstrual     Marianne Go, emile 47 y.o. female presents to the ED via POV with CC Vaginal pain      Patient identifiers verified verbally with  and Marianne Go.    LOC/ APPEARANCE: The patient is AAOx4. Pt is speaking appropriately, no slurred speech. Pt changed into hospital gown. Continuous cardiac monitor, cont pulse ox, and auto BP cuff applied to patient. Pt is clean and well groomed. No JVD visible. Pt reports pain level of 0. Pt updated on POC. Bed low and locked with side rails up x2, call bell in pt reach.  SKIN: Skin is warm dry and intact, and color is consistent with ethnicity. Capillary refill <3 seconds. No breakdown or brusing visible. Mucus membranes moist, acyanotic.  RESPIRATORY: Airway is open and patent. Respirations-spontaneous, unlabored, regular rate, equal bilaterally on inspiration and expiration. No accessory muscle use noted.   CARDIAC: Patient has regular heart rate.  No peripheral edema noted, and patient has no c/o chest pain. Peripheral pulses present equal and strong throughout.  ABDOMEN: Soft and non-tender to palpation with no distention noted. Pt has no complaints of abnormal bowel movements, denies blood in stool. Pt reports normal appetite.   NEUROLOGIC: Eyes open spontaneously and " facial expression symmetrical. Pt behavior appropriate to situation, and pt follows commands. Pt reports sensation present in all extremities when touched with a finger, denies any numbness or tingling. PERRLA  MUSCULOSKELETAL: Spontaneous movement noted to all extremities. Hand  equal and leg strength strong +5 bilaterally.   : No complaints of frequency, burning, urgency or blood in the urine. No complaints of incontinence.

## 2022-11-01 ENCOUNTER — TELEPHONE (OUTPATIENT)
Dept: SLEEP MEDICINE | Facility: OTHER | Age: 47
End: 2022-11-01
Payer: MEDICAID

## 2022-11-01 ENCOUNTER — TELEPHONE (OUTPATIENT)
Dept: INTERNAL MEDICINE | Facility: CLINIC | Age: 47
End: 2022-11-01
Payer: MEDICAID

## 2022-11-01 NOTE — TELEPHONE ENCOUNTER
----- Message from Collins Knox sent at 11/1/2022  8:49 AM CDT -----  Contact: Pt [phone]  No blue slot available to schedule an appointment for the patient.  Patient is established with which PCP: DR Mcneil   Reason for the visit: labial lesion. Follow up from the ER   Would the patient like a call back, or a response through their [facility] portal?:  call

## 2022-11-01 NOTE — TELEPHONE ENCOUNTER
The patient had packing to be removed today. She stated that some of the packing did not come out in the shower. There are no openings today.   However I scheduled an appointment for tomorrow with Dr Mcneil. I informed the patient to sit in a tub of warm water to loosen the remaining packing.  If the patient develops fever or any noted signs of infection she is to return to the ER. The patient verbalized understanding.

## 2022-11-02 ENCOUNTER — TELEPHONE (OUTPATIENT)
Dept: OBSTETRICS AND GYNECOLOGY | Facility: CLINIC | Age: 47
End: 2022-11-02
Payer: MEDICAID

## 2022-11-02 ENCOUNTER — OFFICE VISIT (OUTPATIENT)
Dept: INTERNAL MEDICINE | Facility: CLINIC | Age: 47
End: 2022-11-02
Payer: MEDICAID

## 2022-11-02 VITALS
BODY MASS INDEX: 46.25 KG/M2 | WEIGHT: 251.31 LBS | OXYGEN SATURATION: 97 % | DIASTOLIC BLOOD PRESSURE: 85 MMHG | SYSTOLIC BLOOD PRESSURE: 121 MMHG | HEART RATE: 79 BPM | HEIGHT: 62 IN | TEMPERATURE: 98 F

## 2022-11-02 DIAGNOSIS — N76.4 VULVAR ABSCESS: Primary | ICD-10-CM

## 2022-11-02 PROCEDURE — 3008F BODY MASS INDEX DOCD: CPT | Mod: CPTII,,, | Performed by: HOSPITALIST

## 2022-11-02 PROCEDURE — 99213 PR OFFICE/OUTPT VISIT, EST, LEVL III, 20-29 MIN: ICD-10-PCS | Mod: S$PBB,,, | Performed by: HOSPITALIST

## 2022-11-02 PROCEDURE — 1160F PR REVIEW ALL MEDS BY PRESCRIBER/CLIN PHARMACIST DOCUMENTED: ICD-10-PCS | Mod: CPTII,,, | Performed by: HOSPITALIST

## 2022-11-02 PROCEDURE — 1159F PR MEDICATION LIST DOCUMENTED IN MEDICAL RECORD: ICD-10-PCS | Mod: CPTII,,, | Performed by: HOSPITALIST

## 2022-11-02 PROCEDURE — 99999 PR PBB SHADOW E&M-EST. PATIENT-LVL IV: ICD-10-PCS | Mod: PBBFAC,,, | Performed by: HOSPITALIST

## 2022-11-02 PROCEDURE — 99214 OFFICE O/P EST MOD 30 MIN: CPT | Mod: PBBFAC,PO | Performed by: HOSPITALIST

## 2022-11-02 PROCEDURE — 1160F RVW MEDS BY RX/DR IN RCRD: CPT | Mod: CPTII,,, | Performed by: HOSPITALIST

## 2022-11-02 PROCEDURE — 3008F PR BODY MASS INDEX (BMI) DOCUMENTED: ICD-10-PCS | Mod: CPTII,,, | Performed by: HOSPITALIST

## 2022-11-02 PROCEDURE — 3074F PR MOST RECENT SYSTOLIC BLOOD PRESSURE < 130 MM HG: ICD-10-PCS | Mod: CPTII,,, | Performed by: HOSPITALIST

## 2022-11-02 PROCEDURE — 3044F HG A1C LEVEL LT 7.0%: CPT | Mod: CPTII,,, | Performed by: HOSPITALIST

## 2022-11-02 PROCEDURE — 3079F PR MOST RECENT DIASTOLIC BLOOD PRESSURE 80-89 MM HG: ICD-10-PCS | Mod: CPTII,,, | Performed by: HOSPITALIST

## 2022-11-02 PROCEDURE — 3044F PR MOST RECENT HEMOGLOBIN A1C LEVEL <7.0%: ICD-10-PCS | Mod: CPTII,,, | Performed by: HOSPITALIST

## 2022-11-02 PROCEDURE — 3079F DIAST BP 80-89 MM HG: CPT | Mod: CPTII,,, | Performed by: HOSPITALIST

## 2022-11-02 PROCEDURE — 99999 PR PBB SHADOW E&M-EST. PATIENT-LVL IV: CPT | Mod: PBBFAC,,, | Performed by: HOSPITALIST

## 2022-11-02 PROCEDURE — 1159F MED LIST DOCD IN RCRD: CPT | Mod: CPTII,,, | Performed by: HOSPITALIST

## 2022-11-02 PROCEDURE — 3074F SYST BP LT 130 MM HG: CPT | Mod: CPTII,,, | Performed by: HOSPITALIST

## 2022-11-02 PROCEDURE — 99213 OFFICE O/P EST LOW 20 MIN: CPT | Mod: S$PBB,,, | Performed by: HOSPITALIST

## 2022-11-02 RX ORDER — SULFAMETHOXAZOLE AND TRIMETHOPRIM 800; 160 MG/1; MG/1
1 TABLET ORAL 2 TIMES DAILY
Qty: 14 TABLET | Refills: 0 | Status: SHIPPED | OUTPATIENT
Start: 2022-11-02 | End: 2023-01-31

## 2022-11-02 NOTE — PROGRESS NOTES
"Subjective:     @Patient ID: Marianne Go is a 47 y.o. female.    Chief Complaint: Abscess (Pt believes packing didn't come out all the way) and Pain (Abscess is causing pain)    HPI  46 yo F presents for urgent visit for f/u of vulvar abscess. Seen in ER 3 days ago for vaginal pain. Found to have abscess. Underwent I&D with packing placed. Following-up today in clinic. Reports pain is better but still present. Has been soaking with warm compresses.     Review of Systems   Constitutional:  Negative for chills and fever.   Skin:  Positive for wound.   Psychiatric/Behavioral:  Negative for agitation and confusion.    Past medical history, surgical history, and family medical history reviewed and updated as appropriate.    Medications and allergies reviewed.     Objective:     Vitals:    11/02/22 1403   BP: 121/85   BP Location: Right arm   Patient Position: Sitting   BP Method: Medium (Manual)   Pulse: 79   Temp: 97.5 °F (36.4 °C)   TempSrc: Temporal   SpO2: 97%   Weight: 114 kg (251 lb 5.2 oz)   Height: 5' 2" (1.575 m)     Body mass index is 45.97 kg/m².  Physical Exam  Exam conducted with a chaperone present.   Constitutional:       Appearance: Normal appearance.   HENT:      Head: Normocephalic and atraumatic.   Pulmonary:      Effort: Pulmonary effort is normal.   Skin:     Comments: R outer vulvar abscess with packing in opening. Mild purulent drainage. Mildly red surrounding skin and palpable induration. Tender on palpation    Neurological:      Mental Status: She is alert and oriented to person, place, and time.   Psychiatric:         Mood and Affect: Mood normal.         Behavior: Behavior normal.       Lab Results   Component Value Date    WBC 8.18 08/26/2022    HGB 15.0 08/26/2022    HCT 46.2 08/26/2022     08/26/2022    CHOL 203 (H) 08/26/2022    TRIG 186 (H) 08/26/2022    HDL 43 08/26/2022    ALT 18 08/26/2022    AST 14 08/26/2022     08/26/2022    K 4.6 08/26/2022     08/26/2022    " CREATININE 0.7 08/26/2022    BUN 15 08/26/2022    CO2 27 08/26/2022    TSH 3.506 08/26/2022    INR 1.0 04/25/2011    HGBA1C 4.9 08/26/2022       Assessment:     1. Vulvar abscess      Plan:   Marianne was seen today for abscess and pain.    Diagnoses and all orders for this visit:    Vulvar abscess  -     Ambulatory referral/consult to Obstetrics / Gynecology; Future    Other orders  -     sulfamethoxazole-trimethoprim 800-160mg (BACTRIM DS) 800-160 mg Tab; Take 1 tablet by mouth 2 (two) times daily.    Urgent referral placed to ob/gyn  Start abx  Continue warm compresses            Carlee Mcneil MD  Internal Medicine    11/2/2022

## 2022-11-04 ENCOUNTER — TELEPHONE (OUTPATIENT)
Dept: OBSTETRICS AND GYNECOLOGY | Facility: CLINIC | Age: 47
End: 2022-11-04
Payer: MEDICAID

## 2022-11-04 ENCOUNTER — HOSPITAL ENCOUNTER (EMERGENCY)
Facility: HOSPITAL | Age: 47
Discharge: HOME | End: 2022-11-04
Attending: EMERGENCY MEDICINE
Payer: MEDICAID

## 2022-11-04 ENCOUNTER — TELEPHONE (OUTPATIENT)
Dept: SLEEP MEDICINE | Facility: OTHER | Age: 47
End: 2022-11-04
Payer: MEDICAID

## 2022-11-04 VITALS
OXYGEN SATURATION: 100 % | DIASTOLIC BLOOD PRESSURE: 99 MMHG | HEART RATE: 81 BPM | SYSTOLIC BLOOD PRESSURE: 174 MMHG | RESPIRATION RATE: 20 BRPM | TEMPERATURE: 99 F

## 2022-11-04 DIAGNOSIS — L08.9 INFECTED EPIDERMOID CYST: Primary | ICD-10-CM

## 2022-11-04 DIAGNOSIS — L72.0 INFECTED EPIDERMOID CYST: Primary | ICD-10-CM

## 2022-11-04 PROCEDURE — 56405 I&D VULVA/PERINEAL ABSCESS: CPT

## 2022-11-04 PROCEDURE — 25000003 PHARM REV CODE 250: Performed by: EMERGENCY MEDICINE

## 2022-11-04 PROCEDURE — 99282 EMERGENCY DEPT VISIT SF MDM: CPT | Mod: 25

## 2022-11-04 RX ADMIN — Medication 1 ML: at 03:11

## 2022-11-04 NOTE — ED NOTES
"Present to ED with pain to her vaginal area. Reports having an abscess drained on Sunday at another hospital. Incision was packed with gauze. States, "A small piece of the gauze fell out on Monday or Tuesday and I know it was longer than what fell out." Pt pain has gotten worse and now has a odor with a discharged coming from incision.    APPEARANCE: Alert, oriented and in no acute distress.  CARDIAC: Normal rate and rhythm, no murmur heard.   RESPIRATORY:Normal rate and effort, breath sounds clear bilaterally throughout chest. Respirations are equal and unlabored no obvious signs of distress.  SKIN: Skin is warm and dry, normal skin turgor, mucous membranes moist.  NEURO: 5/5 strength major flexors/extensors bilaterally. Sensory intact to light touch bilaterally. Abbi coma scale: eyes open spontaneously-4, oriented & converses-5, obeys commands-6. No neurological abnormalities.   MENTAL STATUS: awake, alert and aware of environment.  GENITALIA: Normal external genitalia. + vaginal incision.     "

## 2022-11-04 NOTE — TELEPHONE ENCOUNTER
----- Message from Yoshi Kumar sent at 11/2/2022  2:33 PM CDT -----  Good afternoon,    Dr. Mcneil has put in a STAT referral for patient to consult to Obstetrics / Gynecology. She has been scheduled to be seen on 11/9 but Dr. Mcneil wanted to see if there is anything available for the next 1-2 days. Can somone please give patient a call. Thanks in advance.       Vaginal bleeding [N93.9]

## 2022-11-04 NOTE — DISCHARGE INSTRUCTIONS
You had 1 stitch placed today. This needs to be taken out in 5-7 days. Please follow up with the OB as scheduled. Please come back to the ED if they turn red, pus forms, or other concerning symptoms.    
clear to auscultation bilaterally/airway patent/no chest wall tenderness/breath sounds equal/good air movement/respirations non-labored

## 2022-11-04 NOTE — ED PROVIDER NOTES
Encounter Date: 2022       History     Chief Complaint   Patient presents with    Recurrent Skin Infections     Abcess in vaginal area drained on  at main with packing placed. Part of the packing came out and now site is draining gray drainage with odor. +severe pain. Patient was not placed on antibiotics in ED visit but PCP placed her on bactrim Wednesday.      See mdm       Review of patient's allergies indicates:   Allergen Reactions    Topiramate Itching     Past Medical History:   Diagnosis Date    Deep vein thrombosis     pregnancy related     Kidney calculi     Kidney stones     Kidney stones     Migraines     perimenstrual      Past Surgical History:   Procedure Laterality Date    BLADDER SUSPENSION      CYSTOSCOPY W/ LASER LITHOTRIPSY      HYSTERECTOMY      PELVIC LAPAROSCOPY      ablation of endometriosis    TONSILLECTOMY      TUBAL LIGATION  2011    essure      Family History   Problem Relation Age of Onset    Hyperlipidemia Father     Hypothyroidism Father     Cancer Father         lung cancer     Allergic rhinitis Father     Asthma Son     Hyperlipidemia Mother     Hypertension Mother     Diabetes Mother     Hypothyroidism Mother     Breast cancer Mother         bilateral mastectomy ,    Colon cancer Maternal Uncle         40s     Ovarian cancer Neg Hx      Social History     Tobacco Use    Smoking status: Former     Packs/day: 0.25     Years: 13.00     Pack years: 3.25     Types: Cigarettes     Quit date: 2003     Years since quittin.8    Smokeless tobacco: Never    Tobacco comments:     quit 11 years ago   Substance Use Topics    Alcohol use: Yes     Comment: rarely    Drug use: No     Review of Systems   Constitutional:  Negative for fever.   HENT:  Negative for sore throat.    Respiratory:  Negative for shortness of breath.    Cardiovascular:  Negative for chest pain.   Gastrointestinal:  Negative for nausea.   Genitourinary:  Positive for vaginal pain. Negative for  dysuria.   Musculoskeletal:  Negative for back pain.   Skin:  Negative for rash.   Neurological:  Negative for weakness.   Hematological:  Does not bruise/bleed easily.     Physical Exam     Initial Vitals [11/04/22 1335]   BP Pulse Resp Temp SpO2   (!) 174/99 81 20 98.9 °F (37.2 °C) 100 %      MAP       --         Physical Exam    Nursing note and vitals reviewed.  Constitutional:   Narrative: Triage vital signs reviewed.     Constitutional: Well-nourished, well-developed. Not in acute distress.     HEENT: Normocephalic, atraumatic. Moist mucous membranes.     Eyes: No conjunctival injection.     Resp: Normal respiratory effort, breathing unlabored.     Cardio: Regular rate and rhythm.     GI: Abdomen non-distended.     : open area R labia with protrusion of material. No drainage.     MSK: Extremities without any deformities noted. No lower extremity edema.     Skin: Warm and dry. No rashes or lesions noted.     Neuro: Awake and alert. Moves all extremities.             ED Course   Lac Repair    Date/Time: 11/4/2022 4:32 PM  Performed by: Shanti Warren MD  Authorized by: Shanti Warren MD     Skin repair:     Repair method:  Sutures    Suture size:  4-0    Suture material:  Nylon    Suture technique:  Simple interrupted    Number of sutures:  1  Approximation:     Approximation:  Close  Repair type:     Repair type:  Simple  Post-procedure details:     Dressing:  Open (no dressing)  Labs Reviewed - No data to display       Imaging Results    None          Medications   LETS (LIDOcaine-TETRAcaine-EPINEPHrine) gel solution (1 mL Topical (Top) Given 11/4/22 1544)     Medical Decision Making:   ED Management:  Pleasant 47-year-old female presenting with right labial packing removal request.  Patient states that this was I&D'd and packed in the ED, and some packing fell out in the shower - a small piece of gauze. Placed on bactrim w PCP 2d ago. Pw worsening pain at the site. Cystic sac removed with immediate relief.  No evidence infx. 1 stitch placed to close the open wound. Continue bactrim for 5 total days, pt has fu with OB in 5d for suture removal and wound check. Strict return precautions discussed with patient expressing understanding of instructions, and all questions answered.                           Clinical Impression:   Final diagnoses:  [L72.0, L08.9] Infected epidermoid cyst (Primary)      ED Disposition Condition    Discharge Stable          ED Prescriptions    None       Follow-up Information    None          Shanti Warren MD  11/04/22 9426

## 2022-11-10 ENCOUNTER — OFFICE VISIT (OUTPATIENT)
Dept: OBSTETRICS AND GYNECOLOGY | Facility: CLINIC | Age: 47
End: 2022-11-10
Payer: MEDICAID

## 2022-11-10 VITALS
DIASTOLIC BLOOD PRESSURE: 84 MMHG | SYSTOLIC BLOOD PRESSURE: 134 MMHG | BODY MASS INDEX: 45.93 KG/M2 | WEIGHT: 251.13 LBS

## 2022-11-10 DIAGNOSIS — N76.4 VULVAR ABSCESS: ICD-10-CM

## 2022-11-10 PROCEDURE — 1159F MED LIST DOCD IN RCRD: CPT | Mod: CPTII,,, | Performed by: STUDENT IN AN ORGANIZED HEALTH CARE EDUCATION/TRAINING PROGRAM

## 2022-11-10 PROCEDURE — 99213 OFFICE O/P EST LOW 20 MIN: CPT | Mod: PBBFAC,PO | Performed by: STUDENT IN AN ORGANIZED HEALTH CARE EDUCATION/TRAINING PROGRAM

## 2022-11-10 PROCEDURE — 3079F PR MOST RECENT DIASTOLIC BLOOD PRESSURE 80-89 MM HG: ICD-10-PCS | Mod: CPTII,,, | Performed by: STUDENT IN AN ORGANIZED HEALTH CARE EDUCATION/TRAINING PROGRAM

## 2022-11-10 PROCEDURE — 99999 PR PBB SHADOW E&M-EST. PATIENT-LVL III: ICD-10-PCS | Mod: PBBFAC,,, | Performed by: STUDENT IN AN ORGANIZED HEALTH CARE EDUCATION/TRAINING PROGRAM

## 2022-11-10 PROCEDURE — 3044F PR MOST RECENT HEMOGLOBIN A1C LEVEL <7.0%: ICD-10-PCS | Mod: CPTII,,, | Performed by: STUDENT IN AN ORGANIZED HEALTH CARE EDUCATION/TRAINING PROGRAM

## 2022-11-10 PROCEDURE — 3044F HG A1C LEVEL LT 7.0%: CPT | Mod: CPTII,,, | Performed by: STUDENT IN AN ORGANIZED HEALTH CARE EDUCATION/TRAINING PROGRAM

## 2022-11-10 PROCEDURE — 3008F BODY MASS INDEX DOCD: CPT | Mod: CPTII,,, | Performed by: STUDENT IN AN ORGANIZED HEALTH CARE EDUCATION/TRAINING PROGRAM

## 2022-11-10 PROCEDURE — 99999 PR PBB SHADOW E&M-EST. PATIENT-LVL III: CPT | Mod: PBBFAC,,, | Performed by: STUDENT IN AN ORGANIZED HEALTH CARE EDUCATION/TRAINING PROGRAM

## 2022-11-10 PROCEDURE — 3075F PR MOST RECENT SYSTOLIC BLOOD PRESS GE 130-139MM HG: ICD-10-PCS | Mod: CPTII,,, | Performed by: STUDENT IN AN ORGANIZED HEALTH CARE EDUCATION/TRAINING PROGRAM

## 2022-11-10 PROCEDURE — 3075F SYST BP GE 130 - 139MM HG: CPT | Mod: CPTII,,, | Performed by: STUDENT IN AN ORGANIZED HEALTH CARE EDUCATION/TRAINING PROGRAM

## 2022-11-10 PROCEDURE — 99213 PR OFFICE/OUTPT VISIT, EST, LEVL III, 20-29 MIN: ICD-10-PCS | Mod: S$PBB,,, | Performed by: STUDENT IN AN ORGANIZED HEALTH CARE EDUCATION/TRAINING PROGRAM

## 2022-11-10 PROCEDURE — 3008F PR BODY MASS INDEX (BMI) DOCUMENTED: ICD-10-PCS | Mod: CPTII,,, | Performed by: STUDENT IN AN ORGANIZED HEALTH CARE EDUCATION/TRAINING PROGRAM

## 2022-11-10 PROCEDURE — 3079F DIAST BP 80-89 MM HG: CPT | Mod: CPTII,,, | Performed by: STUDENT IN AN ORGANIZED HEALTH CARE EDUCATION/TRAINING PROGRAM

## 2022-11-10 PROCEDURE — 99213 OFFICE O/P EST LOW 20 MIN: CPT | Mod: S$PBB,,, | Performed by: STUDENT IN AN ORGANIZED HEALTH CARE EDUCATION/TRAINING PROGRAM

## 2022-11-10 PROCEDURE — 1159F PR MEDICATION LIST DOCUMENTED IN MEDICAL RECORD: ICD-10-PCS | Mod: CPTII,,, | Performed by: STUDENT IN AN ORGANIZED HEALTH CARE EDUCATION/TRAINING PROGRAM

## 2022-11-10 RX ORDER — POLYETHYLENE GLYCOL 3350, SODIUM CHLORIDE, SODIUM BICARBONATE, POTASSIUM CHLORIDE 420; 11.2; 5.72; 1.48 G/4L; G/4L; G/4L; G/4L
4000 POWDER, FOR SOLUTION ORAL
COMMUNITY
Start: 2022-10-12 | End: 2023-01-31

## 2022-11-10 RX ORDER — PAROXETINE 10 MG/1
10 TABLET, FILM COATED ORAL DAILY
COMMUNITY
Start: 2022-10-25 | End: 2022-12-15

## 2022-11-10 NOTE — PROGRESS NOTES
CC: Gynecologic Exam    HPI:  Marianne Go is a 47 y.o. female  presents for suture removal and examination of right labial abscess drainage site.     10/30: ED visit with I&D of right labial abscess, packed with iodoform gauze and dressed   PCP started patient on bactrim : ED visit for severe pain, cyst sac removed and 1 interrupted stitch placed to close open wound       Today patient feels well, no fevers and pain completely resolved. No current drainage from the right labia.       ROS:  GENERAL: No fever, chills, fatigability or weight loss.  VULVAR: No current pain, no new lesions and no itching.  VAGINAL: No relaxation, no itching, no discharge, no abnormal bleeding and no lesions.  ABDOMEN: No abdominal pain. Denies nausea. Denies vomiting. No diarrhea. No constipation  BREAST: Denies pain. No lumps. No discharge.  URINARY: No incontinence, no nocturia, no frequency and no dysuria.  CARDIOVASCULAR: No chest pain. No shortness of breath. No leg cramps.  NEUROLOGICAL: No headaches. No vision changes.      Patient History:  Past Medical History:   Diagnosis Date    Deep vein thrombosis     pregnancy related     Kidney calculi     Kidney stones     Kidney stones     Migraines     perimenstrual      Past Surgical History:   Procedure Laterality Date    BLADDER SUSPENSION      CYSTOSCOPY W/ LASER LITHOTRIPSY      HYSTERECTOMY      PELVIC LAPAROSCOPY      ablation of endometriosis    TONSILLECTOMY      TUBAL LIGATION      [facility]      Social History     Tobacco Use    Smoking status: Former     Packs/day: 0.25     Years: 13.00     Pack years: 3.25     Types: Cigarettes     Quit date: 2003     Years since quittin.8    Smokeless tobacco: Never    Tobacco comments:     quit 11 years ago   Substance Use Topics    Alcohol use: Yes     Comment: rarely    Drug use: No     Family History   Problem Relation Age of Onset    Hyperlipidemia Father     Hypothyroidism Father     Cancer Father          lung cancer     Allergic rhinitis Father     Asthma Son     Hyperlipidemia Mother     Hypertension Mother     Diabetes Mother     Hypothyroidism Mother     Breast cancer Mother         bilateral mastectomy ,    Colon cancer Maternal Uncle         40s     Ovarian cancer Neg Hx      OB History    Para Term  AB Living   5 3 3   2 3   SAB IAB Ectopic Multiple Live Births   2       3      # Outcome Date GA Lbr Jason/2nd Weight Sex Delivery Anes PTL Lv   5 Term 11   2.948 kg (6 lb 8 oz) F Vag-Spont EPI  LUÍS   4 SAB            3 Term 02   3.033 kg (6 lb 11 oz) M Vag-Spont EPI  LUÍS   2 SAB            1 Term 90   3.147 kg (6 lb 15 oz) M Vag-Spont EPI  LUÍS      Obstetric Comments   Menarche age 11       Objective:   /84   Wt 113.9 kg (251 lb 1.7 oz)   LMP 10/13/2017 (Approximate)   BMI 45.93 kg/m²   Patient's last menstrual period was 10/13/2017 (approximate).      PHYSICAL EXAM:  APPEARANCE: Well nourished, well developed, in no acute distress.  AFFECT: WNL, alert and oriented x 3  SKIN: No acne or hirsutism  NECK: Neck symmetric without masses or thyromegaly  NODES: No inguinal, cervical, axillary, or femoral lymph node enlargement  CHEST: Good respiratory effect  ABDOMEN: Soft.  No tenderness or masses.  No hepatosplenomegaly.  No hernias.  PELVIC: Normal external genitalia. Right labia 5mm healing wound (closed, no active drainage but not completely re-epithelialized). No pain, edema or fluctuance noted. Normal hair distribution.  Adequate perineal body, normal urethral meatus.  Vagina atrophic without lesions or discharge.  Cervix surgically absent.  No significant cystocele or rectocele.    EXTREMITIES: No edema.      ASSESSMENT and PLAN:    ICD-10-CM ICD-9-CM    1. Vulvar abscess  N76.4 616.4 Ambulatory referral/consult to Obstetrics / Gynecology          Vulvar abscess   - drained in ED with one suture placed over open wound, now healed   - 1 interrupted  silk suture removed today   - no further abscess noted today, appears to be resolved.  - continued skin care and precautions discussed with patient today, all questions answered       Follow up: as needed if symptoms worsen        Stephanie Heaney, MD OBGYN [facility]

## 2022-11-16 ENCOUNTER — HOSPITAL ENCOUNTER (OUTPATIENT)
Dept: SLEEP MEDICINE | Facility: OTHER | Age: 47
Discharge: HOME | End: 2022-11-16
Attending: NURSE PRACTITIONER
Payer: MEDICAID

## 2022-11-16 DIAGNOSIS — G47.30 SLEEP APNEA, UNSPECIFIED TYPE: ICD-10-CM

## 2022-11-16 DIAGNOSIS — G47.33 OSA (OBSTRUCTIVE SLEEP APNEA): Primary | ICD-10-CM

## 2022-11-16 PROCEDURE — 95806 PR SLEEP STUDY, UNATTENDED, SIMUL RECORD HR/O2 SAT/RESP FLOW/RESP EFFT: ICD-10-PCS | Mod: 26,,, | Performed by: PSYCHIATRY & NEUROLOGY

## 2022-11-16 PROCEDURE — 95800 SLP STDY UNATTENDED: CPT

## 2022-11-16 PROCEDURE — 95806 SLEEP STUDY UNATT&RESP EFFT: CPT | Mod: 26,,, | Performed by: PSYCHIATRY & NEUROLOGY

## 2022-11-17 ENCOUNTER — CLINICAL SUPPORT (OUTPATIENT)
Dept: ENDOSCOPY | Facility: HOSPITAL | Age: 47
End: 2022-11-17
Attending: HOSPITALIST
Payer: MEDICAID

## 2022-11-17 VITALS — BODY MASS INDEX: 46.01 KG/M2 | HEIGHT: 62 IN | WEIGHT: 250 LBS

## 2022-11-17 DIAGNOSIS — Z12.11 SCREENING FOR COLORECTAL CANCER: ICD-10-CM

## 2022-11-17 DIAGNOSIS — Z12.12 SCREENING FOR COLORECTAL CANCER: ICD-10-CM

## 2022-11-17 PROBLEM — G47.30 SLEEP APNEA: Status: ACTIVE | Noted: 2022-11-17

## 2022-11-28 PROBLEM — G47.33 OSA (OBSTRUCTIVE SLEEP APNEA): Status: ACTIVE | Noted: 2022-11-17

## 2022-11-29 NOTE — PROCEDURES
PHYSICIAN INTERPRETATION AND COMMENTS: Findings are consistent with mild, obstructive sleep apnea (JOSIAS) (G47.33),  by overall AHI (apnea hypopnea index). However, findings on this study suggest that the degree of sleep disordered  breathing is in the moderate range, when RDI is measured. This study was technically adequate to allow for interpretation.  CLINICAL HISTORY: 47 year old female presented with: 18 inch neck, BMI of 43.9, an [place] sleepiness score of 7, history  of depression and symptoms of nocturnal snoring and witnessed apneas. Based on the clinical history, the patient has a  high pre-test probability of having Severe JOSIAS.  SLEEP STUDY FINDINGS: Patient underwent a 1 night Home Sleep Test and by behavioral criteria, slept for approximately  5.82 hours, with a sleep latency of 13 minutes and a sleep efficiency of 88%. Mild sleep disordered breathing (AHI=12) is  noted based on a 4% hypopnea desaturation criteria. The patient slept supine 25.5% of the night based on valid recording  time of 5.82 hours. When considering more subtle measures of sleep disordered breathing, the overall respiratory  disturbance index is moderate(RDI=23) based on a 1% hypopnea desaturation criteria with confirmation by surrogate  arousal indicators. The apneas/hypopneas are accompanied by moderate oxygen desaturation (percent time below 90%  SpO2: 14.4%, Min SpO2: 78%). The average desaturation across all sleep disordered breathing events is 3.6%. Snoring  occurs for 15.2% (30 dB) of the study. The mean pulse rate is 65.9 BPM, with very frequent pulse rate variability (76 events  with >= 6 BPM increase/decrease per hour).  TREATMENT CONSIDERATIONS: Consider trial of Auto-titrating CPAP 6-20 cm, mask of patient's choice, and heated  humidification. If patient has difficulty with CPAP adherence or ongoing JOSIAS symptoms or despite CPAP adherence, then  consider an in-lab titration sleep study in order to determine optimal fixed  CPAP setting. Alternatively consider oral  appliance fitted by a dentist specializing in these devices, or surgical consultation for uvulopalatopharyngoplasty (UPPP)  for treatment of obstructive sleep apnea.  DISEASE MANAGEMENT CONSIDERATIONS: Definitive treatment for JOSIAS is recommended. Consider Sleep Clinic referral for  JOSIAS management.  Signature:

## 2022-11-30 ENCOUNTER — PATIENT MESSAGE (OUTPATIENT)
Dept: SLEEP MEDICINE | Facility: CLINIC | Age: 47
End: 2022-11-30
Payer: MEDICAID

## 2022-11-30 DIAGNOSIS — G47.33 OSA (OBSTRUCTIVE SLEEP APNEA): Primary | ICD-10-CM

## 2022-12-22 ENCOUNTER — ANESTHESIA EVENT (OUTPATIENT)
Dept: ENDOSCOPY | Facility: HOSPITAL | Age: 47
End: 2022-12-22
Payer: MEDICAID

## 2022-12-22 ENCOUNTER — HOSPITAL ENCOUNTER (OUTPATIENT)
Facility: HOSPITAL | Age: 47
Discharge: HOME | End: 2022-12-22
Attending: COLON & RECTAL SURGERY | Admitting: COLON & RECTAL SURGERY
Payer: MEDICAID

## 2022-12-22 ENCOUNTER — ANESTHESIA (OUTPATIENT)
Dept: ENDOSCOPY | Facility: HOSPITAL | Age: 47
End: 2022-12-22
Payer: MEDICAID

## 2022-12-22 VITALS
RESPIRATION RATE: 16 BRPM | DIASTOLIC BLOOD PRESSURE: 80 MMHG | WEIGHT: 235 LBS | HEART RATE: 60 BPM | TEMPERATURE: 98 F | BODY MASS INDEX: 43.24 KG/M2 | HEIGHT: 62 IN | OXYGEN SATURATION: 99 % | SYSTOLIC BLOOD PRESSURE: 131 MMHG

## 2022-12-22 DIAGNOSIS — Z12.11 ENCOUNTER FOR SCREENING COLONOSCOPY: ICD-10-CM

## 2022-12-22 PROCEDURE — 45385 COLONOSCOPY W/LESION REMOVAL: CPT | Performed by: COLON & RECTAL SURGERY

## 2022-12-22 PROCEDURE — 88305 TISSUE EXAM BY PATHOLOGIST: CPT | Performed by: PATHOLOGY

## 2022-12-22 PROCEDURE — 25000003 PHARM REV CODE 250: Performed by: NURSE ANESTHETIST, CERTIFIED REGISTERED

## 2022-12-22 PROCEDURE — 00811 ANES LWR INTST NDSC NOS: CPT | Performed by: COLON & RECTAL SURGERY

## 2022-12-22 PROCEDURE — 88305 TISSUE EXAM BY PATHOLOGIST: CPT | Mod: 26,,, | Performed by: PATHOLOGY

## 2022-12-22 PROCEDURE — E9220 PRA ENDO ANESTHESIA: HCPCS | Mod: ,,, | Performed by: NURSE ANESTHETIST, CERTIFIED REGISTERED

## 2022-12-22 PROCEDURE — 37000009 HC ANESTHESIA EA ADD 15 MINS: Performed by: COLON & RECTAL SURGERY

## 2022-12-22 PROCEDURE — 63600175 PHARM REV CODE 636 W HCPCS: Performed by: NURSE ANESTHETIST, CERTIFIED REGISTERED

## 2022-12-22 PROCEDURE — 27201089 HC SNARE, DISP (ANY): Performed by: COLON & RECTAL SURGERY

## 2022-12-22 PROCEDURE — 45385 COLONOSCOPY W/LESION REMOVAL: CPT | Mod: ,,, | Performed by: COLON & RECTAL SURGERY

## 2022-12-22 PROCEDURE — 88305 TISSUE EXAM BY PATHOLOGIST: ICD-10-PCS | Mod: 26,,, | Performed by: PATHOLOGY

## 2022-12-22 PROCEDURE — 45385 PR COLONOSCOPY,REMV LESN,SNARE: ICD-10-PCS | Mod: ,,, | Performed by: COLON & RECTAL SURGERY

## 2022-12-22 PROCEDURE — E9220 PRA ENDO ANESTHESIA: ICD-10-PCS | Mod: ,,, | Performed by: NURSE ANESTHETIST, CERTIFIED REGISTERED

## 2022-12-22 PROCEDURE — 25000003 PHARM REV CODE 250: Performed by: COLON & RECTAL SURGERY

## 2022-12-22 PROCEDURE — 37000008 HC ANESTHESIA 1ST 15 MINUTES: Performed by: COLON & RECTAL SURGERY

## 2022-12-22 RX ORDER — LIDOCAINE HYDROCHLORIDE 20 MG/ML
INJECTION INTRAVENOUS
Status: DISCONTINUED | OUTPATIENT
Start: 2022-12-22 | End: 2022-12-22

## 2022-12-22 RX ORDER — PROPOFOL 10 MG/ML
VIAL (ML) INTRAVENOUS
Status: DISCONTINUED | OUTPATIENT
Start: 2022-12-22 | End: 2022-12-22

## 2022-12-22 RX ORDER — SODIUM CHLORIDE 9 MG/ML
INJECTION, SOLUTION INTRAVENOUS CONTINUOUS
Status: DISCONTINUED | OUTPATIENT
Start: 2022-12-22 | End: 2022-12-22 | Stop reason: HOSPADM

## 2022-12-22 RX ORDER — PROPOFOL 10 MG/ML
VIAL (ML) INTRAVENOUS CONTINUOUS PRN
Status: DISCONTINUED | OUTPATIENT
Start: 2022-12-22 | End: 2022-12-22

## 2022-12-22 RX ADMIN — PROPOFOL 70 MG: 10 INJECTION, EMULSION INTRAVENOUS at 10:12

## 2022-12-22 RX ADMIN — SODIUM CHLORIDE: 9 INJECTION, SOLUTION INTRAVENOUS at 09:12

## 2022-12-22 RX ADMIN — PROPOFOL 150 MCG/KG/MIN: 10 INJECTION, EMULSION INTRAVENOUS at 10:12

## 2022-12-22 RX ADMIN — LIDOCAINE HYDROCHLORIDE 50 MG: 20 INJECTION INTRAVENOUS at 10:12

## 2022-12-22 RX ADMIN — PROPOFOL 30 MG: 10 INJECTION, EMULSION INTRAVENOUS at 10:12

## 2022-12-22 NOTE — H&P
COLONOSCOPY HISTORY AND PE    Procedure : Colonoscopy    INDICATIONS: asymptomatic screening exam, constipation, and family history of colon cancer (maternal uncle, age 47)    No prior colonoscopy.    Past Medical History:   Diagnosis Date    Deep vein thrombosis 1990    pregnancy related     Kidney calculi     Kidney stones     Kidney stones     Migraines     perimenstrual      Past Surgical History:   Procedure Laterality Date    BLADDER SUSPENSION      CYSTOSCOPY W/ LASER LITHOTRIPSY      HYSTERECTOMY      PELVIC LAPAROSCOPY      ablation of endometriosis    TONSILLECTOMY      TUBAL LIGATION  2011    essure      Review of patient's allergies indicates:   Allergen Reactions    Topiramate Itching     No current facility-administered medications on file prior to encounter.     Current Outpatient Medications on File Prior to Encounter   Medication Sig Dispense Refill    atorvastatin (LIPITOR) 10 MG tablet TAKE 1 TABLET(10 MG) BY MOUTH EVERY DAY 90 tablet 3    butalbital-acetaminophen-caffeine -40 mg (FIORICET, ESGIC) -40 mg per tablet Take 1 tablet by mouth every 4 (four) hours as needed for Pain. (Patient not taking: Reported on 11/10/2022) 30 tablet 0    cetirizine (ZYRTEC) 5 MG tablet Take 1 tablet (5 mg total) by mouth once daily. 30 tablet 0    meclizine (ANTIVERT) 25 mg tablet Take 1 tablet (25 mg total) by mouth 3 (three) times daily as needed for Dizziness. 30 tablet 0    meloxicam (MOBIC) 15 MG tablet TAKE 1 TABLET(15 MG) BY MOUTH EVERY DAY 90 tablet 1    multivitamin capsule Take 1 capsule by mouth once daily.      peg-electrolyte soln (NULYTELY WITH FLAVOR PACKS) 420 gram SolR Take 4,000 mLs by mouth.      sulfamethoxazole-trimethoprim 800-160mg (BACTRIM DS) 800-160 mg Tab Take 1 tablet by mouth 2 (two) times daily. 14 tablet 0    sumatriptan (IMITREX) 50 MG tablet Take 1 tab by mouth at first symptom of headache, then may repeat x 1 an hour later (Patient not taking: Reported on 11/10/2022) 10  tablet 0     Family History   Problem Relation Age of Onset    Hyperlipidemia Father     Hypothyroidism Father     Cancer Father         lung cancer     Allergic rhinitis Father     Asthma Son     Hyperlipidemia Mother     Hypertension Mother     Diabetes Mother     Hypothyroidism Mother     Breast cancer Mother         bilateral mastectomy ,2019    Colon cancer Maternal Uncle         40s     Ovarian cancer Neg Hx      Social History     Socioeconomic History    Marital status: Significant Other     Spouse name: 3   Occupational History    Occupation: stay at home mom     Employer: NOT EMPLOYED   Tobacco Use    Smoking status: Former     Packs/day: 0.25     Years: 13.00     Pack years: 3.25     Types: Cigarettes     Quit date: 2003     Years since quittin.9    Smokeless tobacco: Never    Tobacco comments:     quit 11 years ago   Substance and Sexual Activity    Alcohol use: Yes     Comment: rarely    Drug use: No    Sexual activity: Yes     Partners: Male     Birth control/protection: See Surgical Hx     Review of Systems -    Respiratory : no cough, shortness of breath, or wheezing  Cardiovascular  no chest pain or dyspnea on exertion  Gastrointestinal no abdominal pain, change in bowel habits, or black or bloody stools  Musculoskeletal no deformities, swelling  Neurological no TIA or stroke symptoms    Physical Exam:  General: NAD  AT NC EOMI  Mallampati Score   Neck supple, trachea midline  Lungs: nl excursions, no retractions.  Breathing comfortably  Abdomen ND soft NT.  No masses  Extremities: No CCE.      ASA:  II    PLAN  COLONOSCOPY.  The details of the procedure, the possible need for biopsy or polypectomy and the potential risks including bleeding, perforation, missed polyps were discussed in detail.    Adiel Machado MD  Colon and Rectal Surgery Fellow

## 2022-12-22 NOTE — TRANSFER OF CARE
"Anesthesia Transfer of Care Note    Patient: Marianne Go    Procedure(s) Performed: Procedure(s) (LRB):  COLONOSCOPY (N/A)    Patient location: GI    Anesthesia Type: general    Transport from OR: Transported from OR on room air with adequate spontaneous ventilation    Post pain: adequate analgesia    Post assessment: no apparent anesthetic complications and tolerated procedure well    Post vital signs: stable    Level of consciousness: awake, alert and oriented    Nausea/Vomiting: no nausea/vomiting    Complications: none    Transfer of care protocol was followed      Last vitals:   Visit Vitals  /63   Pulse 100   Temp 36.1 °C (97 °F) (Temporal)   Resp 16   Ht 5' 2" (1.575 m)   Wt 106.6 kg (235 lb)   LMP 10/13/2017 (Approximate)   SpO2 96%   Breastfeeding No   BMI 42.98 kg/m²     "

## 2022-12-22 NOTE — PROVATION PATIENT INSTRUCTIONS
Discharge Summary/Instructions after an Endoscopic Procedure  Patient Name: Marianne Go  Patient MRN: [id number]  Patient YOB: 1975  Thursday, December 22, 2022  Charity Rudolph MD  Dear patient,  As a result of recent federal legislation (The Federal Cures Act), you may   receive lab or pathology results from your procedure in your [facility]   account before your physician is able to contact you. Your physician or   their representative will relay the results to you with their   recommendations at their soonest availability.  Thank you,  RESTRICTIONS:  During your procedure today, you received medications for sedation.  These   medications may affect your judgment, balance and coordination.  Therefore,   for 24 hours, you have the following restrictions:   - DO NOT drive a car, operate machinery, make legal/financial decisions,   sign important papers or drink alcohol.    ACTIVITY:  Today: no heavy lifting, straining or running due to procedural   sedation/anesthesia.  The following day: return to full activity including work.  DIET:  Eat and drink normally unless instructed otherwise.     TREATMENT FOR COMMON SIDE EFFECTS:  - Mild abdominal pain, nausea, belching, bloating or excessive gas:  rest,   eat lightly and use a heating pad.  - Sore Throat: treat with throat lozenges and/or gargle with warm salt   water.  - Because air was used during the procedure, expelling large amounts of air   from your rectum or belching is normal.  - If a bowel prep was taken, you may not have a bowel movement for 1-3 days.    This is normal.  SYMPTOMS TO WATCH FOR AND REPORT TO YOUR PHYSICIAN:  1. Abdominal pain or bloating, other than gas cramps.  2. Chest pain.  3. Back pain.  4. Signs of infection such as: chills or fever occurring within 24 hours   after the procedure.  5. Rectal bleeding, which would show as bright red, maroon, or black stools.   (A tablespoon of blood from the rectum is not serious, especially  if   hemorrhoids are present.)  6. Vomiting.  7. Weakness or dizziness.  GO DIRECTLY TO THE NEAREST EMERGENCY ROOM IF YOU HAVE ANY OF THE FOLLOWING:      Difficulty breathing              Chills and/or fever over 101 F   Persistent vomiting and/or vomiting blood   Severe abdominal pain   Severe chest pain   Black, tarry stools   Bleeding- more than one tablespoon   Any other symptom or condition that you feel may need urgent attention  Your doctor recommends these additional instructions:  If any biopsies were taken, your doctors clinic will contact you in 1 to 2   weeks with any results.  - Discharge patient to home.   - Resume previous diet.   - Continue present medications.   - Await pathology results.   - Repeat colonoscopy date to be determined after pending pathology results   are reviewed for surveillance.   - Return to primary care physician.   - Written discharge instructions were provided to the patient.   - The signs and symptoms of potential delayed complications were discussed   with the patient.   - Patient has a contact number available for emergencies.   - Return to normal activities tomorrow.  For questions, problems or results please call your physician - Cahrity Rudolph MD at Work:  [phone].  [facility], EMERGENCY ROOM PHONE NUMBER: [phone]  IF A COMPLICATION OR EMERGENCY SITUATION ARISES AND YOU ARE UNABLE TO REACH   YOUR PHYSICIAN - GO DIRECTLY TO THE EMERGENCY ROOM.  Charity Rudolph MD  12/22/2022 11:00:34 AM  This report has been verified and signed electronically.  Dear patient,  As a result of recent federal legislation (The Federal Cures Act), you may   receive lab or pathology results from your procedure in your [facility]   account before your physician is able to contact you. Your physician or   their representative will relay the results to you with their   recommendations at their soonest availability.  Thank you,  PROVATION

## 2022-12-22 NOTE — ANESTHESIA POSTPROCEDURE EVALUATION
Anesthesia Post Evaluation    Patient: Marianne Go    Procedure(s) Performed: Procedure(s) (LRB):  COLONOSCOPY (N/A)    Final Anesthesia Type: general      Patient location during evaluation: GI PACU  Patient participation: Yes- Able to Participate  Level of consciousness: awake and alert and oriented  Post-procedure vital signs: reviewed and stable  Pain management: adequate  Airway patency: patent    PONV status at discharge: No PONV  Anesthetic complications: no      Cardiovascular status: stable  Respiratory status: unassisted, spontaneous ventilation and room air  Hydration status: euvolemic  Follow-up not needed.          Vitals Value Taken Time   /64 12/22/22 1107   Temp 36.7 °C (98 °F) 12/22/22 1107   Pulse 93 12/22/22 1107   Resp 14 12/22/22 1107   SpO2 96 % 12/22/22 1107         No case tracking events are documented in the log.      Pain/Sami Score: Sami Score: 10 (12/22/2022 11:07 AM)         independent

## 2022-12-22 NOTE — ANESTHESIA PREPROCEDURE EVALUATION
12/22/2022  Marianne Go is a 47 y.o., female.    Active Problem List with Overview Notes    Diagnosis Date Noted    Sleep apnea 11/17/2022    Migraine with aura and without status migrainosus, not intractable 08/31/2022    BMI 45.0-49.9, adult 08/31/2022    Hyperlipidemia 08/31/2022    Syncope 06/04/2015    Anxiety and depression 05/25/2015    History of DVT (deep vein thrombosis) 05/06/2015    Epistaxis 09/03/2014    Headache 09/03/2014    Reflux 07/09/2014     Past Surgical History:   Procedure Laterality Date    BLADDER SUSPENSION      CYSTOSCOPY W/ LASER LITHOTRIPSY      HYSTERECTOMY      PELVIC LAPAROSCOPY      ablation of endometriosis    TONSILLECTOMY      TUBAL LIGATION  2011    essure      Results for orders placed or performed during the hospital encounter of 05/20/15   EKG 12-lead (Reason: Chest Pain)    Collection Time: 05/20/15  9:38 PM    Narrative    Test Reason : 786.50  Blood Pressure : 121/070 mmHG  Vent. Rate : 081 BPM     Atrial Rate : 081 BPM     P-R Int : 144 ms          QRS Dur : 078 ms      QT Int : 344 ms       P-R-T Axes : 041 071 030 degrees     QTc Int : 399 ms    Normal sinus rhythm  Normal ECG  Compared to previous tracing Sinus bradycardia has resolved  Confirmed by fellow Elizabeth AMANDA, BS (Unofficial Report)Abdullahi ([id number]) on  5/21/2015 9:16:22 AM  Confirmed by Guera Bhat MD ([id number]) on 5/21/2015 10:24:30 AM    Referred By: SELF REFERRAL           Confirmed By:Guera Bhat MD       Pre-op Assessment    I have reviewed the Patient Summary Reports.    I have reviewed the NPO Status.   I have reviewed the Medications.     Review of Systems  Anesthesia Hx:  No problems with previous Anesthesia    Social:  Non-Smoker, Social Alcohol Use    Hematology/Oncology:  Hematology Normal   Oncology Normal     EENT/Dental:EENT/Dental Normal   Cardiovascular:    Denies  Angina. hyperlipidemia ECG has been reviewed.    Pulmonary:   Sleep Apnea    Renal/:   Chronic Renal Disease    Hepatic/GI:  Hepatic/GI Normal    Musculoskeletal:  Musculoskeletal Normal    Neurological:   Headaches    Endocrine:  Endocrine Normal  Obesity / BMI > 30  Dermatological:  Skin Normal    Psych:   Psychiatric History anxiety depression          Physical Exam  General: Well nourished, Cooperative, Alert and Oriented    Airway:  Mallampati: II   Mouth Opening: Normal  TM Distance: Normal  Tongue: Normal  Neck ROM: Normal ROM    Dental:  Intact    Chest/Lungs:  Clear to auscultation, Normal Respiratory Rate    Heart:  Rate: Normal  Rhythm: Regular Rhythm        Anesthesia Plan  Type of Anesthesia, risks & benefits discussed:    Anesthesia Type: Gen Natural Airway  Intra-op Monitoring Plan: Standard ASA Monitors  Post Op Pain Control Plan: multimodal analgesia  Induction:  IV  Informed Consent: Informed consent signed with the Patient and all parties understand the risks and agree with anesthesia plan.  All questions answered.   ASA Score: 2  Day of Surgery Review of History & Physical: H&P Update referred to the surgeon/provider.    Ready For Surgery From Anesthesia Perspective.     .

## 2023-01-05 LAB
FINAL PATHOLOGIC DIAGNOSIS: NORMAL
GROSS: NORMAL
Lab: NORMAL

## 2023-01-24 RX ORDER — PAROXETINE HYDROCHLORIDE 20 MG/1
20 TABLET, FILM COATED ORAL EVERY MORNING
Qty: 30 TABLET | Refills: 9 | OUTPATIENT
Start: 2023-01-24 | End: 2024-01-24

## 2023-01-31 ENCOUNTER — PATIENT MESSAGE (OUTPATIENT)
Dept: INTERNAL MEDICINE | Facility: CLINIC | Age: 48
End: 2023-01-31

## 2023-01-31 ENCOUNTER — E-VISIT (OUTPATIENT)
Dept: INTERNAL MEDICINE | Facility: CLINIC | Age: 48
End: 2023-01-31
Payer: MEDICAID

## 2023-01-31 DIAGNOSIS — N30.00 ACUTE CYSTITIS WITHOUT HEMATURIA: Primary | ICD-10-CM

## 2023-01-31 PROCEDURE — 99421 OL DIG E/M SVC 5-10 MIN: CPT | Mod: ,,, | Performed by: NURSE PRACTITIONER

## 2023-01-31 PROCEDURE — 99421 PR E&M, ONLINE DIGIT, EST, < 7 DAYS, 5-10 MINS: ICD-10-PCS | Mod: ,,, | Performed by: NURSE PRACTITIONER

## 2023-01-31 RX ORDER — NITROFURANTOIN 25; 75 MG/1; MG/1
100 CAPSULE ORAL 2 TIMES DAILY
Qty: 10 CAPSULE | Refills: 0 | Status: SHIPPED | OUTPATIENT
Start: 2023-01-31 | End: 2023-02-05

## 2023-03-09 ENCOUNTER — OFFICE VISIT (OUTPATIENT)
Dept: URGENT CARE | Facility: CLINIC | Age: 48
End: 2023-03-09
Payer: MEDICAID

## 2023-03-09 VITALS
TEMPERATURE: 99 F | OXYGEN SATURATION: 96 % | WEIGHT: 235 LBS | HEIGHT: 62 IN | BODY MASS INDEX: 43.24 KG/M2 | SYSTOLIC BLOOD PRESSURE: 118 MMHG | DIASTOLIC BLOOD PRESSURE: 69 MMHG | HEART RATE: 92 BPM | RESPIRATION RATE: 18 BRPM

## 2023-03-09 DIAGNOSIS — J30.1 SEASONAL ALLERGIC RHINITIS DUE TO POLLEN: ICD-10-CM

## 2023-03-09 DIAGNOSIS — Z11.52 ENCOUNTER FOR SCREENING LABORATORY TESTING FOR COVID-19 VIRUS: Primary | ICD-10-CM

## 2023-03-09 LAB
CTP QC/QA: YES
SARS-COV-2 AG RESP QL IA.RAPID: NEGATIVE

## 2023-03-09 PROCEDURE — 99213 OFFICE O/P EST LOW 20 MIN: CPT | Mod: S$GLB,,, | Performed by: FAMILY MEDICINE

## 2023-03-09 PROCEDURE — 87811 SARS CORONAVIRUS 2 ANTIGEN POCT, MANUAL READ: ICD-10-PCS | Mod: QW,S$GLB,, | Performed by: FAMILY MEDICINE

## 2023-03-09 PROCEDURE — 99213 PR OFFICE/OUTPT VISIT, EST, LEVL III, 20-29 MIN: ICD-10-PCS | Mod: S$GLB,,, | Performed by: FAMILY MEDICINE

## 2023-03-09 PROCEDURE — 87811 SARS-COV-2 COVID19 W/OPTIC: CPT | Mod: QW,S$GLB,, | Performed by: FAMILY MEDICINE

## 2023-03-09 RX ORDER — ALBUTEROL SULFATE 90 UG/1
2 AEROSOL, METERED RESPIRATORY (INHALATION) EVERY 6 HOURS PRN
Qty: 18 G | Refills: 0 | Status: SHIPPED | OUTPATIENT
Start: 2023-03-09 | End: 2024-03-08

## 2023-03-09 RX ORDER — PROMETHAZINE HYDROCHLORIDE AND DEXTROMETHORPHAN HYDROBROMIDE 6.25; 15 MG/5ML; MG/5ML
SYRUP ORAL
Qty: 180 ML | Refills: 0 | Status: ON HOLD | OUTPATIENT
Start: 2023-03-09 | End: 2023-05-05 | Stop reason: HOSPADM

## 2023-03-09 RX ORDER — PREDNISONE 20 MG/1
20 TABLET ORAL DAILY
Qty: 7 TABLET | Refills: 0 | Status: SHIPPED | OUTPATIENT
Start: 2023-03-09 | End: 2023-03-16

## 2023-03-09 NOTE — PROGRESS NOTES
"Subjective:       Patient ID: Marianne Go is a 47 y.o. female.    Vitals:  height is 5' 2" (1.575 m) and weight is 106.6 kg (235 lb). Her oral temperature is 98.6 °F (37 °C). Her blood pressure is 118/69 and her pulse is 92. Her respiration is 18 and oxygen saturation is 96%.     Chief Complaint: Cough (Congestion and earache - Entered by patient)    C/o severe sore throat, cough and chest congestion x 3 days, relapse of sx, feels pressure in ears  Denies f/c  Vaccinated      Cough  This is a new problem. The current episode started in the past 7 days (2 days ago). The problem has been gradually worsening. The problem occurs every few minutes. The cough is Productive of sputum. Associated symptoms include chills, ear congestion, ear pain, headaches, nasal congestion, postnasal drip, a sore throat and shortness of breath. Pertinent negatives include no chest pain or fever. Nothing aggravates the symptoms. Treatments tried: tylenol, tessalon perles, claratin D. The treatment provided mild relief. Her past medical history is significant for asthma and bronchitis. There is no history of pneumonia.     Constitution: Positive for chills. Negative for fever.   HENT:  Positive for ear pain, postnasal drip and sore throat.    Cardiovascular:  Negative for chest pain.   Respiratory:  Positive for cough and shortness of breath.    Neurological:  Positive for headaches.     Objective:      Physical Exam   Constitutional: She is oriented to person, place, and time. She appears well-developed. She is cooperative.   HENT:   Head: Normocephalic and atraumatic.   Ears:   Right Ear: Hearing, tympanic membrane, external ear and ear canal normal.   Left Ear: Hearing, tympanic membrane, external ear and ear canal normal.      Comments: Tms normal, fluid    Nose: Nose normal. No mucosal edema or nasal deformity. No epistaxis. Right sinus exhibits no maxillary sinus tenderness and no frontal sinus tenderness. Left sinus exhibits no " maxillary sinus tenderness and no frontal sinus tenderness.   Mouth/Throat: Uvula is midline, oropharynx is clear and moist and mucous membranes are normal. Mucous membranes are moist. No trismus in the jaw. Normal dentition. No uvula swelling. Oropharynx is clear.   Eyes: Conjunctivae and lids are normal. Pupils are equal, round, and reactive to light. Extraocular movement intact   Neck: Trachea normal and phonation normal. Neck supple.   Cardiovascular: Normal rate, regular rhythm, normal heart sounds and normal pulses.   Pulmonary/Chest: Effort normal and breath sounds normal.   Abdominal: Normal appearance and bowel sounds are normal. Soft.   Musculoskeletal: Normal range of motion.         General: Normal range of motion.   Neurological: She is alert and oriented to person, place, and time. She exhibits normal muscle tone.   Skin: Skin is warm, dry and intact.   Psychiatric: Her speech is normal and behavior is normal. Judgment and thought content normal.   Nursing note and vitals reviewed.      Assessment:       1. Encounter for screening laboratory testing for COVID-19 virus    2. Seasonal allergic rhinitis due to pollen          Plan:         Encounter for screening laboratory testing for COVID-19 virus  -     SARS Coronavirus 2 Antigen, POCT Manual Read    Seasonal allergic rhinitis due to pollen    Other orders  -     promethazine-dextromethorphan (PROMETHAZINE-DM) 6.25-15 mg/5 mL Syrp; Take one tsp po q 6 hrs prn cough  Dispense: 180 mL; Refill: 0  -     albuterol (VENTOLIN HFA) 90 mcg/actuation inhaler; Inhale 2 puffs into the lungs every 6 (six) hours as needed for Wheezing. Rescue  Dispense: 18 g; Refill: 0  -     predniSONE (DELTASONE) 20 MG tablet; Take 1 tablet (20 mg total) by mouth once daily. for 7 days  Dispense: 7 tablet; Refill: 0             Results for orders placed or performed in visit on 03/09/23   SARS Coronavirus 2 Antigen, POCT Manual Read   Result Value Ref Range    SARS Coronavirus 2  Antigen Negative Negative     Acceptable Yes

## 2023-03-13 ENCOUNTER — OFFICE VISIT (OUTPATIENT)
Dept: FAMILY MEDICINE | Facility: CLINIC | Age: 48
End: 2023-03-13
Payer: MEDICAID

## 2023-03-13 ENCOUNTER — HOSPITAL ENCOUNTER (OUTPATIENT)
Dept: RADIOLOGY | Facility: HOSPITAL | Age: 48
Discharge: HOME | End: 2023-03-13
Attending: FAMILY MEDICINE
Payer: MEDICAID

## 2023-03-13 VITALS
DIASTOLIC BLOOD PRESSURE: 90 MMHG | HEART RATE: 83 BPM | BODY MASS INDEX: 48.03 KG/M2 | HEIGHT: 62 IN | TEMPERATURE: 99 F | OXYGEN SATURATION: 98 % | SYSTOLIC BLOOD PRESSURE: 128 MMHG | WEIGHT: 261 LBS

## 2023-03-13 DIAGNOSIS — R05.9 COUGH, UNSPECIFIED TYPE: ICD-10-CM

## 2023-03-13 DIAGNOSIS — J34.89 SINUS PRESSURE: ICD-10-CM

## 2023-03-13 DIAGNOSIS — B96.89 ACUTE BACTERIAL SINUSITIS: Primary | ICD-10-CM

## 2023-03-13 DIAGNOSIS — R04.0 EPISTAXIS: ICD-10-CM

## 2023-03-13 DIAGNOSIS — H92.03 OTALGIA OF BOTH EARS: ICD-10-CM

## 2023-03-13 DIAGNOSIS — J01.90 ACUTE BACTERIAL SINUSITIS: Primary | ICD-10-CM

## 2023-03-13 LAB
CTP QC/QA: YES
S PYO RRNA THROAT QL PROBE: NEGATIVE

## 2023-03-13 PROCEDURE — 71046 X-RAY EXAM CHEST 2 VIEWS: CPT | Mod: TC,FY

## 2023-03-13 PROCEDURE — 71046 XR CHEST PA AND LATERAL: ICD-10-PCS | Mod: 26,,, | Performed by: INTERNAL MEDICINE

## 2023-03-13 PROCEDURE — 3008F BODY MASS INDEX DOCD: CPT | Mod: CPTII,,, | Performed by: FAMILY MEDICINE

## 2023-03-13 PROCEDURE — 99999 PR PBB SHADOW E&M-EST. PATIENT-LVL IV: CPT | Mod: PBBFAC,,, | Performed by: FAMILY MEDICINE

## 2023-03-13 PROCEDURE — 1159F PR MEDICATION LIST DOCUMENTED IN MEDICAL RECORD: ICD-10-PCS | Mod: CPTII,,, | Performed by: FAMILY MEDICINE

## 2023-03-13 PROCEDURE — 1159F MED LIST DOCD IN RCRD: CPT | Mod: CPTII,,, | Performed by: FAMILY MEDICINE

## 2023-03-13 PROCEDURE — 99214 OFFICE O/P EST MOD 30 MIN: CPT | Mod: PBBFAC,25,PO | Performed by: FAMILY MEDICINE

## 2023-03-13 PROCEDURE — 71046 X-RAY EXAM CHEST 2 VIEWS: CPT | Mod: 26,,, | Performed by: INTERNAL MEDICINE

## 2023-03-13 PROCEDURE — 99999 PR PBB SHADOW E&M-EST. PATIENT-LVL IV: ICD-10-PCS | Mod: PBBFAC,,, | Performed by: FAMILY MEDICINE

## 2023-03-13 PROCEDURE — 3074F PR MOST RECENT SYSTOLIC BLOOD PRESSURE < 130 MM HG: ICD-10-PCS | Mod: CPTII,,, | Performed by: FAMILY MEDICINE

## 2023-03-13 PROCEDURE — 99214 OFFICE O/P EST MOD 30 MIN: CPT | Mod: S$PBB,,, | Performed by: FAMILY MEDICINE

## 2023-03-13 PROCEDURE — 3080F DIAST BP >= 90 MM HG: CPT | Mod: CPTII,,, | Performed by: FAMILY MEDICINE

## 2023-03-13 PROCEDURE — 87880 STREP A ASSAY W/OPTIC: CPT | Mod: PBBFAC,PO | Performed by: FAMILY MEDICINE

## 2023-03-13 PROCEDURE — 3074F SYST BP LT 130 MM HG: CPT | Mod: CPTII,,, | Performed by: FAMILY MEDICINE

## 2023-03-13 PROCEDURE — 3008F PR BODY MASS INDEX (BMI) DOCUMENTED: ICD-10-PCS | Mod: CPTII,,, | Performed by: FAMILY MEDICINE

## 2023-03-13 PROCEDURE — 99214 PR OFFICE/OUTPT VISIT, EST, LEVL IV, 30-39 MIN: ICD-10-PCS | Mod: S$PBB,,, | Performed by: FAMILY MEDICINE

## 2023-03-13 PROCEDURE — 3080F PR MOST RECENT DIASTOLIC BLOOD PRESSURE >= 90 MM HG: ICD-10-PCS | Mod: CPTII,,, | Performed by: FAMILY MEDICINE

## 2023-03-13 PROCEDURE — 87081 CULTURE SCREEN ONLY: CPT | Performed by: FAMILY MEDICINE

## 2023-03-13 RX ORDER — DOXYCYCLINE 100 MG/1
100 CAPSULE ORAL EVERY 12 HOURS
Qty: 10 CAPSULE | Refills: 0 | Status: SHIPPED | OUTPATIENT
Start: 2023-03-13 | End: 2023-03-18

## 2023-03-13 NOTE — PROGRESS NOTES
(Portions of this note were dictated using voice recognition software and may contain dictation related errors in spelling/grammar/syntax not found on text review)    CC:   Chief Complaint   Patient presents with    Cough     Since 1 month     Ear Fullness       HPI: 47 y.o. female, pt of Dr Mcneil, presented with cough and ear fullness for urgent care, new to me.  She has medical history significant for pregnancy related DVT, kidney stones, migraine headaches.  Patient reports symptoms started 1 and half month ago, she had allergy symptoms with cough, sinus pressure congestion with runny nose, she tried to treat the symptom with symptomatic treatment and over-the-counter drugs for a week, symptoms did not improve, she presented to [facility] clinic on 02/23, was tested negative for COVID and flu, was given Augmentin for 7 days for ear infection.  She completed Augmentin and felt fine for 2 days, she again started having the same symptoms, presented to urgent care clinic on 03/09, was tested again negative for COVID, was given prednisone 20 mg which she is still taking along with promethazine DM.  Patient reports symptoms are not improving, she reports having congested and hacking cough which is productive with green colored sputum, reports worsening sinus pressure and congestion with left ear pressure and pain, she has sore throat and nasal secretions, denies fever, chills, body aches. She took home Covid test few days ago which was negative. She also reports having nose bleeds which started after using Flonase nasal spray mom, she stop using Flonase but since then every time she starts having epistaxis when she blows her nose. No other symptoms or concerns.     Past Medical History:   Diagnosis Date    Deep vein thrombosis 1990    pregnancy related     Kidney calculi     Kidney stones     Kidney stones     Migraines     perimenstrual        Past Surgical History:   Procedure Laterality Date    BLADDER SUSPENSION       COLONOSCOPY N/A 2022    Procedure: COLONOSCOPY;  Surgeon: Charity Rudolph MD;  Location: [facility] ([address] [place] [address]);  Service: Endoscopy;  Laterality: N/A;  instr portal-GT    CYSTOSCOPY W/ LASER LITHOTRIPSY      HYSTERECTOMY      PELVIC LAPAROSCOPY      ablation of endometriosis    TONSILLECTOMY      TUBAL LIGATION      essure        Family History   Problem Relation Age of Onset    Hyperlipidemia Father     Hypothyroidism Father     Cancer Father         lung cancer     Allergic rhinitis Father     Asthma Son     Hyperlipidemia Mother     Hypertension Mother     Diabetes Mother     Hypothyroidism Mother     Breast cancer Mother         bilateral mastectomy ,    Colon cancer Maternal Uncle         40s     Ovarian cancer Neg Hx        Social History     Tobacco Use    Smoking status: Former     Packs/day: 0.25     Years: 13.00     Pack years: 3.25     Types: Cigarettes     Quit date: 2003     Years since quittin.2    Smokeless tobacco: Never    Tobacco comments:     quit 11 years ago   Substance Use Topics    Alcohol use: Yes     Comment: rarely    Drug use: No       Lab Results   Component Value Date    WBC 8.18 2022    HGB 15.0 2022    HCT 46.2 2022     (H) 2022     2022    CHOL 203 (H) 2022    TRIG 186 (H) 2022    HDL 43 2022    ALT 18 2022    AST 14 2022    BILITOT 0.6 2022    ALKPHOS 78 2022     2022    K 4.6 2022     2022    CREATININE 0.7 2022    ESTGFRAFRICA >60.0 2021    EGFRNONAA >60.0 2021    CALCIUM 9.1 2022    ALBUMIN 3.9 2022    BUN 15 2022    CO2 27 2022    TSH 3.506 2022    INR 1.0 2011    HGBA1C 4.9 2022    LDLCALC 122.8 2022    GLU 93 2022    [id number] 39 2015             Vital signs reviewed  PE:   APPEARANCE: In no acute distress.    HEAD: Normocephalic, atraumatic.  EYES:  EOMI.  Conjunctivae noninjected.  NOSE: Mucosa pink. Airway clear.  MOUTH & THROAT: No tonsillar enlargement. Pharyngeal erythema noted, no exudate. TTP on maxillary sinuses  NECK: Supple with no cervical lymphadenopathy.    CHEST: Good inspiratory effort. Lungs clear to auscultation with no wheezes or crackles.  CARDIOVASCULAR: Normal S1, S2. No rubs, murmurs, or gallops.  ABDOMEN: Bowel sounds normal. Not distended. Soft. No tenderness or masses. No organomegaly.  EXTREMITIES: No edema, cyanosis, or clubbing.    Review of Systems   Constitutional:  Negative for chills, fatigue and fever.   HENT:  Positive for nasal congestion, ear pain, postnasal drip, rhinorrhea, sinus pressure/congestion, sneezing and sore throat. Negative for ear discharge.    Respiratory:  Positive for cough. Negative for shortness of breath and wheezing.    Cardiovascular:  Negative for chest pain, palpitations and leg swelling.   Gastrointestinal:  Negative for abdominal pain, change in bowel habit, constipation, diarrhea, nausea, vomiting, reflux and change in bowel habit.   Genitourinary: Negative.    Musculoskeletal: Negative.    Neurological: Negative.    Psychiatric/Behavioral: Negative.     All other systems reviewed and are negative.    IMPRESSION  1. Acute bacterial sinusitis    2. Cough, unspecified type    3. Sinus pressure    4. Otalgia of both ears    5. Epistaxis            PLAN      1. Cough, unspecified type    - POCT Rapid Strep A  - X-Ray Chest PA And Lateral; Future      2. Sinus pressure    - POCT Rapid Strep A  - Strep A culture, throat      3. Otalgia of both ears    - POCT Rapid Strep A  - Strep A culture, throat      4. Acute bacterial sinusitis    Strept negative, unable to run flu test due to technical problem, home Covid negative      - doxycycline (VIBRAMYCIN) 100 MG Cap; Take 1 capsule (100 mg total) by mouth every 12 (twelve) hours. for 5 days  Dispense: 10 capsule; Refill: 0      Advised to do steam  inhalation/short shower bath   Mouthwash gargles 2-3 times daily, salt water gargles nightly  Tylenol/ibuprofen as needed   Advised to maintain enough hydration    Return ED/urgent care precautions discussed      5. Epistaxis    - Ambulatory referral/consult to ENT; Future         Age/demographic appropriate health maintenance:    Health Maintenance Due   Topic Date Due    COVID-19 Vaccine (5 - Booster) 05/07/2021    Mammogram  03/11/2023             Josue Steward   3/13/2023

## 2023-03-15 DIAGNOSIS — Z12.31 OTHER SCREENING MAMMOGRAM: ICD-10-CM

## 2023-03-17 ENCOUNTER — PATIENT MESSAGE (OUTPATIENT)
Dept: RESEARCH | Facility: HOSPITAL | Age: 48
End: 2023-03-17
Payer: MEDICAID

## 2023-03-17 LAB — BACTERIA THROAT CULT: NORMAL

## 2023-04-04 ENCOUNTER — PATIENT MESSAGE (OUTPATIENT)
Dept: FAMILY MEDICINE | Facility: CLINIC | Age: 48
End: 2023-04-04

## 2023-04-04 ENCOUNTER — OFFICE VISIT (OUTPATIENT)
Dept: FAMILY MEDICINE | Facility: CLINIC | Age: 48
End: 2023-04-04
Payer: MEDICAID

## 2023-04-04 ENCOUNTER — HOSPITAL ENCOUNTER (OUTPATIENT)
Dept: RADIOLOGY | Facility: HOSPITAL | Age: 48
Discharge: HOME | End: 2023-04-04
Attending: FAMILY MEDICINE
Payer: MEDICAID

## 2023-04-04 VITALS
BODY MASS INDEX: 48.28 KG/M2 | DIASTOLIC BLOOD PRESSURE: 92 MMHG | HEIGHT: 62 IN | TEMPERATURE: 98 F | HEART RATE: 79 BPM | SYSTOLIC BLOOD PRESSURE: 138 MMHG | WEIGHT: 262.38 LBS | OXYGEN SATURATION: 96 %

## 2023-04-04 DIAGNOSIS — F32.A ANXIETY AND DEPRESSION: ICD-10-CM

## 2023-04-04 DIAGNOSIS — F41.9 ANXIETY AND DEPRESSION: ICD-10-CM

## 2023-04-04 DIAGNOSIS — J40 BRONCHITIS: Primary | ICD-10-CM

## 2023-04-04 DIAGNOSIS — R03.0 ELEVATED BP WITHOUT DIAGNOSIS OF HYPERTENSION: ICD-10-CM

## 2023-04-04 DIAGNOSIS — M75.41 SHOULDER IMPINGEMENT SYNDROME, RIGHT: ICD-10-CM

## 2023-04-04 PROCEDURE — 73030 X-RAY EXAM OF SHOULDER: CPT | Mod: TC,FY,RT

## 2023-04-04 PROCEDURE — 3080F DIAST BP >= 90 MM HG: CPT | Mod: CPTII,,, | Performed by: FAMILY MEDICINE

## 2023-04-04 PROCEDURE — 99999 PR PBB SHADOW E&M-EST. PATIENT-LVL V: CPT | Mod: PBBFAC,,, | Performed by: FAMILY MEDICINE

## 2023-04-04 PROCEDURE — 3008F BODY MASS INDEX DOCD: CPT | Mod: CPTII,,, | Performed by: FAMILY MEDICINE

## 2023-04-04 PROCEDURE — 3080F PR MOST RECENT DIASTOLIC BLOOD PRESSURE >= 90 MM HG: ICD-10-PCS | Mod: CPTII,,, | Performed by: FAMILY MEDICINE

## 2023-04-04 PROCEDURE — 73030 XR SHOULDER COMPLETE 2 OR MORE VIEWS RIGHT: ICD-10-PCS | Mod: 26,RT,, | Performed by: RADIOLOGY

## 2023-04-04 PROCEDURE — 99214 OFFICE O/P EST MOD 30 MIN: CPT | Mod: S$PBB,,, | Performed by: FAMILY MEDICINE

## 2023-04-04 PROCEDURE — 99214 PR OFFICE/OUTPT VISIT, EST, LEVL IV, 30-39 MIN: ICD-10-PCS | Mod: S$PBB,,, | Performed by: FAMILY MEDICINE

## 2023-04-04 PROCEDURE — 73030 X-RAY EXAM OF SHOULDER: CPT | Mod: 26,RT,, | Performed by: RADIOLOGY

## 2023-04-04 PROCEDURE — 3075F PR MOST RECENT SYSTOLIC BLOOD PRESS GE 130-139MM HG: ICD-10-PCS | Mod: CPTII,,, | Performed by: FAMILY MEDICINE

## 2023-04-04 PROCEDURE — 1159F MED LIST DOCD IN RCRD: CPT | Mod: CPTII,,, | Performed by: FAMILY MEDICINE

## 2023-04-04 PROCEDURE — 99999 PR PBB SHADOW E&M-EST. PATIENT-LVL V: ICD-10-PCS | Mod: PBBFAC,,, | Performed by: FAMILY MEDICINE

## 2023-04-04 PROCEDURE — 3008F PR BODY MASS INDEX (BMI) DOCUMENTED: ICD-10-PCS | Mod: CPTII,,, | Performed by: FAMILY MEDICINE

## 2023-04-04 PROCEDURE — 1159F PR MEDICATION LIST DOCUMENTED IN MEDICAL RECORD: ICD-10-PCS | Mod: CPTII,,, | Performed by: FAMILY MEDICINE

## 2023-04-04 PROCEDURE — 3075F SYST BP GE 130 - 139MM HG: CPT | Mod: CPTII,,, | Performed by: FAMILY MEDICINE

## 2023-04-04 PROCEDURE — 99215 OFFICE O/P EST HI 40 MIN: CPT | Mod: PBBFAC,PO | Performed by: FAMILY MEDICINE

## 2023-04-04 RX ORDER — PREDNISONE 20 MG/1
20 TABLET ORAL 2 TIMES DAILY
Qty: 10 TABLET | Refills: 0 | Status: SHIPPED | OUTPATIENT
Start: 2023-04-04 | End: 2023-04-09

## 2023-04-04 NOTE — PROGRESS NOTES
(Portions of this note were dictated using voice recognition software and may contain dictation related errors in spelling/grammar/syntax not found on text review)    CC:   Chief Complaint   Patient presents with    Cough     1 months     Sore Throat    Arm Pain     Right arm pt stated it hurt when she cough       HPI: 47 y.o. female, pt of Dr Monsalve, presented for evaluation of cough and arm pain.  She has medical history significant for pregnancy related DVT, kidney stones, migraine headaches.  Patient was last seen in the clinic on 03/13 with a cough and cold symptoms, was tested negative for strept, CXR unremarkable, she was prescribed doxycycline for 5 days, patient reports symptoms of sinusitis and congestion resolved, however, she has lingering cough which is dry hacking and comes in bouts, she has rib pain due to constant coughing, also reports having difficulty in taking deep breath and has postnasal drip, she takes Claritin for allergies, she denies having fever, chills, body aches, ear symptoms.  Her other concern is having right shoulder pain, reports starts from the anterior shoulder and radiates towards the upper arm, describes as dull ache, pain increases in intensity with doing overhead activities, range of motion is restricted, symptoms have progressively worsened, she has been taking Mobic 15 mg daily with little relief, denies having any trauma/fall or any injury or inciting factor related to it.  Patient feels anxious due to shoulder pain.  She takes Effexor on daily basis.  She denies having any other symptoms or concerns.    Past Medical History:   Diagnosis Date    Deep vein thrombosis 1990    pregnancy related     Kidney calculi     Kidney stones     Kidney stones     Migraines     perimenstrual        Past Surgical History:   Procedure Laterality Date    BLADDER SUSPENSION      COLONOSCOPY N/A 12/22/2022    Procedure: COLONOSCOPY;  Surgeon: Charity Rudolph MD;  Location: [address]  [facility]);  Service: Endoscopy;  Laterality: N/A;  instr portal-GT    CYSTOSCOPY W/ LASER LITHOTRIPSY      HYSTERECTOMY      PELVIC LAPAROSCOPY      ablation of endometriosis    TONSILLECTOMY      TUBAL LIGATION  2011    essure        Family History   Problem Relation Age of Onset    Hyperlipidemia Father     Hypothyroidism Father     Cancer Father         lung cancer     Allergic rhinitis Father     Asthma Son     Hyperlipidemia Mother     Hypertension Mother     Diabetes Mother     Hypothyroidism Mother     Breast cancer Mother         bilateral mastectomy ,    Colon cancer Maternal Uncle         40s     Ovarian cancer Neg Hx        Social History     Tobacco Use    Smoking status: Former     Packs/day: 0.25     Years: 13.00     Pack years: 3.25     Types: Cigarettes     Quit date: 2003     Years since quittin.2    Smokeless tobacco: Never    Tobacco comments:     quit 11 years ago   Substance Use Topics    Alcohol use: Yes     Comment: rarely    Drug use: No       Lab Results   Component Value Date    WBC 8.18 2022    HGB 15.0 2022    HCT 46.2 2022     (H) 2022     2022    CHOL 203 (H) 2022    TRIG 186 (H) 2022    HDL 43 2022    ALT 18 2022    AST 14 2022    BILITOT 0.6 2022    ALKPHOS 78 2022     2022    K 4.6 2022     2022    CREATININE 0.7 2022    ESTGFRAFRICA >60.0 2021    EGFRNONAA >60.0 2021    CALCIUM 9.1 2022    ALBUMIN 3.9 2022    BUN 15 2022    CO2 27 2022    TSH 3.506 2022    INR 1.0 2011    HGBA1C 4.9 2022    LDLCALC 122.8 2022    GLU 93 2022    [id number] 39 2015             Vital signs reviewed  PE:   APPEARANCE: In no acute distress.    HEAD: Normocephalic, atraumatic.  EYES: EOMI.  Conjunctivae noninjected.  EAR: Fluid behind left ear  NOSE: Mucosa pink. Airway clear.  MOUTH & THROAT: No  tonsillar enlargement. No pharyngeal erythema or exudate.   NECK: Supple with no cervical lymphadenopathy.    CHEST: Good inspiratory effort. Lungs clear to auscultation with no wheezes or crackles.  CARDIOVASCULAR: Normal S1, S2. No rubs, murmurs, or gallops.  ABDOMEN: Bowel sounds normal. Not distended. Soft. No tenderness or masses. No organomegaly.  EXTREMITIES: No edema, cyanosis, or clubbing.  SHOULDER, right: positive Neer's and Puri test, ROM restricted    Review of Systems   Constitutional:  Negative for chills, fatigue and fever.   HENT:  Positive for postnasal drip. Negative for ear pain, rhinorrhea and sore throat.    Respiratory:  Positive for cough and shortness of breath. Negative for wheezing.    Cardiovascular:  Negative for chest pain, palpitations and leg swelling.   Gastrointestinal: Negative.    Genitourinary: Negative.    Musculoskeletal:  Positive for myalgias.   Integumentary:  Negative for rash.   Allergic/Immunologic: Negative for environmental allergies.   Neurological: Negative.  Negative for headaches.   Psychiatric/Behavioral: Negative.     All other systems reviewed and are negative.    IMPRESSION  1. Bronchitis    2. Shoulder impingement syndrome, right    3. Anxiety and depression    4. Elevated BP without diagnosis of hypertension            PLAN      1. Shoulder impingement syndrome, right    - X-ray Shoulder 2 or More Views Right; Future    Mobic 15 mg daily  Stretching exercises advised    Ambulatory referral to PT/OT sent        2. Bronchitis    - predniSONE (DELTASONE) 20 MG tablet; Take 1 tablet (20 mg total) by mouth 2 (two) times daily. for 5 days  Dispense: 10 tablet; Refill: 0    Albuterol as needed        3. Anxiety and depression    Depression screen high, feels depressed due to shoulder pain,seems like situational     Continue Effexor  F/U with PCP      4. Elevated BP without diagnosis of hypertension     Bp elevated, repeated still elevated    Low salt diet, regular  exercise    Advised to monitor bp at home, bp log given    F/U with PCP      Age/demographic appropriate health maintenance:    Health Maintenance Due   Topic Date Due    COVID-19 Vaccine (5 - Booster) 05/07/2021    Mammogram  03/11/2023             Josue Steward         4/4/2023Answers submitted by the patient for this visit:  Cough Questionnaire (Submitted on 4/4/2023)  Chief Complaint: Cough  Chronicity: chronic  Onset: more than 1 month ago  Progression since onset: waxing and waning  Frequency: hourly  Cough characteristics: non-productive  ear congestion: No  heartburn: No  hemoptysis: No  nasal congestion: No  sweats: Yes  weight loss: No  Aggravated by: exercise, lying down, pollens  asthma: Yes  bronchiectasis: No  bronchitis: Yes  COPD: No  emphysema: No  pneumonia: No  Treatments tried: OTC cough suppressant, a beta-agonist inhaler, body position changes, cool air, oral steroids, prescription cough suppressant, rest, steroid inhaler  Improvement on treatment: mild

## 2023-04-05 ENCOUNTER — CLINICAL SUPPORT (OUTPATIENT)
Dept: REHABILITATION | Facility: HOSPITAL | Age: 48
End: 2023-04-05
Attending: FAMILY MEDICINE
Payer: MEDICAID

## 2023-04-05 DIAGNOSIS — M75.41 SHOULDER IMPINGEMENT SYNDROME, RIGHT: Primary | ICD-10-CM

## 2023-04-05 PROCEDURE — 97162 PT EVAL MOD COMPLEX 30 MIN: CPT

## 2023-04-05 PROCEDURE — 97140 MANUAL THERAPY 1/> REGIONS: CPT

## 2023-04-05 NOTE — PLAN OF CARE
[facility]   Physical Therapy Initial Evaluation       Name: Marianne Go  Clinic Number: [id number]    Therapy Diagnosis:   Encounter Diagnosis   Name Primary?    Shoulder impingement syndrome, right Yes        Physician: Josue Steward MD    Physician Orders: PT Eval and Treat   Medical Diagnosis from Referral: M75.41 (ICD-10-CM) - Shoulder impingement syndrome, right   Evaluation Date: 4/5/2023  Authorization Period Expiration: 10/5/2023  Plan of Care Expiration: 7/5/2023  Progress Note Due: 5/5/2023  Visit # / Visits authorized: 1/ pending   FOTO: 1/3    Precautions: Standard     Time In: 805 ( 20 min late)  Time Out: 830  Total Appointment Time (timed & untimed codes): 25 minutes      SUBJECTIVE     Date of onset: 2 weeks ago    History of current condition - Marianne reports: she has been sick for about a month now and she was coughing so hard that the shoulder started to hurt more. She is having trouble with cooking and cleaning, driving and dressing are both also challenging. Lifting anything of significant weight causes an increase in pain in the shoulder. She has tingling that goes down the arm and into the fingers and the pain will radiate into the underside of the arm. 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 She has to sleep on her left side because lying on the right or on her back will cause pain. She has a 20 year old special needs son that he does have to assist with and has challenges with this at this time    Falls: none    Imaging: xrays: clear    Prior Therapy: yes for sciatica  Social History: 1 level home   Occupation: voting   Prior Level of Function: was able to do all ADLs and work related duties without issue  Current Level of Function: challenges with any lifting, reaching, dressing, sleeping    Pain:  Current 3/10, worst 10/10, best 1/10   Location: right shoulder    Description: Aching, Tingling, and  Sharp  Aggravating Factors: Coughing/Sneezing, Walking, Flexing, and Lifting  Easing Factors: ice and heating pad    Patients goals: be able to do full work duties, sleep on the right side, be able to help with her son without pain, dress and reach without pain increasing     Medical History:   Past Medical History:   Diagnosis Date    Deep vein thrombosis 1990    pregnancy related     Kidney calculi     Kidney stones     Kidney stones     Migraines     perimenstrual        Surgical History:   Marianne Go  has a past surgical history that includes Tonsillectomy; Pelvic laparoscopy; Cystoscopy w/ laser lithotripsy; Bladder suspension; Tubal ligation (2011); Hysterectomy; and Colonoscopy (N/A, 12/22/2022).    Medications:   Marianne has a current medication list which includes the following prescription(s): albuterol, atorvastatin, butalbital-acetaminophen-caffeine -40 mg, meloxicam, multivitamin, prednisone, promethazine-dextromethorphan, sumatriptan, and venlafaxine.    Allergies:   Review of patient's allergies indicates:   Allergen Reactions    Topiramate Itching          OBJECTIVE       Objective     Observation: overweight, right shoulder being held in internal rotation and adducted protected position    Posture: bilateral rounded shoulders with mod slumped position    Sensation: Intact    Active/Passive ROM: (degrees)   Shoulder Left Right   Flexion / 84 with inc pain lowering   Abduction / 125 with painful arc lowering   ER / To lateral cervical spine with inc pain   IR / Within functional limits range of motion but anterior shoulder shift at initiation of movement and severe pain when returning from the movement     Scapular Control/Dyskinesis:        Normal / Subtle / Obvious Comments   Left     Right severe Anterior shift and scapular winging     Strength:   Shoulder (R) (L)   Flexion 4-/5 5/5   Abduction 4-/5 5/5   ER 4-/5 4+/5   IR 4/5 5/5     Special Tests:  Special Test (R) (L)   Empty Can     positive    Obriens positive    Distraction with ROM positive    Ant Impingement positive      Joint Mobility: mod posterior and inferior glenohumeral joint mobilization restrictions present, mod scapular hypermobility present    Palpation: severe tenderness to palpation present over deltoid, biceps tendon and posterior rotator cuff muscles and tendons (infraspinatus > supraspinatus), mod UT tone present    Flexibility: mod pectoral tightness and UT tone present    Neural Tension: not tested at this time        Limitation/Restriction for FOTO  Survey    Therapist reviewed FOTO scores for Marianne Go on 4/5/2023.   FOTO documents entered into [employer] - see Media section.    Limitation Score: 50%         TREATMENT     Total Treatment time (time-based codes) separate from Evaluation: 10 minutes      Marianne received the treatments listed below:        manual therapy techniques: Joint mobilizations, Manual traction, Myofacial release, Soft tissue Mobilization, and Friction Massage were applied to the: R shoulder for 10 minutes, including:  Glenohumeral distraction with range of motion  Post / inf glenohumeral glides grade 1-2 to pt toleration  STM to deltoid, Anterior shoulder complex      PATIENT EDUCATION AND HOME EXERCISES     Education provided:   - education given on anatomy of shoulder and rotator cuff  -discussed POC and what to avoid at home    Written Home Exercises Provided: will be initiated at first after visit. Exercises were reviewed and Marianne was able to demonstrate them prior to the end of the session.  Mairanne demonstrated fair  understanding of the education provided. See EMR under Patient Instructions for exercises provided during therapy sessions.    ASSESSMENT     Marianne is a 47 y.o. female referred to outpatient Physical Therapy with a medical diagnosis of M75.41 (ICD-10-CM) - Shoulder impingement syndrome, right . Patient presents with right shoulder anterior impingement syndrome with likely  partial tearing of the posterior rotator cuff complex due to her painful arc and decreased ability to smoothly move through range of motion. She does have arthritic changes in the glenohumeral complex that was indicated in Xray and with decreased pain with glenohumeral distraction and range of motion. She responded well with improvement in shoulder range of motion after session and will need physical therapy to address issues found in objective and return patient to functional activity at work and home.     Patient prognosis is Good.   Patient will benefit from skilled outpatient Physical Therapy to address the deficits stated above and in the chart below, provide patient /family education, and to maximize patientt's level of independence.     Plan of care discussed with patient: Yes  Patient's spiritual, cultural and educational needs considered and patient is agreeable to the plan of care and goals as stated below:     Anticipated Barriers for therapy: level of assist needed at home for her son and work duties    Medical Necessity is demonstrated by the following  History  Co-morbidities and personal factors that may impact the plan of care Co-morbidities:   Past Medical History:   Diagnosis Date    Deep vein thrombosis 1990    pregnancy related     Kidney calculi     Kidney stones     Kidney stones     Migraines     perimenstrual        Personal Factors:   no deficits     moderate   Examination  Body Structures and Functions, activity limitations and participation restrictions that may impact the plan of care Body Regions:   upper extremities    Body Systems:    gross symmetry  ROM  strength    Participation Restrictions:   Pain limiting    Activity limitations:   Learning and applying knowledge  no deficits    General Tasks and Commands  no deficits    Communication  no deficits    Mobility  lifting and carrying objects  walking  driving (bike, car, motorcycle)    Self care  caring for body parts (brushing  teeth, shaving, grooming)  dressing    Domestic Life  cooking  doing house work (cleaning house, washing dishes, laundry)  assisting others    Interactions/Relationships  no deficits    Life Areas  no deficits    Community and Social Life  no deficits         moderate   Clinical Presentation evolving clinical presentation with changing clinical characteristics moderate   Decision Making/ Complexity Score: moderate     Goals:  Short Term Goals: 4 weeks   Patient will shoe 20 degree improvement in R shoulder range of motion  Patient will show 1/2 grade MMT improvement in R shoulder strength  Patient will show a reduction by 50% of anterior glenohumeral translation with range of motion  Patient will show 50% reduction in shoulder muscle tone and deltoid compensations    Long Term Goals: 8 weeks   Patient will be able to don / doff shirts and jackets without pain in the shoulder  Patient will be able to reach overhead with > 10# without pain  Patient will be able to assist her son for > 30 minutes without pain increasing  Patient will be able to perform all work related duties for > 2 hours before onset of pain  50% FOTO score improvement    PLAN   Plan of care Certification: 4/5/2023 to 7/5/2023.    Outpatient Physical Therapy 2 times weekly for 8 weeks to include the following interventions: Manual Therapy, Moist Heat/ Ice, Neuromuscular Re-ed, Patient Education, Self Care, Therapeutic Activities, Therapeutic Exercise, and dry needling.     Deborah Sandy, PT    Patient may be seen by PTA in course of therapy services    I CERTIFY THE NEED FOR THESE SERVICES FURNISHED UNDER THIS PLAN OF TREATMENT AND WHILE UNDER MY CARE   Physician's comments:     Physician's Signature: ___________________________________________________

## 2023-04-10 ENCOUNTER — HOSPITAL ENCOUNTER (OUTPATIENT)
Dept: RADIOLOGY | Facility: HOSPITAL | Age: 48
Discharge: HOME | End: 2023-04-10
Attending: HOSPITALIST
Payer: MEDICAID

## 2023-04-10 VITALS — HEIGHT: 62 IN | BODY MASS INDEX: 48.21 KG/M2 | WEIGHT: 262 LBS

## 2023-04-10 DIAGNOSIS — Z12.31 OTHER SCREENING MAMMOGRAM: ICD-10-CM

## 2023-04-10 PROCEDURE — 77063 MAMMO DIGITAL SCREENING BILAT WITH TOMO: ICD-10-PCS | Mod: 26,,, | Performed by: RADIOLOGY

## 2023-04-10 PROCEDURE — 77067 MAMMO DIGITAL SCREENING BILAT WITH TOMO: ICD-10-PCS | Mod: 26,,, | Performed by: RADIOLOGY

## 2023-04-10 PROCEDURE — 77067 SCR MAMMO BI INCL CAD: CPT | Mod: 26,,, | Performed by: RADIOLOGY

## 2023-04-10 PROCEDURE — 77063 BREAST TOMOSYNTHESIS BI: CPT | Mod: 26,,, | Performed by: RADIOLOGY

## 2023-04-10 PROCEDURE — 77067 SCR MAMMO BI INCL CAD: CPT | Mod: TC

## 2023-05-01 ENCOUNTER — CLINICAL SUPPORT (OUTPATIENT)
Dept: REHABILITATION | Facility: HOSPITAL | Age: 48
End: 2023-05-01
Payer: MEDICAID

## 2023-05-01 DIAGNOSIS — M75.41 SHOULDER IMPINGEMENT SYNDROME, RIGHT: Primary | ICD-10-CM

## 2023-05-01 PROCEDURE — 97110 THERAPEUTIC EXERCISES: CPT

## 2023-05-01 NOTE — PROGRESS NOTES
[facility]   Physical Therapy Treatment Note      Name: Marianne Go  Clinic Number: [id number]    Therapy Diagnosis:   Encounter Diagnosis   Name Primary?    Shoulder impingement syndrome, right Yes     Physician: Josue Steward MD    Visit Date: 5/1/2023    Physician Orders: PT Eval and Treat   Medical Diagnosis from Referral: M75.41 (ICD-10-CM) - Shoulder impingement syndrome, right   Evaluation Date: 4/5/2023  Authorization Period Expiration: 10/5/2023  Plan of Care Expiration: 7/5/2023  Progress Note Due: 6/5/2023  Visit # / Visits authorized: 1/ 12 + eval   FOTO: 1/3     Precautions: Standard     PTA Visit #: 0/5     Time In: 930 am  Time Out: 1012  Total Billable Time: 42 minutes    Subjective     Pt reports: right shoulder is doing better but the left arm is starting to hurt probably because of the compensations. Worst pain was around 5/10 over the past few weeks  She was compliant with home exercise program.  Response to previous treatment: feeling better  Functional change: in progress    Pain: 0/10  Location: right shoulder      Objective      Objective Measures updated at progress report unless specified.     Treatment     Marianne received the treatments listed below:      therapeutic exercises to develop strength, endurance, ROM, flexibility, posture, and core stabilization for 28 minutes including:  Pulleys 2' flex / 2 ' scaption  Scap pinches X 30  Serratus punches X 30 2# dowel  RTB rows X 30  RTB extensions X 30  SL external rotation X 20 each side    manual therapy techniques: Joint mobilizations, Manual traction, Myofacial release, Soft tissue Mobilization, and Friction Massage were applied to the: R shoulder for 10 minutes, including:  STM to right side UT and levator, biceps tendon and infra and supraspinatus muscles  Post and inf glenohumeral glides grade 2 on right shoulder  PROM with slight distraction in all planes      Patient Education and Home Exercises        Education provided:   - educated on rotator cuff anatomy and how it can cause challenges with lifting and reaching overhead    Written Home Exercises Provided: yes. Exercises were reviewed and Marianne was able to demonstrate them prior to the end of the session.  Marianne demonstrated good  understanding of the education provided. See EMR under Patient Instructions for exercises provided during therapy sessions    Assessment     Marianne did well with activity today although she did start to have an increase in bilateral shoulder pain toward the end of her exercise program. She continues to have anterior shoulder pain as she fatigues but was able to control her symptoms well with rest and cueing for form.     Marianne Is progressing well towards her goals.   Pt prognosis is Good.     Pt will continue to benefit from skilled outpatient physical therapy to address the deficits listed in the problem list box on initial evaluation, provide pt/family education and to maximize pt's level of independence in the home and community environment.     Pt's spiritual, cultural and educational needs considered and pt agreeable to plan of care and goals.     Anticipated barriers to physical therapy: bilateral shoulder pain    Goals: Short Term Goals: 4 weeks   Patient will shoe 20 degree improvement in R shoulder range of motion  Patient will show 1/2 grade MMT improvement in R shoulder strength  Patient will show a reduction by 50% of anterior glenohumeral translation with range of motion  Patient will show 50% reduction in shoulder muscle tone and deltoid compensations     Long Term Goals: 8 weeks   Patient will be able to don / doff shirts and jackets without pain in the shoulder  Patient will be able to reach overhead with > 10# without pain  Patient will be able to assist her son for > 30 minutes without pain increasing  Patient will be able to perform all work related duties for > 2 hours before onset of pain  50% FOTO  score improvement    Plan     Cont per POC    Deborah Sandy, PT

## 2023-05-04 ENCOUNTER — HOSPITAL ENCOUNTER (INPATIENT)
Facility: HOSPITAL | Age: 48
LOS: 2 days | Discharge: HOME | DRG: 247 | End: 2023-05-06
Attending: EMERGENCY MEDICINE | Admitting: EMERGENCY MEDICINE
Payer: MEDICAID

## 2023-05-04 DIAGNOSIS — I21.4 NSTEMI (NON-ST ELEVATION MYOCARDIAL INFARCTION): ICD-10-CM

## 2023-05-04 DIAGNOSIS — Z86.718 HISTORY OF DVT (DEEP VEIN THROMBOSIS): ICD-10-CM

## 2023-05-04 DIAGNOSIS — R07.9 CHEST PAIN: ICD-10-CM

## 2023-05-04 DIAGNOSIS — F41.9 ANXIETY AND DEPRESSION: ICD-10-CM

## 2023-05-04 DIAGNOSIS — E78.5 HYPERLIPIDEMIA, UNSPECIFIED HYPERLIPIDEMIA TYPE: ICD-10-CM

## 2023-05-04 DIAGNOSIS — M79.602 PAIN OF LEFT UPPER EXTREMITY: ICD-10-CM

## 2023-05-04 DIAGNOSIS — I21.4 NON-ST ELEVATION MYOCARDIAL INFARCTION (NSTEMI): ICD-10-CM

## 2023-05-04 DIAGNOSIS — R55 SYNCOPE, UNSPECIFIED SYNCOPE TYPE: ICD-10-CM

## 2023-05-04 DIAGNOSIS — I21.4 NSTEMI (NON-ST ELEVATED MYOCARDIAL INFARCTION): Primary | ICD-10-CM

## 2023-05-04 DIAGNOSIS — F32.A ANXIETY AND DEPRESSION: ICD-10-CM

## 2023-05-04 LAB
ALBUMIN SERPL BCP-MCNC: 4.1 G/DL (ref 3.5–5.2)
ALP SERPL-CCNC: 102 U/L (ref 55–135)
ALT SERPL W/O P-5'-P-CCNC: 21 U/L (ref 10–44)
ANION GAP SERPL CALC-SCNC: 12 MMOL/L (ref 8–16)
AST SERPL-CCNC: 15 U/L (ref 10–40)
BASOPHILS # BLD AUTO: 0.08 K/UL (ref 0–0.2)
BASOPHILS NFR BLD: 0.7 % (ref 0–1.9)
BILIRUB SERPL-MCNC: 0.3 MG/DL (ref 0.1–1)
BNP SERPL-MCNC: <10 PG/ML (ref 0–99)
BUN SERPL-MCNC: 18 MG/DL (ref 6–20)
CALCIUM SERPL-MCNC: 9.8 MG/DL (ref 8.7–10.5)
CHLORIDE SERPL-SCNC: 105 MMOL/L (ref 95–110)
CO2 SERPL-SCNC: 21 MMOL/L (ref 23–29)
CREAT SERPL-MCNC: 0.8 MG/DL (ref 0.5–1.4)
DIFFERENTIAL METHOD: ABNORMAL
EOSINOPHIL # BLD AUTO: 0.2 K/UL (ref 0–0.5)
EOSINOPHIL NFR BLD: 1.6 % (ref 0–8)
ERYTHROCYTE [DISTWIDTH] IN BLOOD BY AUTOMATED COUNT: 12.2 % (ref 11.5–14.5)
EST. GFR  (NO RACE VARIABLE): >60 ML/MIN/1.73 M^2
GLUCOSE SERPL-MCNC: 106 MG/DL (ref 70–110)
HCT VFR BLD AUTO: 44.5 % (ref 37–48.5)
HCV AB SERPL QL IA: NORMAL
HGB BLD-MCNC: 15 G/DL (ref 12–16)
HIV 1+2 AB+HIV1 P24 AG SERPL QL IA: NORMAL
IMM GRANULOCYTES # BLD AUTO: 0.03 K/UL (ref 0–0.04)
IMM GRANULOCYTES NFR BLD AUTO: 0.3 % (ref 0–0.5)
LYMPHOCYTES # BLD AUTO: 1.8 K/UL (ref 1–4.8)
LYMPHOCYTES NFR BLD: 16.1 % (ref 18–48)
MCH RBC QN AUTO: 32.1 PG (ref 27–31)
MCHC RBC AUTO-ENTMCNC: 33.7 G/DL (ref 32–36)
MCV RBC AUTO: 95 FL (ref 82–98)
MONOCYTES # BLD AUTO: 0.5 K/UL (ref 0.3–1)
MONOCYTES NFR BLD: 4.3 % (ref 4–15)
NEUTROPHILS # BLD AUTO: 8.8 K/UL (ref 1.8–7.7)
NEUTROPHILS NFR BLD: 77 % (ref 38–73)
NRBC BLD-RTO: 0 /100 WBC
PLATELET # BLD AUTO: 387 K/UL (ref 150–450)
PMV BLD AUTO: 9.3 FL (ref 9.2–12.9)
POTASSIUM SERPL-SCNC: 3.8 MMOL/L (ref 3.5–5.1)
PROT SERPL-MCNC: 7.6 G/DL (ref 6–8.4)
RBC # BLD AUTO: 4.68 M/UL (ref 4–5.4)
SARS-COV-2 RDRP RESP QL NAA+PROBE: NEGATIVE
SODIUM SERPL-SCNC: 138 MMOL/L (ref 136–145)
TROPONIN I SERPL DL<=0.01 NG/ML-MCNC: 0.02 NG/ML (ref 0–0.03)
TROPONIN I SERPL DL<=0.01 NG/ML-MCNC: 0.18 NG/ML (ref 0–0.03)
WBC # BLD AUTO: 11.39 K/UL (ref 3.9–12.7)

## 2023-05-04 PROCEDURE — 86803 HEPATITIS C AB TEST: CPT | Performed by: PHYSICIAN ASSISTANT

## 2023-05-04 PROCEDURE — 25000003 PHARM REV CODE 250: Performed by: EMERGENCY MEDICINE

## 2023-05-04 PROCEDURE — 25500020 PHARM REV CODE 255: Performed by: EMERGENCY MEDICINE

## 2023-05-04 PROCEDURE — 99291 CRITICAL CARE FIRST HOUR: CPT | Mod: ,,, | Performed by: EMERGENCY MEDICINE

## 2023-05-04 PROCEDURE — 93005 ELECTROCARDIOGRAM TRACING: CPT

## 2023-05-04 PROCEDURE — 93010 EKG 12-LEAD: ICD-10-PCS | Mod: ,,, | Performed by: INTERNAL MEDICINE

## 2023-05-04 PROCEDURE — 99285 EMERGENCY DEPT VISIT HI MDM: CPT | Mod: 25

## 2023-05-04 PROCEDURE — 84484 ASSAY OF TROPONIN QUANT: CPT | Mod: 91 | Performed by: EMERGENCY MEDICINE

## 2023-05-04 PROCEDURE — 12000002 HC ACUTE/MED SURGE SEMI-PRIVATE ROOM

## 2023-05-04 PROCEDURE — U0002 COVID-19 LAB TEST NON-CDC: HCPCS | Performed by: EMERGENCY MEDICINE

## 2023-05-04 PROCEDURE — 25000242 PHARM REV CODE 250 ALT 637 W/ HCPCS: Performed by: EMERGENCY MEDICINE

## 2023-05-04 PROCEDURE — 99291 PR CRITICAL CARE, E/M 30-74 MINUTES: ICD-10-PCS | Mod: ,,, | Performed by: EMERGENCY MEDICINE

## 2023-05-04 PROCEDURE — 84484 ASSAY OF TROPONIN QUANT: CPT | Performed by: EMERGENCY MEDICINE

## 2023-05-04 PROCEDURE — 80053 COMPREHEN METABOLIC PANEL: CPT | Performed by: EMERGENCY MEDICINE

## 2023-05-04 PROCEDURE — 83880 ASSAY OF NATRIURETIC PEPTIDE: CPT | Performed by: EMERGENCY MEDICINE

## 2023-05-04 PROCEDURE — 87389 HIV-1 AG W/HIV-1&-2 AB AG IA: CPT | Performed by: PHYSICIAN ASSISTANT

## 2023-05-04 PROCEDURE — 93010 ELECTROCARDIOGRAM REPORT: CPT | Mod: ,,, | Performed by: INTERNAL MEDICINE

## 2023-05-04 PROCEDURE — 85025 COMPLETE CBC W/AUTO DIFF WBC: CPT | Performed by: EMERGENCY MEDICINE

## 2023-05-04 RX ORDER — ACETAMINOPHEN 325 MG/1
650 TABLET ORAL
Status: COMPLETED | OUTPATIENT
Start: 2023-05-04 | End: 2023-05-04

## 2023-05-04 RX ORDER — NITROGLYCERIN 0.4 MG/1
0.4 TABLET SUBLINGUAL
Status: COMPLETED | OUTPATIENT
Start: 2023-05-04 | End: 2023-05-04

## 2023-05-04 RX ORDER — ASPIRIN 325 MG
325 TABLET, DELAYED RELEASE (ENTERIC COATED) ORAL
Status: COMPLETED | OUTPATIENT
Start: 2023-05-04 | End: 2023-05-04

## 2023-05-04 RX ADMIN — IOHEXOL 75 ML: 350 INJECTION, SOLUTION INTRAVENOUS at 09:05

## 2023-05-04 RX ADMIN — ASPIRIN 325 MG: 325 TABLET, COATED ORAL at 11:05

## 2023-05-04 RX ADMIN — Medication 250 MG: at 07:05

## 2023-05-04 RX ADMIN — TICAGRELOR 180 MG: 90 TABLET ORAL at 11:05

## 2023-05-04 RX ADMIN — ACETAMINOPHEN 650 MG: 325 TABLET ORAL at 07:05

## 2023-05-04 RX ADMIN — NITROGLYCERIN 0.4 MG: 0.4 TABLET, ORALLY DISINTEGRATING SUBLINGUAL at 11:05

## 2023-05-04 NOTE — Clinical Note
The catheter was repositioned into the and insertion attempt was made into the ostium   right coronary artery.

## 2023-05-05 ENCOUNTER — TELEPHONE (OUTPATIENT)
Dept: CARDIAC REHAB | Facility: CLINIC | Age: 48
End: 2023-05-05
Payer: MEDICAID

## 2023-05-05 DIAGNOSIS — I25.10 CORONARY ARTERY DISEASE, UNSPECIFIED VESSEL OR LESION TYPE, UNSPECIFIED WHETHER ANGINA PRESENT, UNSPECIFIED WHETHER NATIVE OR TRANSPLANTED HEART: ICD-10-CM

## 2023-05-05 DIAGNOSIS — Z98.61 POSTSURGICAL PERCUTANEOUS TRANSLUMINAL CORONARY ANGIOPLASTY STATUS: Primary | ICD-10-CM

## 2023-05-05 PROBLEM — I21.4 NSTEMI (NON-ST ELEVATED MYOCARDIAL INFARCTION): Status: ACTIVE | Noted: 2023-05-05

## 2023-05-05 PROBLEM — R79.89 ELEVATED TROPONIN: Status: ACTIVE | Noted: 2023-05-05

## 2023-05-05 LAB
ABO + RH BLD: NORMAL
ANION GAP SERPL CALC-SCNC: 11 MMOL/L (ref 8–16)
APTT PPP: 27.8 SEC (ref 21–32)
APTT PPP: <21 SEC (ref 21–32)
ASCENDING AORTA: 3.54 CM
AV INDEX (PROSTH): 0.67
AV MEAN GRADIENT: 6 MMHG
AV PEAK GRADIENT: 11 MMHG
AV VALVE AREA: 2.41 CM2
AV VELOCITY RATIO: 0.6
BASOPHILS # BLD AUTO: 0.06 K/UL (ref 0–0.2)
BASOPHILS # BLD AUTO: 0.06 K/UL (ref 0–0.2)
BASOPHILS NFR BLD: 0.6 % (ref 0–1.9)
BASOPHILS NFR BLD: 0.6 % (ref 0–1.9)
BLD GP AB SCN CELLS X3 SERPL QL: NORMAL
BSA FOR ECHO PROCEDURE: 2.26 M2
BUN SERPL-MCNC: 14 MG/DL (ref 6–20)
CALCIUM SERPL-MCNC: 9.4 MG/DL (ref 8.7–10.5)
CHLORIDE SERPL-SCNC: 106 MMOL/L (ref 95–110)
CHOLEST SERPL-MCNC: 199 MG/DL (ref 120–199)
CHOLEST/HDLC SERPL: 5.2 {RATIO} (ref 2–5)
CO2 SERPL-SCNC: 22 MMOL/L (ref 23–29)
CREAT SERPL-MCNC: 0.7 MG/DL (ref 0.5–1.4)
CV ECHO LV RWT: 0.49 CM
DIFFERENTIAL METHOD: ABNORMAL
DIFFERENTIAL METHOD: ABNORMAL
DOP CALC AO PEAK VEL: 1.68 M/S
DOP CALC AO VTI: 29.93 CM
DOP CALC LVOT AREA: 3.6 CM2
DOP CALC LVOT DIAMETER: 2.14 CM
DOP CALC LVOT PEAK VEL: 1 M/S
DOP CALC LVOT STROKE VOLUME: 72.19 CM3
DOP CALCLVOT PEAK VEL VTI: 20.08 CM
E WAVE DECELERATION TIME: 206.45 MSEC
E/A RATIO: 1.14
E/E' RATIO: 8.59 M/S
ECHO LV POSTERIOR WALL: 0.93 CM (ref 0.6–1.1)
EJECTION FRACTION: 60 %
EOSINOPHIL # BLD AUTO: 0.1 K/UL (ref 0–0.5)
EOSINOPHIL # BLD AUTO: 0.2 K/UL (ref 0–0.5)
EOSINOPHIL NFR BLD: 1.5 % (ref 0–8)
EOSINOPHIL NFR BLD: 2.2 % (ref 0–8)
ERYTHROCYTE [DISTWIDTH] IN BLOOD BY AUTOMATED COUNT: 12.2 % (ref 11.5–14.5)
ERYTHROCYTE [DISTWIDTH] IN BLOOD BY AUTOMATED COUNT: 12.2 % (ref 11.5–14.5)
EST. GFR  (NO RACE VARIABLE): >60 ML/MIN/1.73 M^2
ESTIMATED AVG GLUCOSE: 94 MG/DL (ref 68–131)
FRACTIONAL SHORTENING: 35 % (ref 28–44)
GLUCOSE SERPL-MCNC: 101 MG/DL (ref 70–110)
HBA1C MFR BLD: 4.9 % (ref 4–5.6)
HCT VFR BLD AUTO: 39.6 % (ref 37–48.5)
HCT VFR BLD AUTO: 42.1 % (ref 37–48.5)
HDLC SERPL-MCNC: 38 MG/DL (ref 40–75)
HDLC SERPL: 19.1 % (ref 20–50)
HGB BLD-MCNC: 13.7 G/DL (ref 12–16)
HGB BLD-MCNC: 14.1 G/DL (ref 12–16)
IMM GRANULOCYTES # BLD AUTO: 0.01 K/UL (ref 0–0.04)
IMM GRANULOCYTES # BLD AUTO: 0.02 K/UL (ref 0–0.04)
IMM GRANULOCYTES NFR BLD AUTO: 0.1 % (ref 0–0.5)
IMM GRANULOCYTES NFR BLD AUTO: 0.2 % (ref 0–0.5)
INR PPP: 1 (ref 0.8–1.2)
INTERVENTRICULAR SEPTUM: 1.03 CM (ref 0.6–1.1)
LA MAJOR: 4.7 CM
LA MINOR: 4.29 CM
LA WIDTH: 3.48 CM
LDLC SERPL CALC-MCNC: 129 MG/DL (ref 63–159)
LEFT ATRIUM SIZE: 2.57 CM
LEFT ATRIUM VOLUME INDEX MOD: 23 ML/M2
LEFT ATRIUM VOLUME INDEX: 16 ML/M2
LEFT ATRIUM VOLUME MOD: 48.92 CM3
LEFT ATRIUM VOLUME: 34.1 CM3
LEFT INTERNAL DIMENSION IN SYSTOLE: 2.47 CM (ref 2.1–4)
LEFT VENTRICLE DIASTOLIC VOLUME INDEX: 29.12 ML/M2
LEFT VENTRICLE DIASTOLIC VOLUME: 62.03 ML
LEFT VENTRICLE MASS INDEX: 53 G/M2
LEFT VENTRICLE SYSTOLIC VOLUME INDEX: 10.2 ML/M2
LEFT VENTRICLE SYSTOLIC VOLUME: 21.74 ML
LEFT VENTRICULAR INTERNAL DIMENSION IN DIASTOLE: 3.8 CM (ref 3.5–6)
LEFT VENTRICULAR MASS: 113.94 G
LV LATERAL E/E' RATIO: 7.3 M/S
LV SEPTAL E/E' RATIO: 10.43 M/S
LYMPHOCYTES # BLD AUTO: 2.5 K/UL (ref 1–4.8)
LYMPHOCYTES # BLD AUTO: 3.4 K/UL (ref 1–4.8)
LYMPHOCYTES NFR BLD: 26.8 % (ref 18–48)
LYMPHOCYTES NFR BLD: 33.3 % (ref 18–48)
MAGNESIUM SERPL-MCNC: 1.9 MG/DL (ref 1.6–2.6)
MCH RBC QN AUTO: 32 PG (ref 27–31)
MCH RBC QN AUTO: 32.9 PG (ref 27–31)
MCHC RBC AUTO-ENTMCNC: 33.5 G/DL (ref 32–36)
MCHC RBC AUTO-ENTMCNC: 34.6 G/DL (ref 32–36)
MCV RBC AUTO: 95 FL (ref 82–98)
MCV RBC AUTO: 96 FL (ref 82–98)
MONOCYTES # BLD AUTO: 0.6 K/UL (ref 0.3–1)
MONOCYTES # BLD AUTO: 0.6 K/UL (ref 0.3–1)
MONOCYTES NFR BLD: 5.8 % (ref 4–15)
MONOCYTES NFR BLD: 5.9 % (ref 4–15)
MV PEAK A VEL: 0.64 M/S
MV PEAK E VEL: 0.73 M/S
NEUTROPHILS # BLD AUTO: 5.9 K/UL (ref 1.8–7.7)
NEUTROPHILS # BLD AUTO: 6 K/UL (ref 1.8–7.7)
NEUTROPHILS NFR BLD: 58 % (ref 38–73)
NEUTROPHILS NFR BLD: 65 % (ref 38–73)
NONHDLC SERPL-MCNC: 161 MG/DL
NRBC BLD-RTO: 0 /100 WBC
NRBC BLD-RTO: 0 /100 WBC
PLATELET # BLD AUTO: 386 K/UL (ref 150–450)
PLATELET # BLD AUTO: 395 K/UL (ref 150–450)
PMV BLD AUTO: 9.8 FL (ref 9.2–12.9)
PMV BLD AUTO: 9.9 FL (ref 9.2–12.9)
POTASSIUM SERPL-SCNC: 3.6 MMOL/L (ref 3.5–5.1)
PROTHROMBIN TIME: 10.3 SEC (ref 9–12.5)
RA MAJOR: 4.26 CM
RA PRESSURE: 8 MMHG
RA WIDTH: 2.85 CM
RBC # BLD AUTO: 4.16 M/UL (ref 4–5.4)
RBC # BLD AUTO: 4.41 M/UL (ref 4–5.4)
RIGHT VENTRICULAR END-DIASTOLIC DIMENSION: 3.19 CM
SINUS: 3.58 CM
SODIUM SERPL-SCNC: 139 MMOL/L (ref 136–145)
SPECIMEN OUTDATE: NORMAL
STJ: 3.11 CM
TDI LATERAL: 0.1 M/S
TDI SEPTAL: 0.07 M/S
TDI: 0.09 M/S
TRICUSPID ANNULAR PLANE SYSTOLIC EXCURSION: 2.21 CM
TRIGL SERPL-MCNC: 160 MG/DL (ref 30–150)
TROPONIN I SERPL DL<=0.01 NG/ML-MCNC: 0.41 NG/ML (ref 0–0.03)
TROPONIN I SERPL DL<=0.01 NG/ML-MCNC: 2.18 NG/ML (ref 0–0.03)
TROPONIN I SERPL DL<=0.01 NG/ML-MCNC: 3.48 NG/ML (ref 0–0.03)
TROPONIN I SERPL DL<=0.01 NG/ML-MCNC: 6.01 NG/ML (ref 0–0.03)
TROPONIN I SERPL DL<=0.01 NG/ML-MCNC: 6.53 NG/ML (ref 0–0.03)
WBC # BLD AUTO: 10.07 K/UL (ref 3.9–12.7)
WBC # BLD AUTO: 9.3 K/UL (ref 3.9–12.7)

## 2023-05-05 PROCEDURE — C1887 CATHETER, GUIDING: HCPCS | Performed by: INTERNAL MEDICINE

## 2023-05-05 PROCEDURE — 84484 ASSAY OF TROPONIN QUANT: CPT | Mod: 91 | Performed by: STUDENT IN AN ORGANIZED HEALTH CARE EDUCATION/TRAINING PROGRAM

## 2023-05-05 PROCEDURE — 99233 SBSQ HOSP IP/OBS HIGH 50: CPT | Mod: ,,, | Performed by: INTERNAL MEDICINE

## 2023-05-05 PROCEDURE — 85610 PROTHROMBIN TIME: CPT | Performed by: STUDENT IN AN ORGANIZED HEALTH CARE EDUCATION/TRAINING PROGRAM

## 2023-05-05 PROCEDURE — 25000242 PHARM REV CODE 250 ALT 637 W/ HCPCS: Performed by: EMERGENCY MEDICINE

## 2023-05-05 PROCEDURE — 93010 ELECTROCARDIOGRAM REPORT: CPT | Mod: ,,, | Performed by: INTERNAL MEDICINE

## 2023-05-05 PROCEDURE — 63600175 PHARM REV CODE 636 W HCPCS: Performed by: INTERNAL MEDICINE

## 2023-05-05 PROCEDURE — 99223 1ST HOSP IP/OBS HIGH 75: CPT | Mod: ,,, | Performed by: STUDENT IN AN ORGANIZED HEALTH CARE EDUCATION/TRAINING PROGRAM

## 2023-05-05 PROCEDURE — 85730 THROMBOPLASTIN TIME PARTIAL: CPT | Performed by: STUDENT IN AN ORGANIZED HEALTH CARE EDUCATION/TRAINING PROGRAM

## 2023-05-05 PROCEDURE — 85730 THROMBOPLASTIN TIME PARTIAL: CPT | Mod: 91 | Performed by: EMERGENCY MEDICINE

## 2023-05-05 PROCEDURE — 86900 BLOOD TYPING SEROLOGIC ABO: CPT | Performed by: STUDENT IN AN ORGANIZED HEALTH CARE EDUCATION/TRAINING PROGRAM

## 2023-05-05 PROCEDURE — 80048 BASIC METABOLIC PNL TOTAL CA: CPT | Performed by: STUDENT IN AN ORGANIZED HEALTH CARE EDUCATION/TRAINING PROGRAM

## 2023-05-05 PROCEDURE — 25000003 PHARM REV CODE 250: Performed by: STUDENT IN AN ORGANIZED HEALTH CARE EDUCATION/TRAINING PROGRAM

## 2023-05-05 PROCEDURE — 93454 CORONARY ARTERY ANGIO S&I: CPT | Mod: 26,59,51, | Performed by: INTERNAL MEDICINE

## 2023-05-05 PROCEDURE — 92928 PR STENT: ICD-10-PCS | Mod: RC,,, | Performed by: INTERNAL MEDICINE

## 2023-05-05 PROCEDURE — 83735 ASSAY OF MAGNESIUM: CPT | Performed by: STUDENT IN AN ORGANIZED HEALTH CARE EDUCATION/TRAINING PROGRAM

## 2023-05-05 PROCEDURE — 93010 ELECTROCARDIOGRAM REPORT: CPT | Mod: 76,59,, | Performed by: INTERNAL MEDICINE

## 2023-05-05 PROCEDURE — 94640 AIRWAY INHALATION TREATMENT: CPT

## 2023-05-05 PROCEDURE — 99152 MOD SED SAME PHYS/QHP 5/>YRS: CPT | Mod: ,,, | Performed by: INTERNAL MEDICINE

## 2023-05-05 PROCEDURE — 84484 ASSAY OF TROPONIN QUANT: CPT | Performed by: STUDENT IN AN ORGANIZED HEALTH CARE EDUCATION/TRAINING PROGRAM

## 2023-05-05 PROCEDURE — 93010 EKG 12-LEAD: ICD-10-PCS | Mod: 76,59,, | Performed by: INTERNAL MEDICINE

## 2023-05-05 PROCEDURE — 93005 ELECTROCARDIOGRAM TRACING: CPT

## 2023-05-05 PROCEDURE — 25000003 PHARM REV CODE 250: Performed by: INTERNAL MEDICINE

## 2023-05-05 PROCEDURE — 99153 MOD SED SAME PHYS/QHP EA: CPT | Performed by: INTERNAL MEDICINE

## 2023-05-05 PROCEDURE — 93454 PR CATH PLACE/CORONARY ANGIO, IMG SUPER/INTERP: ICD-10-PCS | Mod: 26,59,51, | Performed by: INTERNAL MEDICINE

## 2023-05-05 PROCEDURE — 85025 COMPLETE CBC W/AUTO DIFF WBC: CPT | Performed by: STUDENT IN AN ORGANIZED HEALTH CARE EDUCATION/TRAINING PROGRAM

## 2023-05-05 PROCEDURE — 63600175 PHARM REV CODE 636 W HCPCS: Performed by: EMERGENCY MEDICINE

## 2023-05-05 PROCEDURE — 99223 PR INITIAL HOSPITAL CARE,LEVL III: ICD-10-PCS | Mod: ,,, | Performed by: STUDENT IN AN ORGANIZED HEALTH CARE EDUCATION/TRAINING PROGRAM

## 2023-05-05 PROCEDURE — C9600 PERC DRUG-EL COR STENT SING: HCPCS | Mod: RC | Performed by: INTERNAL MEDICINE

## 2023-05-05 PROCEDURE — 99233 SBSQ HOSP IP/OBS HIGH 50: CPT | Mod: 25,,, | Performed by: INTERNAL MEDICINE

## 2023-05-05 PROCEDURE — 99233 PR SUBSEQUENT HOSPITAL CARE,LEVL III: ICD-10-PCS | Mod: ,,, | Performed by: INTERNAL MEDICINE

## 2023-05-05 PROCEDURE — 99152 PR MOD CONSCIOUS SEDATION, SAME PHYS, 5+ YRS, FIRST 15 MIN: ICD-10-PCS | Mod: ,,, | Performed by: INTERNAL MEDICINE

## 2023-05-05 PROCEDURE — 36415 COLL VENOUS BLD VENIPUNCTURE: CPT | Performed by: EMERGENCY MEDICINE

## 2023-05-05 PROCEDURE — 80061 LIPID PANEL: CPT | Performed by: STUDENT IN AN ORGANIZED HEALTH CARE EDUCATION/TRAINING PROGRAM

## 2023-05-05 PROCEDURE — 25000242 PHARM REV CODE 250 ALT 637 W/ HCPCS: Performed by: STUDENT IN AN ORGANIZED HEALTH CARE EDUCATION/TRAINING PROGRAM

## 2023-05-05 PROCEDURE — 36415 COLL VENOUS BLD VENIPUNCTURE: CPT | Performed by: STUDENT IN AN ORGANIZED HEALTH CARE EDUCATION/TRAINING PROGRAM

## 2023-05-05 PROCEDURE — C1874 STENT, COATED/COV W/DEL SYS: HCPCS | Performed by: INTERNAL MEDICINE

## 2023-05-05 PROCEDURE — 25500020 PHARM REV CODE 255: Performed by: INTERNAL MEDICINE

## 2023-05-05 PROCEDURE — C1769 GUIDE WIRE: HCPCS | Performed by: INTERNAL MEDICINE

## 2023-05-05 PROCEDURE — 85025 COMPLETE CBC W/AUTO DIFF WBC: CPT | Mod: 91 | Performed by: STUDENT IN AN ORGANIZED HEALTH CARE EDUCATION/TRAINING PROGRAM

## 2023-05-05 PROCEDURE — C1894 INTRO/SHEATH, NON-LASER: HCPCS | Performed by: INTERNAL MEDICINE

## 2023-05-05 PROCEDURE — 83036 HEMOGLOBIN GLYCOSYLATED A1C: CPT | Performed by: STUDENT IN AN ORGANIZED HEALTH CARE EDUCATION/TRAINING PROGRAM

## 2023-05-05 PROCEDURE — 85347 COAGULATION TIME ACTIVATED: CPT | Performed by: INTERNAL MEDICINE

## 2023-05-05 PROCEDURE — C1725 CATH, TRANSLUMIN NON-LASER: HCPCS | Performed by: INTERNAL MEDICINE

## 2023-05-05 PROCEDURE — 92928 PRQ TCAT PLMT NTRAC ST 1 LES: CPT | Mod: RC,,, | Performed by: INTERNAL MEDICINE

## 2023-05-05 PROCEDURE — 20600001 HC STEP DOWN PRIVATE ROOM

## 2023-05-05 PROCEDURE — 94761 N-INVAS EAR/PLS OXIMETRY MLT: CPT

## 2023-05-05 PROCEDURE — 93454 CORONARY ARTERY ANGIO S&I: CPT | Mod: 59 | Performed by: INTERNAL MEDICINE

## 2023-05-05 PROCEDURE — 93010 EKG 12-LEAD: ICD-10-PCS | Mod: ,,, | Performed by: INTERNAL MEDICINE

## 2023-05-05 PROCEDURE — 99233 PR SUBSEQUENT HOSPITAL CARE,LEVL III: ICD-10-PCS | Mod: 25,,, | Performed by: INTERNAL MEDICINE

## 2023-05-05 PROCEDURE — 99152 MOD SED SAME PHYS/QHP 5/>YRS: CPT | Performed by: INTERNAL MEDICINE

## 2023-05-05 PROCEDURE — 27201423 OPTIME MED/SURG SUP & DEVICES STERILE SUPPLY: Performed by: INTERNAL MEDICINE

## 2023-05-05 DEVICE — EVEROLIMUS-ELUTING PLATINUM CHROMIUM CORONARY STENT SYSTEM
Type: IMPLANTABLE DEVICE | Site: HEART | Status: FUNCTIONAL
Brand: SYNERGY™ XD

## 2023-05-05 RX ORDER — DIPHENHYDRAMINE HCL 50 MG
50 CAPSULE ORAL ONCE
Status: COMPLETED | OUTPATIENT
Start: 2023-05-05 | End: 2023-05-05

## 2023-05-05 RX ORDER — VENLAFAXINE HYDROCHLORIDE 37.5 MG/1
37.5 CAPSULE, EXTENDED RELEASE ORAL DAILY
Status: DISCONTINUED | OUTPATIENT
Start: 2023-05-05 | End: 2023-05-06 | Stop reason: HOSPADM

## 2023-05-05 RX ORDER — NITROGLYCERIN 0.4 MG/1
0.4 TABLET SUBLINGUAL EVERY 5 MIN PRN
Status: DISCONTINUED | OUTPATIENT
Start: 2023-05-05 | End: 2023-05-06 | Stop reason: HOSPADM

## 2023-05-05 RX ORDER — NITROGLYCERIN 0.4 MG/1
0.4 TABLET SUBLINGUAL EVERY 5 MIN PRN
Qty: 25 TABLET | Refills: 0 | Status: SHIPPED | OUTPATIENT
Start: 2023-05-05 | End: 2023-05-05 | Stop reason: SDUPTHER

## 2023-05-05 RX ORDER — SODIUM CHLORIDE 9 MG/ML
INJECTION, SOLUTION INTRAVENOUS CONTINUOUS
Status: ACTIVE | OUTPATIENT
Start: 2023-05-05 | End: 2023-05-05

## 2023-05-05 RX ORDER — TALC
6 POWDER (GRAM) TOPICAL NIGHTLY PRN
Status: CANCELLED | OUTPATIENT
Start: 2023-05-05

## 2023-05-05 RX ORDER — BUTALBITAL, ACETAMINOPHEN AND CAFFEINE 50; 325; 40 MG/1; MG/1; MG/1
1 TABLET ORAL EVERY 4 HOURS PRN
Status: DISCONTINUED | OUTPATIENT
Start: 2023-05-05 | End: 2023-05-06 | Stop reason: HOSPADM

## 2023-05-05 RX ORDER — ATORVASTATIN CALCIUM 40 MG/1
40 TABLET, FILM COATED ORAL DAILY
Status: DISCONTINUED | OUTPATIENT
Start: 2023-05-05 | End: 2023-05-05

## 2023-05-05 RX ORDER — ATORVASTATIN CALCIUM 20 MG/1
80 TABLET, FILM COATED ORAL DAILY
Status: DISCONTINUED | OUTPATIENT
Start: 2023-05-05 | End: 2023-05-06 | Stop reason: HOSPADM

## 2023-05-05 RX ORDER — LIDOCAINE HYDROCHLORIDE 20 MG/ML
INJECTION, SOLUTION INFILTRATION; PERINEURAL
Status: DISCONTINUED | OUTPATIENT
Start: 2023-05-05 | End: 2023-05-06 | Stop reason: HOSPADM

## 2023-05-05 RX ORDER — IPRATROPIUM BROMIDE AND ALBUTEROL SULFATE 2.5; .5 MG/3ML; MG/3ML
3 SOLUTION RESPIRATORY (INHALATION) ONCE
Status: COMPLETED | OUTPATIENT
Start: 2023-05-05 | End: 2023-05-05

## 2023-05-05 RX ORDER — FENTANYL CITRATE 50 UG/ML
INJECTION, SOLUTION INTRAMUSCULAR; INTRAVENOUS
Status: DISCONTINUED | OUTPATIENT
Start: 2023-05-05 | End: 2023-05-06 | Stop reason: HOSPADM

## 2023-05-05 RX ORDER — NAPROXEN SODIUM 220 MG/1
81 TABLET, FILM COATED ORAL DAILY
Qty: 360 TABLET | Refills: 0 | Status: SHIPPED | OUTPATIENT
Start: 2023-05-06 | End: 2024-05-05

## 2023-05-05 RX ORDER — MORPHINE SULFATE 2 MG/ML
2 INJECTION, SOLUTION INTRAMUSCULAR; INTRAVENOUS EVERY 6 HOURS PRN
Status: DISCONTINUED | OUTPATIENT
Start: 2023-05-05 | End: 2023-05-06 | Stop reason: HOSPADM

## 2023-05-05 RX ORDER — ACETAMINOPHEN 325 MG/1
650 TABLET ORAL EVERY 4 HOURS PRN
Status: DISCONTINUED | OUTPATIENT
Start: 2023-05-05 | End: 2023-05-06 | Stop reason: HOSPADM

## 2023-05-05 RX ORDER — TALC
6 POWDER (GRAM) TOPICAL NIGHTLY PRN
Status: DISCONTINUED | OUTPATIENT
Start: 2023-05-05 | End: 2023-05-06 | Stop reason: HOSPADM

## 2023-05-05 RX ORDER — ONDANSETRON 2 MG/ML
4 INJECTION INTRAMUSCULAR; INTRAVENOUS EVERY 6 HOURS PRN
Status: DISCONTINUED | OUTPATIENT
Start: 2023-05-05 | End: 2023-05-06 | Stop reason: HOSPADM

## 2023-05-05 RX ORDER — VERAPAMIL HYDROCHLORIDE 120 MG/1
120 TABLET, FILM COATED, EXTENDED RELEASE ORAL NIGHTLY
Status: DISCONTINUED | OUTPATIENT
Start: 2023-05-05 | End: 2023-05-06 | Stop reason: HOSPADM

## 2023-05-05 RX ORDER — HEPARIN SODIUM 1000 [USP'U]/ML
INJECTION, SOLUTION INTRAVENOUS; SUBCUTANEOUS
Status: DISCONTINUED | OUTPATIENT
Start: 2023-05-05 | End: 2023-05-06 | Stop reason: HOSPADM

## 2023-05-05 RX ORDER — ACETAMINOPHEN 325 MG/1
650 TABLET ORAL EVERY 6 HOURS PRN
Status: DISCONTINUED | OUTPATIENT
Start: 2023-05-05 | End: 2023-05-06 | Stop reason: HOSPADM

## 2023-05-05 RX ORDER — SODIUM CHLORIDE 0.9 % (FLUSH) 0.9 %
10 SYRINGE (ML) INJECTION
Status: CANCELLED | OUTPATIENT
Start: 2023-05-05

## 2023-05-05 RX ORDER — NAPROXEN SODIUM 220 MG/1
81 TABLET, FILM COATED ORAL DAILY
Status: DISCONTINUED | OUTPATIENT
Start: 2023-05-06 | End: 2023-05-06 | Stop reason: HOSPADM

## 2023-05-05 RX ORDER — NITROGLYCERIN 0.4 MG/1
0.4 TABLET SUBLINGUAL EVERY 5 MIN PRN
Status: DISCONTINUED | OUTPATIENT
Start: 2023-05-05 | End: 2023-05-05

## 2023-05-05 RX ORDER — PROMETHAZINE HYDROCHLORIDE AND CODEINE PHOSPHATE 6.25; 1 MG/5ML; MG/5ML
5 SOLUTION ORAL EVERY 6 HOURS PRN
Status: DISCONTINUED | OUTPATIENT
Start: 2023-05-05 | End: 2023-05-06 | Stop reason: HOSPADM

## 2023-05-05 RX ORDER — ISOSORBIDE MONONITRATE 30 MG/1
30 TABLET, EXTENDED RELEASE ORAL DAILY
Status: DISCONTINUED | OUTPATIENT
Start: 2023-05-06 | End: 2023-05-06 | Stop reason: HOSPADM

## 2023-05-05 RX ORDER — MIDAZOLAM HYDROCHLORIDE 1 MG/ML
INJECTION INTRAMUSCULAR; INTRAVENOUS
Status: DISCONTINUED | OUTPATIENT
Start: 2023-05-05 | End: 2023-05-06 | Stop reason: HOSPADM

## 2023-05-05 RX ORDER — ONDANSETRON 8 MG/1
8 TABLET, ORALLY DISINTEGRATING ORAL EVERY 8 HOURS PRN
Status: DISCONTINUED | OUTPATIENT
Start: 2023-05-05 | End: 2023-05-06 | Stop reason: HOSPADM

## 2023-05-05 RX ORDER — NITROGLYCERIN 0.4 MG/1
0.4 TABLET SUBLINGUAL EVERY 5 MIN PRN
Qty: 25 TABLET | Refills: 0 | Status: SHIPPED | OUTPATIENT
Start: 2023-05-05 | End: 2023-06-30

## 2023-05-05 RX ORDER — HEPARIN SODIUM,PORCINE/D5W 25000/250
0-40 INTRAVENOUS SOLUTION INTRAVENOUS CONTINUOUS
Status: DISCONTINUED | OUTPATIENT
Start: 2023-05-05 | End: 2023-05-05

## 2023-05-05 RX ORDER — SODIUM CHLORIDE 0.9 % (FLUSH) 0.9 %
10 SYRINGE (ML) INJECTION
Status: DISCONTINUED | OUTPATIENT
Start: 2023-05-05 | End: 2023-05-06 | Stop reason: HOSPADM

## 2023-05-05 RX ORDER — ATORVASTATIN CALCIUM 80 MG/1
80 TABLET, FILM COATED ORAL DAILY
Qty: 90 TABLET | Refills: 3 | Status: SHIPPED | OUTPATIENT
Start: 2023-05-06 | End: 2024-03-22

## 2023-05-05 RX ORDER — HEPARIN SOD,PORCINE/0.9 % NACL 1000/500ML
INTRAVENOUS SOLUTION INTRAVENOUS
Status: DISCONTINUED | OUTPATIENT
Start: 2023-05-05 | End: 2023-05-06 | Stop reason: HOSPADM

## 2023-05-05 RX ADMIN — ATORVASTATIN CALCIUM 80 MG: 40 TABLET, FILM COATED ORAL at 02:05

## 2023-05-05 RX ADMIN — VERAPAMIL HYDROCHLORIDE 120 MG: 120 TABLET, FILM COATED, EXTENDED RELEASE ORAL at 08:05

## 2023-05-05 RX ADMIN — SODIUM CHLORIDE: 9 INJECTION, SOLUTION INTRAVENOUS at 10:05

## 2023-05-05 RX ADMIN — TICAGRELOR 90 MG: 90 TABLET ORAL at 09:05

## 2023-05-05 RX ADMIN — VENLAFAXINE HYDROCHLORIDE 37.5 MG: 37.5 CAPSULE, EXTENDED RELEASE ORAL at 08:05

## 2023-05-05 RX ADMIN — NITROGLYCERIN 0.4 MG: 0.4 TABLET, ORALLY DISINTEGRATING SUBLINGUAL at 12:05

## 2023-05-05 RX ADMIN — DIPHENHYDRAMINE HYDROCHLORIDE 50 MG: 50 CAPSULE ORAL at 10:05

## 2023-05-05 RX ADMIN — TICAGRELOR 90 MG: 90 TABLET ORAL at 08:05

## 2023-05-05 RX ADMIN — HEPARIN SODIUM AND DEXTROSE 12 UNITS/KG/HR: 10000; 5 INJECTION INTRAVENOUS at 02:05

## 2023-05-05 RX ADMIN — SODIUM CHLORIDE: 9 INJECTION, SOLUTION INTRAVENOUS at 12:05

## 2023-05-05 RX ADMIN — ACETAMINOPHEN 650 MG: 325 TABLET ORAL at 08:05

## 2023-05-05 RX ADMIN — NITROGLYCERIN 0.4 MG: 0.4 TABLET, ORALLY DISINTEGRATING SUBLINGUAL at 08:05

## 2023-05-05 RX ADMIN — IPRATROPIUM BROMIDE AND ALBUTEROL SULFATE 3 ML: .5; 3 SOLUTION RESPIRATORY (INHALATION) at 01:05

## 2023-05-05 NOTE — ASSESSMENT & PLAN NOTE
Body mass index is 47.19 kg/m². Morbid obesity complicates all aspects of disease management from diagnostic modalities to treatment. Weight loss encouraged and health benefits explained to patient.

## 2023-05-05 NOTE — ASSESSMENT & PLAN NOTE
Patient reports history of DVT when she was 15 years of age. She reports that she is no longer on anticoagulation.

## 2023-05-05 NOTE — HPI
Marianne Go is a 47-year-old woman with a past medical history of DVT, history of nephrolithiasis, anxiety, depression and obesity who presents to the emergency department with chest pain. Since having covid some time ago, she has had a frequent coughing and has been diagnosed with bronchitis and been on steroid taper over the past few weeks.  Today she began to feel short of breath this afternoon, and used her inhaler.  Her shortness of breath did not resolve, and her neighbor who is a nurse listened to her lungs and told her she was wheezing with poor air movement.  She then began to experience midsternal chest pain that radiated down her left arm.  This lasted for approximately 20 minutes and resolved with nitroglycerin with EMS.  She says she has had frequent episodes of reproducible chest pain over the past few weeks, which she attributed to coughing.  However, her chest pain today was different in nature than her previous episodes.  Denies prior history of coronary disease, and has never had any angiogram.  She is a former smoker, and has hyperlipidemia.    In the Emergency department, her systolic blood pressure was in the 140s-160s, and ECG was negative for acute ST changes.  Initial troponin was negative, before rising to 0.18 followed by 0.4.  She complained of repeat chest pain in the emergency department, that resolved with nitroglycerin.  Kidney function and CBC within normal limits.  CT a was negative for acute PE.  She was started on ACS protocol with aspirin, Brilinta and heparin and cardiology was then called for evaluation of NSTEMI.

## 2023-05-05 NOTE — ASSESSMENT & PLAN NOTE
Patient presenting with severe chest pain. Does not appear to be exertional but with radiation to left upper extremity. Pain relief with nitroglycerin. EKG stable but troponin elevation 0.018 -> 0.18 -> 0.41. CTA Chest without PE but notable coronary artery atherosclerosis. Loaded with aspirin and ticagrelor and started on heparin infusion in ED. Treating for acute coronary syndrome but chest pain and troponin elevation may also be secondary to other causes such as myocarditis or SCAD.  - Cardiology consult placed, appreciate recommendations  - s/p aspirin and ticagrelor load in ED  - continue aspirin 81 mg daily  - begin IV heparin infusion  - begin atorvastatin 80 mg daily  - sublingual nitroglycerin 0.4 mg PRN chest pain  - IV morphine 2 mg q4h PRN refractory chest pain  - continue to trend troponin until peak

## 2023-05-05 NOTE — CONSULTS
Information packet & letter regarding Phase II cardiac rehab was sent to patient.  Will contact patient in 2 weeks to see if interested.  Also, information letter sent to [facility].  Savita Landeros RN  Cardiac Rehab Nurse

## 2023-05-05 NOTE — SUBJECTIVE & OBJECTIVE
Past Medical History:   Diagnosis Date    Deep vein thrombosis     pregnancy related     Kidney calculi     Kidney stones     Kidney stones     Migraines     perimenstrual        Past Surgical History:   Procedure Laterality Date    BLADDER SUSPENSION      COLONOSCOPY N/A 2022    Procedure: COLONOSCOPY;  Surgeon: Charity Rudolph MD;  Location: [address]);  Service: Endoscopy;  Laterality: N/A;  instr portal-GT    CYSTOSCOPY W/ LASER LITHOTRIPSY      HYSTERECTOMY      PELVIC LAPAROSCOPY      ablation of endometriosis    TONSILLECTOMY      TUBAL LIGATION  2011    essure        Review of patient's allergies indicates:   Allergen Reactions    Topiramate Itching       (Not in a hospital admission)    Family History       Problem Relation (Age of Onset)    Allergic rhinitis Father    Asthma Son    Breast cancer Mother    Cancer Father    Colon cancer Maternal Uncle    Diabetes Mother    Hyperlipidemia Father, Mother    Hypertension Mother    Hypothyroidism Father, Mother          Tobacco Use    Smoking status: Former     Packs/day: 0.25     Years: 13.00     Pack years: 3.25     Types: Cigarettes     Quit date: 2003     Years since quittin.3    Smokeless tobacco: Never    Tobacco comments:     quit 11 years ago   Substance and Sexual Activity    Alcohol use: Yes     Comment: rarely    Drug use: No    Sexual activity: Yes     Partners: Male     Birth control/protection: See Surgical Hx     Review of Systems   Cardiovascular:  Positive for chest pain.   Respiratory:  Positive for cough and shortness of breath.    All other systems reviewed and are negative.  Objective:     Vital Signs (Most Recent):  Temp: 98.4 °F (36.9 °C) (23 1709)  Pulse: (!) 56 (23 0133)  Resp: 16 (23 0133)  BP: (!) 148/95 (23 0042)  SpO2: 97 % (23 0133) Vital Signs (24h Range):  Temp:  [98.4 °F (36.9 °C)] 98.4 °F (36.9 °C)  Pulse:  [56-76] 56  Resp:  [13-21] 16  SpO2:  [93 %-97 %] 97 %  BP:  (126-163)/(74-95) 148/95     Weight: 117 kg (258 lb)  Body mass index is 47.19 kg/m².    SpO2: 97 %       No intake or output data in the 24 hours ending 05/05/23 0219    Lines/Drains/Airways       Peripheral Intravenous Line  Duration                  Peripheral IV - Single Lumen 05/04/23 1845 20 G Right Antecubital <1 day                     Physical Exam  Vitals and nursing note reviewed.   Constitutional:       Appearance: She is obese.   HENT:      Head: Normocephalic and atraumatic.      Right Ear: External ear normal.      Left Ear: External ear normal.      Nose: Nose normal.      Mouth/Throat:      Mouth: Mucous membranes are moist.   Eyes:      Pupils: Pupils are equal, round, and reactive to light.   Cardiovascular:      Rate and Rhythm: Normal rate and regular rhythm.      Pulses: Normal pulses.   Pulmonary:      Effort: Pulmonary effort is normal.   Abdominal:      General: Abdomen is flat.   Musculoskeletal:         General: Normal range of motion.      Cervical back: Normal range of motion.      Right lower leg: No edema.      Left lower leg: No edema.   Skin:     General: Skin is warm and dry.      Capillary Refill: Capillary refill takes less than 2 seconds.   Neurological:      General: No focal deficit present.      Mental Status: She is alert and oriented to person, place, and time. Mental status is at baseline.          Significant Labs: All pertinent lab results from the last 24 hours have been reviewed.    Significant Imaging:  reviewed

## 2023-05-05 NOTE — NURSING
Patient complains of chest pain during morning assessment. Nitroglycerin X1 given sublingual per order. MD team notified.

## 2023-05-05 NOTE — CONSULTS
Food & Nutrition  Education    Diet Education: Cardiac Diet Education   Time Spent: 10 minutes   Learners: Pt       Nutrition Education provided with handouts:   RD discussed limiting foods high in saturated fats such as fatty meat, poultry, skin, mcdonald, sausage, whole milk, cream and butter. RD additionally discussed incorporating more fruits, vegetables, whole grains, and dried beans in pt's diet. Foods recommended/not recommended discussed, sample menus provided.     Comments: Spoke w/ pt at bedside, reports a good appetite, pt consuming all of breakfast. Discussed heart healthy options at bedside, handouts provided, pt w/ no additional questions for me today. NFPE not warranted as pt is well nourished w/ no s/s of malnutrition at this time. LBM noted- 5/5/23      All questions and concerns answered. Dietitian's contact information provided.       Follow-Up: Yes     Please Re-consult as needed        Thanks!

## 2023-05-05 NOTE — PROGRESS NOTES
"[facility] - Cardiology [facility] Medicine  Progress Note    Patient Name: Marianne Go  MRN: [id number]  Patient Class: IP- Inpatient   Admission Date: 5/4/2023  Length of Stay: 0 days  Attending Physician: Janine Alex MD  Primary Care Provider: Carlee Mcneil MD        Subjective:     Principal Problem:NSTEMI (non-ST elevated myocardial infarction)        HPI:  Marianne Go is a 47-year-old woman with a past medical history of DVT, history of nephrolithiasis, anxiety, depression and obesity who presents to the emergency department with chest pain. The patient says that her chest pain began earlier this afternoon. She says she has been having recurrent viral infections with bronchitis over the past several weeks. She says that her cough has been unrelenting and is productive of "green" sputum. She says this afternoon she sat on her couch and was drinking a glass of water when she began coughing and having severe chest pain. She says the pain was substernal but does radiate down her left upper extremity. She said that her neighbor who is a nurse came over to her home and listened to her with a stethoscope and appreciated some wheezing. She also took her blood pressure and reported a systolic blood pressure in the 190s. The patient used an albuterol inhaler but had little relief with her symptoms. Emergency medical services was called. The patient was given approximately four sublingual nitroglycerin tablets en route which she said eased her pain from to a "6/10." During my interview, the patient says that she still is having chest pain. She denies any dyspnea on exertion. She denies any fevers or chills. She denies any abdominal pain, nausea or emesis. She says her appetite has been unchanged.     In the emergency department, the patient was noted to have stable vital signs upon arrival. Labs significant for initial negative troponin with repeat troponin at 0.180 and subsequent repeat at 0.411. BNP " within normal limits. A CT angiogram was performed which revealed no evidence of pulmonary artery thromboembolism or other acute intrathoracic abnormality but scattered calcific atherosclerosis of the coronary vessels. She was loaded with aspirin and ticagrelor in the emergency department and started on a heparin infusion. Nitroglycerin was given for chest pain. Cardiology service was contacted. She was admitted to Hospital Medicine for further management.      Overview/Hospital Course:  Patient underwent LHC with the following findings:    Left Anterior Descending   There is mild diffuse disease throughout the vessel.   Mid LAD lesion was 55% stenosed.      First Diagonal Branch   1st Diag lesion was 50% stenosed.      Left Circumflex      Third Obtuse Marginal Branch   3rd Mrg lesion was 50% stenosed.      Right Coronary Artery   Mid RCA lesion was 100% stenosed.        She underwent TEE to mRCA w/0% stenosis following intervention.    TTE performed with the following findings:  Summary     The left ventricle is normal in size with concentric remodeling and normal systolic function. The estimated ejection fraction is 60%.   Normal right ventricular size with normal right ventricular systolic function.   Normal left ventricular diastolic function.   Intermediate central venous pressure (8 mmHg).        Interval History: Patient was seen and evaluated. She had an episode of chest pain this am- lasting 2min- described as pressure- relieved w/1x nitro w/radiation to the left jaw and left arm. Headache following nitro. Underwent LHC w/successful TEE. No nausea, vomiting, fevers, chills, night sweats, abd pain, diarrhea, LE edema.     Objective:     Vital Signs (Most Recent):  Temp: 97.4 °F (36.3 °C) (05/05/23 1212)  Pulse: 65 (05/05/23 1504)  Resp: 16 (05/05/23 1212)  BP: 126/66 (05/05/23 1315)  SpO2: 95 % (05/05/23 1212) Vital Signs (24h Range):  Temp:  [97.4 °F (36.3 °C)-98.4 °F (36.9 °C)] 97.4 °F (36.3  °C)  Pulse:  [50-78] 65  Resp:  [13-21] 16  SpO2:  [93 %-97 %] 95 %  BP: (107-163)/(58-95) 126/66     Weight: 117 kg (258 lb)  Body mass index is 47.19 kg/m².  No intake or output data in the 24 hours ending 05/05/23 1621      Physical Exam  Gen: in NAD, appears stated age  Neuro: AAOx3, motor, sensory, and strength grossly intact BL  HEENT: NTNC, EOMI, PERRL, MMM  CVS: RRR, no m/r/g; S1/S2 auscultated with no S3 or S4; capillary refill < 2 sec  Resp: lungs CTAB, no w/r/r; no belabored breathing or accessory muscle use appreciated   Abd: BS+ in all 4 quadrants; NTND, soft to palpation; no organomegaly appreciated   Extrem: pulses full, equal, and regular over all 4 extremities; no UE or LE edema BL    Significant Labs: All pertinent labs within the past 24 hours have been reviewed.  CBC:   Recent Labs   Lab 05/04/23  1847 05/05/23  0218 05/05/23  0554   WBC 11.39 10.07 9.30   HGB 15.0 14.1 13.7   HCT 44.5 42.1 39.6    386 395     CMP:   Recent Labs   Lab 05/04/23  1847 05/05/23  0554    139   K 3.8 3.6    106   CO2 21* 22*    101   BUN 18 14   CREATININE 0.8 0.7   CALCIUM 9.8 9.4   PROT 7.6  --    ALBUMIN 4.1  --    BILITOT 0.3  --    ALKPHOS 102  --    AST 15  --    ALT 21  --    ANIONGAP 12 11     Troponin:   Recent Labs   Lab 05/05/23  0554 05/05/23  0843 05/05/23  1428   TROPONINI 2.181* 3.484* 6.534*       Significant Imaging: I have reviewed all pertinent imaging results/findings within the past 24 hours.    LHC:  Left Anterior Descending   There is mild diffuse disease throughout the vessel.   Mid LAD lesion was 55% stenosed.      First Diagonal Branch   1st Diag lesion was 50% stenosed.      Left Circumflex      Third Obtuse Marginal Branch   3rd Mrg lesion was 50% stenosed.      Right Coronary Artery   Mid RCA lesion was 100% stenosed.        TTE:   The left ventricle is normal in size with concentric remodeling and normal systolic function. The estimated ejection fraction is  60%.   Normal right ventricular size with normal right ventricular systolic function.   Normal left ventricular diastolic function.   Intermediate central venous pressure (8 mmHg).      Assessment/Plan:      * NSTEMI (non-ST elevated myocardial infarction)  - patient w/cardiac-like chest pain, trop of 3.48 prior to LHC  - Started on acs protocol- heparin drip, asa  - loaded with brilinta  - LHC performed today- TEE to mRCA- went from 100% stenosis to 0%, also with diffuse calcium/stenosis to mLAD with 50% stenosis and 50% stenosis to OM3  - TTE w/the following findings:    The left ventricle is normal in size with concentric remodeling and normal systolic function. The estimated ejection fraction is 60%.   Normal right ventricular size with normal right ventricular systolic function.   Normal left ventricular diastolic function.   Intermediate central venous pressure (8 mmHg).  - Trop post-cath of 6- will keep patient to trend trop until downtrend- plan for dc afterwards  - Post-cath protocol in place- IVF at 150mL/hr for 4hrs  - Plan for asa/brilinta for 1yr  - Cardiac rehab at discharge     Hyperlipidemia  - continue HI statin      BMI 45.0-49.9, adult  Body mass index is 47.19 kg/m². Morbid obesity complicates all aspects of disease management from diagnostic modalities to treatment. Weight loss encouraged and health benefits explained to patient.         Anxiety and depression  - continue venlafaxine 37.5 mg daily.      History of DVT (deep vein thrombosis)  - h/o DVT at 15 years of age; denied current AC use        VTE Risk Mitigation (From admission, onward)         Ordered     heparin (porcine) injection  As needed (PRN)         05/05/23 1112     heparin infusion 1,000 units/500 ml in 0.9% NaCl (on sterile field)  As needed (PRN)         05/05/23 1056     IP VTE HIGH RISK PATIENT  Once         05/05/23 0050     Place sequential compression device  Until discontinued         05/05/23 0050                 Discharge Planning   AXEL: 5/6/2023     Code Status: Full Code   Is the patient medically ready for discharge?: Yes    Reason for patient still in hospital (select all that apply): Patient trending condition                     Janine Alex MD  Department of Hospital Medicine   [facility] - Cardiology Step[facility]

## 2023-05-05 NOTE — CONSULTS
[facility] - Cardiology Stepdown  Interventional Cardiology  Consult Note    Patient Name: Marianne Go  MRN: [id number]  Admission Date: 5/4/2023  Hospital Length of Stay: 0 days  Code Status: Full Code   Attending Provider: Janine Alex MD  Consulting Provider: Fazal Hearn MD  Primary Care Physician: Carlee Mcneil MD  Principal Problem:NSTEMI (non-ST elevated myocardial infarction)    Patient information was obtained from patient and past medical records.     Inpatient consult to Interventional Cardiology  Consult performed by: Fazal Hearn MD  Consult ordered by: Fazal Haern MD        Subjective:     Chief Complaint:  Chest pain      HPI:  Marianne Go is a 47-year-old woman with a past medical history of DVT, history of nephrolithiasis, anxiety, depression and obesity who presents to the emergency department with chest pain. Since having covid some time ago, she has had a frequent coughing and has been diagnosed with bronchitis and been on steroid taper over the past few weeks.  Today she began to feel short of breath this afternoon, and used her inhaler.  Her shortness of breath did not resolve, and her neighbor who is a nurse listened to her lungs and told her she was wheezing with poor air movement.  She then began to experience midsternal chest pain that radiated down her left arm.  This lasted for approximately 20 minutes and resolved with nitroglycerin with EMS.  She says she has had frequent episodes of reproducible chest pain over the past few weeks, which she attributed to coughing.  However, her chest pain today was different in nature than her previous episodes.  Denies prior history of coronary disease, and has never had any angiogram.  She is a former smoker, and has hyperlipidemia.    In the Emergency department, her systolic blood pressure was in the 140s-160s, and ECG was negative for acute ST changes.  Initial troponin was negative, before rising to 0.18 followed by 0.4.  She  complained of repeat chest pain in the emergency department, that resolved with nitroglycerin.  Kidney function and CBC within normal limits.  CT a was negative for acute PE.  She was started on ACS protocol with aspirin, Brilinta and heparin and cardiology was then called for evaluation of NSTEMI.          Past Medical History:   Diagnosis Date    Deep vein thrombosis 1990    pregnancy related     Kidney calculi     Kidney stones     Kidney stones     Migraines     perimenstrual        Past Surgical History:   Procedure Laterality Date    BLADDER SUSPENSION      COLONOSCOPY N/A 12/22/2022    Procedure: COLONOSCOPY;  Surgeon: Charity Rudolph MD;  Location: [facility] ([address] [place] [address]);  Service: Endoscopy;  Laterality: N/A;  instr portal-GT    CYSTOSCOPY W/ LASER LITHOTRIPSY      HYSTERECTOMY      PELVIC LAPAROSCOPY      ablation of endometriosis    TONSILLECTOMY      TUBAL LIGATION  2011    essure        Review of patient's allergies indicates:   Allergen Reactions    Topiramate Itching       PTA Medications   Medication Sig    albuterol (VENTOLIN HFA) 90 mcg/actuation inhaler Inhale 2 puffs into the lungs every 6 (six) hours as needed for Wheezing. Rescue    atorvastatin (LIPITOR) 10 MG tablet TAKE 1 TABLET(10 MG) BY MOUTH EVERY DAY    butalbital-acetaminophen-caffeine -40 mg (FIORICET, ESGIC) -40 mg per tablet Take 1 tablet by mouth every 4 (four) hours as needed for Pain.    meloxicam (MOBIC) 15 MG tablet TAKE 1 TABLET(15 MG) BY MOUTH EVERY DAY    multivitamin capsule Take 1 capsule by mouth once daily.    promethazine-dextromethorphan (PROMETHAZINE-DM) 6.25-15 mg/5 mL Syrp Take one tsp po q 6 hrs prn cough    sumatriptan (IMITREX) 50 MG tablet Take 1 tab by mouth at first symptom of headache, then may repeat x 1 an hour later    venlafaxine (EFFEXOR-XR) 37.5 MG 24 hr capsule TAKE 1 CAPSULE(37.5 MG) BY MOUTH EVERY DAY     Family History       Problem Relation (Age of Onset)     Allergic rhinitis Father    Asthma Son    Breast cancer Mother    Cancer Father    Colon cancer Maternal Uncle    Diabetes Mother    Hyperlipidemia Father, Mother    Hypertension Mother    Hypothyroidism Father, Mother          Tobacco Use    Smoking status: Former     Packs/day: 0.25     Years: 13.00     Pack years: 3.25     Types: Cigarettes     Quit date: 2003     Years since quittin.3    Smokeless tobacco: Never    Tobacco comments:     quit 11 years ago   Substance and Sexual Activity    Alcohol use: Yes     Comment: rarely    Drug use: No    Sexual activity: Yes     Partners: Male     Birth control/protection: See Surgical Hx     Review of Systems   Cardiovascular:  Positive for chest pain.   Respiratory:  Positive for cough and shortness of breath.    All other systems reviewed and are negative.  Objective:     Vital Signs (Most Recent):  Temp: 97.7 °F (36.5 °C) (23)  Pulse: (!) 50 (23)  Resp: 18 (23)  BP: (!) 140/79 (23)  SpO2: 96 % (23) Vital Signs (24h Range):  Temp:  [97.7 °F (36.5 °C)-98.4 °F (36.9 °C)] 97.7 °F (36.5 °C)  Pulse:  [50-78] 50  Resp:  [13-21] 18  SpO2:  [93 %-97 %] 96 %  BP: (125-163)/(66-95) 140/79     Weight: 117 kg (258 lb)  Body mass index is 47.19 kg/m².    SpO2: 96 %       No intake or output data in the 24 hours ending 23 0953    Lines/Drains/Airways       Peripheral Intravenous Line  Duration                  Peripheral IV - Single Lumen 20 G Left;Posterior Hand -- days         Peripheral IV - Single Lumen 23 1845 20 G Right Antecubital <1 day                     Physical Exam  Vitals and nursing note reviewed.   Constitutional:       Appearance: She is obese.   HENT:      Head: Normocephalic and atraumatic.      Right Ear: External ear normal.      Left Ear: External ear normal.      Nose: Nose normal.      Mouth/Throat:      Mouth: Mucous membranes are moist.   Eyes:      Pupils: Pupils are equal,  round, and reactive to light.   Cardiovascular:      Rate and Rhythm: Normal rate and regular rhythm.      Pulses: Normal pulses.   Pulmonary:      Effort: Pulmonary effort is normal.   Abdominal:      General: Abdomen is flat.   Musculoskeletal:         General: Normal range of motion.      Cervical back: Normal range of motion.      Right lower leg: No edema.      Left lower leg: No edema.   Skin:     General: Skin is warm and dry.      Capillary Refill: Capillary refill takes less than 2 seconds.   Neurological:      General: No focal deficit present.      Mental Status: She is alert and oriented to person, place, and time. Mental status is at baseline.          Significant Labs: All pertinent lab results from the last 24 hours have been reviewed.    Significant Imaging:  reviewed    Assessment and Plan:     Cardiac/Vascular  * NSTEMI (non-ST elevated myocardial infarction)  --C +/- PCI, patient is a TEE candidate  - Anti-platelet Therapy: ASA / Ticagrelor  - Access: Right radial  - Catheters: Wilfredo  - Creatinine/CrCl: 0.7  - Allergies: No shellfish / Iodine allergy  - Pre-Hydration: NS  - Pre-Op Med: Bendaryl 50mg pO   - All patient's questions were answered.  -The risks, benefits and alternatives of the procedure were explained to the patient.   -The risks of coronary angiography include but are not limited to: bleeding, infection, heart rhythm abnormalities, allergic reactions, kidney injury and potential need for dialysis, stroke and death.   - Should stenting be indicated, the patient has agreed to dual anti-platelet therapy for 1-consecutive year with a drug-eluting stent and a minimum of 1-month with the use of a bare metal stent  - Additionally, pt is aware that non-compliance is likely to result in stent clotting with heart attack, heart failure, and/or death  -The risks of moderate sedation include hypotension, respiratory depression, arrhythmias, bronchospasm, and death.   - Informed consent was  obtained and the  patient is agreeable to proceed with the procedure.          VTE Risk Mitigation (From admission, onward)         Ordered     heparin 25,000 units in dextrose 5% (100 units/ml) IV bolus from bag - ADDITIONAL PRN BOLUS - 60 units/kg (max bolus 4000 units)  As needed (PRN)        Question:  Heparin Infusion Adjustment (DO NOT MODIFY ANSWER)  Answer:  [email or web address]\epic\Images\Pharmacy\HeparinInfusions\heparin LOW INTENSITY nomogram for [facility] [id number]    05/05/23 0034     heparin 25,000 units in dextrose 5% (100 units/ml) IV bolus from bag - ADDITIONAL PRN BOLUS - 30 units/kg (max bolus 4000 units)  As needed (PRN)        Question:  Heparin Infusion Adjustment (DO NOT MODIFY ANSWER)  Answer:  [email or web address]\epic\Images\Pharmacy\HeparinInfusions\heparin LOW INTENSITY nomogram for [facility] [id number]    05/05/23 0034     IP VTE HIGH RISK PATIENT  Once         05/05/23 0050     Place sequential compression device  Until discontinued         05/05/23 0050     heparin 25,000 units in dextrose 5% 250 mL (100 units/mL) infusion LOW INTENSITY nomogram - [facility]  Continuous        Question Answer Comment   Heparin Infusion Adjustment (DO NOT MODIFY ANSWER) [email or web address]\epic\Images\Pharmacy\HeparinInfusions\heparin LOW INTENSITY nomogram for [facility] [id number]    Begin at (in units/kg/hr) 12        05/05/23 0034                Thank you for your consult. I will follow-up with patient. Please contact us if you have any additional questions.    Fazal Hearn MD  Interventional Cardiology   [facility] - [facility]

## 2023-05-05 NOTE — BRIEF OP NOTE
Brief Operative Note:    : Dhruv Scott MD     Referring Physician: Self,Aaareferral     All Operators: Surgeon(s):  MD Dhruv Quinonez MD John Dudley, MD     Preoperative Diagnosis: NSTEMI (non-ST elevated myocardial infarction) [I21.4]     Postop Diagnosis: NSTEMI (non-ST elevated myocardial infarction) [I21.4]    Treatments/Procedures: Procedure(s) (LRB):  Angiogram, Coronary Artery (Left)  Stent, Drug Eluting, Single Vessel, Coronary    Findings:Severe coronary disease is present. See catheterization report for full details.    -100% occlusion to mRCA reduced to 0 with 3.0 x 20 TEE  -diffuse calcium/stenosis to mLAD with 50% stenosis  -50% stenosis to OM3    Estimated Blood loss: 20 cc    Specimens removed: No    Recommendations:   - Routine post-cath care as per orders  - IVF at 150 cc/hr for 4 hrs  - Cardiac rehab referral, Continue medical management, Risk factor reduction, Follow-up with outpatient cardiologist  - asa/brilinta for 1 year    Fazal Hearn

## 2023-05-05 NOTE — HOSPITAL COURSE
Patient underwent LHC with the following findings:    Left Anterior Descending   There is mild diffuse disease throughout the vessel.   Mid LAD lesion was 55% stenosed.      First Diagonal Branch   1st Diag lesion was 50% stenosed.      Left Circumflex      Third Obtuse Marginal Branch   3rd Mrg lesion was 50% stenosed.      Right Coronary Artery   Mid RCA lesion was 100% stenosed.        She underwent TEE to mRCA w/0% stenosis following intervention.    TTE performed with the following findings:  Summary    The left ventricle is normal in size with concentric remodeling and normal systolic function. The estimated ejection fraction is 60%.  Normal right ventricular size with normal right ventricular systolic function.  Normal left ventricular diastolic function.  Intermediate central venous pressure (8 mmHg).  Patient had downtrending of trop. Deemed stable for discharge.    Pt deemed appropriate for discharge. Plan discussed with pt, who was agreeable and amenable; medications were discussed and reviewed, outpatient follow-up scheduled, ER precautions were given, all questions were answered to the pt's satisfaction, and Ms. Lopes was subsequently discharged.    Physical Exam  Gen: in NAD, appears stated age  Neuro: AAOx3, motor, sensory, and strength grossly intact BL  HEENT: NTNC, EOMI, PERRL, MMM  CVS: RRR, no m/r/g; S1/S2 auscultated with no S3 or S4; capillary refill < 2 sec  Resp: lungs CTAB, no w/r/r; no belabored breathing or accessory muscle use appreciated   Abd: BS+ in all 4 quadrants; NTND, soft to palpation; no organomegaly appreciated   Extrem: pulses full, equal, and regular over all 4 extremities; no UE or LE edema BL

## 2023-05-05 NOTE — SUBJECTIVE & OBJECTIVE
Interval History: Patient was seen and evaluated. She had an episode of chest pain this am- lasting 2min- described as pressure- relieved w/1x nitro w/radiation to the left jaw and left arm. Headache following nitro. Underwent LHC w/successful TEE. No nausea, vomiting, fevers, chills, night sweats, abd pain, diarrhea, LE edema.     Objective:     Vital Signs (Most Recent):  Temp: 97.4 °F (36.3 °C) (05/05/23 1212)  Pulse: 65 (05/05/23 1504)  Resp: 16 (05/05/23 1212)  BP: 126/66 (05/05/23 1315)  SpO2: 95 % (05/05/23 1212) Vital Signs (24h Range):  Temp:  [97.4 °F (36.3 °C)-98.4 °F (36.9 °C)] 97.4 °F (36.3 °C)  Pulse:  [50-78] 65  Resp:  [13-21] 16  SpO2:  [93 %-97 %] 95 %  BP: (107-163)/(58-95) 126/66     Weight: 117 kg (258 lb)  Body mass index is 47.19 kg/m².  No intake or output data in the 24 hours ending 05/05/23 1621      Physical Exam  Gen: in NAD, appears stated age  Neuro: AAOx3, motor, sensory, and strength grossly intact BL  HEENT: NTNC, EOMI, PERRL, MMM  CVS: RRR, no m/r/g; S1/S2 auscultated with no S3 or S4; capillary refill < 2 sec  Resp: lungs CTAB, no w/r/r; no belabored breathing or accessory muscle use appreciated   Abd: BS+ in all 4 quadrants; NTND, soft to palpation; no organomegaly appreciated   Extrem: pulses full, equal, and regular over all 4 extremities; no UE or LE edema BL    Significant Labs: All pertinent labs within the past 24 hours have been reviewed.  CBC:   Recent Labs   Lab 05/04/23  1847 05/05/23  0218 05/05/23  0554   WBC 11.39 10.07 9.30   HGB 15.0 14.1 13.7   HCT 44.5 42.1 39.6    386 395     CMP:   Recent Labs   Lab 05/04/23  1847 05/05/23  0554    139   K 3.8 3.6    106   CO2 21* 22*    101   BUN 18 14   CREATININE 0.8 0.7   CALCIUM 9.8 9.4   PROT 7.6  --    ALBUMIN 4.1  --    BILITOT 0.3  --    ALKPHOS 102  --    AST 15  --    ALT 21  --    ANIONGAP 12 11     Troponin:   Recent Labs   Lab 05/05/23  0554 05/05/23  0843 05/05/23  1428   TROPONINI 2.181*  3.484* 6.534*       Significant Imaging: I have reviewed all pertinent imaging results/findings within the past 24 hours.    LHC:  Left Anterior Descending   There is mild diffuse disease throughout the vessel.   Mid LAD lesion was 55% stenosed.      First Diagonal Branch   1st Diag lesion was 50% stenosed.      Left Circumflex      Third Obtuse Marginal Branch   3rd Mrg lesion was 50% stenosed.      Right Coronary Artery   Mid RCA lesion was 100% stenosed.        TTE:  The left ventricle is normal in size with concentric remodeling and normal systolic function. The estimated ejection fraction is 60%.  Normal right ventricular size with normal right ventricular systolic function.  Normal left ventricular diastolic function.  Intermediate central venous pressure (8 mmHg).

## 2023-05-05 NOTE — ASSESSMENT & PLAN NOTE
-admit to hospital medicine  -agree with acs protocol  -trend troponin to peak  -repeat ECG in am   -obtain TTE   -start metoprolol 25 BID, continue atorvastatin 10mg   -consult interventional cardiology, keep npo for possible angiogram   -prn SL NTG, please call cardiology if chest pain does not resolve with SL NTG or there is clinical change

## 2023-05-05 NOTE — PLAN OF CARE
Pt is now s/p PCI with mRCA stenting. Pt was found to have 100% occlusion of mRCA, diffuse calcium in the mLAD with 50% stenosis, and 50% OM3 stenosis. Pt is currently doing well. Echo showed EF 60% s/p PCI. Educated the patient on the importance on lifestyle changes such as by implementing a DASH or mediterranean diet, exercise as tolerated, and weight loss.     Recommendations  - Start verapamil  - Start long acting nitrate, Imdur  - Will need cardiac rehabilitation  - Trend troponin until downtrend  - continue ASA and ticagrelor   - Follow up outpatient cardiology clinic within 2 weeks for stress test.

## 2023-05-05 NOTE — CONSULTS
[facility] - Emergency Dept  Interventional Cardiology  Consult Note    Patient Name: Marianne Go  MRN: [id number]  Admission Date: 5/4/2023  Hospital Length of Stay: 0 days  Code Status: Full Code   Attending Provider: Janine Alex MD  Consulting Provider: Fazal Hearn MD  Primary Care Physician: Carlee Mcneil MD  Principal Problem:NSTEMI (non-ST elevated myocardial infarction)    Patient information was obtained from patient and past medical records.     Inpatient consult to Cardiology  Consult performed by: Fazal Hearn MD  Consult ordered by: Cecille Salazar MD      Subjective:     Chief Complaint:  Chest pain      HPI:  Marianne Go is a 47-year-old woman with a past medical history of DVT, history of nephrolithiasis, anxiety, depression and obesity who presents to the emergency department with chest pain. Since having covid some time ago, she has had a frequent coughing and has been diagnosed with bronchitis and been on steroid taper over the past few weeks.  Today she began to feel short of breath this afternoon, and used her inhaler.  Her shortness of breath did not resolve, and her neighbor who is a nurse listened to her lungs and told her she was wheezing with poor air movement.  She then began to experience midsternal chest pain that radiated down her left arm.  This lasted for approximately 20 minutes and resolved with nitroglycerin with EMS.  She says she has had frequent episodes of reproducible chest pain over the past few weeks, which she attributed to coughing.  However, her chest pain today was different in nature than her previous episodes.  Denies prior history of coronary disease, and has never had any angiogram.  She is a former smoker, and has hyperlipidemia.    In the Emergency department, her systolic blood pressure was in the 140s-160s, and ECG was negative for acute ST changes.  Initial troponin was negative, before rising to 0.18 followed by 0.4.  She complained of  repeat chest pain in the emergency department, that resolved with nitroglycerin.  Kidney function and CBC within normal limits.  CT a was negative for acute PE.  She was started on ACS protocol with aspirin, Brilinta and heparin and cardiology was then called for evaluation of NSTEMI.          Past Medical History:   Diagnosis Date    Deep vein thrombosis     pregnancy related     Kidney calculi     Kidney stones     Kidney stones     Migraines     perimenstrual        Past Surgical History:   Procedure Laterality Date    BLADDER SUSPENSION      COLONOSCOPY N/A 2022    Procedure: COLONOSCOPY;  Surgeon: Charity Rudolph MD;  Location: [facility] ([address] [place] [address]);  Service: Endoscopy;  Laterality: N/A;  instr portal-GT    CYSTOSCOPY W/ LASER LITHOTRIPSY      HYSTERECTOMY      PELVIC LAPAROSCOPY      ablation of endometriosis    TONSILLECTOMY      TUBAL LIGATION      essure        Review of patient's allergies indicates:   Allergen Reactions    Topiramate Itching       (Not in a hospital admission)    Family History       Problem Relation (Age of Onset)    Allergic rhinitis Father    Asthma Son    Breast cancer Mother    Cancer Father    Colon cancer Maternal Uncle    Diabetes Mother    Hyperlipidemia Father, Mother    Hypertension Mother    Hypothyroidism Father, Mother          Tobacco Use    Smoking status: Former     Packs/day: 0.25     Years: 13.00     Pack years: 3.25     Types: Cigarettes     Quit date: 2003     Years since quittin.3    Smokeless tobacco: Never    Tobacco comments:     quit 11 years ago   Substance and Sexual Activity    Alcohol use: Yes     Comment: rarely    Drug use: No    Sexual activity: Yes     Partners: Male     Birth control/protection: See Surgical Hx     Review of Systems   Cardiovascular:  Positive for chest pain.   Respiratory:  Positive for cough and shortness of breath.    All other systems reviewed and are negative.  Objective:     Vital Signs (Most  Recent):  Temp: 98.4 °F (36.9 °C) (05/04/23 1709)  Pulse: (!) 56 (05/05/23 0133)  Resp: 16 (05/05/23 0133)  BP: (!) 148/95 (05/05/23 0042)  SpO2: 97 % (05/05/23 0133) Vital Signs (24h Range):  Temp:  [98.4 °F (36.9 °C)] 98.4 °F (36.9 °C)  Pulse:  [56-76] 56  Resp:  [13-21] 16  SpO2:  [93 %-97 %] 97 %  BP: (126-163)/(74-95) 148/95     Weight: 117 kg (258 lb)  Body mass index is 47.19 kg/m².    SpO2: 97 %       No intake or output data in the 24 hours ending 05/05/23 0219    Lines/Drains/Airways       Peripheral Intravenous Line  Duration                  Peripheral IV - Single Lumen 05/04/23 1845 20 G Right Antecubital <1 day                     Physical Exam  Vitals and nursing note reviewed.   Constitutional:       Appearance: She is obese.   HENT:      Head: Normocephalic and atraumatic.      Right Ear: External ear normal.      Left Ear: External ear normal.      Nose: Nose normal.      Mouth/Throat:      Mouth: Mucous membranes are moist.   Eyes:      Pupils: Pupils are equal, round, and reactive to light.   Cardiovascular:      Rate and Rhythm: Normal rate and regular rhythm.      Pulses: Normal pulses.   Pulmonary:      Effort: Pulmonary effort is normal.   Abdominal:      General: Abdomen is flat.   Musculoskeletal:         General: Normal range of motion.      Cervical back: Normal range of motion.      Right lower leg: No edema.      Left lower leg: No edema.   Skin:     General: Skin is warm and dry.      Capillary Refill: Capillary refill takes less than 2 seconds.   Neurological:      General: No focal deficit present.      Mental Status: She is alert and oriented to person, place, and time. Mental status is at baseline.          Significant Labs: All pertinent lab results from the last 24 hours have been reviewed.    Significant Imaging:  reviewed    Assessment and Plan:     Cardiac/Vascular  * NSTEMI (non-ST elevated myocardial infarction)  -admit to hospital medicine  -agree with acs protocol  -trend  troponin to peak  -repeat ECG in am   -obtain TTE   -start metoprolol 25 BID, change atorvastatin to high intensity dosing   -consult interventional cardiology, keep npo for possible angiogram   -prn SL NTG, please call cardiology if chest pain does not resolve with SL NTG or there is clinical change         VTE Risk Mitigation (From admission, onward)           Ordered     heparin 25,000 units in dextrose 5% (100 units/ml) IV bolus from bag - ADDITIONAL PRN BOLUS - 60 units/kg (max bolus 4000 units)  As needed (PRN)        Question:  Heparin Infusion Adjustment (DO NOT MODIFY ANSWER)  Answer:  \\[email or web address]\epic\Images\Pharmacy\HeparinInfusions\heparin LOW INTENSITY nomogram for [facility] [id number].pdf    05/05/23 0034     heparin 25,000 units in dextrose 5% (100 units/ml) IV bolus from bag - ADDITIONAL PRN BOLUS - 30 units/kg (max bolus 4000 units)  As needed (PRN)        Question:  Heparin Infusion Adjustment (DO NOT MODIFY ANSWER)  Answer:  \\[email or web address]\epic\Images\Pharmacy\HeparinInfusions\heparin LOW INTENSITY nomogram for [facility] [id number].pdf    05/05/23 0034     IP VTE HIGH RISK PATIENT  Once         05/05/23 0050     Place sequential compression device  Until discontinued         05/05/23 0050     heparin 25,000 units in dextrose 5% 250 mL (100 units/mL) infusion LOW INTENSITY nomogram - [facility]  Continuous        Question Answer Comment   Heparin Infusion Adjustment (DO NOT MODIFY ANSWER) \\[email or web address]\epic\Images\Pharmacy\HeparinInfusions\heparin LOW INTENSITY nomogram for [facility] [id number].pdf    Begin at (in units/kg/hr) 12        05/05/23 0034                    Thank you for your consult. I will follow-up with patient. Please contact us if you have any additional questions.     Fazal eHarn MD  Interventional Cardiology   [facility] - Emergency Dept

## 2023-05-05 NOTE — SUBJECTIVE & OBJECTIVE
Past Medical History:   Diagnosis Date    Deep vein thrombosis 1990    pregnancy related     Kidney calculi     Kidney stones     Kidney stones     Migraines     perimenstrual        Past Surgical History:   Procedure Laterality Date    BLADDER SUSPENSION      COLONOSCOPY N/A 12/22/2022    Procedure: COLONOSCOPY;  Surgeon: Charity Rudolph MD;  Location: [address]);  Service: Endoscopy;  Laterality: N/A;  instr portal-GT    CYSTOSCOPY W/ LASER LITHOTRIPSY      HYSTERECTOMY      PELVIC LAPAROSCOPY      ablation of endometriosis    TONSILLECTOMY      TUBAL LIGATION  2011    essure        Review of patient's allergies indicates:   Allergen Reactions    Topiramate Itching       PTA Medications   Medication Sig    albuterol (VENTOLIN HFA) 90 mcg/actuation inhaler Inhale 2 puffs into the lungs every 6 (six) hours as needed for Wheezing. Rescue    atorvastatin (LIPITOR) 10 MG tablet TAKE 1 TABLET(10 MG) BY MOUTH EVERY DAY    butalbital-acetaminophen-caffeine -40 mg (FIORICET, ESGIC) -40 mg per tablet Take 1 tablet by mouth every 4 (four) hours as needed for Pain.    meloxicam (MOBIC) 15 MG tablet TAKE 1 TABLET(15 MG) BY MOUTH EVERY DAY    multivitamin capsule Take 1 capsule by mouth once daily.    promethazine-dextromethorphan (PROMETHAZINE-DM) 6.25-15 mg/5 mL Syrp Take one tsp po q 6 hrs prn cough    sumatriptan (IMITREX) 50 MG tablet Take 1 tab by mouth at first symptom of headache, then may repeat x 1 an hour later    venlafaxine (EFFEXOR-XR) 37.5 MG 24 hr capsule TAKE 1 CAPSULE(37.5 MG) BY MOUTH EVERY DAY     Family History       Problem Relation (Age of Onset)    Allergic rhinitis Father    Asthma Son    Breast cancer Mother    Cancer Father    Colon cancer Maternal Uncle    Diabetes Mother    Hyperlipidemia Father, Mother    Hypertension Mother    Hypothyroidism Father, Mother          Tobacco Use    Smoking status: Former     Packs/day: 0.25     Years: 13.00     Pack years: 3.25     Types:  Cigarettes     Quit date: 2003     Years since quittin.3    Smokeless tobacco: Never    Tobacco comments:     quit 11 years ago   Substance and Sexual Activity    Alcohol use: Yes     Comment: rarely    Drug use: No    Sexual activity: Yes     Partners: Male     Birth control/protection: See Surgical Hx     Review of Systems   Cardiovascular:  Positive for chest pain.   Respiratory:  Positive for cough and shortness of breath.    All other systems reviewed and are negative.  Objective:     Vital Signs (Most Recent):  Temp: 97.7 °F (36.5 °C) (23)  Pulse: (!) 50 (23)  Resp: 18 (23)  BP: (!) 140/79 (23)  SpO2: 96 % (23) Vital Signs (24h Range):  Temp:  [97.7 °F (36.5 °C)-98.4 °F (36.9 °C)] 97.7 °F (36.5 °C)  Pulse:  [50-78] 50  Resp:  [13-21] 18  SpO2:  [93 %-97 %] 96 %  BP: (125-163)/(66-95) 140/79     Weight: 117 kg (258 lb)  Body mass index is 47.19 kg/m².    SpO2: 96 %       No intake or output data in the 24 hours ending 23 0953    Lines/Drains/Airways       Peripheral Intravenous Line  Duration                  Peripheral IV - Single Lumen 20 G Left;Posterior Hand -- days         Peripheral IV - Single Lumen 23 1845 20 G Right Antecubital <1 day                     Physical Exam  Vitals and nursing note reviewed.   Constitutional:       Appearance: She is obese.   HENT:      Head: Normocephalic and atraumatic.      Right Ear: External ear normal.      Left Ear: External ear normal.      Nose: Nose normal.      Mouth/Throat:      Mouth: Mucous membranes are moist.   Eyes:      Pupils: Pupils are equal, round, and reactive to light.   Cardiovascular:      Rate and Rhythm: Normal rate and regular rhythm.      Pulses: Normal pulses.   Pulmonary:      Effort: Pulmonary effort is normal.   Abdominal:      General: Abdomen is flat.   Musculoskeletal:         General: Normal range of motion.      Cervical back: Normal range of motion.      Right  lower leg: No edema.      Left lower leg: No edema.   Skin:     General: Skin is warm and dry.      Capillary Refill: Capillary refill takes less than 2 seconds.   Neurological:      General: No focal deficit present.      Mental Status: She is alert and oriented to person, place, and time. Mental status is at baseline.          Significant Labs: All pertinent lab results from the last 24 hours have been reviewed.    Significant Imaging:  reviewed

## 2023-05-05 NOTE — HPI
"Marianne Go is a 47-year-old woman with a past medical history of DVT, history of nephrolithiasis, anxiety, depression and obesity who presents to the emergency department with chest pain. The patient says that her chest pain began earlier this afternoon. She says she has been having recurrent viral infections with bronchitis over the past several weeks. She says that her cough has been unrelenting and is productive of "green" sputum. She says this afternoon she sat on her couch and was drinking a glass of water when she began coughing and having severe chest pain. She says the pain was substernal but does radiate down her left upper extremity. She said that her neighbor who is a nurse came over to her home and listened to her with a stethoscope and appreciated some wheezing. She also took her blood pressure and reported a systolic blood pressure in the 190s. The patient used an albuterol inhaler but had little relief with her symptoms. Emergency medical services was called. The patient was given approximately four sublingual nitroglycerin tablets en route which she said eased her pain from to a "6/10." During my interview, the patient says that she still is having chest pain. She denies any dyspnea on exertion. She denies any fevers or chills. She denies any abdominal pain, nausea or emesis. She says her appetite has been unchanged.     In the emergency department, the patient was noted to have stable vital signs upon arrival. Labs significant for initial negative troponin with repeat troponin at 0.180 and subsequent repeat at 0.411. BNP within normal limits. A CT angiogram was performed which revealed no evidence of pulmonary artery thromboembolism or other acute intrathoracic abnormality but scattered calcific atherosclerosis of the coronary vessels. She was loaded with aspirin and ticagrelor in the emergency department and started on a heparin infusion. Nitroglycerin was given for chest pain. Cardiology " service was contacted. She was admitted to Hospital Medicine for further management.

## 2023-05-05 NOTE — ASSESSMENT & PLAN NOTE
- patient w/cardiac-like chest pain, trop of 3.48 prior to LHC  - Started on acs protocol- heparin drip, asa  - loaded with brilinta  - LHC performed today- TEE to mRCA- went from 100% stenosis to 0%, also with diffuse calcium/stenosis to mLAD with 50% stenosis and 50% stenosis to OM3  - TTE w/the following findings:    The left ventricle is normal in size with concentric remodeling and normal systolic function. The estimated ejection fraction is 60%.   Normal right ventricular size with normal right ventricular systolic function.   Normal left ventricular diastolic function.   Intermediate central venous pressure (8 mmHg).  - Trop post-cath of 6- will keep patient to trend trop until downtrend- plan for dc afterwards  - Post-cath protocol in place- IVF at 150mL/hr for 4hrs  - Plan for asa/brilinta for 1yr  - Cardiac rehab at discharge

## 2023-05-05 NOTE — ED PROVIDER NOTES
Encounter Date: 5/4/2023       History     Chief Complaint   Patient presents with    Chest Pain     Arrived via ems from home with c/o chest pain reproducible     46 yo W with pmhx remote provoked DVT (1990) off anticoagulation, nephrolithiasis, migraines, obesity, anxiety, depression, HLD presents with a chief complaint of difficulty in breathing.  Patient notes that at approximately 4:00 p.m. she developed difficulty in breathing.  She then developed tightness to upper chest and neck.  Then pain that began radiating down the inner aspect of her left upper arm.  She reports similar episodes in the past but less severe than this one.  Her difficulty in breathing has improved somewhat but the pain persists.  She does endorse coughing for the past 4 months suffering from recurrent URIs.  No immobilization.  No recent travel.  No exogenous hormones.  No rashes.  No facial swelling.  No trauma to the arm or increased use.  She does endorse a headache and reports that it is consistent with previous migraines.  Associated with photophobia.  She typically takes caffeine for her headaches.      Review of patient's allergies indicates:   Allergen Reactions    Topiramate Itching     Past Medical History:   Diagnosis Date    Deep vein thrombosis 1990    pregnancy related     Kidney calculi     Kidney stones     Kidney stones     Migraines     perimenstrual      Past Surgical History:   Procedure Laterality Date    BLADDER SUSPENSION      COLONOSCOPY N/A 12/22/2022    Procedure: COLONOSCOPY;  Surgeon: Charity Rudolph MD;  Location: [facility] ([address] [place] [address]);  Service: Endoscopy;  Laterality: N/A;  instr portal-GT    CYSTOSCOPY W/ LASER LITHOTRIPSY      HYSTERECTOMY      PELVIC LAPAROSCOPY      ablation of endometriosis    TONSILLECTOMY      TUBAL LIGATION  2011    essure      Family History   Problem Relation Age of Onset    Hyperlipidemia Father     Hypothyroidism Father     Cancer Father         lung cancer     Allergic  "rhinitis Father     Asthma Son     Hyperlipidemia Mother     Hypertension Mother     Diabetes Mother     Hypothyroidism Mother     Breast cancer Mother         bilateral mastectomy 2018,2019    Colon cancer Maternal Uncle         40s     Ovarian cancer Neg Hx      Social History     Tobacco Use    Smoking status: Former     Packs/day: 0.25     Years: 13.00     Pack years: 3.25     Types: Cigarettes     Quit date: 2003     Years since quittin.3    Smokeless tobacco: Never    Tobacco comments:     quit 11 years ago   Substance Use Topics    Alcohol use: Yes     Comment: rarely    Drug use: No     Review of Systems    Physical Exam     Initial Vitals [23 1709]   BP Pulse Resp Temp SpO2   126/84 74 20 98.4 °F (36.9 °C) (!) 93 %      MAP       --         Physical Exam    Nursing note and vitals reviewed.  Constitutional: She appears well-developed and well-nourished. She is not diaphoretic. No distress.   Uncomfortable appearing   HENT:   Head: Normocephalic and atraumatic.   Mouth/Throat: Oropharynx is clear and moist.   Phonation normal   Eyes: EOM are normal. Pupils are equal, round, and reactive to light. Right eye exhibits no discharge. Left eye exhibits no discharge. No scleral icterus.   Neck: Neck supple. No JVD present.   No meningeal signs  Anterior neck is normal without any tenderness over area of "tightness"   Normal range of motion.  Cardiovascular:  Normal rate, regular rhythm, normal heart sounds and intact distal pulses.     Exam reveals no gallop and no friction rub.       No murmur heard.  Pulmonary/Chest: Breath sounds normal. No stridor. No respiratory distress. She has no wheezes. She has no rhonchi. She has no rales. She exhibits no tenderness.   Abdominal: Abdomen is soft. Bowel sounds are normal. She exhibits no distension and no mass. There is no abdominal tenderness. There is no rebound and no guarding.   Musculoskeletal:         General: Tenderness (Tenderness to media aspect of " left bicep, with no fullness or palpable cords) present. No edema. Normal range of motion.      Cervical back: Normal range of motion and neck supple.      Comments: Full active range of motion of left upper extremity     Neurological: She is alert and oriented to person, place, and time. She has normal strength. No sensory deficit.   Skin: Skin is warm and dry. Capillary refill takes less than 2 seconds.   Psychiatric: Her mood appears anxious.       ED Course   Procedures  Labs Reviewed   CBC W/ AUTO DIFFERENTIAL - Abnormal; Notable for the following components:       Result Value    MCH 32.1 (*)     Gran # (ANC) 8.8 (*)     Gran % 77.0 (*)     Lymph % 16.1 (*)     All other components within normal limits   COMPREHENSIVE METABOLIC PANEL - Abnormal; Notable for the following components:    CO2 21 (*)     All other components within normal limits   HIV 1 / 2 ANTIBODY    Narrative:     Release to patient->Immediate   HEPATITIS C ANTIBODY    Narrative:     Release to patient->Immediate   TROPONIN I   B-TYPE NATRIURETIC PEPTIDE   SARS-COV-2 RNA AMPLIFICATION, QUAL   TROPONIN I   POCT TROPONIN     EKG Readings: (Independently Interpreted)   Initial Reading: No STEMI. Rhythm: Sinus Bradycardia. Heart Rate: 51. ST Segments: Normal ST Segments. T Waves: Normal. Axis: Normal.     Imaging Results    None          Medications   caffeine 250mg powder (250 mg Oral Given 5/4/23 1951)   acetaminophen tablet 650 mg (650 mg Oral Given 5/4/23 1951)     Medical Decision Making:   History:   I obtained history from: someone other than patient.  Old Medical Records: I decided to obtain old medical records.  Initial Assessment:   46 yo W with pmhx remote provoked DVT (1990) off anticoagulation, nephrolithiasis, migraines, obesity, anxiety, depression, HLD presents with a chief complaint of difficulty in breathing, upper chest tightness, left arm pain.  Differential Diagnosis:   ACS, PE, pneumonia, pneumothorax, migraine    Reassuring  "hemodynamics are inconsistent with cardiac tamponade or aortic dissection  No urticaria or evidence of angioedema or anaphylaxis.  No fever meningeal signs to suggest meningitis  Clinical Tests:   Lab Tests: Ordered  Radiological Study: Ordered  Medical Tests: Ordered  ED Management:  Will administer acetaminophen and caffeine.  Will obtain a 2 troponin rule out.  Given high risk Wells score, will obtain a CTA chest.  Will monitor.    Reassessment:  COVID negative.  CBC without leukocytosis or anemia.  CMP normal.  Troponin and BNP are normal.  On repeat evaluation, patient is more alert and able to open her eyes.  She appears more comfortable.  SpO2 95% on room air which improved from 93% previously.  She reports her headache has improved and she is free of her shortness of breath.  She still reports pain in the left arm and some "tightness" in the left chest but it is improving.  She is most concerned about pain in left arm.  It has moved in location somewhat and is now slightly radiating up proximally towards the left trapezium.  Patient notes that she has been coughing so severely that she actually has a partial tear on the rotator cuff on her right.  This pain radiating up towards her left neck feels similar to that but is on a new side.  She is suspicious that if the scan is negative this could be an etiology of her symptoms.  At this time, my shift is coming to a close the patient has had doubt incoming MD. Patient awaiting repeat troponin and CTA chest.                        Clinical Impression:   Final diagnoses:  [R07.9] Chest pain  [M79.602] Pain of left upper extremity (Primary)               Jeovanny Wills MD  05/04/23 2046    "

## 2023-05-05 NOTE — ASSESSMENT & PLAN NOTE
--C +/- PCI, patient is a TEE candidate  - Anti-platelet Therapy: ASA / Ticagrelor  - Access: Right radial  - Catheters: Wilfredo  - Creatinine/CrCl: 0.7  - Allergies: No shellfish / Iodine allergy  - Pre-Hydration: NS  - Pre-Op Med: Bendaryl 50mg pO   - All patient's questions were answered.  -The risks, benefits and alternatives of the procedure were explained to the patient.   -The risks of coronary angiography include but are not limited to: bleeding, infection, heart rhythm abnormalities, allergic reactions, kidney injury and potential need for dialysis, stroke and death.   - Should stenting be indicated, the patient has agreed to dual anti-platelet therapy for 1-consecutive year with a drug-eluting stent and a minimum of 1-month with the use of a bare metal stent  - Additionally, pt is aware that non-compliance is likely to result in stent clotting with heart attack, heart failure, and/or death  -The risks of moderate sedation include hypotension, respiratory depression, arrhythmias, bronchospasm, and death.   - Informed consent was obtained and the  patient is agreeable to proceed with the procedure.

## 2023-05-05 NOTE — SUBJECTIVE & OBJECTIVE
Past Medical History:   Diagnosis Date    Deep vein thrombosis 1990    pregnancy related     Kidney calculi     Kidney stones     Kidney stones     Migraines     perimenstrual        Past Surgical History:   Procedure Laterality Date    BLADDER SUSPENSION      COLONOSCOPY N/A 12/22/2022    Procedure: COLONOSCOPY;  Surgeon: Charity Rudolph MD;  Location: [address]);  Service: Endoscopy;  Laterality: N/A;  instr portal-GT    CYSTOSCOPY W/ LASER LITHOTRIPSY      HYSTERECTOMY      PELVIC LAPAROSCOPY      ablation of endometriosis    TONSILLECTOMY      TUBAL LIGATION  2011    essure        Review of patient's allergies indicates:   Allergen Reactions    Topiramate Itching       No current facility-administered medications on file prior to encounter.     Current Outpatient Medications on File Prior to Encounter   Medication Sig    albuterol (VENTOLIN HFA) 90 mcg/actuation inhaler Inhale 2 puffs into the lungs every 6 (six) hours as needed for Wheezing. Rescue    atorvastatin (LIPITOR) 10 MG tablet TAKE 1 TABLET(10 MG) BY MOUTH EVERY DAY    butalbital-acetaminophen-caffeine -40 mg (FIORICET, ESGIC) -40 mg per tablet Take 1 tablet by mouth every 4 (four) hours as needed for Pain.    meloxicam (MOBIC) 15 MG tablet TAKE 1 TABLET(15 MG) BY MOUTH EVERY DAY    multivitamin capsule Take 1 capsule by mouth once daily.    promethazine-dextromethorphan (PROMETHAZINE-DM) 6.25-15 mg/5 mL Syrp Take one tsp po q 6 hrs prn cough    sumatriptan (IMITREX) 50 MG tablet Take 1 tab by mouth at first symptom of headache, then may repeat x 1 an hour later    venlafaxine (EFFEXOR-XR) 37.5 MG 24 hr capsule TAKE 1 CAPSULE(37.5 MG) BY MOUTH EVERY DAY     Family History       Problem Relation (Age of Onset)    Allergic rhinitis Father    Asthma Son    Breast cancer Mother    Cancer Father    Colon cancer Maternal Uncle    Diabetes Mother    Hyperlipidemia Father, Mother    Hypertension Mother    Hypothyroidism Father, Mother           Tobacco Use    Smoking status: Former     Packs/day: 0.25     Years: 13.00     Pack years: 3.25     Types: Cigarettes     Quit date: 2003     Years since quittin.3    Smokeless tobacco: Never    Tobacco comments:     quit 11 years ago   Substance and Sexual Activity    Alcohol use: Yes     Comment: rarely    Drug use: No    Sexual activity: Yes     Partners: Male     Birth control/protection: See Surgical Hx     Review of Systems   Constitutional:  Positive for fatigue. Negative for appetite change, chills and fever.   HENT:  Negative for congestion, postnasal drip, rhinorrhea, sneezing and sore throat.    Eyes:  Negative for photophobia and visual disturbance.   Respiratory:  Positive for cough and chest tightness. Negative for shortness of breath.    Cardiovascular:  Positive for chest pain. Negative for palpitations and leg swelling.   Gastrointestinal:  Negative for abdominal distention, abdominal pain, constipation, diarrhea, nausea and vomiting.   Endocrine: Negative for polyphagia and polyuria.   Genitourinary:  Negative for difficulty urinating, dysuria and urgency.   Musculoskeletal:  Negative for arthralgias, back pain and gait problem.   Skin:  Negative for rash and wound.   Neurological:  Negative for dizziness, tremors, seizures, light-headedness and headaches.   Psychiatric/Behavioral:  Negative for agitation, behavioral problems and confusion.    Objective:     Vital Signs (Most Recent):  Temp: 98.4 °F (36.9 °C) (23 1709)  Pulse: 76 (23 2334)  Resp: 13 (23 233)  BP: (!) 163/93 (23 233)  SpO2: 97 % (23)   Vital Signs (24h Range):  Temp:  [98.4 °F (36.9 °C)] 98.4 °F (36.9 °C)  Pulse:  [62-76] 76  Resp:  [13-21] 13  SpO2:  [93 %-97 %] 97 %  BP: (126-163)/(74-93) 163/93     Weight: 117 kg (258 lb)  Body mass index is 47.19 kg/m².     Physical Exam  Vitals reviewed.   Constitutional:       General: She is not in acute distress.     Appearance: Normal  appearance. She is obese. She is not ill-appearing, toxic-appearing or diaphoretic.      Comments: Pleasant woman in no acute distress but appears uncomfortable. Cooperative with examination and conversant with interview.   HENT:      Head: Normocephalic and atraumatic.      Right Ear: External ear normal.      Left Ear: External ear normal.      Nose: Nose normal. No congestion or rhinorrhea.      Mouth/Throat:      Mouth: Mucous membranes are moist.      Pharynx: Oropharynx is clear. No oropharyngeal exudate or posterior oropharyngeal erythema.   Eyes:      General: No scleral icterus.        Right eye: No discharge.         Left eye: No discharge.      Extraocular Movements: Extraocular movements intact.   Cardiovascular:      Rate and Rhythm: Normal rate and regular rhythm.      Heart sounds: No murmur heard.    No friction rub. No gallop.   Pulmonary:      Effort: Pulmonary effort is normal. No respiratory distress.      Breath sounds: Normal breath sounds. No stridor. No wheezing, rhonchi or rales.   Abdominal:      General: Bowel sounds are normal. There is no distension.      Palpations: Abdomen is soft. There is no mass.      Tenderness: There is no abdominal tenderness. There is no guarding.      Hernia: No hernia is present.   Musculoskeletal:      Cervical back: Normal range of motion and neck supple.      Right lower leg: No edema.      Left lower leg: No edema.   Skin:     General: Skin is warm and dry.   Neurological:      General: No focal deficit present.      Mental Status: She is alert and oriented to person, place, and time. Mental status is at baseline.      Sensory: No sensory deficit.      Motor: No weakness.   Psychiatric:         Mood and Affect: Mood normal.         Behavior: Behavior normal.         Thought Content: Thought content normal.              Significant Labs: All pertinent labs within the past 24 hours have been reviewed.  CBC:   Recent Labs   Lab 05/04/23  1847   WBC 11.39    HGB 15.0   HCT 44.5        CMP:   Recent Labs   Lab 05/04/23  1847      K 3.8      CO2 21*      BUN 18   CREATININE 0.8   CALCIUM 9.8   PROT 7.6   ALBUMIN 4.1   BILITOT 0.3   ALKPHOS 102   AST 15   ALT 21   ANIONGAP 12       Significant Imaging: I have reviewed all pertinent imaging results/findings within the past 24 hours.

## 2023-05-05 NOTE — PROVIDER PROGRESS NOTES - EMERGENCY DEPT.
I have assumed care for this patient from Dr. Wills          9:05 PM . I have reviewed case, and have seen patient. I have read the chart and discussed care with the previous attending. I agree with the plan as previously determined. Since assuming care, the patient's course includes:      46 yo F with chest pain, radiating down the left arm  Hx of DVT    Pending:   trop, CTA r/o PE    Patient reported sharp chest pain that started while having a bad bout of cough  Left sided sharp chest pain with tingling and pain on the medial left side arm/forearm/5th digit (left arm pain worse w/ elevation of the LUE), lefy side chest pain reproducible on palpation,. She was wheezing at the time, improved with albuterol    Based on the description of the event, reproducible pain, + wheezing at the time, unlikely ACS    Currently comfortable  CTAB    CTA  trop pending    CTA negative for PE, repeat trop 0.18   Patient reports mild pain 2/10, plan for another nitroglycerin, she looks comfortable in the room     12:34 AM  Chest pain 5/10, ekg no acute ischemia, nitro SL, cardiology consulted  Heparin  ordered, rpt trop 0.4      12:48 AM  Pain 1/10 after nitro    2:10 AM  Evaluated by cardiology recs for NPO, admit to     Dg: NSTEMI    Aggregate Critical Care Time by Attending (exclusive of procedural time) = 30 minutes         During the Emergency Depment visit, there was a high probability of imminent or life threatening deterioration in the patient's condition necessitating medical decision  making of a high complexity. Organ systems that were compromised/potentially compromised                                         cardiovascular                      Pt required constant monitoring because of the potential for them to deteriorate at any moment. Critical care was time spent personally by me on the following activities:  Development of treatment plan with patient or surrogate; discussion with consultant, evaluation of  patient's response to treatment, examination of patient, obtaining history from patient or surrogate, ordering and performing treatments and interventions, ordering and reviewing of laboratory studies, ordering and review of radiographic studies, vitals, re-evaluation of patient' condition and review of old charts            The failure to initiate the interventions performed in the Emergency Department on an urgent basis would have likely resulted in sudden, clinically significant or life threatening deterioration in the patient's condition.

## 2023-05-05 NOTE — LETTER
May 5, 2023    Marianne Go  [address]  [place] [address]             [facility]  [address] [facility].  [place] [address]  Phone: [phone]                                  [facility]   [address] [facility]   [place] [address]  [phone]         [facility]   [address] [place] [address] [phone]         [facility]    [address] [facility] [address]  [place] [address] [phone]   Re: Marianne Go  Clinic number: [id number]    Dear Ms. Go:    You were recently admitted to an [facility] facility for cardiac (heart) problem.  Your physician has referred you to [facility].  Cardiac Rehab Phase 2 is an educational and exercise program, conducted in a outpatient setting, proven to help reduce your risk for recurrent heart events.    Cardiac rehab has two major parts:    1. Exercise training to help you achieve cardiovascular fitness while learning how to exercise safely and improve muscle strength and endurance.  Your exercise prescription will be based on the results of the cardiopulmonary stress test (CPX) which will be done before entering the program and at completion.  2. Education, counseling and training to help you understand your heart condition and find ways to reduce your risk of future heart problems.  The cardiac rehab team will help you learn how to cope with the stress of adjusting to a new lifestyle and to deal with your fears about the future.    Phase 2 is a 36-session program, meeting 3 times a week for 12 weeks.  Each session consists of an hour of exercise and half-hour dedicated to the educational topic of the day.  Class days vary per location.  Please contact your nearest facility for details.    Through cardiac rehab you will learn:  About your heart condition, medical therapies, and medication  Risk factors in y our lifestyle contributing to heart  disease  New strategies to modify your risk factors  About a healthy diet that can lower your blood cholesterol, control weight, help prevent or control high blood pressure, and diabetes  How to stop smoking  How to manage stress    If you are interested in getting started, call the [facility] of your choosing.     Sincerely,     [facility]

## 2023-05-05 NOTE — PLAN OF CARE
Patient AAOx4. Plan ofcare reviewed with patient and mom at bedside. All questions, comments, concerns addressed. Vitals q 4hrs and prn. Telemetry monitoring in progress. Bed in position possible. Left heart cath performed. Vitals q 15 minutes post procedure. No distress noted. Will continue to monitor.

## 2023-05-05 NOTE — ED NOTES
Marianne Go, a 47 y.o. female presents to the ED w/ complaint of Chest pain that started after using her inhaler. Pt stated her throat felt tight and sharp pain to left chest that radiated down LUE. Pt stated currently has a HA with a hx of migraines. Pt was also given nitro SL by EMS pta.     Triage note:  Chief Complaint   Patient presents with    Chest Pain     Arrived via ems from home with c/o chest pain reproducible     Review of patient's allergies indicates:   Allergen Reactions    Topiramate Itching     Past Medical History:   Diagnosis Date    Deep vein thrombosis 1990    pregnancy related     Kidney calculi     Kidney stones     Kidney stones     Migraines     perimenstrual

## 2023-05-05 NOTE — H&P
"[facility] - Emergency [facility] Medicine  History & Physical    Patient Name: Marianne Go  MRN: [id number]  Patient Class: Emergency  Admission Date: 5/4/2023  Attending Physician: Jeovanny Wills MD   Primary Care Provider: Carlee Mcneil MD    Patient information was obtained from patient, past medical records and ER records.     Subjective:     Principal Problem:Elevated troponin    Chief Complaint:   Chief Complaint   Patient presents with    Chest Pain     Arrived via ems from home with c/o chest pain reproducible        HPI: Marianne Go is a 47-year-old woman with a past medical history of DVT, history of nephrolithiasis, anxiety, depression and obesity who presents to the emergency department with chest pain. The patient says that her chest pain began earlier this afternoon. She says she has been having recurrent viral infections with bronchitis over the past several weeks. She says that her cough has been unrelenting and is productive of "green" sputum. She says this afternoon she sat on her couch and was drinking a glass of water when she began coughing and having severe chest pain. She says the pain was substernal but does radiate down her left upper extremity. She said that her neighbor who is a nurse came over to her home and listened to her with a stethoscope and appreciated some wheezing. She also took her blood pressure and reported a systolic blood pressure in the 190s. The patient used an albuterol inhaler but had little relief with her symptoms. Emergency medical services was called. The patient was given approximately four sublingual nitroglycerin tablets en route which she said eased her pain from to a "6/10." During my interview, the patient says that she still is having chest pain. She denies any dyspnea on exertion. She denies any fevers or chills. She denies any abdominal pain, nausea or emesis. She says her appetite has been unchanged.     In the emergency department, the patient " was noted to have stable vital signs upon arrival. Labs significant for initial negative troponin with repeat troponin at 0.180 and subsequent repeat at 0.411. BNP within normal limits. A CT angiogram was performed which revealed no evidence of pulmonary artery thromboembolism or other acute intrathoracic abnormality but scattered calcific atherosclerosis of the coronary vessels. She was loaded with aspirin and ticagrelor in the emergency department and started on a heparin infusion. Nitroglycerin was given for chest pain. Cardiology service was contacted. She was admitted to Hospital Medicine for further management.      Past Medical History:   Diagnosis Date    Deep vein thrombosis 1990    pregnancy related     Kidney calculi     Kidney stones     Kidney stones     Migraines     perimenstrual        Past Surgical History:   Procedure Laterality Date    BLADDER SUSPENSION      COLONOSCOPY N/A 12/22/2022    Procedure: COLONOSCOPY;  Surgeon: Charity Rudolph MD;  Location: [facility] ([address] [place] [address]);  Service: Endoscopy;  Laterality: N/A;  instr portal-GT    CYSTOSCOPY W/ LASER LITHOTRIPSY      HYSTERECTOMY      PELVIC LAPAROSCOPY      ablation of endometriosis    TONSILLECTOMY      TUBAL LIGATION  2011    essure        Review of patient's allergies indicates:   Allergen Reactions    Topiramate Itching       No current facility-administered medications on file prior to encounter.     Current Outpatient Medications on File Prior to Encounter   Medication Sig    albuterol (VENTOLIN HFA) 90 mcg/actuation inhaler Inhale 2 puffs into the lungs every 6 (six) hours as needed for Wheezing. Rescue    atorvastatin (LIPITOR) 10 MG tablet TAKE 1 TABLET(10 MG) BY MOUTH EVERY DAY    butalbital-acetaminophen-caffeine -40 mg (FIORICET, ESGIC) -40 mg per tablet Take 1 tablet by mouth every 4 (four) hours as needed for Pain.    meloxicam (MOBIC) 15 MG tablet TAKE 1 TABLET(15 MG) BY MOUTH EVERY DAY     multivitamin capsule Take 1 capsule by mouth once daily.    promethazine-dextromethorphan (PROMETHAZINE-DM) 6.25-15 mg/5 mL Syrp Take one tsp po q 6 hrs prn cough    sumatriptan (IMITREX) 50 MG tablet Take 1 tab by mouth at first symptom of headache, then may repeat x 1 an hour later    venlafaxine (EFFEXOR-XR) 37.5 MG 24 hr capsule TAKE 1 CAPSULE(37.5 MG) BY MOUTH EVERY DAY     Family History       Problem Relation (Age of Onset)    Allergic rhinitis Father    Asthma Son    Breast cancer Mother    Cancer Father    Colon cancer Maternal Uncle    Diabetes Mother    Hyperlipidemia Father, Mother    Hypertension Mother    Hypothyroidism Father, Mother          Tobacco Use    Smoking status: Former     Packs/day: 0.25     Years: 13.00     Pack years: 3.25     Types: Cigarettes     Quit date: 2003     Years since quittin.3    Smokeless tobacco: Never    Tobacco comments:     quit 11 years ago   Substance and Sexual Activity    Alcohol use: Yes     Comment: rarely    Drug use: No    Sexual activity: Yes     Partners: Male     Birth control/protection: See Surgical Hx     Review of Systems   Constitutional:  Positive for fatigue. Negative for appetite change, chills and fever.   HENT:  Negative for congestion, postnasal drip, rhinorrhea, sneezing and sore throat.    Eyes:  Negative for photophobia and visual disturbance.   Respiratory:  Positive for cough and chest tightness. Negative for shortness of breath.    Cardiovascular:  Positive for chest pain. Negative for palpitations and leg swelling.   Gastrointestinal:  Negative for abdominal distention, abdominal pain, constipation, diarrhea, nausea and vomiting.   Endocrine: Negative for polyphagia and polyuria.   Genitourinary:  Negative for difficulty urinating, dysuria and urgency.   Musculoskeletal:  Negative for arthralgias, back pain and gait problem.   Skin:  Negative for rash and wound.   Neurological:  Negative for dizziness, tremors, seizures,  light-headedness and headaches.   Psychiatric/Behavioral:  Negative for agitation, behavioral problems and confusion.    Objective:     Vital Signs (Most Recent):  Temp: 98.4 °F (36.9 °C) (05/04/23 1709)  Pulse: 76 (05/04/23 2334)  Resp: 13 (05/04/23 2334)  BP: (!) 163/93 (05/04/23 2334)  SpO2: 97 % (05/04/23 2334)   Vital Signs (24h Range):  Temp:  [98.4 °F (36.9 °C)] 98.4 °F (36.9 °C)  Pulse:  [62-76] 76  Resp:  [13-21] 13  SpO2:  [93 %-97 %] 97 %  BP: (126-163)/(74-93) 163/93     Weight: 117 kg (258 lb)  Body mass index is 47.19 kg/m².     Physical Exam  Vitals reviewed.   Constitutional:       General: She is not in acute distress.     Appearance: Normal appearance. She is obese. She is not ill-appearing, toxic-appearing or diaphoretic.      Comments: Pleasant woman in no acute distress but appears uncomfortable. Cooperative with examination and conversant with interview.   HENT:      Head: Normocephalic and atraumatic.      Right Ear: External ear normal.      Left Ear: External ear normal.      Nose: Nose normal. No congestion or rhinorrhea.      Mouth/Throat:      Mouth: Mucous membranes are moist.      Pharynx: Oropharynx is clear. No oropharyngeal exudate or posterior oropharyngeal erythema.   Eyes:      General: No scleral icterus.        Right eye: No discharge.         Left eye: No discharge.      Extraocular Movements: Extraocular movements intact.   Cardiovascular:      Rate and Rhythm: Normal rate and regular rhythm.      Heart sounds: No murmur heard.    No friction rub. No gallop.   Pulmonary:      Effort: Pulmonary effort is normal. No respiratory distress.      Breath sounds: Normal breath sounds. No stridor. No wheezing, rhonchi or rales.   Abdominal:      General: Bowel sounds are normal. There is no distension.      Palpations: Abdomen is soft. There is no mass.      Tenderness: There is no abdominal tenderness. There is no guarding.      Hernia: No hernia is present.   Musculoskeletal:       Cervical back: Normal range of motion and neck supple.      Right lower leg: No edema.      Left lower leg: No edema.   Skin:     General: Skin is warm and dry.   Neurological:      General: No focal deficit present.      Mental Status: She is alert and oriented to person, place, and time. Mental status is at baseline.      Sensory: No sensory deficit.      Motor: No weakness.   Psychiatric:         Mood and Affect: Mood normal.         Behavior: Behavior normal.         Thought Content: Thought content normal.              Significant Labs: All pertinent labs within the past 24 hours have been reviewed.  CBC:   Recent Labs   Lab 05/04/23  1847   WBC 11.39   HGB 15.0   HCT 44.5        CMP:   Recent Labs   Lab 05/04/23  1847      K 3.8      CO2 21*      BUN 18   CREATININE 0.8   CALCIUM 9.8   PROT 7.6   ALBUMIN 4.1   BILITOT 0.3   ALKPHOS 102   AST 15   ALT 21   ANIONGAP 12       Significant Imaging: I have reviewed all pertinent imaging results/findings within the past 24 hours.    Assessment/Plan:     * Elevated troponin  Patient presenting with severe chest pain. Does not appear to be exertional but with radiation to left upper extremity. Pain relief with nitroglycerin. EKG stable but troponin elevation 0.018 -> 0.18 -> 0.41. CTA Chest without PE but notable coronary artery atherosclerosis. Loaded with aspirin and ticagrelor and started on heparin infusion in ED. Treating for acute coronary syndrome but chest pain and troponin elevation may also be secondary to other causes such as myocarditis or SCAD.  - Cardiology consult placed, appreciate recommendations  - s/p aspirin and ticagrelor load in ED  - continue aspirin 81 mg daily  - begin IV heparin infusion  - begin atorvastatin 80 mg daily  - sublingual nitroglycerin 0.4 mg PRN chest pain  - IV morphine 2 mg q4h PRN refractory chest pain  - continue to trend troponin until peak      Hyperlipidemia  Atorvastatin 80 mg daily.      BMI  45.0-49.9, adult  Body mass index is 47.19 kg/m². Morbid obesity complicates all aspects of disease management from diagnostic modalities to treatment. Weight loss encouraged and health benefits explained to patient.         Anxiety and depression  Resume home venlafaxine 37.5 mg daily.      History of DVT (deep vein thrombosis)  Patient reports history of DVT when she was 15 years of age. She reports that she is no longer on anticoagulation.        VTE Risk Mitigation (From admission, onward)         Ordered     heparin 25,000 units in dextrose 5% (100 units/ml) IV bolus from bag - ADDITIONAL PRN BOLUS - 60 units/kg (max bolus 4000 units)  As needed (PRN)        Question:  Heparin Infusion Adjustment (DO NOT MODIFY ANSWER)  Answer:  \[email or web address]\epic\Images\Pharmacy\HeparinInfusions\heparin LOW INTENSITY nomogram for [facility] [id number]    05/05/23 0034     heparin 25,000 units in dextrose 5% (100 units/ml) IV bolus from bag - ADDITIONAL PRN BOLUS - 30 units/kg (max bolus 4000 units)  As needed (PRN)        Question:  Heparin Infusion Adjustment (DO NOT MODIFY ANSWER)  Answer:  \[email or web address]\epic\Images\Pharmacy\HeparinInfusions\heparin LOW INTENSITY nomogram for [facility] [id number].pdf    05/05/23 0034     IP VTE HIGH RISK PATIENT  Once         05/05/23 0050     Place sequential compression device  Until discontinued         05/05/23 0050     heparin 25,000 units in dextrose 5% (100 units/ml) IV bolus from bag INITIAL BOLUS (max bolus 4000 units)  Once        Question:  Heparin Infusion Adjustment (DO NOT MODIFY ANSWER)  Answer:  \[email or web address]\epic\Images\Pharmacy\HeparinInfusions\heparin LOW INTENSITY nomogram for [facility] [id number].pdf    05/05/23 0034     heparin 25,000 units in dextrose 5% 250 mL (100 units/mL) infusion LOW INTENSITY nomogram - [facility]  Continuous        Question Answer Comment   Heparin Infusion Adjustment (DO NOT MODIFY ANSWER) \\[email or web address]\epic\Images\Pharmacy\HeparinInfusions\heparin LOW INTENSITY nomogram for  [facility] [id number].pdf    Begin at (in units/kg/hr) 12        05/05/23 0034              Code Status: FULL    Riley Goss MD  Department of Hospital Medicine  [facility] - Emergency Dept

## 2023-05-06 VITALS
RESPIRATION RATE: 19 BRPM | DIASTOLIC BLOOD PRESSURE: 74 MMHG | TEMPERATURE: 98 F | HEART RATE: 66 BPM | SYSTOLIC BLOOD PRESSURE: 126 MMHG | WEIGHT: 264.13 LBS | BODY MASS INDEX: 48.61 KG/M2 | HEIGHT: 62 IN | OXYGEN SATURATION: 95 %

## 2023-05-06 LAB
ANION GAP SERPL CALC-SCNC: 8 MMOL/L (ref 8–16)
BUN SERPL-MCNC: 13 MG/DL (ref 6–20)
CALCIUM SERPL-MCNC: 8.7 MG/DL (ref 8.7–10.5)
CHLORIDE SERPL-SCNC: 108 MMOL/L (ref 95–110)
CO2 SERPL-SCNC: 22 MMOL/L (ref 23–29)
CREAT SERPL-MCNC: 0.7 MG/DL (ref 0.5–1.4)
EST. GFR  (NO RACE VARIABLE): >60 ML/MIN/1.73 M^2
GLUCOSE SERPL-MCNC: 89 MG/DL (ref 70–110)
MAGNESIUM SERPL-MCNC: 1.8 MG/DL (ref 1.6–2.6)
POTASSIUM SERPL-SCNC: 3.9 MMOL/L (ref 3.5–5.1)
SODIUM SERPL-SCNC: 138 MMOL/L (ref 136–145)
TROPONIN I SERPL DL<=0.01 NG/ML-MCNC: 3.31 NG/ML (ref 0–0.03)
TROPONIN I SERPL DL<=0.01 NG/ML-MCNC: 4.64 NG/ML (ref 0–0.03)

## 2023-05-06 PROCEDURE — 25000003 PHARM REV CODE 250: Performed by: STUDENT IN AN ORGANIZED HEALTH CARE EDUCATION/TRAINING PROGRAM

## 2023-05-06 PROCEDURE — 83735 ASSAY OF MAGNESIUM: CPT | Performed by: STUDENT IN AN ORGANIZED HEALTH CARE EDUCATION/TRAINING PROGRAM

## 2023-05-06 PROCEDURE — 99232 PR SUBSEQUENT HOSPITAL CARE,LEVL II: ICD-10-PCS | Mod: ,,, | Performed by: INTERNAL MEDICINE

## 2023-05-06 PROCEDURE — 36415 COLL VENOUS BLD VENIPUNCTURE: CPT | Performed by: STUDENT IN AN ORGANIZED HEALTH CARE EDUCATION/TRAINING PROGRAM

## 2023-05-06 PROCEDURE — 99232 SBSQ HOSP IP/OBS MODERATE 35: CPT | Mod: ,,, | Performed by: INTERNAL MEDICINE

## 2023-05-06 PROCEDURE — 80048 BASIC METABOLIC PNL TOTAL CA: CPT | Performed by: STUDENT IN AN ORGANIZED HEALTH CARE EDUCATION/TRAINING PROGRAM

## 2023-05-06 PROCEDURE — 99239 PR HOSPITAL DISCHARGE DAY,>30 MIN: ICD-10-PCS | Mod: ,,, | Performed by: STUDENT IN AN ORGANIZED HEALTH CARE EDUCATION/TRAINING PROGRAM

## 2023-05-06 PROCEDURE — 84484 ASSAY OF TROPONIN QUANT: CPT | Mod: 91 | Performed by: STUDENT IN AN ORGANIZED HEALTH CARE EDUCATION/TRAINING PROGRAM

## 2023-05-06 PROCEDURE — 99239 HOSP IP/OBS DSCHRG MGMT >30: CPT | Mod: ,,, | Performed by: STUDENT IN AN ORGANIZED HEALTH CARE EDUCATION/TRAINING PROGRAM

## 2023-05-06 RX ORDER — ISOSORBIDE MONONITRATE 30 MG/1
30 TABLET, EXTENDED RELEASE ORAL DAILY
Qty: 30 TABLET | Refills: 11 | Status: SHIPPED | OUTPATIENT
Start: 2023-05-06 | End: 2023-05-22

## 2023-05-06 RX ORDER — VERAPAMIL HYDROCHLORIDE 120 MG/1
120 TABLET, FILM COATED, EXTENDED RELEASE ORAL NIGHTLY
Qty: 30 TABLET | Refills: 11 | Status: SHIPPED | OUTPATIENT
Start: 2023-05-06 | End: 2024-05-05

## 2023-05-06 RX ADMIN — ISOSORBIDE MONONITRATE 30 MG: 30 TABLET, EXTENDED RELEASE ORAL at 08:05

## 2023-05-06 RX ADMIN — TICAGRELOR 90 MG: 90 TABLET ORAL at 08:05

## 2023-05-06 RX ADMIN — ASPIRIN 81 MG: 81 TABLET, CHEWABLE ORAL at 08:05

## 2023-05-06 RX ADMIN — ATORVASTATIN CALCIUM 80 MG: 40 TABLET, FILM COATED ORAL at 08:05

## 2023-05-06 RX ADMIN — ACETAMINOPHEN 650 MG: 325 TABLET ORAL at 11:05

## 2023-05-06 RX ADMIN — VENLAFAXINE HYDROCHLORIDE 37.5 MG: 37.5 CAPSULE, EXTENDED RELEASE ORAL at 08:05

## 2023-05-06 NOTE — PLAN OF CARE
[facility] - Cardiology Stepdown  Discharge Final Note    Primary Care Provider: Carlee Mcneil MD    Expected Discharge Date: 5/6/2023    Final Discharge Note (most recent)       Final Note - 05/06/23 1603          Final Note    Assessment Type Final Discharge Note     Anticipated Discharge Disposition Home or Self Care     Hospital Resources/Appts/Education Provided Provided patient/caregiver with written discharge plan information        Post-Acute Status    Discharge Delays None known at this time                     Important Message from Medicare             Contact Info       Apollo Kothari MD   Specialty: Internal Medicine    [address]  [facility] [address]   Phone: [phone]       Next Steps: Go on 5/22/2023    Instructions: Hospital follow up 5/22 at 9a          DORIAN Chin, DANIELITOW  Weekend -[facility] [place] [facility]  [facility] [phone]

## 2023-05-06 NOTE — NURSING
Patient is ready for discharge. Patient stable alert and oriented. IVs removed. No complaints of pain. Discussed discharge plan. Reviewed medications and side effects, appointments, and answered questions with patient and family. Rx bought to bedside. Patient discharged via hospital transport.

## 2023-05-06 NOTE — PLAN OF CARE
Problem: Adjustment to Illness (Acute Coronary Syndrome)  Goal: Optimal Adaptation to Illness  Outcome: Ongoing, Progressing     Problem: Dysrhythmia (Acute Coronary Syndrome)  Goal: Normalized Cardiac Rhythm  Outcome: Ongoing, Progressing     Problem: Cardiac-Related Pain (Acute Coronary Syndrome)  Goal: Absence of Cardiac-Related Pain  Outcome: Ongoing, Progressing     Problem: Hemodynamic Instability (Acute Coronary Syndrome)  Goal: Effective Cardiac Pump Function  Outcome: Ongoing, Progressing     Problem: Tissue Perfusion (Acute Coronary Syndrome)  Goal: Adequate Tissue Perfusion  Outcome: Ongoing, Progressing

## 2023-05-06 NOTE — SUBJECTIVE & OBJECTIVE
Interval History: NAEON. No new complaints. No episodes of chest pain or shortness of breath. Able to walk around without difficulty or angina. Tolerating verapamil. Troponins downtrending. Pt also reported R wrist pain at catheterization site. No changes in motor or sensation.     Review of Systems   Constitutional: Negative for chills and fever.   HENT:  Negative for congestion.    Eyes:  Negative for blurred vision and double vision.   Cardiovascular:  Negative for chest pain and dyspnea on exertion.   Respiratory:  Negative for cough and shortness of breath.    Musculoskeletal:  Negative for back pain and myalgias.   Gastrointestinal:  Negative for abdominal pain, diarrhea, hematochezia, melena, nausea and vomiting.   Genitourinary:  Negative for flank pain and frequency.   Neurological:  Negative for dizziness and headaches.   All other systems reviewed and are negative.  Objective:     Vital Signs (Most Recent):  Temp: 97.8 °F (36.6 °C) (05/06/23 0725)  Pulse: 65 (05/06/23 0725)  Resp: 18 (05/06/23 0725)  BP: 137/83 (05/06/23 0725)  SpO2: (!) 94 % (05/06/23 0725) Vital Signs (24h Range):  Temp:  [97.3 °F (36.3 °C)-98.2 °F (36.8 °C)] 97.8 °F (36.6 °C)  Pulse:  [64-77] 65  Resp:  [16-18] 18  SpO2:  [94 %-97 %] 94 %  BP: (107-152)/(58-86) 137/83     Weight: 119.8 kg (264 lb 1.8 oz)  Body mass index is 48.31 kg/m².     SpO2: (!) 94 %       No intake or output data in the 24 hours ending 05/06/23 0814    Lines/Drains/Airways       Peripheral Intravenous Line  Duration                  Peripheral IV - Single Lumen 20 G Left;Posterior Hand -- days         Peripheral IV - Single Lumen 05/04/23 1845 20 G Right Antecubital 1 day                       Physical Exam  Vitals and nursing note reviewed.   Constitutional:       General: She is not in acute distress.     Appearance: Normal appearance. She is obese. She is not ill-appearing.   HENT:      Head: Normocephalic and atraumatic.   Eyes:      Extraocular Movements:  Extraocular movements intact.      Pupils: Pupils are equal, round, and reactive to light.   Cardiovascular:      Rate and Rhythm: Normal rate and regular rhythm.      Pulses: Normal pulses.      Comments: 1+ R radial pulse  2+ R ulnar pulse.   Pulmonary:      Effort: Pulmonary effort is normal. No respiratory distress.   Abdominal:      General: There is no distension.      Palpations: Abdomen is soft.      Tenderness: There is no abdominal tenderness.   Musculoskeletal:      Right lower leg: No edema.      Left lower leg: No edema.   Skin:     General: Skin is warm and dry.      Comments: Brusing noted to R arm near catheterization site  No significant discoloration or pallor   Neurological:      General: No focal deficit present.      Mental Status: She is alert and oriented to person, place, and time.      Comments: Motor and sensation intact in R wrist          Significant Labs: CMP   Recent Labs   Lab 05/04/23  1847 05/05/23  0554 05/06/23  0417    139 138   K 3.8 3.6 3.9    106 108   CO2 21* 22* 22*    101 89   BUN 18 14 13   CREATININE 0.8 0.7 0.7   CALCIUM 9.8 9.4 8.7   PROT 7.6  --   --    ALBUMIN 4.1  --   --    BILITOT 0.3  --   --    ALKPHOS 102  --   --    AST 15  --   --    ALT 21  --   --    ANIONGAP 12 11 8   , CBC   Recent Labs   Lab 05/04/23  1847 05/05/23  0218 05/05/23  0554   WBC 11.39 10.07 9.30   HGB 15.0 14.1 13.7   HCT 44.5 42.1 39.6    386 395   , and Troponin   Recent Labs   Lab 05/05/23  1428 05/05/23  1950 05/06/23  0417   TROPONINI 6.534* 6.013* 4.637*       Significant Imaging: X-Ray: CXR: X-Ray Chest 1 View (CXR): No results found for this visit on 05/04/23.

## 2023-05-06 NOTE — ASSESSMENT & PLAN NOTE
47 YOF with hx of asthma and anxiety presenting with chest pain admitted for NSTEMI. Pt is now s/p PCI with mRCA stenting. Pt was found to have 100% occlusion of mRCA, diffuse calcium in the mLAD with 50% stenosis, and 50% OM3 stenosis. Pt is currently doing well. Echo showed EF 60% s/p PCI with concentric remodeling  . Educated the patient on the importance on lifestyle changes such as by implementing a DASH or mediterranean diet, exercise as tolerated, and weight loss.      Recommendations  - Start verapamil  - Start long acting nitrate, Imdur  - Will need cardiac rehabilitation  - Trend troponin until downtrend  - continue ASA and ticagrelor   - Follow up outpatient cardiology clinic within 2 weeks for stress test.

## 2023-05-06 NOTE — PROGRESS NOTES
[facility] - Cardiology Stepdown  Cardiology  Progress Note    Patient Name: Marianne Go  MRN: [id number]  Admission Date: 5/4/2023  Hospital Length of Stay: 1 days  Code Status: Full Code   Attending Physician: Janine Alex MD   Primary Care Physician: Carlee Mcneil MD  Expected Discharge Date: 5/6/2023  Principal Problem:NSTEMI (non-ST elevated myocardial infarction)    Subjective:     Hospital Course:   No notes on file    Interval History: NAEON. No new complaints. No episodes of chest pain or shortness of breath. Able to walk around without difficulty or angina. Tolerating verapamil. Troponins downtrending. Pt also reported R wrist pain at catheterization site. No changes in motor or sensation.     Review of Systems   Constitutional: Negative for chills and fever.   HENT:  Negative for congestion.    Eyes:  Negative for blurred vision and double vision.   Cardiovascular:  Negative for chest pain and dyspnea on exertion.   Respiratory:  Negative for cough and shortness of breath.    Musculoskeletal:  Negative for back pain and myalgias.   Gastrointestinal:  Negative for abdominal pain, diarrhea, hematochezia, melena, nausea and vomiting.   Genitourinary:  Negative for flank pain and frequency.   Neurological:  Negative for dizziness and headaches.   All other systems reviewed and are negative.  Objective:     Vital Signs (Most Recent):  Temp: 97.8 °F (36.6 °C) (05/06/23 0725)  Pulse: 65 (05/06/23 0725)  Resp: 18 (05/06/23 0725)  BP: 137/83 (05/06/23 0725)  SpO2: (!) 94 % (05/06/23 0725) Vital Signs (24h Range):  Temp:  [97.3 °F (36.3 °C)-98.2 °F (36.8 °C)] 97.8 °F (36.6 °C)  Pulse:  [64-77] 65  Resp:  [16-18] 18  SpO2:  [94 %-97 %] 94 %  BP: (107-152)/(58-86) 137/83     Weight: 119.8 kg (264 lb 1.8 oz)  Body mass index is 48.31 kg/m².     SpO2: (!) 94 %       No intake or output data in the 24 hours ending 05/06/23 0814    Lines/Drains/Airways       Peripheral Intravenous Line  Duration                   Peripheral IV - Single Lumen 20 G Left;Posterior Hand -- days         Peripheral IV - Single Lumen 05/04/23 1845 20 G Right Antecubital 1 day                       Physical Exam  Vitals and nursing note reviewed.   Constitutional:       General: She is not in acute distress.     Appearance: Normal appearance. She is obese. She is not ill-appearing.   HENT:      Head: Normocephalic and atraumatic.   Eyes:      Extraocular Movements: Extraocular movements intact.      Pupils: Pupils are equal, round, and reactive to light.   Cardiovascular:      Rate and Rhythm: Normal rate and regular rhythm.      Pulses: Normal pulses.      Comments: 1+ R radial pulse  2+ R ulnar pulse.   Pulmonary:      Effort: Pulmonary effort is normal. No respiratory distress.   Abdominal:      General: There is no distension.      Palpations: Abdomen is soft.      Tenderness: There is no abdominal tenderness.   Musculoskeletal:      Right lower leg: No edema.      Left lower leg: No edema.   Skin:     General: Skin is warm and dry.      Comments: Brusing noted to R arm near catheterization site  No significant discoloration or pallor   Neurological:      General: No focal deficit present.      Mental Status: She is alert and oriented to person, place, and time.      Comments: Motor and sensation intact in R wrist          Significant Labs: CMP   Recent Labs   Lab 05/04/23  1847 05/05/23  0554 05/06/23  0417    139 138   K 3.8 3.6 3.9    106 108   CO2 21* 22* 22*    101 89   BUN 18 14 13   CREATININE 0.8 0.7 0.7   CALCIUM 9.8 9.4 8.7   PROT 7.6  --   --    ALBUMIN 4.1  --   --    BILITOT 0.3  --   --    ALKPHOS 102  --   --    AST 15  --   --    ALT 21  --   --    ANIONGAP 12 11 8   , CBC   Recent Labs   Lab 05/04/23  1847 05/05/23  0218 05/05/23  0554   WBC 11.39 10.07 9.30   HGB 15.0 14.1 13.7   HCT 44.5 42.1 39.6    386 395   , and Troponin   Recent Labs   Lab 05/05/23  1428 05/05/23  1950 05/06/23  0417   TROPONINI  6.534* 6.013* 4.637*       Significant Imaging: X-Ray: CXR: X-Ray Chest 1 View (CXR): No results found for this visit on 05/04/23.    Assessment and Plan:       * NSTEMI (non-ST elevated myocardial infarction)  47 YOF with hx of asthma and anxiety presenting with chest pain admitted for NSTEMI. Pt is now s/p PCI with mRCA stenting. Pt was found to have 100% occlusion of mRCA, diffuse calcium in the mLAD with 50% stenosis, and 50% OM3 stenosis. Pt is currently doing well. Echo showed EF 60% s/p PCI with concentric remodeling  . Educated the patient on the importance on lifestyle changes such as by implementing a DASH or mediterranean diet, exercise as tolerated, and weight loss.      Recommendations  - Start verapamil  - Start long acting nitrate, Imdur  - Will need cardiac rehabilitation  - Trend troponin until downtrend  - continue ASA and ticagrelor   - Follow up outpatient cardiology clinic within 2 weeks for stress test.         VTE Risk Mitigation (From admission, onward)         Ordered     heparin (porcine) injection  As needed (PRN)         05/05/23 1112     heparin infusion 1,000 units/500 ml in 0.9% NaCl (on sterile field)  As needed (PRN)         05/05/23 1056     IP VTE HIGH RISK PATIENT  Once         05/05/23 0050     Place sequential compression device  Until discontinued         05/05/23 0050                Brandon Galvez,   Cardiology  [facility] - Cardiology Stepdown

## 2023-05-08 LAB
POC ACTIVATED CLOTTING TIME K: 275 SEC (ref 74–137)
SAMPLE: ABNORMAL

## 2023-05-10 ENCOUNTER — CLINICAL SUPPORT (OUTPATIENT)
Dept: REHABILITATION | Facility: HOSPITAL | Age: 48
End: 2023-05-10
Payer: MEDICAID

## 2023-05-10 DIAGNOSIS — M75.41 SHOULDER IMPINGEMENT SYNDROME, RIGHT: Primary | ICD-10-CM

## 2023-05-10 PROCEDURE — 97110 THERAPEUTIC EXERCISES: CPT

## 2023-05-10 PROCEDURE — 97164 PT RE-EVAL EST PLAN CARE: CPT

## 2023-05-10 NOTE — PROGRESS NOTES
[facility]   Physical Therapy Treatment Note      Name: Marianne Go  Clinic Number: [id number]    Therapy Diagnosis:   Encounter Diagnosis   Name Primary?    Shoulder impingement syndrome, right Yes     Physician: Josue Steward MD    Visit Date: 5/10/2023    Physician Orders: PT Eval and Treat   Medical Diagnosis from Referral: M75.41 (ICD-10-CM) - Shoulder impingement syndrome, right   Evaluation Date: 4/5/2023  Authorization Period Expiration: 10/5/2023  Plan of Care Expiration: 7/5/2023  Progress Note Due: 6/10/2023  Visit # / Visits authorized: 2/ 12 + eval   FOTO: 1/3     Precautions: Standard     PTA Visit #: 0/5     Time In: 8:00 AM  Time Out: 8:45 AM  Total Billable Time: 45 minutes    Subjective   Pt reports: she experienced a MI on on 5/4/2023 and had cardiac stent placed on 5/5/2023. She reports feeling much better following surgery and denies any SOB or chest pain. She states her cough has ceased also. She is monitoring her blood pressure and O2 at home daily. She has continued with home exercises for her shoulder and pain continues to improve. She's able to sleep on her Right side now and don/doff shirts with less difficulty.     She was compliant with home exercise program.  Response to previous treatment: feeling better  Functional change: in progress    Pain: 0/10  Location: right shoulder      Objective      Active/Passive ROM: (degrees)   Shoulder Left Right    Flexion / 84 with inc pain lowering 145    Abduction / 125 with painful arc lowering 120 with painful arc lowering   ER / To lateral cervical spine with inc pain T3   IR / Within functional limits range of motion but anterior shoulder shift at initiation of movement and severe pain when returning from the movement WFL      Strength:   Shoulder (R) (L)   Flexion 4+/5 5/5   Abduction 4+/5 5/5   ER 4+/5 4+/5   IR 4+/5 5/5     Endurance tests:    30 sec STS: 12 reps    2 min step test: Patient fatigued at 1 min 10  sec      Treatment     Marianne received the treatments listed below:      therapeutic exercises to develop strength, endurance, ROM, flexibility, posture, and core stabilization for 40 minutes including:    UBE 3' forward / 3' backwards  Pulleys 2' flex / 2 ' scaption  Scap pinches X 30  Serratus punches 3x10 4# dowel  GTB rows X 30  GTB extensions X 30  SL external rotation 1# DB 3x10    Reassessment - ROM, MMT, endurance tests for 5 minutes      manual therapy techniques: Joint mobilizations, Manual traction, Myofacial release, Soft tissue Mobilization, and Friction Massage were applied to the: R shoulder for 0 minutes, including:  STM to right side UT and levator, biceps tendon and infra and supraspinatus muscles  Post and inf glenohumeral glides grade 2 on right shoulder  PROM with slight distraction in all planes      Patient Education and Home Exercises       Education provided:   - educated on rotator cuff anatomy and how it can cause challenges with lifting and reaching overhead  - cryotherapy for pain/soreness management     Written Home Exercises Provided: yes. Exercises were reviewed and Marianne was able to demonstrate them prior to the end of the session.  Marianne demonstrated good  understanding of the education provided. See EMR under Patient Instructions for exercises provided during therapy sessions    Assessment   Marianne returns after MI on 5/4/2023 and cardiac stent surgery on 5/5/2023. She reports feeling much better following surgery and denies any SOB or chest pain currently. She is monitoring blood pressure and O2 daily. She has been compliant with HEP and reports her R shoulder continues to feel better. She reports improvement with sleeping, reaching, and donning/doffing shirt. Reassessment performed and she demonstrates improvement in R shoulder AROM and strength with mild limitations remaining. She tolerated progressions in therex well today with minimal increase in pain/discomfort. Also  performed tests to assess aerobic endurance. Will continue to progress as tolerated.     Marianne Is progressing well towards her goals.   Pt prognosis is Good.     Pt will continue to benefit from skilled outpatient physical therapy to address the deficits listed in the problem list box on initial evaluation, provide pt/family education and to maximize pt's level of independence in the home and community environment.     Pt's spiritual, cultural and educational needs considered and pt agreeable to plan of care and goals.     Anticipated barriers to physical therapy: bilateral shoulder pain    Goals: Short Term Goals: 4 weeks   Patient will show 20 degree improvement in R shoulder range of motion  Partially met  Patient will show 1/2 grade MMT improvement in R shoulder strength  Met  Patient will show a reduction by 50% of anterior glenohumeral translation with range of motion  Progressing  Patient will show 50% reduction in shoulder muscle tone and deltoid compensations  Progressing     Long Term Goals: 8 weeks   Patient will be able to don / doff shirts and jackets without pain in the shoulder  Progressing  Patient will be able to reach overhead with > 10# without pain  Progressing  Patient will be able to assist her son for > 30 minutes without pain increasing  Progressing  Patient will be able to perform all work related duties for > 2 hours before onset of pain  Pt currently not working  50% FOTO score improvement  In progress    Plan     Cont per POC    Kathy Ziegler, PT

## 2023-05-11 NOTE — DISCHARGE SUMMARY
"[facility] - Cardiology [facility] Medicine  Discharge Summary      Patient Name: Marianne Go  MRN: [id number]  JOSEPH: [id number]  Patient Class: IP- Inpatient  Admission Date: 5/4/2023  Hospital Length of Stay: 2 days  Discharge Date and Time:  05/11/2023 5:21 PM  Attending Physician: Sofy att. providers found   Discharging Provider: Janine Alex MD  Primary Care Provider: Carlee Mcneil MD  Hospital Medicine Team: [facility] Janine Alex MD  Primary Care Team: [facility]     HPI:   Marianne Go is a 47-year-old woman with a past medical history of DVT, history of nephrolithiasis, anxiety, depression and obesity who presents to the emergency department with chest pain. The patient says that her chest pain began earlier this afternoon. She says she has been having recurrent viral infections with bronchitis over the past several weeks. She says that her cough has been unrelenting and is productive of "green" sputum. She says this afternoon she sat on her couch and was drinking a glass of water when she began coughing and having severe chest pain. She says the pain was substernal but does radiate down her left upper extremity. She said that her neighbor who is a nurse came over to her home and listened to her with a stethoscope and appreciated some wheezing. She also took her blood pressure and reported a systolic blood pressure in the 190s. The patient used an albuterol inhaler but had little relief with her symptoms. Emergency medical services was called. The patient was given approximately four sublingual nitroglycerin tablets en route which she said eased her pain from to a "6/10." During my interview, the patient says that she still is having chest pain. She denies any dyspnea on exertion. She denies any fevers or chills. She denies any abdominal pain, nausea or emesis. She says her appetite has been unchanged.     In the emergency department, the patient was noted to have stable vital " signs upon arrival. Labs significant for initial negative troponin with repeat troponin at 0.180 and subsequent repeat at 0.411. BNP within normal limits. A CT angiogram was performed which revealed no evidence of pulmonary artery thromboembolism or other acute intrathoracic abnormality but scattered calcific atherosclerosis of the coronary vessels. She was loaded with aspirin and ticagrelor in the emergency department and started on a heparin infusion. Nitroglycerin was given for chest pain. Cardiology service was contacted. She was admitted to Hospital Medicine for further management.      Procedure(s) (LRB):  Angiogram, Coronary Artery (Left)  Stent, Drug Eluting, Single Vessel, Coronary      Hospital Course:   Patient underwent LHC with the following findings:    Left Anterior Descending   There is mild diffuse disease throughout the vessel.   Mid LAD lesion was 55% stenosed.      First Diagonal Branch   1st Diag lesion was 50% stenosed.      Left Circumflex      Third Obtuse Marginal Branch   3rd Mrg lesion was 50% stenosed.      Right Coronary Artery   Mid RCA lesion was 100% stenosed.        She underwent TEE to mRCA w/0% stenosis following intervention.    TTE performed with the following findings:  Summary     The left ventricle is normal in size with concentric remodeling and normal systolic function. The estimated ejection fraction is 60%.   Normal right ventricular size with normal right ventricular systolic function.   Normal left ventricular diastolic function.   Intermediate central venous pressure (8 mmHg).  Patient had downtrending of trop. Deemed stable for discharge.    Pt deemed appropriate for discharge. Plan discussed with pt, who was agreeable and amenable; medications were discussed and reviewed, outpatient follow-up scheduled, ER precautions were given, all questions were answered to the pt's satisfaction, and Ms. Lopes was subsequently discharged.    Physical Exam  Gen: in NAD,  appears stated age  Neuro: AAOx3, motor, sensory, and strength grossly intact BL  HEENT: NTNC, EOMI, PERRL, MMM  CVS: RRR, no m/r/g; S1/S2 auscultated with no S3 or S4; capillary refill < 2 sec  Resp: lungs CTAB, no w/r/r; no belabored breathing or accessory muscle use appreciated   Abd: BS+ in all 4 quadrants; NTND, soft to palpation; no organomegaly appreciated   Extrem: pulses full, equal, and regular over all 4 extremities; no UE or LE edema BL           Goals of Care Treatment Preferences:  Code Status: Full Code      Consults:   Consults (From admission, onward)        Status Ordering Provider     Inpatient consult to Interventional Cardiology  Once        Provider:  (Not yet assigned)    Completed KALPANA HERNANDEZ     Inpatient consult to Registered Dietitian/Nutritionist  Once        Provider:  (Not yet assigned)    Completed SULY MATTHEW     Inpatient consult to Cardiac Rehab  Once        Provider:  (Not yet assigned)    Completed SULY MATTHEW     Inpatient consult to Cardiology  Once        Provider:  (Not yet assigned)    Completed LEXY ARCEHR          No new Assessment & Plan notes have been filed under this hospital service since the last note was generated.  Service: Hospital Medicine    Final Active Diagnoses:    Diagnosis Date Noted POA    PRINCIPAL PROBLEM:  NSTEMI (non-ST elevated myocardial infarction) [I21.4] 05/05/2023 Yes    BMI 45.0-49.9, adult [Z68.42] 08/31/2022 Not Applicable    Hyperlipidemia [E78.5] 08/31/2022 Yes    Anxiety and depression [F41.9, F32.A] 05/25/2015 Yes    History of DVT (deep vein thrombosis) [Z86.718] 05/06/2015 Not Applicable      Problems Resolved During this Admission:       Discharged Condition: stable    Disposition: Home or Self Care    Follow Up:   Follow-up Information     Apollo Kothari MD. Go on 5/22/2023.    Specialty: Internal Medicine  Why: Hospital follow up 5/22 at 9a  Contact information:  [address]  [place]  [address]  [phone]                       Patient Instructions:      Ambulatory referral/consult to Cardiology   Standing Status: Future   Referral Priority: Routine Referral Type: Consultation   Referral Reason: Specialty Services Required   Requested Specialty: Cardiology   Number of Visits Requested: 1     REASON FOR NOT PRESCRIBING ANTIPLATELET MEDICATION AT DISCHARGE     Order Specific Question Answer Comments   Reason for not Prescribing: Other (Comment) already prescribed       Significant Diagnostic Studies: Labs: CMP No results for input(s): NA, K, CL, CO2, GLU, BUN, CREATININE, CALCIUM, PROT, ALBUMIN, BILITOT, ALKPHOS, AST, ALT, ANIONGAP, ESTGFRAFRICA, EGFRNONAA in the last 48 hours., CBC No results for input(s): WBC, HGB, HCT, PLT in the last 48 hours. and All labs within the past 24 hours have been reviewed    Pending Diagnostic Studies:     Procedure Component Value Units Date/Time    EKG 12-lead [[id number]]     Order Status: Sent Lab Status: No result          Medications:  Reconciled Home Medications:      Medication List      START taking these medications    aspirin 81 MG Chew  Chew and swallow 1 tablet (81 mg total) by mouth once daily.     BRILINTA 90 mg tablet  Generic drug: ticagrelor  Take 1 tablet (90 mg total) by mouth 2 (two) times daily.     isosorbide mononitrate 30 MG 24 hr tablet  Commonly known as: IMDUR  Take 1 tablet (30 mg total) by mouth once daily.     nitroGLYCERIN 0.4 MG SL tablet  Commonly known as: NITROSTAT  Place 1 tablet (0.4 mg total) under the tongue every 5 (five) minutes as needed for Chest pain (angina). Up to 3 doses. If chest pain is not relieved or worsens 3 to 5 minutes after 1 dose, call 911 and seek immediate emergency medical attention.     verapamiL 120 MG CR tablet  Commonly known as: CALAN-SR  Take 1 tablet (120 mg total) by mouth nightly.        CHANGE how you take these medications    atorvastatin 80 MG tablet  Commonly known as: LIPITOR  Take 1 tablet (80  mg total) by mouth once daily.  What changed:   · medication strength  · how much to take  · when to take this        CONTINUE taking these medications    albuterol 90 mcg/actuation inhaler  Commonly known as: VENTOLIN HFA  Inhale 2 puffs into the lungs every 6 (six) hours as needed for Wheezing. Rescue     multivitamin capsule  Take 1 capsule by mouth once daily.     venlafaxine 37.5 MG 24 hr capsule  Commonly known as: EFFEXOR-XR  TAKE 1 CAPSULE(37.5 MG) BY MOUTH EVERY DAY        STOP taking these medications    butalbital-acetaminophen-caffeine -40 mg -40 mg per tablet  Commonly known as: FIORICET, ESGIC     meloxicam 15 MG tablet  Commonly known as: MOBIC     promethazine-dextromethorphan 6.25-15 mg/5 mL Syrp  Commonly known as: PROMETHAZINE-DM     sumatriptan 50 MG tablet  Commonly known as: IMITREX            Indwelling Lines/Drains at time of discharge:   Lines/Drains/Airways     None                 Time spent on the discharge of patient: 35 minutes           Janine Alex MD  Department of Hospital Medicine  [facility] - Cardiology Stepdown

## 2023-05-15 ENCOUNTER — CLINICAL SUPPORT (OUTPATIENT)
Dept: REHABILITATION | Facility: HOSPITAL | Age: 48
End: 2023-05-15
Payer: MEDICAID

## 2023-05-15 DIAGNOSIS — M75.41 SHOULDER IMPINGEMENT SYNDROME, RIGHT: Primary | ICD-10-CM

## 2023-05-15 PROCEDURE — 97110 THERAPEUTIC EXERCISES: CPT | Mod: CQ

## 2023-05-15 PROCEDURE — 97140 MANUAL THERAPY 1/> REGIONS: CPT | Mod: CQ

## 2023-05-15 NOTE — PROGRESS NOTES
[facility]   Physical Therapy Treatment Note      Name: Marianne Go  Clinic Number: [id number]    Therapy Diagnosis:   Encounter Diagnosis   Name Primary?    Shoulder impingement syndrome, right Yes       Physician: Josue Steward MD    Visit Date: 5/15/2023    Physician Orders: PT Eval and Treat   Medical Diagnosis from Referral: M75.41 (ICD-10-CM) - Shoulder impingement syndrome, right   Evaluation Date: 4/5/2023  Authorization Period Expiration: 10/5/2023  Plan of Care Expiration: 7/5/2023  Progress Note Due: 6/10/2023  Visit # / Visits authorized: 3/ 12 + eval   FOTO: 2/3     Precautions: Standard     PTA Visit #: 1/5     Time In: 8:00 AM  Time Out: 8:45 AM  Total Billable Time: 45 minutes    Subjective   Pt reports: having no pain today in her shoulder although she experiences some discomfort with some movements.     She was compliant with home exercise program.  Response to previous treatment: feeling better  Functional change: in progress    Pain: 0/10  Location: right shoulder      Objective      Active/Passive ROM: (degrees)   Shoulder Left Right    Flexion / 84 with inc pain lowering 145    Abduction / 125 with painful arc lowering 120 with painful arc lowering   ER / To lateral cervical spine with inc pain T3   IR / Within functional limits range of motion but anterior shoulder shift at initiation of movement and severe pain when returning from the movement WFL      Strength:   Shoulder (R) (L)   Flexion 4+/5 5/5   Abduction 4+/5 5/5   ER 4+/5 4+/5   IR 4+/5 5/5     Endurance tests:    30 sec STS: 12 reps    2 min step test: Patient fatigued at 1 min 10 sec      Treatment     Marianne received the treatments listed below:      therapeutic exercises to develop strength, endurance, ROM, flexibility, posture, and core stabilization for 40 minutes including:    UBE 3' forward / 3' backwards  Pulleys 2' flex / 2 ' scaption  Scap pinches X 30  Serratus punches 3x10 4# dowel  GTB rows  X 30  GTB extensions X 30  SL external rotation 1# DB 3x10  Side lying abduction 2x10  Sit to stand 2x10   Seated no monies x10 yellow theraband     Reassessment - ROM, MMT, endurance tests for 0 minutes    manual therapy techniques: Joint mobilizations, Manual traction, Myofacial release, Soft tissue Mobilization, and Friction Massage were applied to the: R shoulder for 8 minutes, including:  STM to right side UT and levator, biceps tendon and infra and supraspinatus muscles  Post and inf glenohumeral glides grade 2 on right shoulder  PROM with slight distraction in all planes    Patient Education and Home Exercises       Education provided:   - educated on rotator cuff anatomy and how it can cause challenges with lifting and reaching overhead  - cryotherapy for pain/soreness management     Written Home Exercises Provided: yes. Exercises were reviewed and Marianne was able to demonstrate them prior to the end of the session.  Marianne demonstrated good  understanding of the education provided. See EMR under Patient Instructions for exercises provided during therapy sessions    Assessment   Marianne returns after MI on 5/4/2023 and cardiac stent surgery on 5/5/2023. She reports feeling much better following surgery and denies any SOB or chest pain currently.  She has been compliant with HEP and reports her R shoulder continues to feel better. Patient completed her therapy and had no difficulty with new exercises added, noted in bold, with no increase in symptoms prior to leaving the clinic. Will continue to progress as tolerated.     Marianne Is progressing well towards her goals.   Pt prognosis is Good.     Pt will continue to benefit from skilled outpatient physical therapy to address the deficits listed in the problem list box on initial evaluation, provide pt/family education and to maximize pt's level of independence in the home and community environment.     Pt's spiritual, cultural and educational needs  considered and pt agreeable to plan of care and goals.     Anticipated barriers to physical therapy: bilateral shoulder pain    Goals:   Short Term Goals: 4 weeks   Patient will show 20 degree improvement in R shoulder range of motion  Partially met  Patient will show 1/2 grade MMT improvement in R shoulder strength  Met  Patient will show a reduction by 50% of anterior glenohumeral translation with range of motion  Progressing  Patient will show 50% reduction in shoulder muscle tone and deltoid compensations  Progressing     Long Term Goals: 8 weeks   Patient will be able to don / doff shirts and jackets without pain in the shoulder  Progressing  Patient will be able to reach overhead with > 10# without pain  Progressing  Patient will be able to assist her son for > 30 minutes without pain increasing  Progressing  Patient will be able to perform all work related duties for > 2 hours before onset of pain  Pt currently not working  50% FOTO score improvement  In progress    Plan     Cont per POC    Syed Bullard, PTA

## 2023-05-15 NOTE — PATIENT INSTRUCTIONS
Resisted External Rotation: in Neutral - Bilateral    Sit or stand, Yellow elastic band in both hands, elbows at sides, bent to 90°, forearms forward. Pinch shoulder blades together and rotate forearms out. Keep elbows at sides. Hold 2 seconds.  Repeat 10 times per set. Do 1 sets per session. Do 2 sessions per day.            Lie on left side. Raise arm above head. Keep palm forward.  Repeat 10 times per set. Do 2 sets per session. Do 2 sessions per day.     [email or web address]     Copyright © [employer]. All rights reserved.

## 2023-05-17 ENCOUNTER — CLINICAL SUPPORT (OUTPATIENT)
Dept: REHABILITATION | Facility: HOSPITAL | Age: 48
End: 2023-05-17
Payer: MEDICAID

## 2023-05-17 ENCOUNTER — TELEPHONE (OUTPATIENT)
Dept: CARDIAC REHAB | Facility: CLINIC | Age: 48
End: 2023-05-17
Payer: MEDICAID

## 2023-05-17 DIAGNOSIS — M75.41 SHOULDER IMPINGEMENT SYNDROME, RIGHT: Primary | ICD-10-CM

## 2023-05-17 PROCEDURE — 97110 THERAPEUTIC EXERCISES: CPT

## 2023-05-17 NOTE — PROGRESS NOTES
[facility]   Physical Therapy Treatment Note      Name: Marianne Go  Clinic Number: [id number]    Therapy Diagnosis:   Encounter Diagnosis   Name Primary?    Shoulder impingement syndrome, right Yes       Physician: Josue Stewrad MD    Visit Date: 5/17/2023    Physician Orders: PT Eval and Treat   Medical Diagnosis from Referral: M75.41 (ICD-10-CM) - Shoulder impingement syndrome, right   Evaluation Date: 4/5/2023  Authorization Period Expiration: 10/5/2023  Plan of Care Expiration: 7/5/2023  Progress Note Due: 6/10/2023  Visit # / Visits authorized: 3/ 12 + eval   FOTO: 2/3     Precautions: Standard     PTA Visit #: 1/5     Time In: 8:00 AM  Time Out: 8:40 AM  Total Billable Time: 40 minutes    Subjective   Pt reports: having no pain today in her shoulder although she experiences some discomfort with some movements. Pt states that she still gets winded with minor activity and this has taken precedent over her shoulder    She was compliant with home exercise program.  Response to previous treatment: feeling better  Functional change: in progress    Pain: 0/10  Location: right shoulder      Objective      Active/Passive ROM: (degrees)   Shoulder Left Right    Flexion / 84 with inc pain lowering 145    Abduction / 125 with painful arc lowering 120 with painful arc lowering   ER / To lateral cervical spine with inc pain T3   IR / Within functional limits range of motion but anterior shoulder shift at initiation of movement and severe pain when returning from the movement WFL      Strength:   Shoulder (R) (L)   Flexion 4+/5 5/5   Abduction 4+/5 5/5   ER 4+/5 4+/5   IR 4+/5 5/5     Endurance tests:    30 sec STS: 12 reps    2 min step test: Patient fatigued at 1 min 10 sec      Treatment     Marianne received the treatments listed below:      therapeutic exercises to develop strength, endurance, ROM, flexibility, posture, and core stabilization for 40 minutes including:    UBE 3' forward / 3'  backwards  Pulleys 2' flex / 2 ' scaption  Scap pinches X 30  Serratus punches 3x10 4# dowel  GTB rows X 30  GTB extensions X 30  SL external rotation 1# DB 3x10  Side lying abduction 2x10  Sit to stand 2x10   Seated no monies x10 yellow theraband     Reassessment - ROM, MMT, endurance tests for 0 minutes    manual therapy techniques: Joint mobilizations, Manual traction, Myofacial release, Soft tissue Mobilization, and Friction Massage were applied to the: R shoulder for 0 minutes, including:  STM to right side UT and levator, biceps tendon and infra and supraspinatus muscles  Post and inf glenohumeral glides grade 2 on right shoulder  PROM with slight distraction in all planes    Patient Education and Home Exercises       Education provided:   - educated on rotator cuff anatomy and how it can cause challenges with lifting and reaching overhead  - cryotherapy for pain/soreness management     Written Home Exercises Provided: yes. Exercises were reviewed and Marianne was able to demonstrate them prior to the end of the session.  Marianne demonstrated good  understanding of the education provided. See EMR under Patient Instructions for exercises provided during therapy sessions    Assessment   Marianne returns after MI on 5/4/2023 and cardiac stent surgery on 5/5/2023. She reports feeling much better following surgery and denies any SOB or chest pain currently.  She has been compliant with HEP and reports her R shoulder continues to feel better. Patient completed her therapy and had no difficulty with new exercises added, noted in bold, with no increase in symptoms prior to leaving the clinic. Will continue to progress as tolerated.     Marianne Is progressing well towards her goals.   Pt prognosis is Good.     Pt will continue to benefit from skilled outpatient physical therapy to address the deficits listed in the problem list box on initial evaluation, provide pt/family education and to maximize pt's level of  independence in the home and community environment.     Pt's spiritual, cultural and educational needs considered and pt agreeable to plan of care and goals.     Anticipated barriers to physical therapy: bilateral shoulder pain    Goals:   Short Term Goals: 4 weeks   Patient will show 20 degree improvement in R shoulder range of motion  Partially met  Patient will show 1/2 grade MMT improvement in R shoulder strength  Met  Patient will show a reduction by 50% of anterior glenohumeral translation with range of motion  Progressing  Patient will show 50% reduction in shoulder muscle tone and deltoid compensations  Progressing     Long Term Goals: 8 weeks   Patient will be able to don / doff shirts and jackets without pain in the shoulder  Progressing  Patient will be able to reach overhead with > 10# without pain  Progressing  Patient will be able to assist her son for > 30 minutes without pain increasing  Progressing  Patient will be able to perform all work related duties for > 2 hours before onset of pain  Pt currently not working  50% FOTO score improvement  In progress    Plan     Cont per POC    Adan Griffith, PT

## 2023-05-17 NOTE — TELEPHONE ENCOUNTER
Returned patient's call.  Is interested in attending Phase II cardiac rehab.  Gave patient telephone # for [facility].  Savita Landeros RN  Cardiac Rehab Nurse

## 2023-05-18 ENCOUNTER — TELEPHONE (OUTPATIENT)
Dept: HEPATOLOGY | Facility: CLINIC | Age: 48
End: 2023-05-18
Payer: MEDICAID

## 2023-05-18 NOTE — TELEPHONE ENCOUNTER
----- Message from Isi Jefferson sent at 5/17/2023  3:19 PM CDT -----  Contact: [phone]  Re: Dr. Janine Alex.    Patient is calling for Medical Advice regarding:  Patient says she is having severe headaches and nausea  from taking isosorbide mononitrate (IMDUR) 30 MG 24 hr tablet? Please call and advise.    How long has patient had these symptoms: May 6     Pharmacy name and phone#:"[id number] - [place] - [address] AT [facility]   Phone:  [phone]  Fax:  [phone]        Comments: PLEASE CALL AND ADVISE

## 2023-05-18 NOTE — TELEPHONE ENCOUNTER
Returned patient's call.  She is seeing Dr Joshi on Monday 5/22/23.  I encouraged her to take a log of her BP several times a day and document when she takes her medications and when she has a headache coming on.  I told her to do this over the weekend and bring the log to her appointment with Dr Joshi on Monday so he can possibly make some adjustments to her medication.  She appreciated the call back and was very agreeable to the instructions and also has a BP monitor at home already.

## 2023-05-22 ENCOUNTER — TELEPHONE (OUTPATIENT)
Dept: INTERNAL MEDICINE | Facility: CLINIC | Age: 48
End: 2023-05-22
Payer: MEDICAID

## 2023-05-22 ENCOUNTER — OFFICE VISIT (OUTPATIENT)
Dept: INTERNAL MEDICINE | Facility: CLINIC | Age: 48
End: 2023-05-22
Payer: MEDICAID

## 2023-05-22 VITALS
HEIGHT: 62 IN | HEART RATE: 81 BPM | OXYGEN SATURATION: 96 % | WEIGHT: 264.56 LBS | DIASTOLIC BLOOD PRESSURE: 72 MMHG | BODY MASS INDEX: 48.68 KG/M2 | SYSTOLIC BLOOD PRESSURE: 124 MMHG | RESPIRATION RATE: 18 BRPM

## 2023-05-22 DIAGNOSIS — K21.9 GASTROESOPHAGEAL REFLUX DISEASE, UNSPECIFIED WHETHER ESOPHAGITIS PRESENT: ICD-10-CM

## 2023-05-22 DIAGNOSIS — I21.4 NSTEMI (NON-ST ELEVATED MYOCARDIAL INFARCTION): Primary | ICD-10-CM

## 2023-05-22 PROCEDURE — 3078F DIAST BP <80 MM HG: CPT | Mod: CPTII,,, | Performed by: INTERNAL MEDICINE

## 2023-05-22 PROCEDURE — 1111F PR DISCHARGE MEDS RECONCILED W/ CURRENT OUTPATIENT MED LIST: ICD-10-PCS | Mod: CPTII,,, | Performed by: INTERNAL MEDICINE

## 2023-05-22 PROCEDURE — 99214 PR OFFICE/OUTPT VISIT, EST, LEVL IV, 30-39 MIN: ICD-10-PCS | Mod: S$PBB,,, | Performed by: INTERNAL MEDICINE

## 2023-05-22 PROCEDURE — 3044F HG A1C LEVEL LT 7.0%: CPT | Mod: CPTII,,, | Performed by: INTERNAL MEDICINE

## 2023-05-22 PROCEDURE — 99999 PR PBB SHADOW E&M-EST. PATIENT-LVL IV: CPT | Mod: PBBFAC,,, | Performed by: INTERNAL MEDICINE

## 2023-05-22 PROCEDURE — 1160F RVW MEDS BY RX/DR IN RCRD: CPT | Mod: CPTII,,, | Performed by: INTERNAL MEDICINE

## 2023-05-22 PROCEDURE — 3008F BODY MASS INDEX DOCD: CPT | Mod: CPTII,,, | Performed by: INTERNAL MEDICINE

## 2023-05-22 PROCEDURE — 3074F SYST BP LT 130 MM HG: CPT | Mod: CPTII,,, | Performed by: INTERNAL MEDICINE

## 2023-05-22 PROCEDURE — 3008F PR BODY MASS INDEX (BMI) DOCUMENTED: ICD-10-PCS | Mod: CPTII,,, | Performed by: INTERNAL MEDICINE

## 2023-05-22 PROCEDURE — 3078F PR MOST RECENT DIASTOLIC BLOOD PRESSURE < 80 MM HG: ICD-10-PCS | Mod: CPTII,,, | Performed by: INTERNAL MEDICINE

## 2023-05-22 PROCEDURE — 99214 OFFICE O/P EST MOD 30 MIN: CPT | Mod: PBBFAC | Performed by: INTERNAL MEDICINE

## 2023-05-22 PROCEDURE — 99214 OFFICE O/P EST MOD 30 MIN: CPT | Mod: S$PBB,,, | Performed by: INTERNAL MEDICINE

## 2023-05-22 PROCEDURE — 1159F PR MEDICATION LIST DOCUMENTED IN MEDICAL RECORD: ICD-10-PCS | Mod: CPTII,,, | Performed by: INTERNAL MEDICINE

## 2023-05-22 PROCEDURE — 99999 PR PBB SHADOW E&M-EST. PATIENT-LVL IV: ICD-10-PCS | Mod: PBBFAC,,, | Performed by: INTERNAL MEDICINE

## 2023-05-22 PROCEDURE — 3074F PR MOST RECENT SYSTOLIC BLOOD PRESSURE < 130 MM HG: ICD-10-PCS | Mod: CPTII,,, | Performed by: INTERNAL MEDICINE

## 2023-05-22 PROCEDURE — 3044F PR MOST RECENT HEMOGLOBIN A1C LEVEL <7.0%: ICD-10-PCS | Mod: CPTII,,, | Performed by: INTERNAL MEDICINE

## 2023-05-22 PROCEDURE — 1159F MED LIST DOCD IN RCRD: CPT | Mod: CPTII,,, | Performed by: INTERNAL MEDICINE

## 2023-05-22 PROCEDURE — 1160F PR REVIEW ALL MEDS BY PRESCRIBER/CLIN PHARMACIST DOCUMENTED: ICD-10-PCS | Mod: CPTII,,, | Performed by: INTERNAL MEDICINE

## 2023-05-22 PROCEDURE — 1111F DSCHRG MED/CURRENT MED MERGE: CPT | Mod: CPTII,,, | Performed by: INTERNAL MEDICINE

## 2023-05-24 ENCOUNTER — CLINICAL SUPPORT (OUTPATIENT)
Dept: REHABILITATION | Facility: HOSPITAL | Age: 48
End: 2023-05-24
Payer: MEDICAID

## 2023-05-24 DIAGNOSIS — M75.41 SHOULDER IMPINGEMENT SYNDROME, RIGHT: Primary | ICD-10-CM

## 2023-05-24 PROCEDURE — 97110 THERAPEUTIC EXERCISES: CPT

## 2023-05-24 NOTE — PROGRESS NOTES
[facility]   Physical Therapy Treatment Note      Name: Marianne Go  Clinic Number: [id number]    Therapy Diagnosis:   Encounter Diagnosis   Name Primary?    Shoulder impingement syndrome, right Yes       Physician: Josue Steward MD    Visit Date: 5/24/2023    Physician Orders: PT Eval and Treat   Medical Diagnosis from Referral: M75.41 (ICD-10-CM) - Shoulder impingement syndrome, right   Evaluation Date: 4/5/2023  Authorization Period Expiration: 10/5/2023  Plan of Care Expiration: 7/5/2023  Progress Note Due: 6/10/2023  Visit # / Visits authorized: 3/ 12 + eval   FOTO: 2/3     Precautions: Standard     PTA Visit #: 1/5     Time In: 8:45 AM  Time Out: 925 AM  Total Billable Time: 40 minutes    Subjective   Pt reports: shoulder feeling good, started cardiac rehab yesterday and thinks its going to compliment this well, she is able to do more without pain returning    She was compliant with home exercise program.  Response to previous treatment: feeling better  Functional change: in progress    Pain: 0/10  Location: right shoulder      Objective      Active/Passive ROM: (degrees)   Shoulder Left Right    Flexion / 84 with inc pain lowering 145    Abduction / 125 with painful arc lowering 120 with painful arc lowering   ER / To lateral cervical spine with inc pain T3   IR / Within functional limits range of motion but anterior shoulder shift at initiation of movement and severe pain when returning from the movement WFL      Strength:   Shoulder (R) (L)   Flexion 4+/5 5/5   Abduction 4+/5 5/5   ER 4+/5 4+/5   IR 4+/5 5/5     Endurance tests:    30 sec STS: 12 reps    2 min step test: Patient fatigued at 1 min 10 sec      Treatment     Marianne received the treatments listed below:      therapeutic exercises to develop strength, endurance, ROM, flexibility, posture, and core stabilization for 40 minutes including:    UBE 3' forward / 3' backwards  Pulleys 2' flex / 2 ' scaption  Scap pinches X  30  Serratus punches 3x10 4# dowel  Half range supine flexion with 4 # dowel  GTB rows X 30  GTB extensions X 30- single arm today to focus on staability and core muscle engagement  SL external rotation 1# DB 3x10  Side lying abduction 2x10  Sit to stand 2x10   Seated no monies x10 yellow theraband     Reassessment - ROM, MMT, endurance tests for 0 minutes    manual therapy techniques: Joint mobilizations, Manual traction, Myofacial release, Soft tissue Mobilization, and Friction Massage were applied to the: R shoulder for 0 minutes, including:  STM to right side UT and levator, biceps tendon and infra and supraspinatus muscles  Post and inf glenohumeral glides grade 2 on right shoulder  PROM with slight distraction in all planes    Patient Education and Home Exercises       Education provided:   - educated on rotator cuff anatomy and how it can cause challenges with lifting and reaching overhead  - cryotherapy for pain/soreness management     Written Home Exercises Provided: yes. Exercises were reviewed and Marianne was able to demonstrate them prior to the end of the session.  Marianne demonstrated good  understanding of the education provided. See EMR under Patient Instructions for exercises provided during therapy sessions    Assessment   Marianne did well today with an increase in scapular control and decreased upper trap compensations present. She is showing good improvement in shoulder strength and is recovering well. She was able to perform single arm activity today focusing on improving core muscle engagement and we will progress to dynamic functional overhead lifting    Marianne Is progressing well towards her goals.   Pt prognosis is Good.     Pt will continue to benefit from skilled outpatient physical therapy to address the deficits listed in the problem list box on initial evaluation, provide pt/family education and to maximize pt's level of independence in the home and community environment.     Pt's  spiritual, cultural and educational needs considered and pt agreeable to plan of care and goals.     Anticipated barriers to physical therapy: bilateral shoulder pain    Goals:   Short Term Goals: 4 weeks   Patient will show 20 degree improvement in R shoulder range of motion  Partially met  Patient will show 1/2 grade MMT improvement in R shoulder strength  Met  Patient will show a reduction by 50% of anterior glenohumeral translation with range of motion  Progressing  Patient will show 50% reduction in shoulder muscle tone and deltoid compensations  Progressing     Long Term Goals: 8 weeks   Patient will be able to don / doff shirts and jackets without pain in the shoulder  Progressing  Patient will be able to reach overhead with > 10# without pain  Progressing  Patient will be able to assist her son for > 30 minutes without pain increasing  Progressing  Patient will be able to perform all work related duties for > 2 hours before onset of pain  Pt currently not working  50% FOTO score improvement  In progress    Plan     Cont per POC    Deborah Sandy, PT

## 2023-05-29 ENCOUNTER — CLINICAL SUPPORT (OUTPATIENT)
Dept: REHABILITATION | Facility: HOSPITAL | Age: 48
End: 2023-05-29
Payer: MEDICAID

## 2023-05-29 DIAGNOSIS — M75.41 SHOULDER IMPINGEMENT SYNDROME, RIGHT: Primary | ICD-10-CM

## 2023-05-29 PROCEDURE — 97110 THERAPEUTIC EXERCISES: CPT

## 2023-05-29 NOTE — PROGRESS NOTES
[facility]   Physical Therapy Treatment Note      Name: Marianne Go  Clinic Number: [id number]    Therapy Diagnosis:   Encounter Diagnosis   Name Primary?    Shoulder impingement syndrome, right Yes     Physician: Josue Steward MD    Visit Date: 5/29/2023    Physician Orders: PT Eval and Treat   Medical Diagnosis from Referral: M75.41 (ICD-10-CM) - Shoulder impingement syndrome, right   Evaluation Date: 4/5/2023  Authorization Period Expiration: 10/5/2023  Plan of Care Expiration: 7/5/2023  Progress Note Due: 6/10/2023  Visit # / Visits authorized: 5/ 12 + eval   FOTO: 2/3     Precautions: Standard     PTA Visit #: 0/5     Time In: 8:05 AM  Time Out: 8:45  AM  Total Billable Time: 40 minutes    Subjective   Pt reports: no c/o shoulder pain currently. She has been doing better with ADLs, but continues to have difficulty lifting heavy pots.     She was compliant with home exercise program.  Response to previous treatment: feeling better  Functional change: in progress    Pain: 0/10  Location: right shoulder      Objective      Active/Passive ROM: (degrees)   Shoulder Left Right    Flexion / 84 with inc pain lowering 145    Abduction / 125 with painful arc lowering 120 with painful arc lowering   ER / To lateral cervical spine with inc pain T3   IR / Within functional limits range of motion but anterior shoulder shift at initiation of movement and severe pain when returning from the movement WFL      Strength:   Shoulder (R) (L)   Flexion 4+/5 5/5   Abduction 4+/5 5/5   ER 4+/5 4+/5   IR 4+/5 5/5     Endurance tests:    30 sec STS: 12 reps    2 min step test: Patient fatigued at 1 min 10 sec      Treatment     Marianne received the treatments listed below:      therapeutic exercises to develop strength, endurance, ROM, flexibility, posture, and core stabilization for 40 minutes including:    UBE 3' forward / 3' backwards  Pulleys 2' flex / 2 ' scaption  Serratus punches 3x10 4# dowel  Half  range supine flexion with 4 # dowel  BTB rows X 30  BTB extensions X 30- single arm today to focus on staability and core muscle engagement  SL external rotation 1# DB 3x10  Side lying abduction 2x10  Sit to stand 2x10   Seated no money 2x10 red theraband       manual therapy techniques: Joint mobilizations, Manual traction, Myofacial release, Soft tissue Mobilization, and Friction Massage were applied to the: R shoulder for 0 minutes, including:  STM to right side UT and levator, biceps tendon and infra and supraspinatus muscles  Post and inf glenohumeral glides grade 2 on right shoulder  PROM with slight distraction in all planes    Patient Education and Home Exercises       Education provided:   - educated on rotator cuff anatomy and how it can cause challenges with lifting and reaching overhead  - cryotherapy for pain/soreness management     Written Home Exercises Provided: yes. Exercises were reviewed and Marianne was able to demonstrate them prior to the end of the session.  Marianne demonstrated good  understanding of the education provided. See EMR under Patient Instructions for exercises provided during therapy sessions    Assessment   Marianne tolerated progressions in therex well with reports of moderate muscular fatigue at end of session. She still demonstrates deficit in R shoulder abduction AROM, but continues to demo improvement in scapular control. She is showing good improvement in shoulder strength and is recovering well. Cold pack applied to R shoulder at end of session to decrease soreness.     Marianne Is progressing well towards her goals.   Pt prognosis is Good.     Pt will continue to benefit from skilled outpatient physical therapy to address the deficits listed in the problem list box on initial evaluation, provide pt/family education and to maximize pt's level of independence in the home and community environment.     Pt's spiritual, cultural and educational needs considered and pt agreeable  to plan of care and goals.     Anticipated barriers to physical therapy: bilateral shoulder pain    Goals:   Short Term Goals: 4 weeks   Patient will show 20 degree improvement in R shoulder range of motion  Partially met  Patient will show 1/2 grade MMT improvement in R shoulder strength  Met  Patient will show a reduction by 50% of anterior glenohumeral translation with range of motion  Progressing  Patient will show 50% reduction in shoulder muscle tone and deltoid compensations  Progressing     Long Term Goals: 8 weeks   Patient will be able to don / doff shirts and jackets without pain in the shoulder  Progressing  Patient will be able to reach overhead with > 10# without pain  Progressing  Patient will be able to assist her son for > 30 minutes without pain increasing  Progressing  Patient will be able to perform all work related duties for > 2 hours before onset of pain  Pt currently not working  50% FOTO score improvement  In progress    Plan     Cont per POC    Kathy Ziegler, PT

## 2023-05-31 ENCOUNTER — CLINICAL SUPPORT (OUTPATIENT)
Dept: REHABILITATION | Facility: HOSPITAL | Age: 48
End: 2023-05-31
Payer: MEDICAID

## 2023-05-31 DIAGNOSIS — M75.41 SHOULDER IMPINGEMENT SYNDROME, RIGHT: Primary | ICD-10-CM

## 2023-05-31 PROCEDURE — 97110 THERAPEUTIC EXERCISES: CPT

## 2023-05-31 NOTE — PROGRESS NOTES
[facility]   Physical Therapy Treatment Note      Name: Marianne Go  Clinic Number: [id number]    Therapy Diagnosis:   Encounter Diagnosis   Name Primary?    Shoulder impingement syndrome, right Yes     Physician: Josue Steward MD    Visit Date: 5/31/2023    Physician Orders: PT Eval and Treat   Medical Diagnosis from Referral: M75.41 (ICD-10-CM) - Shoulder impingement syndrome, right   Evaluation Date: 4/5/2023  Authorization Period Expiration: 10/5/2023  Plan of Care Expiration: 7/5/2023  Progress Note Due: 6/10/2023  Visit # / Visits authorized: 7/ 12 + eval   FOTO: 2/3     Precautions: Standard     PTA Visit #: 0/5     Time In: 8:05 AM  Time Out: 8:50  AM  Total Billable Time: 45 minutes    Subjective   Pt reports: she was a little sore after last session. No c/o shoulder pain currently.     She was compliant with home exercise program.  Response to previous treatment: min muscular soreness  Functional change: improved overhead reaching     Pain: 0/10  Location: right shoulder      Objective      Active/Passive ROM: (degrees)   Shoulder Left Right    Flexion / 84 with inc pain lowering 145    Abduction / 125 with painful arc lowering 120 with painful arc lowering   ER / To lateral cervical spine with inc pain T3   IR / Within functional limits range of motion but anterior shoulder shift at initiation of movement and severe pain when returning from the movement WFL      Strength:   Shoulder (R) (L)   Flexion 4+/5 5/5   Abduction 4+/5 5/5   ER 4+/5 4+/5   IR 4+/5 5/5     Endurance tests:    30 sec STS: 12 reps    2 min step test: Patient fatigued at 1 min 10 sec      Treatment     Marianne received the treatments listed below:      therapeutic exercises to develop strength, endurance, ROM, flexibility, posture, and core stabilization for 45 minutes including:    UBE 3' forward / 3' backwards  Pulleys 2' flex / 2 ' scaption  Serratus punches 3x12 4# dowel  Supine arm circles 2# DB CW/CCW  3x20 ea direction  Wall slides c/ towel flexion 2x10, abduction 1x10  Half range supine flexion with 4 # dowel  BTB rows X 30  BTB extensions X 30- single arm today to focus on staability and core muscle engagement  SL external rotation 1# DB 3x10  Side lying abduction 2x10  Seated no money 3x10 red theraband   Wall walk c/ yellow TB x5      manual therapy techniques: Joint mobilizations, Manual traction, Myofacial release, Soft tissue Mobilization, and Friction Massage were applied to the: R shoulder for 0 minutes, including:  STM to right side UT and levator, biceps tendon and infra and supraspinatus muscles  Post and inf glenohumeral glides grade 2 on right shoulder  PROM with slight distraction in all planes    Patient Education and Home Exercises       Education provided:   - educated on rotator cuff anatomy and how it can cause challenges with lifting and reaching overhead  - cryotherapy for pain/soreness management     Written Home Exercises Provided: yes. Exercises were reviewed and Marianne was able to demonstrate them prior to the end of the session.  Marianne demonstrated good  understanding of the education provided. See EMR under Patient Instructions for exercises provided during therapy sessions    Assessment   Marianne tolerated few progressions in therex well with reports of mild muscular fatigue at end of session, but no c/o increased pain. She still demo RTC weakness noted by she easily fatigues with sidelying ER w/ 1# DB. Added wall slides, supine arm circles c/ 2# DB, and wall walk c/ YTB today. She is showing good improvement in overall shoulder strength and R shoulder AROM. She will continue to benefit from progressions in functional strengthening.     Marianne Is progressing well towards her goals.   Pt prognosis is Good.   Pt will continue to benefit from skilled outpatient physical therapy to address the deficits listed in the problem list box on initial evaluation, provide pt/family education  and to maximize pt's level of independence in the home and community environment.     Pt's spiritual, cultural and educational needs considered and pt agreeable to plan of care and goals.     Anticipated barriers to physical therapy: bilateral shoulder pain    Goals:   Short Term Goals: 4 weeks   Patient will show 20 degree improvement in R shoulder range of motion  Partially met  Patient will show 1/2 grade MMT improvement in R shoulder strength  Met  Patient will show a reduction by 50% of anterior glenohumeral translation with range of motion  Progressing  Patient will show 50% reduction in shoulder muscle tone and deltoid compensations  Progressing     Long Term Goals: 8 weeks   Patient will be able to don / doff shirts and jackets without pain in the shoulder  Progressing  Patient will be able to reach overhead with > 10# without pain  Progressing  Patient will be able to assist her son for > 30 minutes without pain increasing  Progressing  Patient will be able to perform all work related duties for > 2 hours before onset of pain  Pt currently not working  50% FOTO score improvement  In progress    Plan     Cont per POC    Kathy Ziegler, PT

## 2023-06-01 RX ORDER — VENLAFAXINE HYDROCHLORIDE 37.5 MG/1
37.5 CAPSULE, EXTENDED RELEASE ORAL DAILY
Qty: 30 CAPSULE | Refills: 3 | Status: SHIPPED | OUTPATIENT
Start: 2023-06-01 | End: 2023-06-23 | Stop reason: SDUPTHER

## 2023-06-14 ENCOUNTER — CLINICAL SUPPORT (OUTPATIENT)
Dept: REHABILITATION | Facility: HOSPITAL | Age: 48
End: 2023-06-14
Payer: MEDICAID

## 2023-06-14 DIAGNOSIS — M75.41 SHOULDER IMPINGEMENT SYNDROME, RIGHT: Primary | ICD-10-CM

## 2023-06-14 PROCEDURE — 97110 THERAPEUTIC EXERCISES: CPT

## 2023-06-14 NOTE — PROGRESS NOTES
"[facility]   Physical Therapy Treatment Note      Name: Marianne oG  Clinic Number: [id number]    Therapy Diagnosis:   Encounter Diagnosis   Name Primary?    Shoulder impingement syndrome, right Yes     Physician: Josue Steward MD    Visit Date: 6/14/2023    Physician Orders: PT Eval and Treat   Medical Diagnosis from Referral: M75.41 (ICD-10-CM) - Shoulder impingement syndrome, right   Evaluation Date: 4/5/2023  Authorization Period Expiration: 10/5/2023  Plan of Care Expiration: 7/5/2023  Progress Note Due: 6/10/2023  Visit # / Visits authorized: 8/ 12 + eval   FOTO: 2/3     Precautions: Standard     PTA Visit #: 0/5     Time In: 8:00 AM  Time Out: 8:45  AM  Total Billable Time: 45 minutes    Subjective   Pt reports: her shoulder is feeling good. She had no pain while on vacation and was able to  pots without pain.     She was compliant with home exercise program.  Response to previous treatment: min muscular soreness  Functional change: improved overhead reaching and lifting    Pain: 0/10  Location: Right shoulder      Objective      Active/Passive ROM: (degrees)   Shoulder Left Right    Flexion / 84 with inc pain lowering 145    Abduction / 125 with painful arc lowering 120 with painful arc lowering   ER / To lateral cervical spine with inc pain T3   IR / Within functional limits range of motion but anterior shoulder shift at initiation of movement and severe pain when returning from the movement WFL      Strength:   Shoulder (R) (L)   Flexion 4+/5 5/5   Abduction 4+/5 5/5   ER 4+/5 4+/5   IR 4+/5 5/5     Endurance tests:    30 sec STS: 12 reps    2 min step test: Patient fatigued at 1 min 10 sec      Treatment     Marianne received the treatments listed below:      therapeutic exercises to develop strength, endurance, ROM, flexibility, posture, and core stabilization for 45 minutes including:    UBE 3' forward / 3' backwards - level 2.0  Doorway pec stretch 3x30"  Serratus " punches 3x12 4# dowel  Wall slides in doorway 3 sec hold - flexion x15, abduction x15  Standing IR (behind back) stretch c/ strap 10 sec hold x10  Serratus press c/ hands on plinth 2x8  Seated row machine 30# 2x10  BTB extensions X 30- single arm today to focus on staability and core muscle engagement  SL external rotation 2# DB 2x8  No money 2x12 green theraband   Wall walk c/ red TB 2x5      manual therapy techniques: Joint mobilizations, Manual traction, Myofacial release, Soft tissue Mobilization, and Friction Massage were applied to the: R shoulder for 0 minutes, including:  STM to right side UT and levator, biceps tendon and infra and supraspinatus muscles  Post and inf glenohumeral glides grade 2 on right shoulder  PROM with slight distraction in all planes    Patient Education and Home Exercises       Education provided:   - educated on rotator cuff anatomy and how it can cause challenges with lifting and reaching overhead  - cryotherapy for pain/soreness management     Written Home Exercises Provided: yes. Exercises were reviewed and Marianne was able to demonstrate them prior to the end of the session.  Marianne demonstrated good  understanding of the education provided. See EMR under Patient Instructions for exercises provided during therapy sessions    Assessment   Marianne arrived with no c/o pain and continues to do well with treatment focused on R shoulder / periscapular strengthening. She reports improvement with reaching overhead and improved ability with lifting pots. Added serratus press on plinth, seated row machine, and progressed resistance for several exercises. She is showing good improvement in overall shoulder strength and AROM. She will continue to benefit from progressions in functional strengthening.     Marianne Is progressing well towards her goals.   Pt prognosis is Good.   Pt will continue to benefit from skilled outpatient physical therapy to address the deficits listed in the problem  list box on initial evaluation, provide pt/family education and to maximize pt's level of independence in the home and community environment.     Pt's spiritual, cultural and educational needs considered and pt agreeable to plan of care and goals.     Anticipated barriers to physical therapy: bilateral shoulder pain    Goals:   Short Term Goals: 4 weeks   Patient will show 20 degree improvement in R shoulder range of motion  Partially met  Patient will show 1/2 grade MMT improvement in R shoulder strength  Met  Patient will show a reduction by 50% of anterior glenohumeral translation with range of motion  Progressing  Patient will show 50% reduction in shoulder muscle tone and deltoid compensations  Progressing     Long Term Goals: 8 weeks   Patient will be able to don / doff shirts and jackets without pain in the shoulder  Progressing  Patient will be able to reach overhead with > 10# without pain  Progressing  Patient will be able to assist her son for > 30 minutes without pain increasing  Progressing  Patient will be able to perform all work related duties for > 2 hours before onset of pain  Pt currently not working  50% FOTO score improvement  In progress    Plan     Cont per POC    Kathy Ziegler, PT

## 2023-06-21 ENCOUNTER — CLINICAL SUPPORT (OUTPATIENT)
Dept: REHABILITATION | Facility: HOSPITAL | Age: 48
End: 2023-06-21
Payer: MEDICAID

## 2023-06-21 DIAGNOSIS — M75.41 SHOULDER IMPINGEMENT SYNDROME, RIGHT: Primary | ICD-10-CM

## 2023-06-21 PROCEDURE — 97110 THERAPEUTIC EXERCISES: CPT

## 2023-06-21 NOTE — PROGRESS NOTES
[facility]   Physical Therapy Treatment Note      Name: Marianne Go  Clinic Number: [id number]    Therapy Diagnosis:   Encounter Diagnosis   Name Primary?    Shoulder impingement syndrome, right Yes     Physician: Josue Steward MD    Visit Date: 6/21/2023    Physician Orders: PT Eval and Treat   Medical Diagnosis from Referral: M75.41 (ICD-10-CM) - Shoulder impingement syndrome, right   Evaluation Date: 4/5/2023  Authorization Period Expiration: 10/5/2023  Plan of Care Expiration: 7/5/2023  Progress Note Due: 7/10/2023  Visit # / Visits authorized: 9/ 12 + eval   FOTO: 2/3     Precautions: Standard     PTA Visit #: 0/5     Time In: 8:10 AM  Time Out: 8:55  AM  Total Billable Time: 45 minutes    Subjective   Pt reports: no c/o shoulder pain. She has been doing well with ADLs and cooking with no difficulty. She states she feels ready to D/C in 2 visits.     She was compliant with home exercise program.  Response to previous treatment: min muscular soreness  Functional change: improved overhead reaching and lifting    Pain: 0/10  Location: Right shoulder      Objective      Active/Passive ROM: (degrees)   Shoulder Left Right    Flexion / 84 with inc pain lowering 145    Abduction / 125 with painful arc lowering 120 with painful arc lowering   ER / To lateral cervical spine with inc pain T3   IR / Within functional limits range of motion but anterior shoulder shift at initiation of movement and severe pain when returning from the movement WFL      Strength:   Shoulder (R) (L)   Flexion 4+/5 5/5   Abduction 4+/5 5/5   ER 4+/5 4+/5   IR 4+/5 5/5     Endurance tests:    30 sec STS: 12 reps    2 min step test: Patient fatigued at 1 min 10 sec      Treatment     Marianne received the treatments listed below:      therapeutic exercises to develop strength, endurance, ROM, flexibility, posture, and core stabilization for 45 minutes including:    UBE 3' forward / 3' backwards - level 2.0  Doorway pec  "stretch 3x30"  Serratus punches 3x12 4# dowel  Wall slides in doorway 3 sec hold - flexion x15, abduction x15  Standing IR (behind back) stretch c/ strap 10 sec hold x10  Serratus press c/ hands on plinth 2x8  Seated row machine 35# 3x10  BTB extensions X 30- single arm today to focus on staability and core muscle engagement  SL external rotation 2# DB 2x8  No money 3x12 green theraband   Prone T's 2# DB 2x10  Prone Y's no weight 2x10  D2 flexion RTB 2x8  Seated thoracic extension c/ 1/2 foam 3 sec hold x15  Standing open book x10 ea  Wall walk c/ red TB 2x5        manual therapy techniques: Joint mobilizations, Manual traction, Myofacial release, Soft tissue Mobilization, and Friction Massage were applied to the: R shoulder for 0 minutes, including:  STM to right side UT and levator, biceps tendon and infra and supraspinatus muscles  Post and inf glenohumeral glides grade 2 on right shoulder  PROM with slight distraction in all planes    Patient Education and Home Exercises       Education provided:   - educated on rotator cuff anatomy and how it can cause challenges with lifting and reaching overhead  - cryotherapy for pain/soreness management     Written Home Exercises Provided: yes. Exercises were reviewed and Marianne was able to demonstrate them prior to the end of the session.  Marianne demonstrated good  understanding of the education provided. See EMR under Patient Instructions for exercises provided during therapy sessions    Assessment   Marianne continues to do well with treatment focused on R shoulder / periscapular strengthening and continues to show improvement in R shoulder AROM. She still has min limitation in IR. She did well with progressions in therex, added seated thoracic extension and standing open book today for thoracic mobility. She has progressed well towards therapy goals and plans to D/C in 2 visits.     Marianne Is progressing well towards her goals.   Pt prognosis is Good.   Pt will " continue to benefit from skilled outpatient physical therapy to address the deficits listed in the problem list box on initial evaluation, provide pt/family education and to maximize pt's level of independence in the home and community environment.     Pt's spiritual, cultural and educational needs considered and pt agreeable to plan of care and goals.     Anticipated barriers to physical therapy: bilateral shoulder pain    Goals:   Short Term Goals: 4 weeks   Patient will show 20 degree improvement in R shoulder range of motion  Partially met  Patient will show 1/2 grade MMT improvement in R shoulder strength  Met  Patient will show a reduction by 50% of anterior glenohumeral translation with range of motion  Progressing  Patient will show 50% reduction in shoulder muscle tone and deltoid compensations  Progressing     Long Term Goals: 8 weeks   Patient will be able to don / doff shirts and jackets without pain in the shoulder  Progressing  Patient will be able to reach overhead with > 10# without pain  Progressing  Patient will be able to assist her son for > 30 minutes without pain increasing  Progressing  Patient will be able to perform all work related duties for > 2 hours before onset of pain  Pt currently not working  50% FOTO score improvement  In progress    Plan     Cont per CRYS Ziegler, PT

## 2023-06-23 ENCOUNTER — PATIENT MESSAGE (OUTPATIENT)
Dept: OBSTETRICS AND GYNECOLOGY | Facility: CLINIC | Age: 48
End: 2023-06-23
Payer: MEDICAID

## 2023-06-23 RX ORDER — VENLAFAXINE HYDROCHLORIDE 37.5 MG/1
37.5 CAPSULE, EXTENDED RELEASE ORAL DAILY
Qty: 30 CAPSULE | Refills: 3 | Status: SHIPPED | OUTPATIENT
Start: 2023-06-23 | End: 2023-06-29 | Stop reason: SDUPTHER

## 2023-06-26 ENCOUNTER — CLINICAL SUPPORT (OUTPATIENT)
Dept: REHABILITATION | Facility: HOSPITAL | Age: 48
End: 2023-06-26
Payer: MEDICAID

## 2023-06-26 DIAGNOSIS — M75.41 SHOULDER IMPINGEMENT SYNDROME, RIGHT: Primary | ICD-10-CM

## 2023-06-26 PROCEDURE — 97110 THERAPEUTIC EXERCISES: CPT

## 2023-06-28 NOTE — PROGRESS NOTES
[facility]   Physical Therapy Treatment Note      Name: Marianne Go  Clinic Number: [id number]    Therapy Diagnosis:   Encounter Diagnosis   Name Primary?    Shoulder impingement syndrome, right Yes     Physician: Josue Steward MD    Visit Date: 6/26/2023    Physician Orders: PT Eval and Treat   Medical Diagnosis from Referral: M75.41 (ICD-10-CM) - Shoulder impingement syndrome, right   Evaluation Date: 4/5/2023  Authorization Period Expiration: 10/5/2023  Plan of Care Expiration: 7/5/2023  Progress Note Due: 7/10/2023  Visit # / Visits authorized: 9/ 12 + eval   FOTO: 2/3     Precautions: Standard     PTA Visit #: 0/5     Time In: 8:00 AM  Time Out: 8:38  AM  Total Billable Time: 38 minutes    Subjective   Pt reports: no c/o shoulder pain. She has been doing well with ADLs and cooking with no difficulty. She states she feels ready to D/C .     She was compliant with home exercise program.  Response to previous treatment: min muscular soreness  Functional change: improved overhead reaching and lifting    Pain: 0/10  Location: Right shoulder      Objective      Active/Passive ROM: (degrees)   Shoulder Left Right    Flexion / 84 with inc pain lowering 145    Abduction / 125 with painful arc lowering 120 with painful arc lowering   ER / To lateral cervical spine with inc pain T3   IR / Within functional limits range of motion but anterior shoulder shift at initiation of movement and severe pain when returning from the movement WFL      Strength:   Shoulder (R) (L)   Flexion 4+/5 5/5   Abduction 4+/5 5/5   ER 4+/5 4+/5   IR 4+/5 5/5     Endurance tests:    30 sec STS: 12 reps    2 min step test: Patient fatigued at 1 min 10 sec      Treatment     Marianne received the treatments listed below:      therapeutic exercises to develop strength, endurance, ROM, flexibility, posture, and core stabilization for 38 minutes including:    UBE 3' forward / 3' backwards - level 2.0  Doorway pec stretch  "3x30"  Serratus punches 3x12 4# dowel  Wall slides in doorway 3 sec hold - flexion x15, abduction x15  Standing IR (behind back) stretch c/ strap 10 sec hold x10  Serratus press c/ hands on plinth 2x8  Seated row machine 35# 3x10  BTB extensions X 30- single arm today to focus on staability and core muscle engagement  SL external rotation 2# DB 2x8  No money 3x12 green theraband   Prone T's 2# DB 2x10  Prone Y's no weight 2x10  D2 flexion RTB 2x8  Seated thoracic extension c/ 1/2 foam 3 sec hold x15  Standing open book x10 ea  Wall walk c/ red TB 2x5        manual therapy techniques: Joint mobilizations, Manual traction, Myofacial release, Soft tissue Mobilization, and Friction Massage were applied to the: R shoulder for 0 minutes, including:  STM to right side UT and levator, biceps tendon and infra and supraspinatus muscles  Post and inf glenohumeral glides grade 2 on right shoulder  PROM with slight distraction in all planes    Patient Education and Home Exercises       Education provided:   - educated on rotator cuff anatomy and how it can cause challenges with lifting and reaching overhead  - cryotherapy for pain/soreness management     Written Home Exercises Provided: yes. Exercises were reviewed and Marianne was able to demonstrate them prior to the end of the session.  Marianne demonstrated good  understanding of the education provided. See EMR under Patient Instructions for exercises provided during therapy sessions    Assessment   Marianne continues to do well with treatment focused on R shoulder / periscapular strengthening and continues to show improvement in R shoulder AROM. She still has min limitation in IR. She did well with progressions in therex, added seated thoracic extension and standing open book today for thoracic mobility. She has progressed well towards therapy goals and plans to D/C today.     Marianne Is progressing well towards her goals.   Pt prognosis is Good.   Pt will continue to benefit " from skilled outpatient physical therapy to address the deficits listed in the problem list box on initial evaluation, provide pt/family education and to maximize pt's level of independence in the home and community environment.     Pt's spiritual, cultural and educational needs considered and pt agreeable to plan of care and goals.     Anticipated barriers to physical therapy: bilateral shoulder pain    Goals:   Short Term Goals: 4 weeks   Patient will show 20 degree improvement in R shoulder range of motion  Met  Patient will show 1/2 grade MMT improvement in R shoulder strength  Met  Patient will show a reduction by 50% of anterior glenohumeral translation with range of motion  Met  Patient will show 50% reduction in shoulder muscle tone and deltoid compensations  met     Long Term Goals: 8 weeks   Patient will be able to don / doff shirts and jackets without pain in the shoulder  Met  Patient will be able to reach overhead with > 10# without pain  Met  Patient will be able to assist her son for > 30 minutes without pain increasing  Met  Patient will be able to perform all work related duties for > 2 hours before onset of pain Met  50% FOTO score improvement Met    Plan     Cont per POC    Adan Griffith, PT

## 2023-06-29 ENCOUNTER — OFFICE VISIT (OUTPATIENT)
Dept: INTERNAL MEDICINE | Facility: CLINIC | Age: 48
End: 2023-06-29
Payer: MEDICAID

## 2023-06-29 VITALS
OXYGEN SATURATION: 96 % | TEMPERATURE: 97 F | WEIGHT: 263 LBS | DIASTOLIC BLOOD PRESSURE: 72 MMHG | BODY MASS INDEX: 48.1 KG/M2 | SYSTOLIC BLOOD PRESSURE: 116 MMHG | HEART RATE: 82 BPM | RESPIRATION RATE: 14 BRPM

## 2023-06-29 DIAGNOSIS — F41.9 ANXIETY AND DEPRESSION: ICD-10-CM

## 2023-06-29 DIAGNOSIS — R42 VERTIGO: Primary | ICD-10-CM

## 2023-06-29 DIAGNOSIS — R51.9 FRONTAL HEADACHE: ICD-10-CM

## 2023-06-29 DIAGNOSIS — F32.A ANXIETY AND DEPRESSION: ICD-10-CM

## 2023-06-29 DIAGNOSIS — I25.10 CORONARY ARTERY DISEASE INVOLVING NATIVE CORONARY ARTERY OF NATIVE HEART WITHOUT ANGINA PECTORIS: ICD-10-CM

## 2023-06-29 DIAGNOSIS — Z00.00 ANNUAL PHYSICAL EXAM: Primary | ICD-10-CM

## 2023-06-29 PROCEDURE — 1160F PR REVIEW ALL MEDS BY PRESCRIBER/CLIN PHARMACIST DOCUMENTED: ICD-10-PCS | Mod: CPTII,,, | Performed by: HOSPITALIST

## 2023-06-29 PROCEDURE — 3078F PR MOST RECENT DIASTOLIC BLOOD PRESSURE < 80 MM HG: ICD-10-PCS | Mod: CPTII,,, | Performed by: HOSPITALIST

## 2023-06-29 PROCEDURE — 1159F MED LIST DOCD IN RCRD: CPT | Mod: CPTII,,, | Performed by: HOSPITALIST

## 2023-06-29 PROCEDURE — 1159F PR MEDICATION LIST DOCUMENTED IN MEDICAL RECORD: ICD-10-PCS | Mod: CPTII,,, | Performed by: HOSPITALIST

## 2023-06-29 PROCEDURE — 3044F HG A1C LEVEL LT 7.0%: CPT | Mod: CPTII,,, | Performed by: HOSPITALIST

## 2023-06-29 PROCEDURE — 3008F PR BODY MASS INDEX (BMI) DOCUMENTED: ICD-10-PCS | Mod: CPTII,,, | Performed by: HOSPITALIST

## 2023-06-29 PROCEDURE — 99214 OFFICE O/P EST MOD 30 MIN: CPT | Mod: PBBFAC,PO | Performed by: HOSPITALIST

## 2023-06-29 PROCEDURE — 99215 PR OFFICE/OUTPT VISIT, EST, LEVL V, 40-54 MIN: ICD-10-PCS | Mod: S$PBB,,, | Performed by: HOSPITALIST

## 2023-06-29 PROCEDURE — 99999 PR PBB SHADOW E&M-EST. PATIENT-LVL IV: CPT | Mod: PBBFAC,,, | Performed by: HOSPITALIST

## 2023-06-29 PROCEDURE — 1160F RVW MEDS BY RX/DR IN RCRD: CPT | Mod: CPTII,,, | Performed by: HOSPITALIST

## 2023-06-29 PROCEDURE — 3074F SYST BP LT 130 MM HG: CPT | Mod: CPTII,,, | Performed by: HOSPITALIST

## 2023-06-29 PROCEDURE — 99999 PR PBB SHADOW E&M-EST. PATIENT-LVL IV: ICD-10-PCS | Mod: PBBFAC,,, | Performed by: HOSPITALIST

## 2023-06-29 PROCEDURE — 3078F DIAST BP <80 MM HG: CPT | Mod: CPTII,,, | Performed by: HOSPITALIST

## 2023-06-29 PROCEDURE — 3044F PR MOST RECENT HEMOGLOBIN A1C LEVEL <7.0%: ICD-10-PCS | Mod: CPTII,,, | Performed by: HOSPITALIST

## 2023-06-29 PROCEDURE — 3074F PR MOST RECENT SYSTOLIC BLOOD PRESSURE < 130 MM HG: ICD-10-PCS | Mod: CPTII,,, | Performed by: HOSPITALIST

## 2023-06-29 PROCEDURE — 99215 OFFICE O/P EST HI 40 MIN: CPT | Mod: S$PBB,,, | Performed by: HOSPITALIST

## 2023-06-29 PROCEDURE — 3008F BODY MASS INDEX DOCD: CPT | Mod: CPTII,,, | Performed by: HOSPITALIST

## 2023-06-29 RX ORDER — ACETAMINOPHEN AND CODEINE PHOSPHATE 300; 30 MG/1; MG/1
1 TABLET ORAL EVERY 8 HOURS PRN
Qty: 21 TABLET | Refills: 0 | Status: SHIPPED | OUTPATIENT
Start: 2023-06-29 | End: 2023-08-14

## 2023-06-29 RX ORDER — VENLAFAXINE HYDROCHLORIDE 75 MG/1
75 CAPSULE, EXTENDED RELEASE ORAL DAILY
Qty: 30 CAPSULE | Refills: 11 | Status: SHIPPED | OUTPATIENT
Start: 2023-06-29

## 2023-06-29 RX ORDER — MECLIZINE HYDROCHLORIDE 25 MG/1
25 TABLET ORAL 3 TIMES DAILY PRN
Qty: 30 TABLET | Refills: 0 | Status: SHIPPED | OUTPATIENT
Start: 2023-06-29 | End: 2023-08-14

## 2023-06-29 NOTE — PROGRESS NOTES
Subjective:     @Patient ID: Marianne Go is a 47 y.o. female.    Chief Complaint: Dizziness    HPI    48 yo F presents for urgent visit with c/o dizziness and headaches x 3 days. Reports dizzy symptoms with head movement. Endorses nausea. Also having L side frontal HA different from her typical migraines. Reports pressure, stabbing sensation. 5/10 in severity. Takes tylenol and ice packs. Reports symptoms started after missing 2 doses of her Effexor     Also reports since having CAD with stent placement last month has worsening depression. Decrease energy, sleep. Appetite ok. Endorses irritability and decreased focus. Denies SI. Reports takes more energy to perform her ADLs. Easily gets out of breath     Review of Systems   Gastrointestinal:  Positive for nausea. Negative for vomiting.   Neurological:  Positive for dizziness and headaches.   Psychiatric/Behavioral:  Negative for agitation and confusion.    Past medical history, surgical history, and family medical history reviewed and updated as appropriate.    Medications and allergies reviewed.     Objective:     Vitals:    06/29/23 1122   BP: 116/72   BP Location: Right arm   Patient Position: Sitting   BP Method: Large (Manual)   Pulse: 82   Resp: 14   Temp: 96.9 °F (36.1 °C)   TempSrc: Temporal   SpO2: 96%   Weight: 119.3 kg (263 lb 0.1 oz)     Body mass index is 48.1 kg/m².  Physical Exam  Constitutional:       Appearance: Normal appearance.   HENT:      Head: Normocephalic and atraumatic.      Right Ear: Tympanic membrane normal.      Left Ear: Tympanic membrane normal.   Eyes:      General:         Right eye: No discharge.         Left eye: No discharge.      Conjunctiva/sclera: Conjunctivae normal.      Pupils: Pupils are equal, round, and reactive to light.   Cardiovascular:      Rate and Rhythm: Normal rate and regular rhythm.      Heart sounds: No murmur heard.  Pulmonary:      Effort: Pulmonary effort is normal.      Breath sounds: Normal breath  sounds.   Musculoskeletal:         General: Normal range of motion.      Cervical back: Normal range of motion and neck supple.      Right lower leg: No edema.      Left lower leg: No edema.   Skin:     General: Skin is warm and dry.   Neurological:      General: No focal deficit present.      Mental Status: She is alert and oriented to person, place, and time.   Psychiatric:         Mood and Affect: Mood normal.         Behavior: Behavior normal.       Lab Results   Component Value Date    WBC 9.30 05/05/2023    HGB 13.7 05/05/2023    HCT 39.6 05/05/2023     05/05/2023    CHOL 199 05/05/2023    TRIG 160 (H) 05/05/2023    HDL 38 (L) 05/05/2023    ALT 21 05/04/2023    AST 15 05/04/2023     05/06/2023    K 3.9 05/06/2023     05/06/2023    CREATININE 0.7 05/06/2023    BUN 13 05/06/2023    CO2 22 (L) 05/06/2023    TSH 3.506 08/26/2022    INR 1.0 05/05/2023    HGBA1C 4.9 05/05/2023       Assessment:     1. Vertigo    2. Anxiety and depression    3. Frontal headache    4. Coronary artery disease involving native coronary artery of native heart without angina pectoris      Plan:   Marianne was seen today for dizziness.    Diagnoses and all orders for this visit:    Vertigo  -     meclizine (ANTIVERT) 25 mg tablet; Take 1 tablet (25 mg total) by mouth 3 (three) times daily as needed for Dizziness.    Anxiety and depression  - Increase effexor dose. Refer for therapy  -     Ambulatory referral/consult to Psychiatry; Future    Frontal headache  - possibly withdrawal symptoms from missing effexor dose. Unable to take fioricet as on brilinta   -     acetaminophen-codeine 300-30mg (TYLENOL #3) 300-30 mg Tab; Take 1 tablet by mouth every 8 (eight) hours as needed (headache, pain).    Coronary artery disease involving native coronary artery of native heart without angina pectoris  - Stable. Continue home meds and f/u with cardiology    Other orders  -     venlafaxine (EFFEXOR-XR) 75 MG 24 hr capsule; Take 1  capsule (75 mg total) by mouth once daily.       Visit time 40 min. Includes pre-charting, face-to-face encounter, medical decision making and documentation.       Carlee Mcneil MD  Internal Medicine    6/29/2023

## 2023-08-07 PROBLEM — I21.4 NSTEMI (NON-ST ELEVATED MYOCARDIAL INFARCTION): Status: RESOLVED | Noted: 2023-05-05 | Resolved: 2023-08-07

## 2023-08-14 ENCOUNTER — OFFICE VISIT (OUTPATIENT)
Dept: CARDIOLOGY | Facility: CLINIC | Age: 48
End: 2023-08-14
Payer: MEDICAID

## 2023-08-14 VITALS
HEART RATE: 71 BPM | WEIGHT: 264 LBS | BODY MASS INDEX: 48.58 KG/M2 | OXYGEN SATURATION: 98 % | HEIGHT: 62 IN | SYSTOLIC BLOOD PRESSURE: 139 MMHG | DIASTOLIC BLOOD PRESSURE: 85 MMHG

## 2023-08-14 DIAGNOSIS — I21.4 NSTEMI (NON-ST ELEVATED MYOCARDIAL INFARCTION): ICD-10-CM

## 2023-08-14 DIAGNOSIS — Z86.718 HISTORY OF DVT (DEEP VEIN THROMBOSIS): Primary | ICD-10-CM

## 2023-08-14 PROCEDURE — 99214 PR OFFICE/OUTPT VISIT, EST, LEVL IV, 30-39 MIN: ICD-10-PCS | Mod: S$PBB,,, | Performed by: INTERNAL MEDICINE

## 2023-08-14 PROCEDURE — 3044F PR MOST RECENT HEMOGLOBIN A1C LEVEL <7.0%: ICD-10-PCS | Mod: CPTII,,, | Performed by: INTERNAL MEDICINE

## 2023-08-14 PROCEDURE — 1159F PR MEDICATION LIST DOCUMENTED IN MEDICAL RECORD: ICD-10-PCS | Mod: CPTII,,, | Performed by: INTERNAL MEDICINE

## 2023-08-14 PROCEDURE — 3008F PR BODY MASS INDEX (BMI) DOCUMENTED: ICD-10-PCS | Mod: CPTII,,, | Performed by: INTERNAL MEDICINE

## 2023-08-14 PROCEDURE — 3008F BODY MASS INDEX DOCD: CPT | Mod: CPTII,,, | Performed by: INTERNAL MEDICINE

## 2023-08-14 PROCEDURE — 1159F MED LIST DOCD IN RCRD: CPT | Mod: CPTII,,, | Performed by: INTERNAL MEDICINE

## 2023-08-14 PROCEDURE — 3079F PR MOST RECENT DIASTOLIC BLOOD PRESSURE 80-89 MM HG: ICD-10-PCS | Mod: CPTII,,, | Performed by: INTERNAL MEDICINE

## 2023-08-14 PROCEDURE — 1160F PR REVIEW ALL MEDS BY PRESCRIBER/CLIN PHARMACIST DOCUMENTED: ICD-10-PCS | Mod: CPTII,,, | Performed by: INTERNAL MEDICINE

## 2023-08-14 PROCEDURE — 3079F DIAST BP 80-89 MM HG: CPT | Mod: CPTII,,, | Performed by: INTERNAL MEDICINE

## 2023-08-14 PROCEDURE — 99999 PR PBB SHADOW E&M-EST. PATIENT-LVL III: ICD-10-PCS | Mod: PBBFAC,,, | Performed by: INTERNAL MEDICINE

## 2023-08-14 PROCEDURE — 3044F HG A1C LEVEL LT 7.0%: CPT | Mod: CPTII,,, | Performed by: INTERNAL MEDICINE

## 2023-08-14 PROCEDURE — 1160F RVW MEDS BY RX/DR IN RCRD: CPT | Mod: CPTII,,, | Performed by: INTERNAL MEDICINE

## 2023-08-14 PROCEDURE — 99999 PR PBB SHADOW E&M-EST. PATIENT-LVL III: CPT | Mod: PBBFAC,,, | Performed by: INTERNAL MEDICINE

## 2023-08-14 PROCEDURE — 99213 OFFICE O/P EST LOW 20 MIN: CPT | Mod: PBBFAC,PN | Performed by: INTERNAL MEDICINE

## 2023-08-14 PROCEDURE — 3075F PR MOST RECENT SYSTOLIC BLOOD PRESS GE 130-139MM HG: ICD-10-PCS | Mod: CPTII,,, | Performed by: INTERNAL MEDICINE

## 2023-08-14 PROCEDURE — 99214 OFFICE O/P EST MOD 30 MIN: CPT | Mod: S$PBB,,, | Performed by: INTERNAL MEDICINE

## 2023-08-14 PROCEDURE — 3075F SYST BP GE 130 - 139MM HG: CPT | Mod: CPTII,,, | Performed by: INTERNAL MEDICINE

## 2023-08-14 RX ORDER — CETIRIZINE HYDROCHLORIDE 10 MG/1
10 TABLET ORAL
COMMUNITY

## 2023-08-14 NOTE — PROGRESS NOTES
[id number]     SUBJECTIVE:     History of Present Illness:  Patient is a 47 y.o. female presents to establish cardiac care . Had stent placed for NSTEMI 5/23. Had an episode in May, had a drain feeling, closing of her throat and pressure , electricity feeling down her arm with ER visit and cath done.     Primary Diagnosis:   Hypertension: none  DM: none  Smoker: quit over 20 years   Family history of early CAD: mother and father with MI's at age 50's  Heart disease : NSTEMI   ROS  Since cath 5/23:  No chest pains  No shortness of breath at rest; does get short of breath in the humidity;   No syncope  No palpitations  Activity: working out, doing cardiac rehab with no issues   Review of patient's allergies indicates:   Allergen Reactions    Topiramate Itching       Past Medical History:   Diagnosis Date    Deep vein thrombosis 1990    pregnancy related     Kidney calculi     Kidney stones     Kidney stones     Migraines     perimenstrual        Past Surgical History:   Procedure Laterality Date    BLADDER SUSPENSION      COLONOSCOPY N/A 12/22/2022    Procedure: COLONOSCOPY;  Surgeon: Charity Rudolph MD;  Location: [facility] ([address] [place] [address]);  Service: Endoscopy;  Laterality: N/A;  instr portal-GT    CORONARY ANGIOGRAPHY Left 5/5/2023    Procedure: Angiogram, Coronary Artery;  Surgeon: Dhruv Scott MD;  Location: [facility];  Service: Cardiology;  Laterality: Left;    CYSTOSCOPY W/ LASER LITHOTRIPSY      HYSTERECTOMY      PELVIC LAPAROSCOPY      ablation of endometriosis    STENT, DRUG ELUTING, SINGLE VESSEL, CORONARY  5/5/2023    Procedure: Stent, Drug Eluting, Single Vessel, Coronary;  Surgeon: Dhruv Scott MD;  Location: [facility];  Service: Cardiology;;    TONSILLECTOMY      TUBAL LIGATION  2011    essure            Past Hospitalization:     5/23: chest pains; chronic cough for months from covid; troponin 0.4 ; cath done     Cardiac meds:  ASA 81 mg  Atorvastatin 80 mg  Brilinta 90 mg  "BID  Verapamil 120 mg         OBJECTIVE:     Vital Signs (Most Recent)  Vitals:    23 0951   BP: 139/85   Pulse: 71   SpO2: 98%   Weight: 119.7 kg (264 lb)   Height: 5' 2" (1.575 m)         Physical Exam:  Neck: normal carotids, no bruits; normal JVP  Lungs :clear  Heart: RR, normal S1,S2, soft systolic  murmur LSB, no gallops  Abd: no masses; no bruits;   Exts: normal DP and PT pulses bilaterally, normal radials; no edema noted               Labs    : GFR normal; LDL pre cath 129; A1c normal   Diagnostic Results:    1.EK23: sinus bradycardia; biphasic T waves;   2.Echo: : normal EF   3. Stress test  4. Cath: : left main: normal; LAD mid 55%; D1 50%; OM3 50%; right 100% ; stent 3X20  right     Chart review:        ASSESSMENT/PLAN:     CAD: s/p stent right with atypical symptoms at that time and unimpressive EKG changes  Residual disease in OM and LAD  3. obesity  Plan: 1. Continue medications  2. PET stress to assess the other stenoses - call for results   3. Return 4 months     Winter Meza MD    "

## 2023-09-05 ENCOUNTER — LAB VISIT (OUTPATIENT)
Dept: LAB | Facility: HOSPITAL | Age: 48
End: 2023-09-05
Attending: HOSPITALIST
Payer: MEDICAID

## 2023-09-05 DIAGNOSIS — Z00.00 ANNUAL PHYSICAL EXAM: ICD-10-CM

## 2023-09-05 LAB
ALBUMIN SERPL BCP-MCNC: 4 G/DL (ref 3.5–5.2)
ALP SERPL-CCNC: 115 U/L (ref 55–135)
ALT SERPL W/O P-5'-P-CCNC: 33 U/L (ref 10–44)
ANION GAP SERPL CALC-SCNC: 13 MMOL/L (ref 8–16)
AST SERPL-CCNC: 19 U/L (ref 10–40)
BASOPHILS # BLD AUTO: 0.06 K/UL (ref 0–0.2)
BASOPHILS NFR BLD: 0.7 % (ref 0–1.9)
BILIRUB SERPL-MCNC: 0.7 MG/DL (ref 0.1–1)
BUN SERPL-MCNC: 14 MG/DL (ref 6–20)
CALCIUM SERPL-MCNC: 9.4 MG/DL (ref 8.7–10.5)
CHLORIDE SERPL-SCNC: 105 MMOL/L (ref 95–110)
CHOLEST SERPL-MCNC: 160 MG/DL (ref 120–199)
CHOLEST/HDLC SERPL: 4.3 {RATIO} (ref 2–5)
CO2 SERPL-SCNC: 21 MMOL/L (ref 23–29)
CREAT SERPL-MCNC: 0.8 MG/DL (ref 0.5–1.4)
DIFFERENTIAL METHOD: ABNORMAL
EOSINOPHIL # BLD AUTO: 0.2 K/UL (ref 0–0.5)
EOSINOPHIL NFR BLD: 3 % (ref 0–8)
ERYTHROCYTE [DISTWIDTH] IN BLOOD BY AUTOMATED COUNT: 13.2 % (ref 11.5–14.5)
EST. GFR  (NO RACE VARIABLE): >60 ML/MIN/1.73 M^2
ESTIMATED AVG GLUCOSE: 97 MG/DL (ref 68–131)
GLUCOSE SERPL-MCNC: 92 MG/DL (ref 70–110)
HBA1C MFR BLD: 5 % (ref 4–5.6)
HCT VFR BLD AUTO: 46.3 % (ref 37–48.5)
HDLC SERPL-MCNC: 37 MG/DL (ref 40–75)
HDLC SERPL: 23.1 % (ref 20–50)
HGB BLD-MCNC: 15 G/DL (ref 12–16)
IMM GRANULOCYTES # BLD AUTO: 0.01 K/UL (ref 0–0.04)
IMM GRANULOCYTES NFR BLD AUTO: 0.1 % (ref 0–0.5)
LDLC SERPL CALC-MCNC: 94.4 MG/DL (ref 63–159)
LYMPHOCYTES # BLD AUTO: 2.7 K/UL (ref 1–4.8)
LYMPHOCYTES NFR BLD: 33.2 % (ref 18–48)
MCH RBC QN AUTO: 32.4 PG (ref 27–31)
MCHC RBC AUTO-ENTMCNC: 32.4 G/DL (ref 32–36)
MCV RBC AUTO: 100 FL (ref 82–98)
MONOCYTES # BLD AUTO: 0.5 K/UL (ref 0.3–1)
MONOCYTES NFR BLD: 5.5 % (ref 4–15)
NEUTROPHILS # BLD AUTO: 4.7 K/UL (ref 1.8–7.7)
NEUTROPHILS NFR BLD: 57.5 % (ref 38–73)
NONHDLC SERPL-MCNC: 123 MG/DL
NRBC BLD-RTO: 0 /100 WBC
PLATELET # BLD AUTO: 332 K/UL (ref 150–450)
PMV BLD AUTO: 10.1 FL (ref 9.2–12.9)
POTASSIUM SERPL-SCNC: 4.3 MMOL/L (ref 3.5–5.1)
PROT SERPL-MCNC: 7.6 G/DL (ref 6–8.4)
RBC # BLD AUTO: 4.63 M/UL (ref 4–5.4)
SODIUM SERPL-SCNC: 139 MMOL/L (ref 136–145)
T4 FREE SERPL-MCNC: 0.84 NG/DL (ref 0.71–1.51)
TRIGL SERPL-MCNC: 143 MG/DL (ref 30–150)
TSH SERPL DL<=0.005 MIU/L-ACNC: 4.46 UIU/ML (ref 0.4–4)
WBC # BLD AUTO: 8.11 K/UL (ref 3.9–12.7)

## 2023-09-05 PROCEDURE — 85025 COMPLETE CBC W/AUTO DIFF WBC: CPT | Performed by: HOSPITALIST

## 2023-09-05 PROCEDURE — 80053 COMPREHEN METABOLIC PANEL: CPT | Performed by: HOSPITALIST

## 2023-09-05 PROCEDURE — 84443 ASSAY THYROID STIM HORMONE: CPT | Performed by: HOSPITALIST

## 2023-09-05 PROCEDURE — 84439 ASSAY OF FREE THYROXINE: CPT | Performed by: HOSPITALIST

## 2023-09-05 PROCEDURE — 80061 LIPID PANEL: CPT | Performed by: HOSPITALIST

## 2023-09-05 PROCEDURE — 36415 COLL VENOUS BLD VENIPUNCTURE: CPT | Performed by: HOSPITALIST

## 2023-09-05 PROCEDURE — 83036 HEMOGLOBIN GLYCOSYLATED A1C: CPT | Performed by: HOSPITALIST

## 2023-09-12 ENCOUNTER — OFFICE VISIT (OUTPATIENT)
Dept: INTERNAL MEDICINE | Facility: CLINIC | Age: 48
End: 2023-09-12
Payer: MEDICAID

## 2023-09-12 VITALS
HEIGHT: 62 IN | WEIGHT: 258.63 LBS | BODY MASS INDEX: 47.59 KG/M2 | OXYGEN SATURATION: 96 % | DIASTOLIC BLOOD PRESSURE: 88 MMHG | TEMPERATURE: 97 F | SYSTOLIC BLOOD PRESSURE: 138 MMHG | HEART RATE: 73 BPM | RESPIRATION RATE: 18 BRPM

## 2023-09-12 DIAGNOSIS — G47.30 SLEEP APNEA, UNSPECIFIED TYPE: ICD-10-CM

## 2023-09-12 DIAGNOSIS — Z00.00 ANNUAL PHYSICAL EXAM: Primary | ICD-10-CM

## 2023-09-12 DIAGNOSIS — M77.8 RIGHT ELBOW TENDONITIS: ICD-10-CM

## 2023-09-12 DIAGNOSIS — F32.A ANXIETY AND DEPRESSION: ICD-10-CM

## 2023-09-12 DIAGNOSIS — I25.10 CORONARY ARTERY DISEASE INVOLVING NATIVE CORONARY ARTERY OF NATIVE HEART WITHOUT ANGINA PECTORIS: ICD-10-CM

## 2023-09-12 DIAGNOSIS — F41.9 ANXIETY AND DEPRESSION: ICD-10-CM

## 2023-09-12 DIAGNOSIS — E78.5 HYPERLIPIDEMIA, UNSPECIFIED HYPERLIPIDEMIA TYPE: ICD-10-CM

## 2023-09-12 PROCEDURE — 3008F BODY MASS INDEX DOCD: CPT | Mod: CPTII,,, | Performed by: HOSPITALIST

## 2023-09-12 PROCEDURE — 3079F DIAST BP 80-89 MM HG: CPT | Mod: CPTII,,, | Performed by: HOSPITALIST

## 2023-09-12 PROCEDURE — 3044F HG A1C LEVEL LT 7.0%: CPT | Mod: CPTII,,, | Performed by: HOSPITALIST

## 2023-09-12 PROCEDURE — 3008F PR BODY MASS INDEX (BMI) DOCUMENTED: ICD-10-PCS | Mod: CPTII,,, | Performed by: HOSPITALIST

## 2023-09-12 PROCEDURE — 99999 PR PBB SHADOW E&M-EST. PATIENT-LVL IV: CPT | Mod: PBBFAC,,, | Performed by: HOSPITALIST

## 2023-09-12 PROCEDURE — 99396 PREV VISIT EST AGE 40-64: CPT | Mod: S$PBB,,, | Performed by: HOSPITALIST

## 2023-09-12 PROCEDURE — 3075F SYST BP GE 130 - 139MM HG: CPT | Mod: CPTII,,, | Performed by: HOSPITALIST

## 2023-09-12 PROCEDURE — 3044F PR MOST RECENT HEMOGLOBIN A1C LEVEL <7.0%: ICD-10-PCS | Mod: CPTII,,, | Performed by: HOSPITALIST

## 2023-09-12 PROCEDURE — 99396 PR PREVENTIVE VISIT,EST,40-64: ICD-10-PCS | Mod: S$PBB,,, | Performed by: HOSPITALIST

## 2023-09-12 PROCEDURE — 3079F PR MOST RECENT DIASTOLIC BLOOD PRESSURE 80-89 MM HG: ICD-10-PCS | Mod: CPTII,,, | Performed by: HOSPITALIST

## 2023-09-12 PROCEDURE — 3075F PR MOST RECENT SYSTOLIC BLOOD PRESS GE 130-139MM HG: ICD-10-PCS | Mod: CPTII,,, | Performed by: HOSPITALIST

## 2023-09-12 PROCEDURE — 1160F RVW MEDS BY RX/DR IN RCRD: CPT | Mod: CPTII,,, | Performed by: HOSPITALIST

## 2023-09-12 PROCEDURE — 1159F MED LIST DOCD IN RCRD: CPT | Mod: CPTII,,, | Performed by: HOSPITALIST

## 2023-09-12 PROCEDURE — 1159F PR MEDICATION LIST DOCUMENTED IN MEDICAL RECORD: ICD-10-PCS | Mod: CPTII,,, | Performed by: HOSPITALIST

## 2023-09-12 PROCEDURE — 99999PBSHW PNEUMOCOCCAL CONJUGATE VACCINE 20-VALENT: ICD-10-PCS | Mod: PBBFAC,,,

## 2023-09-12 PROCEDURE — 99214 OFFICE O/P EST MOD 30 MIN: CPT | Mod: PBBFAC,PO | Performed by: HOSPITALIST

## 2023-09-12 PROCEDURE — 1160F PR REVIEW ALL MEDS BY PRESCRIBER/CLIN PHARMACIST DOCUMENTED: ICD-10-PCS | Mod: CPTII,,, | Performed by: HOSPITALIST

## 2023-09-12 PROCEDURE — 99999PBSHW PNEUMOCOCCAL CONJUGATE VACCINE 20-VALENT: Mod: PBBFAC,,,

## 2023-09-12 PROCEDURE — 90677 PCV20 VACCINE IM: CPT | Mod: PBBFAC,PO

## 2023-09-12 PROCEDURE — 99999 PR PBB SHADOW E&M-EST. PATIENT-LVL IV: ICD-10-PCS | Mod: PBBFAC,,, | Performed by: HOSPITALIST

## 2023-09-12 RX ORDER — METHYLPREDNISOLONE 4 MG/1
TABLET ORAL
Qty: 21 TABLET | Refills: 0 | Status: SHIPPED | OUTPATIENT
Start: 2023-09-12 | End: 2023-10-03

## 2023-09-12 NOTE — PROGRESS NOTES
Subjective:     @Patient ID: Marianne Go is a 47 y.o. female.    Chief Complaint: Annual Exam (Labs Done)    HPI    47 y.o. female with CAD, HLD, anxiety, depression, history of DVT, migraine, obesity, sleep apnea presents for annual.  She reports her dizziness has improved. Reports mood is improving since Effexor has improved  Endorses R elbow pain since started lifting low weight     Lipid disorders/ASCVD risk (ages >/= 45 or >/= 20 if increased risk ): ordered  DM (>45y yearly or if obese, HTN): A1c ordered     Eye exam:    Breast Cancer (40-50y discretion of pt, 50-74y every 1-2 years): Mammogram done    Colorectal Cancer (normal risk 50-75yr): Colonoscopy utd         Vaccines:   Influenza (yearly) due. Pt will get  Tetanus (every 10 yrs - 1st tdap) due  Covid19: will get booster   Pna 20: pt would like to get pneumonia vaccine      Exercise: works out few times a week   Diet: reg          Review of Systems   Constitutional:  Negative for chills and fever.   HENT:  Negative for congestion and sore throat.    Eyes:  Negative for pain and visual disturbance.   Respiratory:  Negative for cough and shortness of breath.    Cardiovascular:  Negative for chest pain and leg swelling.   Gastrointestinal:  Negative for abdominal pain, nausea and vomiting.   Endocrine: Negative for polydipsia and polyuria.   Genitourinary:  Negative for difficulty urinating and dysuria.   Musculoskeletal:  Negative for arthralgias and back pain.   Skin:  Negative for rash and wound.   Neurological:  Negative for dizziness, weakness and headaches.   Psychiatric/Behavioral:  Negative for agitation and confusion.      Past medical history, surgical history, and family medical history reviewed and updated as appropriate.    Medications and allergies reviewed.     Objective:     Vitals:    09/12/23 1109   BP: 138/88   BP Location: Left arm   Patient Position: Sitting   BP Method: Large (Manual)   Pulse: 73   Resp: 18   Temp: 97.3 °F (36.3  "°C)   TempSrc: Temporal   SpO2: 96%   Weight: 117.3 kg (258 lb 9.6 oz)   Height: 5' 2" (1.575 m)     Body mass index is 47.3 kg/m².  Physical Exam  Vitals reviewed.   Constitutional:       General: She is not in acute distress.     Appearance: She is well-developed.   HENT:      Head: Normocephalic and atraumatic.      Right Ear: Tympanic membrane normal.      Left Ear: Tympanic membrane normal.      Mouth/Throat:      Mouth: Mucous membranes are moist.      Pharynx: No oropharyngeal exudate.   Eyes:      General:         Right eye: No discharge.         Left eye: No discharge.      Conjunctiva/sclera: Conjunctivae normal.   Cardiovascular:      Rate and Rhythm: Normal rate and regular rhythm.      Heart sounds: No murmur heard.     No friction rub.   Pulmonary:      Effort: Pulmonary effort is normal.      Breath sounds: Normal breath sounds.   Abdominal:      General: Bowel sounds are normal. There is no distension.      Palpations: Abdomen is soft.      Tenderness: There is no abdominal tenderness. There is no guarding.   Musculoskeletal:         General: Normal range of motion.      Cervical back: Normal range of motion and neck supple.      Right lower leg: No edema.      Left lower leg: No edema.   Lymphadenopathy:      Cervical: No cervical adenopathy.   Skin:     General: Skin is warm and dry.   Neurological:      Mental Status: She is alert and oriented to person, place, and time.   Psychiatric:         Mood and Affect: Mood normal.         Behavior: Behavior normal.         Lab Results   Component Value Date    WBC 8.11 09/05/2023    HGB 15.0 09/05/2023    HCT 46.3 09/05/2023     09/05/2023    CHOL 160 09/05/2023    TRIG 143 09/05/2023    HDL 37 (L) 09/05/2023    ALT 33 09/05/2023    AST 19 09/05/2023     09/05/2023    K 4.3 09/05/2023     09/05/2023    CREATININE 0.8 09/05/2023    BUN 14 09/05/2023    CO2 21 (L) 09/05/2023    TSH 4.458 (H) 09/05/2023    INR 1.0 05/05/2023    HGBA1C 5.0 " 09/05/2023       Assessment:     1. Annual physical exam    2. BMI 45.0-49.9, adult    3. Hyperlipidemia, unspecified hyperlipidemia type    4. Coronary artery disease involving native coronary artery of native heart without angina pectoris    5. Anxiety and depression    6. Sleep apnea, unspecified type    7. Right elbow tendonitis      Plan:   Marianne was seen today for annual exam.    Diagnoses and all orders for this visit:    Annual physical exam  - reviewed lab results in clinic     BMI 45.0-49.9, adult  - counseled on healthy diet and exercise for weight loss    Hyperlipidemia, unspecified hyperlipidemia type  Coronary artery disease involving native coronary artery of native heart without angina pectoris  - Stable. Continue home meds and f/u with cardiology    Anxiety and depression  - Stable. Continue home meds     Sleep apnea, unspecified type  - stable. Continue cpap     Right elbow tendonitis  -     methylPREDNISolone (MEDROL DOSEPACK) 4 mg tablet; use as directed    Other orders  -     (In Office Administered) Pneumococcal Conjugate Vaccine (20 Valent) (IM)      Rtc 1 year and prn     Carlee Mcneil MD  Internal Medicine    9/12/2023

## 2023-09-28 ENCOUNTER — TELEPHONE (OUTPATIENT)
Dept: CARDIOLOGY | Facility: HOSPITAL | Age: 48
End: 2023-09-28
Payer: MEDICAID

## 2023-10-02 ENCOUNTER — HOSPITAL ENCOUNTER (OUTPATIENT)
Dept: CARDIOLOGY | Facility: HOSPITAL | Age: 48
Discharge: HOME | End: 2023-10-02
Attending: INTERNAL MEDICINE
Payer: MEDICAID

## 2023-10-02 VITALS
HEIGHT: 62 IN | DIASTOLIC BLOOD PRESSURE: 85 MMHG | HEART RATE: 78 BPM | BODY MASS INDEX: 47.48 KG/M2 | SYSTOLIC BLOOD PRESSURE: 154 MMHG | WEIGHT: 258 LBS

## 2023-10-02 DIAGNOSIS — I21.4 NSTEMI (NON-ST ELEVATED MYOCARDIAL INFARCTION): ICD-10-CM

## 2023-10-02 LAB
CFR FLOW - ANTERIOR: 2.47
CFR FLOW - INFERIOR: 2.49
CFR FLOW - LATERAL: 2.38
CFR FLOW - MAX: 3.08
CFR FLOW - MIN: 1.27
CFR FLOW - SEPTAL: 2.11
CFR FLOW - WHOLE HEART: 2.36
CV PHARM DOSE: 0.4 MG
CV STRESS BASE HR: 78 BPM
DIASTOLIC BLOOD PRESSURE: 85 MMHG
EJECTION FRACTION- HIGH: 59 %
END DIASTOLIC INDEX-HIGH: 155 ML/M2
END DIASTOLIC INDEX-LOW: 91 ML/M2
END SYSTOLIC INDEX-HIGH: 78 ML/M2
END SYSTOLIC INDEX-LOW: 40 ML/M2
NUC REST DIASTOLIC VOLUME INDEX: 70
NUC REST EJECTION FRACTION: 68
NUC REST SYSTOLIC VOLUME INDEX: 23
NUC STRESS DIASTOLIC VOLUME INDEX: 90
NUC STRESS EJECTION FRACTION: 75 %
NUC STRESS SYSTOLIC VOLUME INDEX: 22
OHS CV CPX 85 PERCENT MAX PREDICTED HEART RATE MALE: 138
OHS CV CPX MAX PREDICTED HEART RATE: 163
OHS CV CPX PATIENT IS FEMALE: 1
OHS CV CPX PATIENT IS MALE: 0
OHS CV CPX PEAK DIASTOLIC BLOOD PRESSURE: 107 MMHG
OHS CV CPX PEAK HEAR RATE: 87 BPM
OHS CV CPX PEAK RATE PRESSURE PRODUCT: NORMAL
OHS CV CPX PEAK SYSTOLIC BLOOD PRESSURE: 139 MMHG
OHS CV CPX PERCENT MAX PREDICTED HEART RATE ACHIEVED: 53
OHS CV CPX RATE PRESSURE PRODUCT PRESENTING: NORMAL
REST FLOW - ANTERIOR: 0.6 CC/MIN/G
REST FLOW - INFERIOR: 0.65 CC/MIN/G
REST FLOW - LATERAL: 0.84 CC/MIN/G
REST FLOW - MAX: 0.94 CC/MIN/G
REST FLOW - MIN: 0.34 CC/MIN/G
REST FLOW - SEPTAL: 0.62 CC/MIN/G
REST FLOW - WHOLE HEART: 0.68 CC/MIN/G
RETIRED EF AND QEF - SEE NOTES: 47 %
STRESS FLOW - ANTERIOR: 1.47 CC/MIN/G
STRESS FLOW - INFERIOR: 1.62 CC/MIN/G
STRESS FLOW - LATERAL: 1.98 CC/MIN/G
STRESS FLOW - MAX: 2.21 CC/MIN/G
STRESS FLOW - MIN: 0.44 CC/MIN/G
STRESS FLOW - SEPTAL: 1.32 CC/MIN/G
STRESS FLOW - WHOLE HEART: 1.6 CC/MIN/G
SYSTOLIC BLOOD PRESSURE: 154 MMHG

## 2023-10-02 PROCEDURE — 78431 CARDIAC PET SCAN STRESS (CUPID ONLY): ICD-10-PCS | Mod: 26,,, | Performed by: INTERNAL MEDICINE

## 2023-10-02 PROCEDURE — 93016 CV STRESS TEST SUPVJ ONLY: CPT | Mod: ,,, | Performed by: INTERNAL MEDICINE

## 2023-10-02 PROCEDURE — 78431 MYOCRD IMG PET RST&STRS CT: CPT | Mod: 26,,, | Performed by: INTERNAL MEDICINE

## 2023-10-02 PROCEDURE — 63600175 PHARM REV CODE 636 W HCPCS: Performed by: INTERNAL MEDICINE

## 2023-10-02 PROCEDURE — 93018 CARDIAC PET SCAN STRESS (CUPID ONLY): ICD-10-PCS | Mod: ,,, | Performed by: INTERNAL MEDICINE

## 2023-10-02 PROCEDURE — 93018 CV STRESS TEST I&R ONLY: CPT | Mod: ,,, | Performed by: INTERNAL MEDICINE

## 2023-10-02 PROCEDURE — 78434 AQMBF PET REST & RX STRESS: CPT | Mod: 26,,, | Performed by: INTERNAL MEDICINE

## 2023-10-02 PROCEDURE — 93016 CARDIAC PET SCAN STRESS (CUPID ONLY): ICD-10-PCS | Mod: ,,, | Performed by: INTERNAL MEDICINE

## 2023-10-02 PROCEDURE — 78434 AQMBF PET REST & RX STRESS: CPT

## 2023-10-02 PROCEDURE — 78431 MYOCRD IMG PET RST&STRS CT: CPT

## 2023-10-02 PROCEDURE — 78434 CARDIAC PET SCAN STRESS (CUPID ONLY): ICD-10-PCS | Mod: 26,,, | Performed by: INTERNAL MEDICINE

## 2023-10-02 RX ORDER — AMINOPHYLLINE 25 MG/ML
75 INJECTION, SOLUTION INTRAVENOUS ONCE
Status: COMPLETED | OUTPATIENT
Start: 2023-10-02 | End: 2023-10-02

## 2023-10-02 RX ORDER — REGADENOSON 0.08 MG/ML
0.4 INJECTION, SOLUTION INTRAVENOUS
Status: COMPLETED | OUTPATIENT
Start: 2023-10-02 | End: 2023-10-02

## 2023-10-02 RX ADMIN — REGADENOSON 0.4 MG: 0.08 INJECTION, SOLUTION INTRAVENOUS at 01:10

## 2023-10-02 RX ADMIN — AMINOPHYLLINE 75 MG: 25 INJECTION, SOLUTION INTRAVENOUS at 01:10

## 2023-10-11 ENCOUNTER — PATIENT MESSAGE (OUTPATIENT)
Dept: INTERNAL MEDICINE | Facility: CLINIC | Age: 48
End: 2023-10-11
Payer: MEDICAID

## 2023-12-05 ENCOUNTER — HOSPITAL ENCOUNTER (EMERGENCY)
Facility: HOSPITAL | Age: 48
Discharge: HOME | End: 2023-12-06
Attending: STUDENT IN AN ORGANIZED HEALTH CARE EDUCATION/TRAINING PROGRAM
Payer: MEDICAID

## 2023-12-05 ENCOUNTER — NURSE TRIAGE (OUTPATIENT)
Dept: ADMINISTRATIVE | Facility: CLINIC | Age: 48
End: 2023-12-05
Payer: MEDICAID

## 2023-12-05 DIAGNOSIS — J10.1 INFLUENZA A: Primary | ICD-10-CM

## 2023-12-05 DIAGNOSIS — J18.9 PNEUMONIA OF RIGHT LOWER LOBE DUE TO INFECTIOUS ORGANISM: ICD-10-CM

## 2023-12-05 DIAGNOSIS — R05.9 COUGH: ICD-10-CM

## 2023-12-05 LAB
ALBUMIN SERPL BCP-MCNC: 4 G/DL (ref 3.5–5.2)
ALP SERPL-CCNC: 100 U/L (ref 55–135)
ALT SERPL W/O P-5'-P-CCNC: 57 U/L (ref 10–44)
ANION GAP SERPL CALC-SCNC: 10 MMOL/L (ref 8–16)
AST SERPL-CCNC: 43 U/L (ref 10–40)
BASOPHILS # BLD AUTO: 0.01 K/UL (ref 0–0.2)
BASOPHILS NFR BLD: 0.2 % (ref 0–1.9)
BILIRUB SERPL-MCNC: 0.6 MG/DL (ref 0.1–1)
BNP SERPL-MCNC: <10 PG/ML (ref 0–99)
BUN SERPL-MCNC: 9 MG/DL (ref 6–20)
CALCIUM SERPL-MCNC: 9.9 MG/DL (ref 8.7–10.5)
CHLORIDE SERPL-SCNC: 103 MMOL/L (ref 95–110)
CO2 SERPL-SCNC: 26 MMOL/L (ref 23–29)
CREAT SERPL-MCNC: 0.8 MG/DL (ref 0.5–1.4)
DIFFERENTIAL METHOD: ABNORMAL
EOSINOPHIL # BLD AUTO: 0 K/UL (ref 0–0.5)
EOSINOPHIL NFR BLD: 0 % (ref 0–8)
ERYTHROCYTE [DISTWIDTH] IN BLOOD BY AUTOMATED COUNT: 12.6 % (ref 11.5–14.5)
EST. GFR  (NO RACE VARIABLE): >60 ML/MIN/1.73 M^2
GLUCOSE SERPL-MCNC: 96 MG/DL (ref 70–110)
HCT VFR BLD AUTO: 46.6 % (ref 37–48.5)
HGB BLD-MCNC: 16 G/DL (ref 12–16)
IMM GRANULOCYTES # BLD AUTO: 0.01 K/UL (ref 0–0.04)
IMM GRANULOCYTES NFR BLD AUTO: 0.2 % (ref 0–0.5)
LYMPHOCYTES # BLD AUTO: 1.4 K/UL (ref 1–4.8)
LYMPHOCYTES NFR BLD: 27.6 % (ref 18–48)
MCH RBC QN AUTO: 32.6 PG (ref 27–31)
MCHC RBC AUTO-ENTMCNC: 34.3 G/DL (ref 32–36)
MCV RBC AUTO: 95 FL (ref 82–98)
MONOCYTES # BLD AUTO: 0.4 K/UL (ref 0.3–1)
MONOCYTES NFR BLD: 8.3 % (ref 4–15)
NEUTROPHILS # BLD AUTO: 3.2 K/UL (ref 1.8–7.7)
NEUTROPHILS NFR BLD: 63.7 % (ref 38–73)
NRBC BLD-RTO: 0 /100 WBC
PLATELET # BLD AUTO: 315 K/UL (ref 150–450)
PMV BLD AUTO: 9.9 FL (ref 9.2–12.9)
POTASSIUM SERPL-SCNC: 3.7 MMOL/L (ref 3.5–5.1)
PROT SERPL-MCNC: 8.4 G/DL (ref 6–8.4)
RBC # BLD AUTO: 4.91 M/UL (ref 4–5.4)
SODIUM SERPL-SCNC: 139 MMOL/L (ref 136–145)
TROPONIN I SERPL DL<=0.01 NG/ML-MCNC: <0.006 NG/ML (ref 0–0.03)
WBC # BLD AUTO: 5.03 K/UL (ref 3.9–12.7)

## 2023-12-05 PROCEDURE — 99284 EMERGENCY DEPT VISIT MOD MDM: CPT | Mod: 25

## 2023-12-05 PROCEDURE — 85025 COMPLETE CBC W/AUTO DIFF WBC: CPT | Performed by: PHYSICIAN ASSISTANT

## 2023-12-05 PROCEDURE — 83880 ASSAY OF NATRIURETIC PEPTIDE: CPT | Performed by: PHYSICIAN ASSISTANT

## 2023-12-05 PROCEDURE — 84484 ASSAY OF TROPONIN QUANT: CPT | Performed by: PHYSICIAN ASSISTANT

## 2023-12-05 PROCEDURE — 87502 INFLUENZA DNA AMP PROBE: CPT | Performed by: PHYSICIAN ASSISTANT

## 2023-12-05 PROCEDURE — 80053 COMPREHEN METABOLIC PANEL: CPT | Performed by: PHYSICIAN ASSISTANT

## 2023-12-05 RX ORDER — ACETAMINOPHEN 500 MG
1000 TABLET ORAL
Status: COMPLETED | OUTPATIENT
Start: 2023-12-05 | End: 2023-12-06

## 2023-12-05 RX ORDER — ALBUTEROL SULFATE 90 UG/1
2 AEROSOL, METERED RESPIRATORY (INHALATION) EVERY 6 HOURS PRN
Status: DISCONTINUED | OUTPATIENT
Start: 2023-12-05 | End: 2023-12-06 | Stop reason: HOSPADM

## 2023-12-05 RX ORDER — PROCHLORPERAZINE MALEATE 5 MG
10 TABLET ORAL
Status: COMPLETED | OUTPATIENT
Start: 2023-12-05 | End: 2023-12-06

## 2023-12-06 VITALS
SYSTOLIC BLOOD PRESSURE: 147 MMHG | TEMPERATURE: 100 F | WEIGHT: 257.94 LBS | RESPIRATION RATE: 18 BRPM | DIASTOLIC BLOOD PRESSURE: 81 MMHG | HEART RATE: 93 BPM | BODY MASS INDEX: 47.18 KG/M2 | OXYGEN SATURATION: 96 %

## 2023-12-06 LAB
INFLUENZA A, MOLECULAR: POSITIVE
INFLUENZA B, MOLECULAR: NEGATIVE
SPECIMEN SOURCE: ABNORMAL

## 2023-12-06 PROCEDURE — 96360 HYDRATION IV INFUSION INIT: CPT

## 2023-12-06 PROCEDURE — 94640 AIRWAY INHALATION TREATMENT: CPT

## 2023-12-06 PROCEDURE — 94761 N-INVAS EAR/PLS OXIMETRY MLT: CPT

## 2023-12-06 PROCEDURE — 25000003 PHARM REV CODE 250: Performed by: PHYSICIAN ASSISTANT

## 2023-12-06 PROCEDURE — 63600175 PHARM REV CODE 636 W HCPCS: Performed by: STUDENT IN AN ORGANIZED HEALTH CARE EDUCATION/TRAINING PROGRAM

## 2023-12-06 PROCEDURE — 27100098 HC SPACER

## 2023-12-06 PROCEDURE — 25000242 PHARM REV CODE 250 ALT 637 W/ HCPCS: Performed by: PHYSICIAN ASSISTANT

## 2023-12-06 RX ORDER — OSELTAMIVIR PHOSPHATE 75 MG/1
75 CAPSULE ORAL 2 TIMES DAILY
Qty: 10 CAPSULE | Refills: 0 | Status: SHIPPED | OUTPATIENT
Start: 2023-12-06 | End: 2023-12-11

## 2023-12-06 RX ORDER — AZITHROMYCIN 250 MG/1
250 TABLET, FILM COATED ORAL DAILY
Qty: 6 TABLET | Refills: 0 | Status: SHIPPED | OUTPATIENT
Start: 2023-12-06 | End: 2024-02-28

## 2023-12-06 RX ADMIN — ACETAMINOPHEN 1000 MG: 500 TABLET ORAL at 12:12

## 2023-12-06 RX ADMIN — PROCHLORPERAZINE MALEATE 10 MG: 5 TABLET ORAL at 12:12

## 2023-12-06 RX ADMIN — ALBUTEROL SULFATE 2 PUFF: 108 INHALANT RESPIRATORY (INHALATION) at 12:12

## 2023-12-06 RX ADMIN — SODIUM CHLORIDE 500 ML: 9 INJECTION, SOLUTION INTRAVENOUS at 12:12

## 2023-12-06 NOTE — TELEPHONE ENCOUNTER
Reached out the pt, she did go to the hospital for evaluation, significant states she is home and with meds and feeling better, if anything else is needed let them know to call the office.

## 2023-12-06 NOTE — TELEPHONE ENCOUNTER
"Marianne Landa's fiance states every one in the house was diagnosed with Flu recently. Marianne has not been seen by doctor but having flu like symptoms. Cordell reports Marianne had heart attack in May this year. Current symptoms include bilateral hip and knee pain, cough and headache. Symptoms present since yesterday.  Reports fever broke yesterday. Has not had a flu shot this year. Pt states "it feels like I am drowning. Helps when I cough." Advised per triage protocol to go to nearest ED now for physician meliton. Instructed to call  now if no immediate . V/u.   Reason for Disposition   [1] Difficulty breathing AND [2] not severe AND [3] not from stuffy nose (e.g., not relieved by cleaning out the nose)    Additional Information   Negative: Influenza has been diagnosed by a doctor (or NP/PA)   [1] Influenza EXPOSURE (close contact) within the last 7 days AND [2] fever or any respiratory symptoms (i.e., cough, runny or stuffy nose, sore throat)   Negative: SEVERE difficulty breathing (e.g., struggling for each breath, speaks in single words)   Negative: Difficult to awaken or acting confused (e.g., disoriented, slurred speech)   Negative: Bluish (or gray) lips or face now   Negative: Shock suspected (e.g., cold/pale/clammy skin, too weak to stand, low BP, rapid pulse)   Negative: Sounds like a life-threatening emergency to the triager   Negative: Chest pain  (Exception: MILD central chest pain, present only when coughing.)   Negative: [1] Headache AND [2] stiff neck (can't touch chin to chest)   Negative: Fever > 104 F (40 C)    Protocols used: Influenza (Flu) Exposure-A-AH, Influenza (Flu) - Seasonal-A-    "

## 2023-12-06 NOTE — ED TRIAGE NOTES
Marianne Go, a 48 y.o. female presents to the ED w/ complaint of patient states having generalized body aches since Saturday. Patient daughter was diagnosed on Friday and  today. Patient states pain in her hips that radiates to her back  and knees. Patient also states having a cough and feeling chest congestions. Patient had a heart attack a few months ago, patient is on a baby Asprin a day, and blood pressure medication. Patient endorses shortness of breath, and 4 episodes of diarrhea in the last 2 days.    Triage note:  Chief Complaint   Patient presents with    Influenza     Pt comes to the ED with complaints of flu like symptoms.  Pt has been having chest congestion, cough, fever, and generalized pain all over since Saturday.       Review of patient's allergies indicates:   Allergen Reactions    Topiramate Itching     Past Medical History:   Diagnosis Date    Deep vein thrombosis 1990    pregnancy related     Kidney calculi     Kidney stones     Kidney stones     Migraines     perimenstrual

## 2023-12-06 NOTE — DISCHARGE INSTRUCTIONS
You have tested positive for influenza A.  We will treat you with Tamiflu.  Your chest x-ray also shows questionable pneumonia in your right lower lobe.  We will treat you with azithromycin.  Use albuterol inhaler every 4-6 hours as needed for shortness of breath and wheezing.  Take Tylenol as needed for pain and fever.  Follow-up with your primary doctor or return to the emergency department sooner for any new or worsening symptoms.

## 2023-12-06 NOTE — ED PROVIDER NOTES
Encounter Date: 12/5/2023       History     Chief Complaint   Patient presents with    Influenza     Pt comes to the ED with complaints of flu like symptoms.  Pt has been having chest congestion, cough, fever, and generalized pain all over since Saturday.       48-year-old female with past medical history of CAD, hypertension, hyperlipidemia who presents to the emergency department with chief complaint of headache, congestion, cough, shortness of breath, body aches, diarrhea, fever that began 4 days ago.  and daughter recently tested positive for the flu. She has tried Tylenol at home without significant improvement of her symptoms.  She has occasional chest pain when she coughs.  She has no chest pain at rest or with exertion.  No vomiting.  She denies other worsening or alleviating factors.      Review of patient's allergies indicates:   Allergen Reactions    Topiramate Itching     Past Medical History:   Diagnosis Date    Deep vein thrombosis 1990    pregnancy related     Kidney calculi     Kidney stones     Kidney stones     Migraines     perimenstrual      Past Surgical History:   Procedure Laterality Date    BLADDER SUSPENSION      COLONOSCOPY N/A 12/22/2022    Procedure: COLONOSCOPY;  Surgeon: Charity Rudolph MD;  Location: [facility] ENDO ([address] [place] [address]);  Service: Endoscopy;  Laterality: N/A;  instr portal-GT    CORONARY ANGIOGRAPHY Left 5/5/2023    Procedure: Angiogram, Coronary Artery;  Surgeon: Dhruv Scott MD;  Location: [facility] CATH LAB;  Service: Cardiology;  Laterality: Left;    CYSTOSCOPY W/ LASER LITHOTRIPSY      HYSTERECTOMY      PELVIC LAPAROSCOPY      ablation of endometriosis    STENT, DRUG ELUTING, SINGLE VESSEL, CORONARY  5/5/2023    Procedure: Stent, Drug Eluting, Single Vessel, Coronary;  Surgeon: Dhruv Scott MD;  Location: [facility] CATH LAB;  Service: Cardiology;;    TONSILLECTOMY      TUBAL LIGATION  2011    essure      Family History   Problem Relation Age of Onset    Hyperlipidemia  Mother     Hypertension Mother     Diabetes Mother     Hypothyroidism Mother     Breast cancer Mother         bilateral mastectomy ,2019    Heart disease Father     Hyperlipidemia Father     Hypothyroidism Father     Cancer Father         lung cancer     Allergic rhinitis Father     Asthma Son     Colon cancer Maternal Uncle         40s     Ovarian cancer Neg Hx      Social History     Tobacco Use    Smoking status: Former     Current packs/day: 0.00     Average packs/day: 0.3 packs/day for 13.0 years (3.3 ttl pk-yrs)     Types: Cigarettes     Start date: 1990     Quit date: 2003     Years since quittin.9    Smokeless tobacco: Never    Tobacco comments:     quit 11 years ago   Substance Use Topics    Alcohol use: Yes     Comment: rarely    Drug use: No     Review of Systems   Constitutional:  Positive for fatigue and fever.   HENT:  Positive for congestion.    Respiratory:  Positive for cough and shortness of breath.    Neurological:  Positive for headaches.       Physical Exam     Initial Vitals [23]   BP Pulse Resp Temp SpO2   99/63 (!) 115 (!) 24 98.4 °F (36.9 °C) (!) 94 %      MAP       --         Physical Exam    Vitals reviewed.  Constitutional: She appears well-developed and well-nourished. She is not diaphoretic. No distress.   HENT:   Head: Normocephalic and atraumatic.   Mouth/Throat: Oropharynx is clear and moist.   Eyes: Conjunctivae and EOM are normal. Pupils are equal, round, and reactive to light.   Neck: Neck supple.   Normal range of motion.  Cardiovascular:  Normal rate, regular rhythm, normal heart sounds and intact distal pulses.     Exam reveals no gallop and no friction rub.       No murmur heard.  Pulmonary/Chest: Breath sounds normal. She has no wheezes. She has no rhonchi. She has no rales.   Abdominal: Abdomen is soft. Bowel sounds are normal. There is no abdominal tenderness.   Musculoskeletal:         General: Normal range of motion.      Cervical back: Normal  range of motion and neck supple.     Neurological: She is alert and oriented to person, place, and time. She has normal strength. No cranial nerve deficit or sensory deficit. GCS score is 15. GCS eye subscore is 4. GCS verbal subscore is 5. GCS motor subscore is 6.   Skin: Skin is warm and dry. Capillary refill takes less than 2 seconds.   Psychiatric: She has a normal mood and affect. Her behavior is normal. Judgment and thought content normal.         ED Course   Procedures  Labs Reviewed   INFLUENZA A & B BY MOLECULAR - Abnormal; Notable for the following components:       Result Value    Influenza A, Molecular Positive (*)     All other components within normal limits   CBC W/ AUTO DIFFERENTIAL - Abnormal; Notable for the following components:    MCH 32.6 (*)     All other components within normal limits   COMPREHENSIVE METABOLIC PANEL - Abnormal; Notable for the following components:    AST 43 (*)     ALT 57 (*)     All other components within normal limits   TROPONIN I   B-TYPE NATRIURETIC PEPTIDE          Imaging Results              X-Ray Chest PA And Lateral (Final result)  Result time 12/05/23 23:09:18      Final result by Noe Bower MD (12/05/23 23:09:18)                   Impression:      Radiographic findings as above.      Electronically signed by: Noe Bower  Date:    12/05/2023  Time:    23:09               Narrative:    EXAMINATION:  XR CHEST PA AND LATERAL    CLINICAL HISTORY:  Cough, unspecified    TECHNIQUE:  PA and lateral views of the chest were performed.    COMPARISON:  Chest radiograph March 13, 2023    FINDINGS:  Two views of the chest are submitted.  There is mild diminished depth of inspiration, when accounting for position and technique and depth of inspiration, the appearance of the cardiomediastinal silhouette is stable.    Parenchymal changes at the lung bases bilaterally likely relates to atelectatic change, the possibly of mild component of infiltrate is a  consideration.  There may be minimal pleural fluid at the left costophrenic angle, there is no large pleural effusion and there is no pneumothorax.    The osseous structures demonstrate chronic change.                                    X-Rays:   Independently Interpreted Readings:   Chest X-Ray: Normal heart size. There is an infiltrate in the RLL. No pneumothorax.      Medications   albuterol inhaler 2 puff (2 puffs Inhalation Given 12/6/23 0057)   acetaminophen tablet 1,000 mg (1,000 mg Oral Given 12/6/23 0001)   sodium chloride 0.9% bolus 500 mL 500 mL (500 mLs Intravenous New Bag 12/6/23 0001)   prochlorperazine tablet 10 mg (10 mg Oral Given 12/6/23 0001)     Medical Decision Making  Emergent evaluation of a 48 y.o. female presenting to the emergency department complaining of headache, congestion, cough, shortness of breath, body aches, diarrhea, fever that began 4 days ago.. Patient is afebrile, hemodynamically stable, and non toxic appearing.   Will order labs, chest x-ray, influenza swab, symptomatic care.    Differential diagnosis includes but isn't limited to influenza, pneumonia, metabolic derangement.     No severe metabolic or hematologic derangements.  Troponin and BNP are negative.  Influenza A positive.  Chest x-ray with questionable right lower lobe pneumonia.  Will treat with azithromycin.  On reassessment, patient reports significant improvement of her symptoms with ED management.  She does have an ambulatory oxygen saturation around 94% on room air.  She is mildly dyspneic with exertion.  I offer her admission with Hospital Medicine however she declines.  She feels comfortable going home with an albuterol inhaler.  She is given extensive return precautions.  All questions answered.  The patient was instructed to follow up with a primary care provider or to return to the emergency department for worsening symptoms. The treatment plan was discussed with the patient who demonstrated understanding  and comfort with plan. The patient's history, physical exam, and plan of care was discussed with and agreed upon with my supervising physician.     Amount and/or Complexity of Data Reviewed  Labs: ordered. Decision-making details documented in ED Course.  Radiology: ordered.    Risk  OTC drugs.  Prescription drug management.               ED Course as of 12/06/23 0111   Tue Dec 05, 2023   2317 WBC: 5.03 [JM]   2317 Hemoglobin: 16.0 []   2317 Hematocrit: 46.6 [JM]   2317 Platelet Count: 315 [JM]   2317 Troponin I: <0.006 [JM]   2317 BNP: <10 [JM]      ED Course User Index  [JM] Carina Sanchez PA-C                           Clinical Impression:  Final diagnoses:  [R05.9] Cough  [J10.1] Influenza A (Primary)  [J18.9] Pneumonia of right lower lobe due to infectious organism          ED Disposition Condition    Discharge Stable          ED Prescriptions       Medication Sig Dispense Start Date End Date Auth. Provider    oseltamivir (TAMIFLU) 75 MG capsule Take 1 capsule (75 mg total) by mouth 2 (two) times daily. for 5 days 10 capsule 12/6/2023 12/11/2023 Carina Sanchez PA-C    azithromycin (Z-PHIL) 250 MG tablet Take 1 tablet (250 mg total) by mouth once daily. Take first 2 tablets together, then 1 every day until finished. 6 tablet 12/6/2023 -- Carina Sanchez PA-C          Follow-up Information       Follow up With Specialties Details Why Contact Info    [facility] - Emergency Dept Emergency Medicine Go to  If symptoms worsen [id number]  [facility] [id number] [phone]    Carlee Mcneil MD Internal Medicine Schedule an appointment as soon as possible for a visit in 1 week  [address] [facility] [place] [address]  [phone]               Carina Sanchez PA-C  12/06/23 0111

## 2023-12-13 NOTE — PROGRESS NOTES
[facility] Cardiology [address]     SUBJECTIVE:     History of Present Illness:  Patient is a 48 y.o. female presents to followup for CAD     Primary Diagnosis:   Hypertension: none  DM: none  Smoker: quit over 20 years   Family history of early CAD: mother and father with MI's at age 50's  Heart disease : NSTEMI with stents placed   ROS  Since cath 8/23:  No chest pains  No shortness of breath at rest; does get short of breath in the humidity;   No syncope  No palpitations  Activity: working out, doing cardiac rehab with no issues   Review of patient's allergies indicates:   Allergen Reactions    Topiramate Itching       Past Medical History:   Diagnosis Date    Deep vein thrombosis 1990    pregnancy related     Kidney calculi     Kidney stones     Kidney stones     Migraines     perimenstrual        Past Surgical History:   Procedure Laterality Date    BLADDER SUSPENSION      COLONOSCOPY N/A 12/22/2022    Procedure: COLONOSCOPY;  Surgeon: Charity Rudolph MD;  Location: [facility] ENDO ([address] [place] [address]);  Service: Endoscopy;  Laterality: N/A;  instr portal-GT    CORONARY ANGIOGRAPHY Left 5/5/2023    Procedure: Angiogram, Coronary Artery;  Surgeon: Dhruv Scott MD;  Location: [facility] CATH LAB;  Service: Cardiology;  Laterality: Left;    CYSTOSCOPY W/ LASER LITHOTRIPSY      HYSTERECTOMY      PELVIC LAPAROSCOPY      ablation of endometriosis    STENT, DRUG ELUTING, SINGLE VESSEL, CORONARY  5/5/2023    Procedure: Stent, Drug Eluting, Single Vessel, Coronary;  Surgeon: Dhruv Scott MD;  Location: [facility] CATH LAB;  Service: Cardiology;;    TONSILLECTOMY      TUBAL LIGATION  2011    essure            Past Hospitalization:     5/23: chest pains; chronic cough for months from covid; troponin 0.4 ; cath done   12/23: ER visit: flu like symptoms   Cardiac meds:    ASA 81 mg  Atorvastatin 80 mg  Brilinta 90 mg BID  Verapamil 120 mg         OBJECTIVE:     Vital Signs (Most Recent)  Vitals:    12/18/23 0913   BP: 131/83   Pulse: 96   SpO2: 96%  "  Weight: 117.4 kg (258 lb 11.4 oz)   Height: 5' 2" (1.575 m)           Physical Exam:  Neck: normal carotids, no bruits; normal JVP  Lungs :clear  Heart: RR, normal S1,S2, soft systolic  murmur LSB, no gallops  Abd: no masses; no bruits;   Exts: normal DP and PT pulses bilaterally, normal radials; no edema noted               Labs    : GFR normal; LDL pre cath 129; A1c normal :  : ldl J94;    :GFR stanislaw;; mild elevation in LFT;s; A1c normal; TSH abnormal   Diagnostic Results:    1.EK23: sinus bradycardia; biphasic T waves;   2.Echo: : normal EF   3. Stress test  4. Cath: : left main: normal; LAD mid 55%; D1 50%; OM3 50%; right 100% ; stent 3X20  right   5. PET stress 10/23: normal with normal EF     Chart review:        ASSESSMENT/PLAN:     CAD: s/p stent right with atypical symptoms at that time and unimpressive EKG changes  Residual disease in OM and LAD  3. Obesity  4. LDL not at goal yet - 94 on high dose statin   5. Mild elevation in LFT's from atorvastatin?    Plan: 1. Continue medications  2.zetia 10 mg  3. Return 6 months     Winter Meza MD    "

## 2023-12-18 ENCOUNTER — OFFICE VISIT (OUTPATIENT)
Dept: CARDIOLOGY | Facility: CLINIC | Age: 48
End: 2023-12-18
Payer: MEDICAID

## 2023-12-18 VITALS
OXYGEN SATURATION: 96 % | BODY MASS INDEX: 47.6 KG/M2 | SYSTOLIC BLOOD PRESSURE: 131 MMHG | DIASTOLIC BLOOD PRESSURE: 83 MMHG | HEIGHT: 62 IN | WEIGHT: 258.69 LBS | HEART RATE: 96 BPM

## 2023-12-18 DIAGNOSIS — I21.4 NSTEMI (NON-ST ELEVATED MYOCARDIAL INFARCTION): Primary | ICD-10-CM

## 2023-12-18 DIAGNOSIS — I25.10 CORONARY ARTERY DISEASE, UNSPECIFIED VESSEL OR LESION TYPE, UNSPECIFIED WHETHER ANGINA PRESENT, UNSPECIFIED WHETHER NATIVE OR TRANSPLANTED HEART: ICD-10-CM

## 2023-12-18 DIAGNOSIS — Z95.5 STENTED CORONARY ARTERY: ICD-10-CM

## 2023-12-18 PROCEDURE — 3044F HG A1C LEVEL LT 7.0%: CPT | Mod: CPTII,,, | Performed by: INTERNAL MEDICINE

## 2023-12-18 PROCEDURE — 1159F PR MEDICATION LIST DOCUMENTED IN MEDICAL RECORD: ICD-10-PCS | Mod: CPTII,,, | Performed by: INTERNAL MEDICINE

## 2023-12-18 PROCEDURE — 99212 OFFICE O/P EST SF 10 MIN: CPT | Mod: PBBFAC,PN | Performed by: INTERNAL MEDICINE

## 2023-12-18 PROCEDURE — 3044F PR MOST RECENT HEMOGLOBIN A1C LEVEL <7.0%: ICD-10-PCS | Mod: CPTII,,, | Performed by: INTERNAL MEDICINE

## 2023-12-18 PROCEDURE — 1159F MED LIST DOCD IN RCRD: CPT | Mod: CPTII,,, | Performed by: INTERNAL MEDICINE

## 2023-12-18 PROCEDURE — 99214 OFFICE O/P EST MOD 30 MIN: CPT | Mod: S$PBB,,, | Performed by: INTERNAL MEDICINE

## 2023-12-18 PROCEDURE — 1160F RVW MEDS BY RX/DR IN RCRD: CPT | Mod: CPTII,,, | Performed by: INTERNAL MEDICINE

## 2023-12-18 PROCEDURE — 3075F PR MOST RECENT SYSTOLIC BLOOD PRESS GE 130-139MM HG: ICD-10-PCS | Mod: CPTII,,, | Performed by: INTERNAL MEDICINE

## 2023-12-18 PROCEDURE — 99999 PR PBB SHADOW E&M-EST. PATIENT-LVL II: ICD-10-PCS | Mod: PBBFAC,,, | Performed by: INTERNAL MEDICINE

## 2023-12-18 PROCEDURE — 3075F SYST BP GE 130 - 139MM HG: CPT | Mod: CPTII,,, | Performed by: INTERNAL MEDICINE

## 2023-12-18 PROCEDURE — 99214 PR OFFICE/OUTPT VISIT, EST, LEVL IV, 30-39 MIN: ICD-10-PCS | Mod: S$PBB,,, | Performed by: INTERNAL MEDICINE

## 2023-12-18 PROCEDURE — 3008F PR BODY MASS INDEX (BMI) DOCUMENTED: ICD-10-PCS | Mod: CPTII,,, | Performed by: INTERNAL MEDICINE

## 2023-12-18 PROCEDURE — 1160F PR REVIEW ALL MEDS BY PRESCRIBER/CLIN PHARMACIST DOCUMENTED: ICD-10-PCS | Mod: CPTII,,, | Performed by: INTERNAL MEDICINE

## 2023-12-18 PROCEDURE — 3079F PR MOST RECENT DIASTOLIC BLOOD PRESSURE 80-89 MM HG: ICD-10-PCS | Mod: CPTII,,, | Performed by: INTERNAL MEDICINE

## 2023-12-18 PROCEDURE — 99999 PR PBB SHADOW E&M-EST. PATIENT-LVL II: CPT | Mod: PBBFAC,,, | Performed by: INTERNAL MEDICINE

## 2023-12-18 PROCEDURE — 3079F DIAST BP 80-89 MM HG: CPT | Mod: CPTII,,, | Performed by: INTERNAL MEDICINE

## 2023-12-18 PROCEDURE — 3008F BODY MASS INDEX DOCD: CPT | Mod: CPTII,,, | Performed by: INTERNAL MEDICINE

## 2023-12-18 RX ORDER — EZETIMIBE 10 MG/1
10 TABLET ORAL DAILY
Qty: 90 TABLET | Refills: 3 | Status: SHIPPED | OUTPATIENT
Start: 2023-12-18 | End: 2024-12-17

## 2024-02-04 ENCOUNTER — PATIENT MESSAGE (OUTPATIENT)
Dept: CARDIOLOGY | Facility: CLINIC | Age: 49
End: 2024-02-04
Payer: MEDICAID

## 2024-02-04 DIAGNOSIS — I25.10 CORONARY ARTERY DISEASE, UNSPECIFIED VESSEL OR LESION TYPE, UNSPECIFIED WHETHER ANGINA PRESENT, UNSPECIFIED WHETHER NATIVE OR TRANSPLANTED HEART: Primary | ICD-10-CM

## 2024-02-04 RX ORDER — TICAGRELOR 90 MG/1
90 TABLET ORAL 2 TIMES DAILY
Qty: 180 TABLET | Refills: 3 | Status: SHIPPED | OUTPATIENT
Start: 2024-02-04

## 2024-02-21 ENCOUNTER — PATIENT MESSAGE (OUTPATIENT)
Dept: INTERNAL MEDICINE | Facility: CLINIC | Age: 49
End: 2024-02-21
Payer: MEDICAID

## 2024-02-21 NOTE — TELEPHONE ENCOUNTER
Called and spoke to pt. Pt stated she been having on & off tingling and numbness in both hands.   Stated she will need to be evaluated and offered first available appointment with Danielle Heredia on 2/28/24 at 1 pm . Pt Vu

## 2024-02-28 ENCOUNTER — OFFICE VISIT (OUTPATIENT)
Dept: INTERNAL MEDICINE | Facility: CLINIC | Age: 49
End: 2024-02-28
Payer: MEDICAID

## 2024-02-28 ENCOUNTER — LAB VISIT (OUTPATIENT)
Dept: LAB | Facility: HOSPITAL | Age: 49
End: 2024-02-28
Payer: MEDICAID

## 2024-02-28 VITALS
DIASTOLIC BLOOD PRESSURE: 98 MMHG | RESPIRATION RATE: 14 BRPM | BODY MASS INDEX: 47.7 KG/M2 | SYSTOLIC BLOOD PRESSURE: 141 MMHG | TEMPERATURE: 97 F | OXYGEN SATURATION: 97 % | HEART RATE: 87 BPM | WEIGHT: 260.81 LBS

## 2024-02-28 DIAGNOSIS — G56.23 ULNAR NEUROPATHY OF BOTH UPPER EXTREMITIES: ICD-10-CM

## 2024-02-28 DIAGNOSIS — G56.23 ULNAR NEUROPATHY OF BOTH UPPER EXTREMITIES: Primary | ICD-10-CM

## 2024-02-28 LAB
TSH SERPL DL<=0.005 MIU/L-ACNC: 3.24 UIU/ML (ref 0.4–4)
VIT B12 SERPL-MCNC: 361 PG/ML (ref 210–950)

## 2024-02-28 PROCEDURE — 3075F SYST BP GE 130 - 139MM HG: CPT | Mod: CPTII,,, | Performed by: NURSE PRACTITIONER

## 2024-02-28 PROCEDURE — 99214 OFFICE O/P EST MOD 30 MIN: CPT | Mod: PBBFAC,PO | Performed by: NURSE PRACTITIONER

## 2024-02-28 PROCEDURE — 99999 PR PBB SHADOW E&M-EST. PATIENT-LVL IV: CPT | Mod: PBBFAC,,, | Performed by: NURSE PRACTITIONER

## 2024-02-28 PROCEDURE — 84443 ASSAY THYROID STIM HORMONE: CPT | Performed by: NURSE PRACTITIONER

## 2024-02-28 PROCEDURE — 3079F DIAST BP 80-89 MM HG: CPT | Mod: CPTII,,, | Performed by: NURSE PRACTITIONER

## 2024-02-28 PROCEDURE — 3008F BODY MASS INDEX DOCD: CPT | Mod: CPTII,,, | Performed by: NURSE PRACTITIONER

## 2024-02-28 PROCEDURE — 1160F RVW MEDS BY RX/DR IN RCRD: CPT | Mod: CPTII,,, | Performed by: NURSE PRACTITIONER

## 2024-02-28 PROCEDURE — 82607 VITAMIN B-12: CPT | Performed by: NURSE PRACTITIONER

## 2024-02-28 PROCEDURE — 99213 OFFICE O/P EST LOW 20 MIN: CPT | Mod: S$PBB,,, | Performed by: NURSE PRACTITIONER

## 2024-02-28 PROCEDURE — 1159F MED LIST DOCD IN RCRD: CPT | Mod: CPTII,,, | Performed by: NURSE PRACTITIONER

## 2024-02-28 PROCEDURE — 36415 COLL VENOUS BLD VENIPUNCTURE: CPT | Mod: PO | Performed by: NURSE PRACTITIONER

## 2024-02-28 NOTE — PROGRESS NOTES
"Subjective:       Patient ID: Marianne Go is a 48 y.o. female.    Chief Complaint: Hand Pain (numbness)      Patient is a 48 y.o. female who traditionally follows with Carlee Mcneil MD presenting today for:    Hand Numbness   Patient notes that her hands/arms feel as though they are asleep, "tingle". At times in her right hand she will have stabbing pains. Denies neck or shoulder pain but was recently treated for medial epicondylitis but she finds that pain improved with steroids and PT.   This sensation comes and goes.     Review of patient's allergies indicates:   Allergen Reactions    Topiramate Itching       Medication List with Changes/Refills   Current Medications    ALBUTEROL (VENTOLIN HFA) 90 MCG/ACTUATION INHALER    Inhale 2 puffs into the lungs every 6 (six) hours as needed for Wheezing. Rescue    ASPIRIN 81 MG CHEW    Chew and swallow 1 tablet (81 mg total) by mouth once daily.    ATORVASTATIN (LIPITOR) 80 MG TABLET    Take 1 tablet (80 mg total) by mouth once daily.    BRILINTA 90 MG TABLET    TAKE 1 TABLET BY MOUTH TWICE DAILY    CETIRIZINE (ZYRTEC) 10 MG TABLET    Take 10 mg by mouth.    EZETIMIBE (ZETIA) 10 MG TABLET    Take 1 tablet (10 mg total) by mouth once daily.    MULTIVITAMIN CAPSULE    Take 1 capsule by mouth once daily.    NITROGLYCERIN (NITROSTAT) 0.4 MG SL TABLET    Place 1 tablet (0.4 mg total) under the tongue every 5 (five) minutes as needed for Chest pain (angina). Up to 3 doses. If chest pain is not relieved or worsens 3 to 5 minutes after 1 dose, call 911 and seek immediate emergency medical attention.    VENLAFAXINE (EFFEXOR-XR) 75 MG 24 HR CAPSULE    Take 1 capsule (75 mg total) by mouth once daily.    VERAPAMIL (CALAN-SR) 120 MG CR TABLET    Take 1 tablet (120 mg total) by mouth nightly.   Discontinued Medications    AZITHROMYCIN (Z-PHIL) 250 MG TABLET    Take 1 tablet (250 mg total) by mouth once daily. Take first 2 tablets together, then 1 every day until finished. "     Medical, social and surgical history has been reviewed with the patient.     Review of Systems   Constitutional:  Negative for chills and fever.   Eyes:  Positive for visual disturbance.   Respiratory:  Negative for cough and shortness of breath.    Cardiovascular:  Negative for chest pain.   Neurological:  Positive for numbness and headaches. Negative for dizziness.     Objective:   BP (!) 141/98 (BP Location: Left arm, BP Method: Large (Manual))   Pulse 87   Temp 97 °F (36.1 °C) (Temporal)   Resp 14   Wt 118.3 kg (260 lb 12.9 oz)   LMP 10/13/2017 (Approximate)   SpO2 97%   BMI 47.70 kg/m²     Physical Exam  Vitals reviewed.   Constitutional:       Appearance: Normal appearance.   HENT:      Head: Normocephalic and atraumatic.   Pulmonary:      Effort: Pulmonary effort is normal.   Musculoskeletal:      Comments: + Tinel at medial epicondyle    Skin:     General: Skin is warm and dry.   Neurological:      Mental Status: She is alert and oriented to person, place, and time.         I reviewed and independently interpreted the labs and imaging below:  Last Labs:  Glucose   Date Value Ref Range Status   12/05/2023 96 70 - 110 mg/dL Final   09/05/2023 92 70 - 110 mg/dL Final     BUN   Date Value Ref Range Status   12/05/2023 9 6 - 20 mg/dL Final   09/05/2023 14 6 - 20 mg/dL Final     Creatinine   Date Value Ref Range Status   12/05/2023 0.8 0.5 - 1.4 mg/dL Final   09/05/2023 0.8 0.5 - 1.4 mg/dL Final     Cholesterol   Date Value Ref Range Status   09/05/2023 160 120 - 199 mg/dL Final     Comment:     The National Cholesterol Education Program (NCEP) has set the  following guidelines (reference ranges) for Cholesterol:  Optimal.....................<200 mg/dL  Borderline High.............200-239 mg/dL  High........................> or = 240 mg/dL     05/05/2023 199 120 - 199 mg/dL Final     Comment:     The National Cholesterol Education Program (NCEP) has set the  following guidelines (reference ranges) for  Cholesterol:  Optimal.....................<200 mg/dL  Borderline High.............200-239 mg/dL  High........................> or = 240 mg/dL       Hemoglobin A1C   Date Value Ref Range Status   09/05/2023 5.0 4.0 - 5.6 % Final     Comment:     ADA Screening Guidelines:  5.7-6.4%  Consistent with prediabetes  >or=6.5%  Consistent with diabetes    High levels of fetal hemoglobin interfere with the HbA1C  assay. Heterozygous hemoglobin variants (HbS, HgC, etc)do  not significantly interfere with this assay.   However, presence of multiple variants may affect accuracy.     05/05/2023 4.9 4.0 - 5.6 % Final     Comment:     ADA Screening Guidelines:  5.7-6.4%  Consistent with prediabetes  >or=6.5%  Consistent with diabetes    High levels of fetal hemoglobin interfere with the HbA1C  assay. Heterozygous hemoglobin variants (HbS, HgC, etc)do  not significantly interfere with this assay.   However, presence of multiple variants may affect accuracy.       Hemoglobin   Date Value Ref Range Status   12/05/2023 16.0 12.0 - 16.0 g/dL Final   09/05/2023 15.0 12.0 - 16.0 g/dL Final     Hematocrit   Date Value Ref Range Status   12/05/2023 46.6 37.0 - 48.5 % Final   09/05/2023 46.3 37.0 - 48.5 % Final       Assessment and Plan:     1. Ulnar neuropathy of both upper extremities  - Ambulatory referral/consult to Orthopedics; Future  - TSH; Future  - Vitamin B12; Future

## 2024-03-22 RX ORDER — ATORVASTATIN CALCIUM 80 MG/1
80 TABLET, FILM COATED ORAL
Qty: 90 TABLET | Refills: 3 | Status: SHIPPED | OUTPATIENT
Start: 2024-03-22

## 2024-03-25 NOTE — PROGRESS NOTES
Subjective:      Patient ID: Marianne Go is a 48 y.o. female.    Chief Complaint: Pain of the Left Hand and Pain of the Right Hand      HPI: Marianne Go is a 48 y.o. left hand dominant female who presents to clinic for intermittent bilateral hand and wrist pain. Patient denies known KILEY. The pain started 2 months ago and is becoming progressively worse.  Pain is located over (points to)  2nd-4th finger pads bilaterally . She reports that the pain is a 0/10 pain today. Pain is 6/10 burning, shooting pain at its worst.  The pain is aggravated by driving, cooking/chopping food.  Associated symptoms include numbness, tingling, burning, weakness, and symptoms worse at night. The pain is affecting ADLs and limiting desired level of activity. There is not a history of previous injury or surgery to the hands.      Previous treatments include Voltaren Gel which has provided minimal relief.     Occupation: Unemployed due to heart attack in May 2023. Previously worked in  (computer work).    Ambulating: unassisted  Diabetic:  No  Smoking:  She quit smoking over 21 years ago.  History of DVT/PE: Positive (on Aspirin 81 mg QD and Brilinta 90 mg BID).    PAST MEDICAL HISTORY:    Past Medical History:   Diagnosis Date    Deep vein thrombosis 1990    pregnancy related     Kidney calculi     Kidney stones     Kidney stones     Migraines     perimenstrual      PAST SURGICAL HISTORY:    Past Surgical History:   Procedure Laterality Date    BLADDER SUSPENSION      COLONOSCOPY N/A 12/22/2022    Procedure: COLONOSCOPY;  Surgeon: Charity Rudolph MD;  Location: [facility] ENDO ([address] [place] [address]);  Service: Endoscopy;  Laterality: N/A;  instr portal-GT    CORONARY ANGIOGRAPHY Left 5/5/2023    Procedure: Angiogram, Coronary Artery;  Surgeon: Dhruv Scott MD;  Location: [facility] CATH LAB;  Service: Cardiology;  Laterality: Left;    CYSTOSCOPY W/ LASER LITHOTRIPSY      HYSTERECTOMY      PELVIC LAPAROSCOPY      ablation of endometriosis     STENT, DRUG ELUTING, SINGLE VESSEL, CORONARY  2023    Procedure: Stent, Drug Eluting, Single Vessel, Coronary;  Surgeon: Dhruv Scott MD;  Location: [facility] CATH LAB;  Service: Cardiology;;    TONSILLECTOMY      TUBAL LIGATION      essure      FAMILY HISTORY:    Family History   Problem Relation Age of Onset    Hyperlipidemia Mother     Hypertension Mother     Diabetes Mother     Hypothyroidism Mother     Breast cancer Mother         bilateral mastectomy ,    Heart disease Father     Hyperlipidemia Father     Hypothyroidism Father     Cancer Father         lung cancer     Allergic rhinitis Father     Asthma Son     Colon cancer Maternal Uncle         40s     Ovarian cancer Neg Hx      SOCIAL HISTORY:    Social History     Occupational History    Occupation: stay at home mom     Employer: NOT EMPLOYED   Tobacco Use    Smoking status: Former     Current packs/day: 0.00     Average packs/day: 0.3 packs/day for 13.0 years (3.3 ttl pk-yrs)     Types: Cigarettes     Start date: 1990     Quit date: 2003     Years since quittin.2    Smokeless tobacco: Never    Tobacco comments:     quit 11 years ago   Substance and Sexual Activity    Alcohol use: Yes     Comment: rarely    Drug use: No    Sexual activity: Yes     Partners: Male     Birth control/protection: See Surgical Hx      MEDICATIONS:   Current Outpatient Medications:     aspirin 81 MG Chew, Chew and swallow 1 tablet (81 mg total) by mouth once daily., Disp: 360 tablet, Rfl: 0    atorvastatin (LIPITOR) 80 MG tablet, TAKE 1 TABLET BY MOUTH EVERY DAY, Disp: 90 tablet, Rfl: 3    BRILINTA 90 mg tablet, TAKE 1 TABLET BY MOUTH TWICE DAILY, Disp: 180 tablet, Rfl: 3    cetirizine (ZYRTEC) 10 MG tablet, Take 10 mg by mouth., Disp: , Rfl:     ezetimibe (ZETIA) 10 mg tablet, Take 1 tablet (10 mg total) by mouth once daily., Disp: 90 tablet, Rfl: 3    multivitamin capsule, Take 1 capsule by mouth once daily., Disp: , Rfl:     venlafaxine  (EFFEXOR-XR) 75 MG 24 hr capsule, Take 1 capsule (75 mg total) by mouth once daily., Disp: 30 capsule, Rfl: 11    verapamiL (CALAN-SR) 120 MG CR tablet, Take 1 tablet (120 mg total) by mouth nightly., Disp: 30 tablet, Rfl: 11    albuterol (VENTOLIN HFA) 90 mcg/actuation inhaler, Inhale 2 puffs into the lungs every 6 (six) hours as needed for Wheezing. Rescue, Disp: 18 g, Rfl: 0    nitroGLYCERIN (NITROSTAT) 0.4 MG SL tablet, Place 1 tablet (0.4 mg total) under the tongue every 5 (five) minutes as needed for Chest pain (angina). Up to 3 doses. If chest pain is not relieved or worsens 3 to 5 minutes after 1 dose, call 911 and seek immediate emergency medical attention., Disp: 25 tablet, Rfl: 0    ALLERGIES:   Review of patient's allergies indicates:   Allergen Reactions    Topiramate Itching       Review of Systems:  Constitution: Negative for chills, fever and night sweats.   HENT: Negative for congestion and headaches.    Eyes: Negative for blurred vision or vision loss.  Cardiovascular: Negative for chest pain and syncope.   Respiratory: Negative for cough and shortness of breath.    Endocrine: Negative for polydipsia, polyphagia and polyuria.   Hematologic/Lymphatic: Negative for bleeding problem. Does not bruise/bleed easily.   Skin: Negative for dry skin, itching and rash.   Musculoskeletal: See HPI.   Gastrointestinal: Negative for abdominal pain and bowel incontinence.   Genitourinary: Negative for bladder incontinence and nocturia.   Neurological: Negative for disturbances in coordination, loss of balance and seizures.   Psychiatric/Behavioral: Negative for depression. The patient does not have insomnia.    Allergic/Immunologic: Negative for hives and persistent infections.          Objective:      There were no vitals filed for this visit.    PHYSICAL EXAM:  General: Alert & oriented x3, well-developed and well-nourished, in no acute distress, sitting comfortably in the exam room.  Skin: Warm and dry.  Capillary refill less than 2 seconds.   Head: Normocephalic and atraumatic.   Eyes: Sclera appear normal.   Nose: No deformities seen.   Ears: No deformities seen.   Neck: No tracheal deviation present.   Pulmonary/Chest: Breathing unlabored.   Neurological: Alert and oriented to person, place, and time.   Psychiatric: Mood is pleasant and affect appropriate.     RIGHT HAND/WRIST:        Observation/Inspection:    No swelling, deformity, discoloration, scars, or atrophy.         ROM (active and passive):  Wrist:  full to wrist flexion, extension, radial, and ulnar deviation.  Digits:  full to digit MCP, PIP, and DIP flexion and extension without pain or difficulty.        Palpation:   No tenderness to palpation to bony prominences and soft tissues throughout.        Special Tests:  Finkelstein's Test  Negative  WHAT Test   Negative  Phalen Test   Positive  Tinel's Test - Carpal Tunnel Positive  Tinel's Test - Cubital Tunnel Positive  CMC Grind   Negative        Neurovascular Exam:  Digits warm and well perfused, brisk capillary refill <3 seconds throughout  NVI motor/LTS to median, radial, and ulnar nerves, radial pulse 2+      LEFT HAND/WRIST:        Observation/Inspection:    No swelling, deformity, discoloration, scars, or atrophy.         ROM (active and passive):  Wrist:  full to wrist flexion, extension, radial, and ulnar deviation.  Digits:  full to digit MCP, PIP, and DIP flexion and extension without pain or difficulty.        Palpation:   No tenderness to palpation to bony prominences and soft tissues throughout.        Special Tests:  Finkelstein's Test  Negative  WHAT Test   Negative  Phalen Test   Positive  Tinel's Test - Carpal Tunnel Positive  Tinel's Test - Cubital Tunnel Negative  CMC Grind   Negative        Neurovascular Exam:  Digits warm and well perfused, brisk capillary refill <3 seconds throughout  NVI motor/LTS to median, radial, and ulnar nerves, radial pulse 2+    Imaging:  None today.          Assessment:       1. Carpal tunnel syndrome of left wrist    2. Ulnar neuropathy of both upper extremities    3. Carpal tunnel syndrome of right wrist    4. Cubital tunnel syndrome on right        Plan:       Orders Placed This Encounter    Carpal Tunnel    Carpal Tunnel     I explained the nature of the problem to the patient.     I discussed at length with the patient all the different treatment options available for her bilateral hands including anti-inflammatories, acetaminophen, rest, ice/heat, bracing, physical therapy, and corticosteroid injections. I explained the potential role of surgery in the treatment of this condition. The patient understands that if non-surgical measures do not adequately control symptoms, surgery will be considered in the future.     Medications:  OTC analgesics (ex: acetaminophen, ibuprofen) as needed for pain management.  Procedures:  Corticosteroid injection requested and performed today to the bilateral carpal tunnel. See procedure note. Standard precautions provided.  DME:  Bilateral gel wrist brace applied today for night-time use.  Pain Management: Ice compress to the affected area 2-3x a day for 15-20 minutes as needed for pain management.  Orders:  Consider bilateral upper extremity EMG study if patient would like to consider surgery in the future.     Follow-Up: 3 months with Dinora Barbosa PA-C for follow-up.    All of the patient's questions were answered and the patient will contact us if they have any questions or concerns in the interim.      Dinora Barbosa PA-C  [facility]  Orthopedic Surgery    Medical Dictation software was used during the dictation of portions or the entirety of this medical record.  Phonetic or grammatic errors may exist due to the use of this software. For clarification, refer to the author of the document.

## 2024-03-26 ENCOUNTER — OFFICE VISIT (OUTPATIENT)
Dept: ORTHOPEDICS | Facility: CLINIC | Age: 49
End: 2024-03-26
Payer: MEDICAID

## 2024-03-26 ENCOUNTER — TELEPHONE (OUTPATIENT)
Dept: ORTHOPEDICS | Facility: CLINIC | Age: 49
End: 2024-03-26
Payer: MEDICAID

## 2024-03-26 ENCOUNTER — PATIENT MESSAGE (OUTPATIENT)
Dept: ORTHOPEDICS | Facility: CLINIC | Age: 49
End: 2024-03-26

## 2024-03-26 DIAGNOSIS — G56.23 ULNAR NEUROPATHY OF BOTH UPPER EXTREMITIES: ICD-10-CM

## 2024-03-26 DIAGNOSIS — G56.01 CARPAL TUNNEL SYNDROME OF RIGHT WRIST: ICD-10-CM

## 2024-03-26 DIAGNOSIS — G56.21 CUBITAL TUNNEL SYNDROME ON RIGHT: ICD-10-CM

## 2024-03-26 DIAGNOSIS — G56.02 CARPAL TUNNEL SYNDROME OF LEFT WRIST: Primary | ICD-10-CM

## 2024-03-26 PROCEDURE — 1159F MED LIST DOCD IN RCRD: CPT | Mod: CPTII,,,

## 2024-03-26 PROCEDURE — 99999PBSHW PR PBB SHADOW TECHNICAL ONLY FILED TO HB: Mod: PBBFAC,,,

## 2024-03-26 PROCEDURE — 99204 OFFICE O/P NEW MOD 45 MIN: CPT | Mod: S$PBB,25,,

## 2024-03-26 PROCEDURE — 99999 PR PBB SHADOW E&M-EST. PATIENT-LVL III: CPT | Mod: PBBFAC,,,

## 2024-03-26 PROCEDURE — 99213 OFFICE O/P EST LOW 20 MIN: CPT | Mod: PBBFAC,PN

## 2024-03-26 PROCEDURE — 20526 THER INJECTION CARP TUNNEL: CPT | Mod: S$PBB,50,,

## 2024-03-26 PROCEDURE — 20526 THER INJECTION CARP TUNNEL: CPT | Mod: PBBFAC,50,PN

## 2024-03-26 PROCEDURE — 1160F RVW MEDS BY RX/DR IN RCRD: CPT | Mod: CPTII,,,

## 2024-03-26 RX ORDER — TRIAMCINOLONE ACETONIDE 40 MG/ML
20 INJECTION, SUSPENSION INTRA-ARTICULAR; INTRAMUSCULAR
Status: DISCONTINUED | OUTPATIENT
Start: 2024-03-26 | End: 2024-03-26 | Stop reason: HOSPADM

## 2024-03-26 RX ADMIN — TRIAMCINOLONE ACETONIDE 20 MG: 40 INJECTION, SUSPENSION INTRA-ARTICULAR; INTRAMUSCULAR at 09:03

## 2024-03-26 NOTE — TELEPHONE ENCOUNTER
----- Message from Yuliana Calle sent at 3/26/2024 10:09 AM CDT -----  Regarding: pt called  Name of Who is Calling: TESHA RDZ [[id number]]      What is the request in detail: Pt states after receiving her injection she is having numbness in her ring and pinky finger . Requesting a callback to reassure this is a normal reaction . Please advise       Can the clinic reply by [facility]: No       [facility]: Telephone Information:            [phone]

## 2024-03-26 NOTE — PROCEDURES
Carpal Tunnel    Date/Time: 3/26/2024 9:00 AM    Performed by: Dinora Barbosa PA-C  Authorized by: Dinora Barbosa PA-C    Consent Done?:  Yes (Verbal)  Indications:  Pain  Site marked: the procedure site was marked    Timeout: prior to procedure the correct patient, procedure, and site was verified    Local anesthesia used?: Yes    Local anesthetic:  Topical anesthetic  Location:  Wrist  Site:  R carpal tunnel  Ultrasonic Guidance for Needle Placement?: No    Needle size:  25 G  Approach:  Volar  Medications:  20 mg triamcinolone acetonide 40 mg/mL  Patient tolerance:  Patient tolerated the procedure well with no immediate complications  Carpal Tunnel    Date/Time: 3/26/2024 9:00 AM    Performed by: Dinora Barbosa PA-C  Authorized by: Dinora Barbosa PA-C    Consent Done?:  Yes (Verbal)  Indications:  Pain  Site marked: the procedure site was marked    Timeout: prior to procedure the correct patient, procedure, and site was verified    Local anesthesia used?: Yes    Local anesthetic:  Topical anesthetic  Location:  Wrist  Site:  L carpal tunnel  Ultrasonic Guidance for Needle Placement?: No    Needle size:  25 G  Approach:  Volar  Medications:  20 mg triamcinolone acetonide 40 mg/mL  Patient tolerance:  Patient tolerated the procedure well with no immediate complications      Injection Procedure Note   Prior to procedure the correct patient, procedure, and site was verified. Allergies were reviewed and verbal consent was obtained. The procedure site was marked.    After time out was performed, the patient was prepped aseptically with chloraprep, the area was sprayed with local topical anesthetic, and then cleaned with alcohol. A therapeutic injection of combination of 0.5cc 1% Xylocaine and 20mg Triamcinolone was given under sterile technique using a 25-gauge x 5/8 needle from a volar approach in the bilateral carpal tunnel. Sterile dressing applied.     Patient tolerated the procedure  well. Pain decreased. She had no adverse reactions to the medication.     The patient is cautioned that immediate relief of pain is secondary to the local anesthetic and will be temporary. After the anesthetic wears off there may be a increase in pain that may last for a few hours or a few days. Advised patient to avoid strenuous activities for the next 24-48 hours and to apply ice for 20 minutes to help alleviate this this pain. She was warned of possible blood sugar and/or blood pressure changes following injection. She was reminded to call the clinic immediately for any adverse side effects as explained in clinic today.

## 2024-04-15 ENCOUNTER — PATIENT MESSAGE (OUTPATIENT)
Dept: ORTHOPEDICS | Facility: CLINIC | Age: 49
End: 2024-04-15

## 2024-04-15 ENCOUNTER — OFFICE VISIT (OUTPATIENT)
Dept: ORTHOPEDICS | Facility: CLINIC | Age: 49
End: 2024-04-15
Payer: MEDICAID

## 2024-04-15 DIAGNOSIS — M25.562 CHRONIC PAIN OF LEFT KNEE: ICD-10-CM

## 2024-04-15 DIAGNOSIS — M17.0 PRIMARY OSTEOARTHRITIS OF BOTH KNEES: ICD-10-CM

## 2024-04-15 DIAGNOSIS — G89.29 CHRONIC PAIN OF LEFT KNEE: ICD-10-CM

## 2024-04-15 DIAGNOSIS — M25.561 CHRONIC PAIN OF RIGHT KNEE: Primary | ICD-10-CM

## 2024-04-15 DIAGNOSIS — G89.29 CHRONIC PAIN OF RIGHT KNEE: Primary | ICD-10-CM

## 2024-04-15 PROCEDURE — 99213 OFFICE O/P EST LOW 20 MIN: CPT | Mod: PBBFAC,PN,25

## 2024-04-15 PROCEDURE — 99999 PR PBB SHADOW E&M-EST. PATIENT-LVL III: CPT | Mod: PBBFAC,,,

## 2024-04-15 PROCEDURE — 20610 DRAIN/INJ JOINT/BURSA W/O US: CPT | Mod: S$PBB,50,,

## 2024-04-15 PROCEDURE — 99214 OFFICE O/P EST MOD 30 MIN: CPT | Mod: S$PBB,25,,

## 2024-04-15 PROCEDURE — 20610 DRAIN/INJ JOINT/BURSA W/O US: CPT | Mod: PBBFAC,50,PN

## 2024-04-15 PROCEDURE — 99999PBSHW PR PBB SHADOW TECHNICAL ONLY FILED TO HB: Mod: PBBFAC,,,

## 2024-04-15 PROCEDURE — 1160F RVW MEDS BY RX/DR IN RCRD: CPT | Mod: CPTII,,,

## 2024-04-15 PROCEDURE — 20610 DRAIN/INJ JOINT/BURSA W/O US: CPT | Mod: PBBFAC,PN

## 2024-04-15 PROCEDURE — 1159F MED LIST DOCD IN RCRD: CPT | Mod: CPTII,,,

## 2024-04-15 RX ADMIN — TRIAMCINOLONE ACETONIDE 40 MG: 40 INJECTION, SUSPENSION INTRA-ARTICULAR; INTRAMUSCULAR at 03:04

## 2024-04-15 NOTE — PROGRESS NOTES
Subjective:      Patient ID: Marianne Go is a 48 y.o. female.    Chief Complaint: Pain of the Right Knee and Pain of the Left Knee      HPI: Marianne Go is a 48 y.o. female who presents to clinic for constant bilateral knee pain (R > L). Patient denies known KILEY. The pain started years ago and is becoming progressively worse.  Pain is located medially and  anteriorly. She reports that the pain is a 6/10 stabbing pain today. Pain is 10/10 at its worst.  The pain is aggravated by exercise, walking, and going up and down stairs.  Associated symptoms include giving way (after standing for long periods of time), pseudolocking (left knee), and gelling (bilaterally). Denies numbness, tingling, radiation. Moderate pain to bear weight with prolonged standing. The pain is affecting ADLs and limiting desired level of activity. There is not a history of surgery to the knees.       Previous treatments include acetaminophen, NSAIDs (Mobic), heat, ice, rest, and activity modification which have provided adequate relief.     Occupation: Unemployed due to heart attack in May 2023. Previously worked in  (GCW work).    Ambulating: unassisted  Diabetic:  No  Smoking:  She quit smoking over 21 years ago.  History of DVT/PE: Positive (on Aspirin 81 mg QD and Brilinta 90 mg BID).    PAST MEDICAL HISTORY:    Past Medical History:   Diagnosis Date    Deep vein thrombosis 1990    pregnancy related     Kidney calculi     Kidney stones     Kidney stones     Migraines     perimenstrual      PAST SURGICAL HISTORY:    Past Surgical History:   Procedure Laterality Date    BLADDER SUSPENSION      COLONOSCOPY N/A 12/22/2022    Procedure: COLONOSCOPY;  Surgeon: Charity Rudolph MD;  Location: [facility] ENDO (4TH FLR);  Service: Endoscopy;  Laterality: N/A;  instr portal-GT    CORONARY ANGIOGRAPHY Left 5/5/2023    Procedure: Angiogram, Coronary Artery;  Surgeon: Dhruv Scott MD;  Location: [facility] CATH LAB;  Service: Cardiology;   Laterality: Left;    CYSTOSCOPY W/ LASER LITHOTRIPSY      HYSTERECTOMY      PELVIC LAPAROSCOPY      ablation of endometriosis    STENT, DRUG ELUTING, SINGLE VESSEL, CORONARY  2023    Procedure: Stent, Drug Eluting, Single Vessel, Coronary;  Surgeon: Dhruv Scott MD;  Location: [facility] CATH LAB;  Service: Cardiology;;    TONSILLECTOMY      TUBAL LIGATION  2011    essure      FAMILY HISTORY:    Family History   Problem Relation Name Age of Onset    Hyperlipidemia Mother      Hypertension Mother      Diabetes Mother      Hypothyroidism Mother      Breast cancer Mother          bilateral mastectomy ,    Heart disease Father      Hyperlipidemia Father      Hypothyroidism Father      Cancer Father          lung cancer     Allergic rhinitis Father      Asthma Son      Colon cancer Maternal Uncle          40s     Ovarian cancer Neg Hx       SOCIAL HISTORY:    Social History     Occupational History    Occupation: stay at home mom     Employer: NOT EMPLOYED   Tobacco Use    Smoking status: Former     Current packs/day: 0.00     Average packs/day: 0.3 packs/day for 13.0 years (3.3 ttl pk-yrs)     Types: Cigarettes     Start date: 1990     Quit date: 2003     Years since quittin.3    Smokeless tobacco: Never    Tobacco comments:     quit 11 years ago   Substance and Sexual Activity    Alcohol use: Yes     Comment: rarely    Drug use: No    Sexual activity: Yes     Partners: Male     Birth control/protection: See Surgical Hx      MEDICATIONS:   Current Outpatient Medications:     aspirin 81 MG Chew, Chew and swallow 1 tablet (81 mg total) by mouth once daily., Disp: 360 tablet, Rfl: 0    atorvastatin (LIPITOR) 80 MG tablet, TAKE 1 TABLET BY MOUTH EVERY DAY, Disp: 90 tablet, Rfl: 3    BRILINTA 90 mg tablet, TAKE 1 TABLET BY MOUTH TWICE DAILY, Disp: 180 tablet, Rfl: 3    cetirizine (ZYRTEC) 10 MG tablet, Take 10 mg by mouth., Disp: , Rfl:     ezetimibe (ZETIA) 10 mg tablet, Take 1 tablet (10 mg total)  by mouth once daily., Disp: 90 tablet, Rfl: 3    multivitamin capsule, Take 1 capsule by mouth once daily., Disp: , Rfl:     venlafaxine (EFFEXOR-XR) 75 MG 24 hr capsule, Take 1 capsule (75 mg total) by mouth once daily., Disp: 30 capsule, Rfl: 11    verapamiL (CALAN-SR) 120 MG CR tablet, Take 1 tablet (120 mg total) by mouth nightly., Disp: 30 tablet, Rfl: 11    albuterol (VENTOLIN HFA) 90 mcg/actuation inhaler, Inhale 2 puffs into the lungs every 6 (six) hours as needed for Wheezing. Rescue, Disp: 18 g, Rfl: 0    nitroGLYCERIN (NITROSTAT) 0.4 MG SL tablet, Place 1 tablet (0.4 mg total) under the tongue every 5 (five) minutes as needed for Chest pain (angina). Up to 3 doses. If chest pain is not relieved or worsens 3 to 5 minutes after 1 dose, call 911 and seek immediate emergency medical attention., Disp: 25 tablet, Rfl: 0    ALLERGIES:   Review of patient's allergies indicates:   Allergen Reactions    Topiramate Itching       Review of Systems:  Constitution: Negative for chills, fever and night sweats.   HENT: Negative for congestion and headaches.    Eyes: Negative for blurred vision or vision loss.  Cardiovascular: Negative for chest pain and syncope.   Respiratory: Negative for cough and shortness of breath.    Endocrine: Negative for polydipsia, polyphagia and polyuria.   Hematologic/Lymphatic: Negative for bleeding problem. Does not bruise/bleed easily.   Skin: Negative for dry skin, itching and rash.   Musculoskeletal: See HPI.   Gastrointestinal: Negative for abdominal pain and bowel incontinence.   Genitourinary: Negative for bladder incontinence and nocturia.   Neurological: Negative for disturbances in coordination, loss of balance and seizures.   Psychiatric/Behavioral: Negative for depression. The patient does not have insomnia.    Allergic/Immunologic: Negative for hives and persistent infections.          Objective:      There were no vitals filed for this visit.    PHYSICAL EXAM:  General: Alert &  oriented x3, well-developed and well-nourished, in no acute distress, sitting comfortably in the exam room.  Skin: Warm and dry. Capillary refill less than 2 seconds.   Head: Normocephalic and atraumatic.   Eyes: Sclera appear normal.   Nose: No deformities seen.   Ears: No deformities seen.   Neck: No tracheal deviation present.   Pulmonary/Chest: Breathing unlabored.   Neurological: Alert and oriented to person, place, and time.   Psychiatric: Mood is pleasant and affect appropriate.           Body habitus is obese.         The patient walks with a limp.    RIGHT KNEE        Hip irritability:  negative.         The skin over the knee is intact.        Knee effusion:  none.        Tenderness:  medial.        Range of Motion:  Flexion 120° (with pain at end range), Extension 0°        Ligament exam:   MCL:  1+ laxity   Lachman:  intact              Posterior sag:  intact    LCL:  1+ laxity        Patellar apprehension:  negative.        Popliteal cyst:  negative.        Patellar crepitation:  present.        Pulses DP present, PT present.        Motor:  normal 5/5 strength in all tested muscle groups.         Sensory:  normal.    LEFT KNEE        Hip irritability:  negative.         The skin over the knee is intact.        Knee effusion:  none.        Tenderness:  medial.        Range of Motion:  Flexion 120°, Extension 0°        Ligament exam:   MCL:  1+ laxity   Lachman:  intact              Posterior sag:  intact    LCL:  1+ laxity        Patellar apprehension:  negative.        Popliteal cyst:  negative.        Patellar crepitation:  present.        Pulses DP present, PT present.        Motor:  normal 5/5 strength in all tested muscle groups.         Sensory:  normal.      Imaging:   X-Rays: 3 views of bilateral knee dated 01/27/2021 , and independently reviewed, show: Degenerative changes bilaterally including joint space narrowing, subchondral sclerosis, and osteophyte formation. Varus deformity bilaterally.  No acute fractures or dislocations. No evidence of foreign bodies.         Assessment:       1. Chronic pain of right knee    2. Chronic pain of left knee    3. Primary osteoarthritis of both knees        Plan:       Orders Placed This Encounter    Large Joint Aspiration/Injection: R knee    Large Joint Aspiration/Injection: L knee     I explained the nature of the problem to the patient.     I discussed at length with the patient all the different treatment options available for her bilateral knee including anti-inflammatories, acetaminophen, rest, ice/heat, physical therapy to include strengthening exercise, corticosteroid injections, viscosupplement injections, and Iovera.     I explained the potential role of knee replacement in the treatment of this condition. I also explained the typical clinical course.  The usual complications that that can occur were also discussed. The patient understands that if non-surgical measures do not adequately control symptoms, surgery will be considered in the future. The patient agrees to discuss this again at follow-up, if clinically warranted.    Medications:  OTC Tylenol Arthritis as needed for pain.  NSAIDs contraindicated due to history of MI and current medications.   Procedures:  Corticosteroid injection requested and performed today to the bilateral knee. See procedure note. Standard precautions provided.  DME:  Hinged knee brace provided today for patient to use with ambulation and activities.  Pain Management: Ice compress to the affected area 2-3x a day for 15-20 minutes as needed for pain management.  Orders:  New bilateral knee x-rays to be performed prior to next visit.      Follow-Up: 3 months with Dinora Barbosa PA-C for follow-up of bilateral knee osteoarthritis and bilateral carpal tunnel syndrome. New bilateral Knee x-rays next visit.  Consider bilateral upper extremity nerve conduction study next visit should carpal tunnel symptoms persist.    All of the  patient's questions were answered and the patient will contact us if they have any questions or concerns in the interim.    Dinora Barbosa PA-C  [facility]  Orthopedic Surgery    Medical Dictation software was used during the dictation of portions or the entirety of this medical record.  Phonetic or grammatic errors may exist due to the use of this software. For clarification, refer to the author of the document.

## 2024-04-16 RX ORDER — TRIAMCINOLONE ACETONIDE 40 MG/ML
40 INJECTION, SUSPENSION INTRA-ARTICULAR; INTRAMUSCULAR
Status: DISCONTINUED | OUTPATIENT
Start: 2024-04-15 | End: 2024-04-16 | Stop reason: HOSPADM

## 2024-04-16 NOTE — PROCEDURES
Large Joint Aspiration/Injection: R knee    Date/Time: 4/15/2024 3:00 PM    Performed by: Dinora Barbosa PA-C  Authorized by: Dinora Barbosa PA-C    Consent Done?:  Yes (Verbal)  Indications:  Arthritis and pain  Site marked: the procedure site was marked    Timeout: prior to procedure the correct patient, procedure, and site was verified      Local anesthesia used?: Yes    Local anesthetic:  Topical anesthetic    Details:  Needle Size:  22 G  Ultrasonic Guidance for needle placement?: No    Approach:  Anterolateral  Location:  Knee  Site:  R knee  Medications:  40 mg triamcinolone acetonide 40 mg/mL  Patient tolerance:  Patient tolerated the procedure well with no immediate complications  Large Joint Aspiration/Injection: L knee    Date/Time: 4/15/2024 3:00 PM    Performed by: Dinora Barbosa PA-C  Authorized by: Dinora Barbosa PA-C    Consent Done?:  Yes (Verbal)  Indications:  Arthritis and pain  Site marked: the procedure site was marked    Timeout: prior to procedure the correct patient, procedure, and site was verified      Local anesthesia used?: Yes    Local anesthetic:  Topical anesthetic    Details:  Needle Size:  22 G  Ultrasonic Guidance for needle placement?: No    Approach:  Anterolateral  Location:  Knee  Site:  L knee  Medications:  40 mg triamcinolone acetonide 40 mg/mL  Patient tolerance:  Patient tolerated the procedure well with no immediate complications      Injection Procedure Note   Prior to procedure the correct patient, procedure, and site was verified. Allergies were reviewed and verbal consent was obtained. The procedure site was marked.    After time out was performed, the patient was prepped aseptically with chloraprep, the area was sprayed with local topical anesthetic, and then cleaned with alcohol. A therapeutic injection of combination of 2 cc 1% Xylocaine and 40mg Triamcinolone was given under sterile technique using a 22-gauge x 1.5 needle from the  anterolateral aspect of the bilateral knee joint. Sterile dressing applied.     Patient tolerated the procedure well. Pain decreased. She had no adverse reactions to the medication.     The patient is cautioned that immediate relief of pain is secondary to the local anesthetic and will be temporary. After the anesthetic wears off there may be a increase in pain that may last for a few hours or a few days. Advised patient to avoid strenuous activities for the next 24-48 hours and to apply ice for 20 minutes to help alleviate this this pain. She was warned of possible blood sugar and/or blood pressure changes following injection. She was reminded to call the clinic immediately for any adverse side effects as explained in clinic today.

## 2024-04-17 DIAGNOSIS — Z12.31 OTHER SCREENING MAMMOGRAM: ICD-10-CM

## 2024-05-07 ENCOUNTER — PATIENT MESSAGE (OUTPATIENT)
Dept: CARDIOLOGY | Facility: CLINIC | Age: 49
End: 2024-05-07
Payer: MEDICAID

## 2024-05-08 ENCOUNTER — HOSPITAL ENCOUNTER (OUTPATIENT)
Dept: RADIOLOGY | Facility: HOSPITAL | Age: 49
Discharge: HOME | End: 2024-05-08
Attending: HOSPITALIST
Payer: MEDICAID

## 2024-05-08 VITALS — HEIGHT: 62 IN | WEIGHT: 260 LBS | BODY MASS INDEX: 47.84 KG/M2

## 2024-05-08 DIAGNOSIS — Z12.31 OTHER SCREENING MAMMOGRAM: ICD-10-CM

## 2024-05-08 PROCEDURE — 77067 SCR MAMMO BI INCL CAD: CPT | Mod: 26,,, | Performed by: RADIOLOGY

## 2024-05-08 PROCEDURE — 77067 SCR MAMMO BI INCL CAD: CPT | Mod: TC

## 2024-05-08 PROCEDURE — 77063 BREAST TOMOSYNTHESIS BI: CPT | Mod: 26,,, | Performed by: RADIOLOGY

## 2024-05-08 RX ORDER — VERAPAMIL HYDROCHLORIDE 120 MG/1
120 TABLET, FILM COATED, EXTENDED RELEASE ORAL NIGHTLY
Qty: 30 TABLET | Refills: 11 | Status: SHIPPED | OUTPATIENT
Start: 2024-05-08 | End: 2025-05-08

## 2024-05-08 RX ORDER — NAPROXEN SODIUM 220 MG/1
81 TABLET, FILM COATED ORAL DAILY
Qty: 360 TABLET | Refills: 0 | Status: SHIPPED | OUTPATIENT
Start: 2024-05-08 | End: 2025-05-08

## 2024-06-04 RX ORDER — VENLAFAXINE HYDROCHLORIDE 75 MG/1
75 CAPSULE, EXTENDED RELEASE ORAL DAILY
Qty: 30 CAPSULE | Refills: 11 | Status: SHIPPED | OUTPATIENT
Start: 2024-06-04

## 2024-06-04 NOTE — TELEPHONE ENCOUNTER
Care Due:                  Date            Visit Type   Department     Provider  --------------------------------------------------------------------------------                                EP -                              PRIMARY      MET INTERNAL  Last Visit: 09-      CARE ([facility])   MEDICINE       Carlee Mcneil  Next Visit: None Scheduled  None         None Found                                                            Last  Test          Frequency    Reason                     Performed    Due Date  --------------------------------------------------------------------------------    Lipid Panel.  12 months..  atorvastatin.............  09- 08-    [facility] Embedded Care Due Messages. Reference number: [id number].   6/04/2024 12:42:17 PM CDT  
- - -

## 2024-08-03 ENCOUNTER — HOSPITAL ENCOUNTER (EMERGENCY)
Facility: HOSPITAL | Age: 49
Discharge: HOME | End: 2024-08-04
Attending: STUDENT IN AN ORGANIZED HEALTH CARE EDUCATION/TRAINING PROGRAM
Payer: MEDICAID

## 2024-08-03 DIAGNOSIS — R03.0 ELEVATED BLOOD PRESSURE READING: ICD-10-CM

## 2024-08-03 DIAGNOSIS — R04.0 EPISTAXIS: Primary | ICD-10-CM

## 2024-08-03 LAB
BUN SERPL-MCNC: 9 MG/DL (ref 6–30)
CHLORIDE SERPL-SCNC: 101 MMOL/L (ref 95–110)
CREAT SERPL-MCNC: 0.8 MG/DL (ref 0.5–1.4)
GLUCOSE SERPL-MCNC: 111 MG/DL (ref 70–110)
HCT VFR BLD CALC: 46 %PCV (ref 36–54)
POC IONIZED CALCIUM: 1.17 MMOL/L (ref 1.06–1.42)
POC TCO2 (MEASURED): 25 MMOL/L (ref 23–29)
POTASSIUM BLD-SCNC: 3.4 MMOL/L (ref 3.5–5.1)
SAMPLE: ABNORMAL
SODIUM BLD-SCNC: 139 MMOL/L (ref 136–145)

## 2024-08-03 PROCEDURE — 96374 THER/PROPH/DIAG INJ IV PUSH: CPT

## 2024-08-03 PROCEDURE — 96375 TX/PRO/DX INJ NEW DRUG ADDON: CPT

## 2024-08-03 PROCEDURE — 96376 TX/PRO/DX INJ SAME DRUG ADON: CPT

## 2024-08-03 PROCEDURE — 63600175 PHARM REV CODE 636 W HCPCS: Mod: JZ,JG | Performed by: STUDENT IN AN ORGANIZED HEALTH CARE EDUCATION/TRAINING PROGRAM

## 2024-08-03 PROCEDURE — 80047 BASIC METABLC PNL IONIZED CA: CPT

## 2024-08-03 PROCEDURE — 99284 EMERGENCY DEPT VISIT MOD MDM: CPT | Mod: 25

## 2024-08-03 PROCEDURE — 25000003 PHARM REV CODE 250: Performed by: STUDENT IN AN ORGANIZED HEALTH CARE EDUCATION/TRAINING PROGRAM

## 2024-08-03 PROCEDURE — 63600175 PHARM REV CODE 636 W HCPCS: Performed by: EMERGENCY MEDICINE

## 2024-08-03 RX ORDER — OXYCODONE HYDROCHLORIDE 5 MG/1
5 TABLET ORAL
Status: DISCONTINUED | OUTPATIENT
Start: 2024-08-03 | End: 2024-08-03

## 2024-08-03 RX ORDER — MORPHINE SULFATE 4 MG/ML
4 INJECTION, SOLUTION INTRAMUSCULAR; INTRAVENOUS
Status: COMPLETED | OUTPATIENT
Start: 2024-08-03 | End: 2024-08-03

## 2024-08-03 RX ORDER — ACETAMINOPHEN 500 MG
1000 TABLET ORAL
Status: DISCONTINUED | OUTPATIENT
Start: 2024-08-03 | End: 2024-08-03

## 2024-08-03 RX ORDER — OXYCODONE HYDROCHLORIDE 5 MG/1
5 TABLET ORAL
Status: COMPLETED | OUTPATIENT
Start: 2024-08-03 | End: 2024-08-03

## 2024-08-03 RX ORDER — LABETALOL HCL 20 MG/4 ML
10 SYRINGE (ML) INTRAVENOUS
Status: COMPLETED | OUTPATIENT
Start: 2024-08-03 | End: 2024-08-03

## 2024-08-03 RX ORDER — HYDRALAZINE HYDROCHLORIDE 20 MG/ML
10 INJECTION INTRAMUSCULAR; INTRAVENOUS
Status: COMPLETED | OUTPATIENT
Start: 2024-08-03 | End: 2024-08-03

## 2024-08-03 RX ORDER — TRANEXAMIC ACID 100 MG/ML
5 INJECTION, SOLUTION INTRAVENOUS
Status: COMPLETED | OUTPATIENT
Start: 2024-08-03 | End: 2024-08-03

## 2024-08-03 RX ORDER — OXYMETAZOLINE HCL 0.05 %
1 SPRAY, NON-AEROSOL (ML) NASAL
Status: COMPLETED | OUTPATIENT
Start: 2024-08-03 | End: 2024-08-03

## 2024-08-03 RX ORDER — AMOXICILLIN AND CLAVULANATE POTASSIUM 875; 125 MG/1; MG/1
1 TABLET, FILM COATED ORAL EVERY 12 HOURS
Qty: 14 TABLET | Refills: 0 | Status: SHIPPED | OUTPATIENT
Start: 2024-08-03 | End: 2024-08-10

## 2024-08-03 RX ORDER — LABETALOL HCL 20 MG/4 ML
10 SYRINGE (ML) INTRAVENOUS ONCE
Status: DISCONTINUED | OUTPATIENT
Start: 2024-08-03 | End: 2024-08-03

## 2024-08-03 RX ORDER — ACETAMINOPHEN 325 MG/1
650 TABLET ORAL
Status: COMPLETED | OUTPATIENT
Start: 2024-08-03 | End: 2024-08-03

## 2024-08-03 RX ORDER — LABETALOL HYDROCHLORIDE 5 MG/ML
10 INJECTION, SOLUTION INTRAVENOUS
Status: DISCONTINUED | OUTPATIENT
Start: 2024-08-03 | End: 2024-08-03

## 2024-08-03 RX ADMIN — TRANEXAMIC ACID 500 MG: 100 INJECTION, SOLUTION INTRAVENOUS at 08:08

## 2024-08-03 RX ADMIN — MORPHINE SULFATE 4 MG: 4 INJECTION INTRAVENOUS at 08:08

## 2024-08-03 RX ADMIN — HYDRALAZINE HYDROCHLORIDE 10 MG: 20 INJECTION, SOLUTION INTRAMUSCULAR; INTRAVENOUS at 11:08

## 2024-08-03 RX ADMIN — MORPHINE SULFATE 4 MG: 4 INJECTION INTRAVENOUS at 11:08

## 2024-08-03 RX ADMIN — Medication 10 MG: at 09:08

## 2024-08-03 RX ADMIN — ACETAMINOPHEN 650 MG: 500 TABLET ORAL at 10:08

## 2024-08-03 RX ADMIN — OXYCODONE 5 MG: 5 TABLET ORAL at 10:08

## 2024-08-03 RX ADMIN — OXYMETAZOLINE HCL 1 SPRAY: 0.05 SPRAY NASAL at 08:08

## 2024-08-04 VITALS
HEART RATE: 71 BPM | DIASTOLIC BLOOD PRESSURE: 91 MMHG | RESPIRATION RATE: 20 BRPM | OXYGEN SATURATION: 94 % | BODY MASS INDEX: 48.47 KG/M2 | WEIGHT: 265 LBS | TEMPERATURE: 99 F | SYSTOLIC BLOOD PRESSURE: 190 MMHG

## 2024-08-05 ENCOUNTER — HOSPITAL ENCOUNTER (EMERGENCY)
Facility: HOSPITAL | Age: 49
Discharge: HOME | End: 2024-08-05
Attending: STUDENT IN AN ORGANIZED HEALTH CARE EDUCATION/TRAINING PROGRAM
Payer: MEDICAID

## 2024-08-05 ENCOUNTER — NURSE TRIAGE (OUTPATIENT)
Dept: ADMINISTRATIVE | Facility: CLINIC | Age: 49
End: 2024-08-05
Payer: MEDICAID

## 2024-08-05 VITALS
WEIGHT: 265 LBS | HEIGHT: 62 IN | TEMPERATURE: 98 F | DIASTOLIC BLOOD PRESSURE: 83 MMHG | RESPIRATION RATE: 15 BRPM | HEART RATE: 110 BPM | OXYGEN SATURATION: 97 % | SYSTOLIC BLOOD PRESSURE: 164 MMHG | BODY MASS INDEX: 48.76 KG/M2

## 2024-08-05 DIAGNOSIS — R04.0 EPISTAXIS: ICD-10-CM

## 2024-08-05 DIAGNOSIS — Z48.00 ENCOUNTER FOR REMOVAL OF NASAL PACKING: Primary | ICD-10-CM

## 2024-08-05 LAB
ALBUMIN SERPL BCP-MCNC: 3.9 G/DL (ref 3.5–5.2)
ALP SERPL-CCNC: 102 U/L (ref 55–135)
ALT SERPL W/O P-5'-P-CCNC: 18 U/L (ref 10–44)
ANION GAP SERPL CALC-SCNC: 11 MMOL/L (ref 8–16)
AST SERPL-CCNC: 11 U/L (ref 10–40)
BASOPHILS # BLD AUTO: 0.06 K/UL (ref 0–0.2)
BASOPHILS NFR BLD: 0.4 % (ref 0–1.9)
BILIRUB SERPL-MCNC: 0.8 MG/DL (ref 0.1–1)
BUN SERPL-MCNC: 8 MG/DL (ref 6–20)
CALCIUM SERPL-MCNC: 9.8 MG/DL (ref 8.7–10.5)
CHLORIDE SERPL-SCNC: 105 MMOL/L (ref 95–110)
CO2 SERPL-SCNC: 24 MMOL/L (ref 23–29)
CREAT SERPL-MCNC: 0.8 MG/DL (ref 0.5–1.4)
DIFFERENTIAL METHOD BLD: ABNORMAL
EOSINOPHIL # BLD AUTO: 0 K/UL (ref 0–0.5)
EOSINOPHIL NFR BLD: 0.3 % (ref 0–8)
ERYTHROCYTE [DISTWIDTH] IN BLOOD BY AUTOMATED COUNT: 12.5 % (ref 11.5–14.5)
EST. GFR  (NO RACE VARIABLE): >60 ML/MIN/1.73 M^2
GLUCOSE SERPL-MCNC: 118 MG/DL (ref 70–110)
HCT VFR BLD AUTO: 45.3 % (ref 37–48.5)
HGB BLD-MCNC: 14.6 G/DL (ref 12–16)
IMM GRANULOCYTES # BLD AUTO: 0.06 K/UL (ref 0–0.04)
IMM GRANULOCYTES NFR BLD AUTO: 0.4 % (ref 0–0.5)
LYMPHOCYTES # BLD AUTO: 1.9 K/UL (ref 1–4.8)
LYMPHOCYTES NFR BLD: 13.9 % (ref 18–48)
MCH RBC QN AUTO: 31.9 PG (ref 27–31)
MCHC RBC AUTO-ENTMCNC: 32.2 G/DL (ref 32–36)
MCV RBC AUTO: 99 FL (ref 82–98)
MONOCYTES # BLD AUTO: 1 K/UL (ref 0.3–1)
MONOCYTES NFR BLD: 6.9 % (ref 4–15)
NEUTROPHILS # BLD AUTO: 10.9 K/UL (ref 1.8–7.7)
NEUTROPHILS NFR BLD: 78.1 % (ref 38–73)
NRBC BLD-RTO: 0 /100 WBC
PLATELET # BLD AUTO: 378 K/UL (ref 150–450)
PMV BLD AUTO: 9.8 FL (ref 9.2–12.9)
POTASSIUM SERPL-SCNC: 3.8 MMOL/L (ref 3.5–5.1)
PROT SERPL-MCNC: 7.8 G/DL (ref 6–8.4)
RBC # BLD AUTO: 4.58 M/UL (ref 4–5.4)
SODIUM SERPL-SCNC: 140 MMOL/L (ref 136–145)
WBC # BLD AUTO: 13.93 K/UL (ref 3.9–12.7)

## 2024-08-05 PROCEDURE — 85025 COMPLETE CBC W/AUTO DIFF WBC: CPT | Performed by: EMERGENCY MEDICINE

## 2024-08-05 PROCEDURE — 96374 THER/PROPH/DIAG INJ IV PUSH: CPT

## 2024-08-05 PROCEDURE — 99284 EMERGENCY DEPT VISIT MOD MDM: CPT | Mod: 25

## 2024-08-05 PROCEDURE — 25000003 PHARM REV CODE 250

## 2024-08-05 PROCEDURE — 63600175 PHARM REV CODE 636 W HCPCS

## 2024-08-05 PROCEDURE — 96375 TX/PRO/DX INJ NEW DRUG ADDON: CPT

## 2024-08-05 PROCEDURE — 80053 COMPREHEN METABOLIC PANEL: CPT | Performed by: EMERGENCY MEDICINE

## 2024-08-05 PROCEDURE — 96361 HYDRATE IV INFUSION ADD-ON: CPT

## 2024-08-05 RX ORDER — PROCHLORPERAZINE EDISYLATE 5 MG/ML
10 INJECTION INTRAMUSCULAR; INTRAVENOUS
Status: COMPLETED | OUTPATIENT
Start: 2024-08-05 | End: 2024-08-05

## 2024-08-05 RX ORDER — DIPHENHYDRAMINE HYDROCHLORIDE 50 MG/ML
12.5 INJECTION INTRAMUSCULAR; INTRAVENOUS
Status: COMPLETED | OUTPATIENT
Start: 2024-08-05 | End: 2024-08-05

## 2024-08-05 RX ADMIN — SODIUM CHLORIDE 1000 ML: 9 INJECTION, SOLUTION INTRAVENOUS at 02:08

## 2024-08-05 RX ADMIN — DIPHENHYDRAMINE HYDROCHLORIDE 12.5 MG: 50 INJECTION, SOLUTION INTRAMUSCULAR; INTRAVENOUS at 02:08

## 2024-08-05 RX ADMIN — PROCHLORPERAZINE EDISYLATE 10 MG: 5 INJECTION INTRAMUSCULAR; INTRAVENOUS at 02:08

## 2024-08-05 NOTE — ED PROVIDER NOTES
Encounter Date: 8/5/2024       History     Chief Complaint   Patient presents with    Multiple complaints     No ent appts, seen here sat night and L rhino rocket, having more pain to nose, not  able to eat, on blood thinners, R nare packed with nasal tampon     48-year-old female with history of migraine, hyperlipidemia, JOSIAS, DVT, CAD currently taking aspirin and Brilinta presents to the ED regarding nasal discomfort.  Patient was seen here 2 days ago on 08/03 for epistaxis of bilateral nostrils L>R.  She had a rhino rocket placed in the left nostril and packing to the right nostril.  She can not get into the ENT clinic for removal of her packing.  She is having poor p.o. intake due to the packing though has been taking Augmentin. Also admits to left-sided headache since placed of rhino rocket. No other complaints at this time. Has stopped taking Brilinta 2 days ago. No bleeding. Denies neck pain, fever, blurred vision, lightheaded/dizziness, nausea, vomiting, SOB, CP, or other complaints at this time.    The history is provided by the patient and medical records.     Review of patient's allergies indicates:   Allergen Reactions    Topiramate Itching     Past Medical History:   Diagnosis Date    Deep vein thrombosis 1990    pregnancy related     Kidney calculi     Kidney stones     Kidney stones     Migraines     perimenstrual      Past Surgical History:   Procedure Laterality Date    BLADDER SUSPENSION      COLONOSCOPY N/A 12/22/2022    Procedure: COLONOSCOPY;  Surgeon: Charity Rudolph MD;  Location: [facility] ENDO ([address] [place] [address]);  Service: Endoscopy;  Laterality: N/A;  instr portal-GT    CORONARY ANGIOGRAPHY Left 5/5/2023    Procedure: Angiogram, Coronary Artery;  Surgeon: Dhruv Scott MD;  Location: [facility] CATH LAB;  Service: Cardiology;  Laterality: Left;    CYSTOSCOPY W/ LASER LITHOTRIPSY      HYSTERECTOMY      PELVIC LAPAROSCOPY      ablation of endometriosis    STENT, DRUG ELUTING, SINGLE VESSEL, CORONARY   2023    Procedure: Stent, Drug Eluting, Single Vessel, Coronary;  Surgeon: Dhruv Scott MD;  Location: [facility] CATH LAB;  Service: Cardiology;;    TONSILLECTOMY      TUBAL LIGATION  2011    essure      Family History   Problem Relation Name Age of Onset    Hyperlipidemia Mother      Hypertension Mother      Diabetes Mother      Hypothyroidism Mother      Breast cancer Mother          bilateral mastectomy     Heart disease Father      Hyperlipidemia Father      Hypothyroidism Father      Cancer Father          lung cancer     Allergic rhinitis Father      Asthma Son      Colon cancer Maternal Uncle          40s     Ovarian cancer Neg Hx       Social History     Tobacco Use    Smoking status: Former     Current packs/day: 0.00     Average packs/day: 0.3 packs/day for 13.0 years (3.3 ttl pk-yrs)     Types: Cigarettes     Start date: 1990     Quit date: 2003     Years since quittin.6    Smokeless tobacco: Never    Tobacco comments:     quit 11 years ago   Substance Use Topics    Alcohol use: Yes     Comment: rarely    Drug use: No     Review of Systems  See HPI  Physical Exam     Initial Vitals [24 1247]   BP Pulse Resp Temp SpO2   (!) 167/96 (!) 115 20 98.6 °F (37 °C) 95 %      MAP       --         Physical Exam    Vitals reviewed.  Constitutional: She appears well-developed and well-nourished. She is not diaphoretic. No distress.   HENT:   Head: Normocephalic and atraumatic.   Mouth/Throat: Uvula is midline.   Rhino rocket in left nare with packing present in right nare. No blood in oropharynx. No active bleeding.   Eyes: Conjunctivae and EOM are normal. Pupils are equal, round, and reactive to light.   Neck: Neck supple.   Normal range of motion.  Cardiovascular:    Tachycardia present.         Pulmonary/Chest: No respiratory distress.   Musculoskeletal:      Cervical back: Normal range of motion and neck supple. No rigidity.     Neurological: She is alert and oriented to person,  place, and time. No cranial nerve deficit.   Psychiatric: She has a normal mood and affect.         ED Course   Procedures  Labs Reviewed   CBC W/ AUTO DIFFERENTIAL - Abnormal       Result Value    WBC 13.93 (*)     RBC 4.58      Hemoglobin 14.6      Hematocrit 45.3      MCV 99 (*)     MCH 31.9 (*)     MCHC 32.2      RDW 12.5      Platelets 378      MPV 9.8      Immature Granulocytes 0.4      Gran # (ANC) 10.9 (*)     Immature Grans (Abs) 0.06 (*)     Lymph # 1.9      Mono # 1.0      Eos # 0.0      Baso # 0.06      nRBC 0      Gran % 78.1 (*)     Lymph % 13.9 (*)     Mono % 6.9      Eosinophil % 0.3      Basophil % 0.4      Differential Method Automated     COMPREHENSIVE METABOLIC PANEL - Abnormal    Sodium 140      Potassium 3.8      Chloride 105      CO2 24      Glucose 118 (*)     BUN 8      Creatinine 0.8      Calcium 9.8      Total Protein 7.8      Albumin 3.9      Total Bilirubin 0.8      Alkaline Phosphatase 102      AST 11      ALT 18      eGFR >60.0      Anion Gap 11            Imaging Results    None          Medications   sodium chloride 0.9% bolus 1,000 mL 1,000 mL (0 mLs Intravenous Stopped 8/5/24 1610)   prochlorperazine injection Soln 10 mg (10 mg Intravenous Given 8/5/24 1432)   diphenhydrAMINE injection 12.5 mg (12.5 mg Intravenous Given 8/5/24 1432)     Medical Decision Making  Amount and/or Complexity of Data Reviewed  Labs:  Decision-making details documented in ED Course.    Risk  Prescription drug management.         APC / Resident Notes:   Emergent evaluation a 48-year-old female presenting with nasal discomfort from recent rhino rocket placement with epistaxis 2 days ago.  Hypertensive and mildly tachycardic with other vitals stable.  See physical exam findings above. Labs initiated in triage. Will give analgesia for headache and attempt to remove rhino rocket.    My differential diagnoses include but are not limited to:   Epistaxis, removal of rhino rocket nasal packing, tension headache,  sinusitis, electrolyte abnormality, low suspicion for toxic shock syndrome     See ED course.  I have reviewed the patient's records and discussed with my supervising physician.        ED Course as of 08/05/24 1622   Mon Aug 05, 2024   1402 Hemoglobin: 14.6  No anemia [KB]   1420 Comprehensive metabolic panel(!)  Grossly unremarkable. No significant metabolic or electrolyte abnormality. No DANTE [KB]   1421 WBC(!): 13.93  Mild [KB]   1440 Attempted to remove packing. Rhino rocket was deflated and immediately had oozing of blood for left nare. Reinflated. Did not attempt anymore at removal. Will discuss with ENT [KB]   1454 Consulted ENT who will come evaluate patient [KB]   1536 Per ENT, merocel and rhinorocket removed- she had some anticipated oozing from removal of the packs. Absorbable packing placed. Does not need to be admitted and can be discharged with outpatient follow up [KB]   1618 Pulse: 110  Heart rate has been consistently mildly elevated. She is currently asymptomatic. Headache resolved. No nasal pain. Denies anxiousness. Denies chest pain, palpitations, shortness of breath, or lightheadedness. [KB]   1621 Pulse now 102. Advised patient to follow up closely with PCP and ENT. No meningimus or rashes. Afebrile. Unclear etiology though low suspicion for toxic shock syndrome. Strict ED return precautions were discussed with all questions answered. She verbalized understanding and agreed to plan.  [KB]      ED Course User Index  [KB] Ivana June PA-C                           Clinical Impression:  Final diagnoses:  [R04.0] Epistaxis  [Z48.00] Encounter for removal of nasal packing (Primary)          ED Disposition Condition    Discharge Stable          ED Prescriptions    None       Follow-up Information       Follow up With Specialties Details Why Contact Info Additional Information    [facility] Otol[facility]ology Schedule an appointment as soon as possible for a visit   [address]  [place] [id number] [phone] [facility] - Main Building, 4th Floor Please park in [facility] and use Clinic elevator    [facility] - Emergency Dept Emergency Medicine Go to  If symptoms worsen [address]  [place] [id number]  [phone]              Ivana June PA-C  08/05/24 2006

## 2024-08-05 NOTE — HPI
Marianne Go is a 48 y.o. female with recent urgent care and ED visit for severe nosebleed, unprovoked. Patient had nasal packing placed at this time both at urgent care then removed and replaced by ED. Patient notes it was worse from the left side and a rhino rocket was placed on that side. Minimal drainage from pack since placed over the weekend. Merocel in right nasal cavity.  Pt was advised to f/u with ENT to have the packing removed but has been unable to get an appointment and presented to the ED due to pain and discomfort related to the packing. ED attempted removal though concern for continued bleeding around pack thus ENT was consulted.    Reports this is her first significant nosebleed. On blood thinners for cardiac stents placed last year.

## 2024-08-05 NOTE — FIRST PROVIDER EVALUATION
"Medical screening examination initiated.  I have conducted a focused provider triage encounter, findings are as follows:    Brief history of present illness:  49yo F had bilateral nasal packing on 8/3, can't get in to ENT clinic for removal and poor PO intake due to packing. On augmentin. On asa/brilinta.     Vitals:    08/05/24 1247   BP: (!) 167/96   Pulse: (!) 115   Resp: 20   Temp: 98.6 °F (37 °C)   TempSrc: Oral   SpO2: 95%   Weight: 120.2 kg (265 lb)   Height: 5' 2" (1.575 m)       Pertinent physical exam:  rhino rocket in L, +nasal packing on R.     Preliminary workup initiated; this workup will be continued and followed by the physician or advanced practice provider that is assigned to the patient when roomed.  "

## 2024-08-05 NOTE — ASSESSMENT & PLAN NOTE
Marianne Go is a 48 y.o. female w/ epistaxis s/p packing placement at outside facility. Right merocel (no blood on packing) and left rhinorocket removed with minimal oozing from left, small amount of fibrillar placed.     Recommend observation for HTN control and to ensure no further bleeding, then may be discharged from ENT perspective.  Will arrange outpatient ENT follow up for routine evaluation of epistaxis given on long term AC.     - Discussed return precautions and epistaxis precautions  - No need for admission from ENT perspective; will arrange outpatient follow up  - Please call ENT with questions; discussed with ED provider   - Remainder of care per primary team      PATIENT INSTRUCTIONS:    Nasal precautions  - DO NOT blow your nose for 2 weeks. The only exception is that you may lightly blow your nose after using a sinus rinse.  - Sneeze/cough with mouth open  - Avoid irritating substances that might make you sneeze, such as dust, chalk, harsh chemicals, and allergic triggers. This might also include spicy foods.  - Do not smoke   - Avoid flying or swimming for 2 weeks.    - Avoid all heavy lifting, straining or bending for 2 weeks.   - Avoid semi-contact sports or vigorous exercising for 3-4 weeks.      Aftercare/ moisturizing recommendations to decrease frequency of nosebleeds:  - Daily nasal saline sprays/rinses  - Nightly application of vaseline/aquaphor to anterior nares  - Room humidification  - Avoidance of nasal cannula if possible (always with humidification and please use face tent, nasal cup, facemask if possible)  Management of medical conditions contributing to epistaxis (coagulopathy, hypertension, etc.)  - Humidification of CPAP appliance if applicable    If rebleeding occurs:   - Apply Afrin liberally to both nostrils  - Apply pressure over the soft part of the nose for 15 minutes (clip or digital pressure, important to have pressure over soft part of nose and consistent pressure (do  not release pressure at any point))  - Instruct patient to lean forward, avoid swallowing blood. This can cause nausea and vomiting  - Can repeat these steps above up to 3 times  - Call/reconsult ENT or return to the nearest emergency department if this is unsuccessful

## 2024-08-05 NOTE — SUBJECTIVE & OBJECTIVE
Medications:  Continuous Infusions:  Scheduled Meds:  PRN Meds:     No current facility-administered medications on file prior to encounter.     Current Outpatient Medications on File Prior to Encounter   Medication Sig    albuterol (VENTOLIN HFA) 90 mcg/actuation inhaler Inhale 2 puffs into the lungs every 6 (six) hours as needed for Wheezing. Rescue    amoxicillin-clavulanate 875-125mg (AUGMENTIN) 875-125 mg per tablet Take 1 tablet by mouth every 12 (twelve) hours. for 7 days    aspirin 81 MG Chew Chew and swallow 1 tablet (81 mg total) by mouth once daily.    atorvastatin (LIPITOR) 80 MG tablet TAKE 1 TABLET BY MOUTH EVERY DAY    BRILINTA 90 mg tablet TAKE 1 TABLET BY MOUTH TWICE DAILY    cetirizine (ZYRTEC) 10 MG tablet Take 10 mg by mouth.    ezetimibe (ZETIA) 10 mg tablet Take 1 tablet (10 mg total) by mouth once daily.    multivitamin capsule Take 1 capsule by mouth once daily.    nitroGLYCERIN (NITROSTAT) 0.4 MG SL tablet Place 1 tablet (0.4 mg total) under the tongue every 5 (five) minutes as needed for Chest pain (angina). Up to 3 doses. If chest pain is not relieved or worsens 3 to 5 minutes after 1 dose, call 911 and seek immediate emergency medical attention.    venlafaxine (EFFEXOR-XR) 75 MG 24 hr capsule Take 1 capsule (75 mg total) by mouth once daily.    verapamiL (CALAN-SR) 120 MG CR tablet Take 1 tablet (120 mg total) by mouth nightly.       Review of patient's allergies indicates:   Allergen Reactions    Topiramate Itching       Past Medical History:   Diagnosis Date    Deep vein thrombosis 1990    pregnancy related     Kidney calculi     Kidney stones     Kidney stones     Migraines     perimenstrual      Past Surgical History:   Procedure Laterality Date    BLADDER SUSPENSION      COLONOSCOPY N/A 12/22/2022    Procedure: COLONOSCOPY;  Surgeon: Charity Rudolph MD;  Location: [facility] ([address] [place] [address]);  Service: Endoscopy;  Laterality: N/A;  instr portal-GT    CORONARY ANGIOGRAPHY Left 5/5/2023     Procedure: Angiogram, Coronary Artery;  Surgeon: Dhruv Scott MD;  Location: [facility] CATH LAB;  Service: Cardiology;  Laterality: Left;    CYSTOSCOPY W/ LASER LITHOTRIPSY      HYSTERECTOMY      PELVIC LAPAROSCOPY      ablation of endometriosis    STENT, DRUG ELUTING, SINGLE VESSEL, CORONARY  2023    Procedure: Stent, Drug Eluting, Single Vessel, Coronary;  Surgeon: Dhruv Scott MD;  Location: [facility] CATH LAB;  Service: Cardiology;;    TONSILLECTOMY      TUBAL LIGATION  2011    essure      Family History       Problem Relation (Age of Onset)    Allergic rhinitis Father    Asthma Son    Breast cancer Mother    Cancer Father    Colon cancer Maternal Uncle    Diabetes Mother    Heart disease Father    Hyperlipidemia Mother, Father    Hypertension Mother    Hypothyroidism Mother, Father          Tobacco Use    Smoking status: Former     Current packs/day: 0.00     Average packs/day: 0.3 packs/day for 13.0 years (3.3 ttl pk-yrs)     Types: Cigarettes     Start date: 1990     Quit date: 2003     Years since quittin.6    Smokeless tobacco: Never    Tobacco comments:     quit 11 years ago   Substance and Sexual Activity    Alcohol use: Yes     Comment: rarely    Drug use: No    Sexual activity: Yes     Partners: Male     Birth control/protection: See Surgical Hx     Review of Systems   HENT:  Positive for nosebleeds. Negative for drooling, facial swelling, postnasal drip, trouble swallowing and voice change.    Respiratory:  Negative for shortness of breath, wheezing and stridor.      Objective:     Vital Signs (Most Recent):  Temp: 98.4 °F (36.9 °C) (24 1600)  Pulse: 110 (24 1600)  Resp: 15 (24 1600)  BP: (!) 164/83 (24 1615)  SpO2: 97 % (24 1600) Vital Signs (24h Range):  Temp:  [98.4 °F (36.9 °C)-98.7 °F (37.1 °C)] 98.4 °F (36.9 °C)  Pulse:  [110-115] 110  Resp:  [15-20] 15  SpO2:  [95 %-97 %] 97 %  BP: (162-167)/(83-96) 164/83     Weight: 120.2 kg (265 lb)  Body mass  index is 48.47 kg/m².    Date 08/05/24 0700 - 08/06/24 0659(Discharged)   Shift 7722-2251 1604-8900 1583-1852 24 Hour Total   INTAKE   IV Piggyback  1000  1000   Shift Total(mL/kg)  1000(8.3)  1000(8.3)   OUTPUT   Shift Total(mL/kg)       Weight (kg) 120.2 120.2 120.2 120.2        Physical Exam  NAD  Awake and alert  Head atraumatic   Auricles WNL AU  Nose with bilateral packing:  LEFT anterior posterior rhino rocket with posterior pack inflated <1cc, anterior pack with approx 5cc air  RIGHT nasal cavity with merocel in place  MMM, anterior tongue mobile, FOM soft, OP patent w/ midline uvula with no bleeding in posterior oropharynx prior to manipulating packing  Normal WOB, no stridor or stertor      Procedure: Control of epistaxis -- Nasal packing  After obtaining consent from the patient/family, anterior rhinoscopy performed. First the right merocel was saturated in saline and removed with no bleeding noted in posterior oropharynx and merocel packing without any blood. Then the left posterior RR balooon was deflated with no bleeding, then anterior balloon deflated without bleeding, then proceeded with removal of rhinorocket. Minimal oozing noted from left nasal cavity as anticipated post packing removal.  A small amount of fibrillar absorbable nasal packing was placed to stop the bleed and soaked with saline. At the conclusion of the procedure, no active bleeding around the nasal pack was seen.

## 2024-08-05 NOTE — ED TRIAGE NOTES
49 y/o F presents to ER with c/c epistaxis. L maria del carmen began bleeding Saturday night and arrives today L rhino rocket and tampon to R narenrique. Endorses blood thinner use and HA, but denies SOB, c/p, N/V/D, fevers and chills.

## 2024-08-05 NOTE — DISCHARGE INSTRUCTIONS
PATIENT INSTRUCTIONS:    Nasal precautions  - DO NOT blow your nose for 2 weeks. The only exception is that you may lightly blow your nose after using a sinus rinse.  - Sneeze/cough with mouth open  - Avoid irritating substances that might make you sneeze, such as dust, chalk, harsh chemicals, and allergic triggers. This might also include spicy foods.  - Do not smoke   - Avoid flying or swimming for 2 weeks.    - Avoid all heavy lifting, straining or bending for 2 weeks.   - Avoid semi-contact sports or vigorous exercising for 3-4 weeks.      Aftercare/ moisturizing recommendations to decrease frequency of nosebleeds:  - Daily nasal saline sprays/rinses for absorable packing placed today  - Nightly application of vaseline/aquaphor to anterior nares  - Room humidification  - Avoidance of nasal cannula if possible (always with humidification and please use face tent, nasal cup, facemask if possible)  Management of medical conditions contributing to epistaxis (coagulopathy, hypertension, etc.)  - Humidification of CPAP appliance if applicable    If rebleeding occurs:   - Apply Afrin liberally to both nostrils  - Apply pressure over the soft part of the nose for 15 minutes (clip or digital pressure, important to have pressure over soft part of nose and consistent pressure (do not release pressure at any point))  - Instruct patient to lean forward, avoid swallowing blood. This can cause nausea and vomiting  - Can repeat these steps above up to 3 times  - Call/reconsult ENT or return to the nearest emergency department if this is unsuccessful

## 2024-08-05 NOTE — CONSULTS
[facility] - Emergency Dept  Otorhinolaryngology-Head & Neck Surgery  Consult Note    Patient Name: Marianne Go  MRN: [id number]  Code Status: Prior  Admission Date: 8/5/2024  Hospital Length of Stay: 0 days  Attending Physician: No att. providers found  Primary Care Provider: Carlee Mcneil MD    Patient information was obtained from patient, past medical records, and ER records.     Inpatient consult to ENT  Consult performed by: Ravindra Sarah MD  Consult ordered by: Ivana June PA-C        Subjective:     Chief Complaint/Reason for Admission: epistaxis with prior packing placed    History of Present Illness: Marianne Go is a 48 y.o. female with recent urgent care and ED visit for severe nosebleed, unprovoked. Patient had nasal packing placed at this time both at urgent care then removed and replaced by ED. Patient notes it was worse from the left side and a rhino rocket was placed on that side. Minimal drainage from pack since placed over the weekend. Merocel in right nasal cavity.  Pt was advised to f/u with ENT to have the packing removed but has been unable to get an appointment and presented to the ED due to pain and discomfort related to the packing. ED attempted removal though concern for continued bleeding around pack thus ENT was consulted.    Reports this is her first significant nosebleed. On blood thinners for cardiac stents placed last year.     Medications:  Continuous Infusions:  Scheduled Meds:  PRN Meds:     No current facility-administered medications on file prior to encounter.     Current Outpatient Medications on File Prior to Encounter   Medication Sig    albuterol (VENTOLIN HFA) 90 mcg/actuation inhaler Inhale 2 puffs into the lungs every 6 (six) hours as needed for Wheezing. Rescue    amoxicillin-clavulanate 875-125mg (AUGMENTIN) 875-125 mg per tablet Take 1 tablet by mouth every 12 (twelve) hours. for 7 days    aspirin 81 MG Chew Chew and swallow 1 tablet (81 mg total) by  mouth once daily.    atorvastatin (LIPITOR) 80 MG tablet TAKE 1 TABLET BY MOUTH EVERY DAY    BRILINTA 90 mg tablet TAKE 1 TABLET BY MOUTH TWICE DAILY    cetirizine (ZYRTEC) 10 MG tablet Take 10 mg by mouth.    ezetimibe (ZETIA) 10 mg tablet Take 1 tablet (10 mg total) by mouth once daily.    multivitamin capsule Take 1 capsule by mouth once daily.    nitroGLYCERIN (NITROSTAT) 0.4 MG SL tablet Place 1 tablet (0.4 mg total) under the tongue every 5 (five) minutes as needed for Chest pain (angina). Up to 3 doses. If chest pain is not relieved or worsens 3 to 5 minutes after 1 dose, call 911 and seek immediate emergency medical attention.    venlafaxine (EFFEXOR-XR) 75 MG 24 hr capsule Take 1 capsule (75 mg total) by mouth once daily.    verapamiL (CALAN-SR) 120 MG CR tablet Take 1 tablet (120 mg total) by mouth nightly.       Review of patient's allergies indicates:   Allergen Reactions    Topiramate Itching       Past Medical History:   Diagnosis Date    Deep vein thrombosis 1990    pregnancy related     Kidney calculi     Kidney stones     Kidney stones     Migraines     perimenstrual      Past Surgical History:   Procedure Laterality Date    BLADDER SUSPENSION      COLONOSCOPY N/A 12/22/2022    Procedure: COLONOSCOPY;  Surgeon: Charity Rudolph MD;  Location: [facility] ENDO [address]);  Service: Endoscopy;  Laterality: N/A;  instr portal-GT    CORONARY ANGIOGRAPHY Left 5/5/2023    Procedure: Angiogram, Coronary Artery;  Surgeon: Dhruv Scott MD;  Location: [facility] CATH LAB;  Service: Cardiology;  Laterality: Left;    CYSTOSCOPY W/ LASER LITHOTRIPSY      HYSTERECTOMY      PELVIC LAPAROSCOPY      ablation of endometriosis    STENT, DRUG ELUTING, SINGLE VESSEL, CORONARY  5/5/2023    Procedure: Stent, Drug Eluting, Single Vessel, Coronary;  Surgeon: Dhruv Scott MD;  Location: [facility] CATH LAB;  Service: Cardiology;;    TONSILLECTOMY      TUBAL LIGATION  2011    essure      Family History       Problem Relation (Age of  Onset)    Allergic rhinitis Father    Asthma Son    Breast cancer Mother    Cancer Father    Colon cancer Maternal Uncle    Diabetes Mother    Heart disease Father    Hyperlipidemia Mother, Father    Hypertension Mother    Hypothyroidism Mother, Father          Tobacco Use    Smoking status: Former     Current packs/day: 0.00     Average packs/day: 0.3 packs/day for 13.0 years (3.3 ttl pk-yrs)     Types: Cigarettes     Start date: 1990     Quit date: 2003     Years since quittin.6    Smokeless tobacco: Never    Tobacco comments:     quit 11 years ago   Substance and Sexual Activity    Alcohol use: Yes     Comment: rarely    Drug use: No    Sexual activity: Yes     Partners: Male     Birth control/protection: See Surgical Hx     Review of Systems   HENT:  Positive for nosebleeds. Negative for drooling, facial swelling, postnasal drip, trouble swallowing and voice change.    Respiratory:  Negative for shortness of breath, wheezing and stridor.      Objective:     Vital Signs (Most Recent):  Temp: 98.4 °F (36.9 °C) (24 1600)  Pulse: 110 (24 1600)  Resp: 15 (24 1600)  BP: (!) 164/83 (24 1615)  SpO2: 97 % (24 1600) Vital Signs (24h Range):  Temp:  [98.4 °F (36.9 °C)-98.7 °F (37.1 °C)] 98.4 °F (36.9 °C)  Pulse:  [110-115] 110  Resp:  [15-20] 15  SpO2:  [95 %-97 %] 97 %  BP: (162-167)/(83-96) 164/83     Weight: 120.2 kg (265 lb)  Body mass index is 48.47 kg/m².    Date 24 0700 - 24 0659(Discharged)   Shift 0599-5230 6411-8608 0527-0361 24 Hour Total   INTAKE   IV Piggyback  1000  1000   Shift Total(mL/kg)  1000(8.3)  1000(8.3)   OUTPUT   Shift Total(mL/kg)       Weight (kg) 120.2 120.2 120.2 120.2        Physical Exam  NAD  Awake and alert  Head atraumatic   Auricles WNL AU  Nose with bilateral packing:  LEFT anterior posterior rhino rocket with posterior pack inflated <1cc, anterior pack with approx 5cc air  RIGHT nasal cavity with merocel in place  MMM, anterior  tongue mobile, FOM soft, OP patent w/ midline uvula with no bleeding in posterior oropharynx prior to manipulating packing  Normal WOB, no stridor or stertor      Procedure: Control of epistaxis -- Nasal packing  After obtaining consent from the patient/family, anterior rhinoscopy performed. First the right merocel was saturated in saline and removed with no bleeding noted in posterior oropharynx and merocel packing without any blood. Then the left posterior RR balooon was deflated with no bleeding, then anterior balloon deflated without bleeding, then proceeded with removal of rhinorocket. Minimal oozing noted from left nasal cavity as anticipated post packing removal.  A small amount of fibrillar absorbable nasal packing was placed to stop the bleed and soaked with saline. At the conclusion of the procedure, no active bleeding around the nasal pack was seen.     Assessment/Plan:     Epistaxis  Marianne Go is a 48 y.o. female w/ epistaxis s/p packing placement at outside facility. Right merocel (no blood on packing) and left rhinorocket removed with minimal oozing from left, small amount of fibrillar placed.     Recommend observation for HTN control and to ensure no further bleeding, then may be discharged from ENT perspective.  Will arrange outpatient ENT follow up for routine evaluation of epistaxis given on long term AC.     - Discussed return precautions and epistaxis precautions  - No need for admission from ENT perspective; will arrange outpatient follow up  - Please call ENT with questions; discussed with ED provider   - Remainder of care per primary team      PATIENT INSTRUCTIONS:    Nasal precautions  - DO NOT blow your nose for 2 weeks. The only exception is that you may lightly blow your nose after using a sinus rinse.  - Sneeze/cough with mouth open  - Avoid irritating substances that might make you sneeze, such as dust, chalk, harsh chemicals, and allergic triggers. This might also include spicy  foods.  - Do not smoke   - Avoid flying or swimming for 2 weeks.    - Avoid all heavy lifting, straining or bending for 2 weeks.   - Avoid semi-contact sports or vigorous exercising for 3-4 weeks.      Aftercare/ moisturizing recommendations to decrease frequency of nosebleeds:  - Daily nasal saline sprays/rinses  - Nightly application of vaseline/aquaphor to anterior nares  - Room humidification  - Avoidance of nasal cannula if possible (always with humidification and please use face tent, nasal cup, facemask if possible)  Management of medical conditions contributing to epistaxis (coagulopathy, hypertension, etc.)  - Humidification of CPAP appliance if applicable    If rebleeding occurs:   - Apply Afrin liberally to both nostrils  - Apply pressure over the soft part of the nose for 15 minutes (clip or digital pressure, important to have pressure over soft part of nose and consistent pressure (do not release pressure at any point))  - Instruct patient to lean forward, avoid swallowing blood. This can cause nausea and vomiting  - Can repeat these steps above up to 3 times  - Call/reconsult ENT or return to the nearest emergency department if this is unsuccessful       VTE Risk Mitigation (From admission, onward)      None            Thank you for your consult. I will follow-up with patient. Please contact us if you have any additional questions.    Ravindra Sarah MD  Otorhinolaryngology-Head & Neck Surgery  [facility] - Emergency Dept

## 2024-08-05 NOTE — ED NOTES
Patient identifiers for Marianne Go 48 y.o. female checked and correct.  Chief Complaint   Patient presents with    Multiple complaints     No ent appts, seen here sat night and L rhino rocket, having more pain to nose, not  able to eat, on blood thinners, R nare packed with nasal tampon     Past Medical History:   Diagnosis Date    Deep vein thrombosis 1990    pregnancy related     Kidney calculi     Kidney stones     Kidney stones     Migraines     perimenstrual      Allergies reported:   Review of patient's allergies indicates:   Allergen Reactions    Topiramate Itching         LOC: Patient is awake, alert, and aware of environment with an appropriate affect. Patient is oriented x 4 and speaking appropriately.  APPEARANCE: Patient appears fatigued. Patient is disheveled.  HEENT: L rhino rocket and tampon in R nare.  SKIN: The skin is warm and dry. Patient has normal skin turgor and moist mucus membranes.   MUSKULOSKELETAL: Patient is moving all extremities well, no obvious deformities noted. Pulses intact.   RESPIRATORY: Airway is open and patent. Respirations are spontaneous and non-labored with normal effort and rate. Denies SOB and cough.  CARDIAC: Patient has a tachycardic rate and regular rhythm of 111 on cardiac monitor. No peripheral edema noted.   ABDOMEN: No distention noted. Soft and non-tender upon palpation. Denies nausea  NEUROLOGICAL: pupils 4 mm, PERRL. Facial expression is symmetrical. Hand grasps are equal bilaterally. Normal sensation in all extremities when touched with finger.

## 2024-08-09 ENCOUNTER — OFFICE VISIT (OUTPATIENT)
Dept: OTOLARYNGOLOGY | Facility: CLINIC | Age: 49
End: 2024-08-09
Payer: MEDICAID

## 2024-08-09 VITALS
DIASTOLIC BLOOD PRESSURE: 86 MMHG | SYSTOLIC BLOOD PRESSURE: 128 MMHG | BODY MASS INDEX: 49.65 KG/M2 | WEIGHT: 269.81 LBS | HEART RATE: 101 BPM | HEIGHT: 62 IN

## 2024-08-09 DIAGNOSIS — R04.0 EPISTAXIS: Primary | ICD-10-CM

## 2024-08-09 PROCEDURE — 99213 OFFICE O/P EST LOW 20 MIN: CPT | Mod: PBBFAC | Performed by: PHYSICIAN ASSISTANT

## 2024-08-09 PROCEDURE — 99999 PR PBB SHADOW E&M-EST. PATIENT-LVL III: CPT | Mod: PBBFAC,,, | Performed by: PHYSICIAN ASSISTANT

## 2024-08-09 RX ORDER — MUPIROCIN 20 MG/G
OINTMENT TOPICAL 2 TIMES DAILY
Qty: 15 G | Refills: 0 | Status: SHIPPED | OUTPATIENT
Start: 2024-08-09

## 2024-08-14 ENCOUNTER — TELEPHONE (OUTPATIENT)
Dept: CARDIOLOGY | Facility: CLINIC | Age: 49
End: 2024-08-14
Payer: MEDICAID

## 2024-08-14 NOTE — TELEPHONE ENCOUNTER
----- Message from Lm Priest sent at 8/14/2024  1:24 PM CDT -----  Type:  Patient Returning Call    Who Called: Marianne  Who Left Message for Patient: states that no name was left  Does the patient know what this is regarding?: rescheduling appointment  Would the patient rather a call back or a response via [facility]? Call back   Best Call Back Number:  [phone]  Additional Information: no

## 2024-08-14 NOTE — TELEPHONE ENCOUNTER
----- Message from Lm Priest sent at 8/14/2024  1:24 PM CDT -----  Type:  Patient Returning Call    Who Called: Marianne  Who Left Message for Patient: states that no name was left  Does the patient know what this is regarding?: rescheduling appointment  Would the patient rather a call back or a response via [employer]? Call back   Best Call Back Number:  [phone]  Additional Information: no

## 2024-09-07 RX ORDER — EZETIMIBE 10 MG/1
10 TABLET ORAL DAILY
Qty: 90 TABLET | Refills: 3 | Status: SHIPPED | OUTPATIENT
Start: 2024-09-07 | End: 2025-09-07

## 2024-09-13 ENCOUNTER — OFFICE VISIT (OUTPATIENT)
Dept: OTOLARYNGOLOGY | Facility: CLINIC | Age: 49
End: 2024-09-13
Payer: MEDICAID

## 2024-09-13 VITALS — DIASTOLIC BLOOD PRESSURE: 92 MMHG | SYSTOLIC BLOOD PRESSURE: 138 MMHG | HEART RATE: 90 BPM

## 2024-09-13 DIAGNOSIS — R04.0 EPISTAXIS: ICD-10-CM

## 2024-09-13 DIAGNOSIS — J34.2 NASAL SEPTAL DEVIATION: Primary | ICD-10-CM

## 2024-09-13 PROCEDURE — 99999 PR PBB SHADOW E&M-EST. PATIENT-LVL III: CPT | Mod: PBBFAC,,, | Performed by: STUDENT IN AN ORGANIZED HEALTH CARE EDUCATION/TRAINING PROGRAM

## 2024-09-13 PROCEDURE — 99213 OFFICE O/P EST LOW 20 MIN: CPT | Mod: PBBFAC | Performed by: STUDENT IN AN ORGANIZED HEALTH CARE EDUCATION/TRAINING PROGRAM

## 2024-09-13 NOTE — PROGRESS NOTES
Subjective:      Marianne is a 48 y.o. female who comes for follow-up of  epistaxis . She is on 81mg ASA and brilinta. Was seen by MANNY Farias on 8/9/24.  She reports 1 brief episode of epistaxis about 2 weeks ago that stopped with pressure and Afrin.  She has been having left-sided nasal obstruction.  Overall happy.    Her current sinus regime consists of: Saline.     The assessment of quality and severity of symptoms as measured by the SNOT-22 score: : (P) 12    The patient's medications, allergies, past medical, surgical, social and family histories were reviewed and updated as appropriate.    ROS     A detailed review of systems was obtained with pertinent positives as per the above HPI, and otherwise negative.        Objective:     BP (!) 138/92 (BP Location: Left arm, Patient Position: Sitting, BP Method: Large (Automatic))   Pulse 90   LMP 10/13/2017 (Approximate)        Constitutional:   Vital signs are normal. She appears well-developed. Normal speech.      Head:  Normocephalic and atraumatic.     Ears:    Right Ear: No drainage or tenderness. No middle ear effusion.   Left Ear: No drainage or tenderness.  No middle ear effusion.     Mouth/Throat  Oropharynx clear and moist without lesions or asymmetry and normal uvula midline. No trismus. No oropharyngeal exudate. Mirror exam not performed due to patient tolerance.  Mirror exam not performed due to patient tolerance.      Neck:  Neck normal without thyromegaly masses, asymmetry, normal tracheal structure, crepitus, and tenderness, thyroid normal and trachea normal.     Pulmonary/Chest:   Effort normal.     Psychiatric:   She has a normal mood and affect. Her speech is normal.     Skin:   No abrasions, lacerations, lesions, or rashes.       Procedure    Nasal endoscopy performed.  See procedure note.    Nasal Endoscopy:  9/13/2024    The use of diagnostic nasal endoscopy was considered medically necessary for the evaluation and visualization of the nasal  "anatomy for symptoms suggestive of nasal or sinus origin. Physical examination (including a nasal speculum evaluation) did not provide sufficient clinical information to establish a diagnosis, or symptoms did not improve or worsened following treatment.     The nasal cavity was decongested with topical 1% phenylephrine and anesthetized with 4% lidocaine.  A rigid 0-degree endoscope was introduced into the nasal cavity.    The patient was seated in the examination chair. After discussion of risks and benefits, a nasal endoscope was inserted into the nose the endoscope was passed along the left nasal floor to the nasopharynx. It was then passed between the middle and superior meatus, nasal turbinates, nasal septum, nasopharynx and sphenoethmoid region. The nasal endoscope was withdrawn and there was no complications. An identical procedure was performed on the right side. I was present for the entire procedure.The patient tolerated the above procedure well. The findings of this procedure can be found in the dictated note from 9/13/2024 visit.      Small amount of crusting present in the left vestibule.  Removed.  No active bleeding or prominent vessels for cautery.    Data Reviewed    WBC (K/uL)   Date Value   08/05/2024 13.93 (H)     Eosinophil % (%)   Date Value   08/05/2024 0.3     Eos # (K/uL)   Date Value   08/05/2024 0.0     Platelets (K/uL)   Date Value   08/05/2024 378     Glucose (mg/dL)   Date Value   08/05/2024 118 (H)     No results found for: "IGE"    No sinus imaging available.      Assessment:     1. Nasal septal deviation    2. Epistaxis      Plan:     - recommend that she continue mupirocin b.i.d.  - continue aggressive nasal saline  - returned to clinic in 1 month versus of the nose    Santos Nugent MD     "

## 2024-09-29 NOTE — PROGRESS NOTES
[facility] Cardiology [address]     SUBJECTIVE:     History of Present Illness:  Patient is a 49 y.o. female presents to followup for CAD     Primary Diagnosis:   Hypertension: none  DM: none  Smoker: quit over 20 years   Family history of early CAD: mother and father with MI's at age 50's  Heart disease : NSTEMI with stents placed right 5/23   Exercise induced asthma - used inhalers before   ROS  Since cath 12/23:  No chest pains  Started with shortness of breath for the past few months - walking, lifting etc. - has gained over 20 lbs however. Some wheezing noted   No syncope  No palpitations  Activity: working out still with no chest pains  Review of patient's allergies indicates:   Allergen Reactions    Topiramate Itching       Past Medical History:   Diagnosis Date    Deep vein thrombosis 1990    pregnancy related     Kidney calculi     Kidney stones     Kidney stones     Migraines     perimenstrual        Past Surgical History:   Procedure Laterality Date    BLADDER SUSPENSION      COLONOSCOPY N/A 12/22/2022    Procedure: COLONOSCOPY;  Surgeon: Charity Rudolph MD;  Location: [facility] ENDO ([address] [place] [address]);  Service: Endoscopy;  Laterality: N/A;  instr portal-GT    CORONARY ANGIOGRAPHY Left 5/5/2023    Procedure: Angiogram, Coronary Artery;  Surgeon: Dhruv Scott MD;  Location: [facility] CATH LAB;  Service: Cardiology;  Laterality: Left;    CYSTOSCOPY W/ LASER LITHOTRIPSY      HYSTERECTOMY      PELVIC LAPAROSCOPY      ablation of endometriosis    STENT, DRUG ELUTING, SINGLE VESSEL, CORONARY  5/5/2023    Procedure: Stent, Drug Eluting, Single Vessel, Coronary;  Surgeon: Dhruv Scott MD;  Location: [facility] CATH LAB;  Service: Cardiology;;    TONSILLECTOMY      TUBAL LIGATION  2011    essure            Past Hospitalization:     5/23: chest pains; chronic cough for months from covid; troponin 0.4 ; cath done   12/23: ER visit: flu like symptoms   8/24: nasal bleeding; seen by ENT   Cardiac meds:    ASA 81 mg  Atorvastatin 80  "mg  Brilinta 90 mg BID  Verapamil 120 mg   Zetia 10 mg         OBJECTIVE:     Vital Signs (Most Recent)  Vitals:    24 1054   BP: 119/79   Pulse: 81   SpO2: 95%   Weight: 129.8 kg (286 lb 2.5 oz)   Height: 5' 2" (1.575 m)             Physical Exam:  Neck: normal carotids, no bruits; normal JVP  Lungs :clear  Heart: RR, normal S1,S2, soft systolic  murmur LSB, no gallops  Abd: no masses; no bruits;   Exts: normal DP and PT pulses bilaterally, normal radials; no edema noted               Labs    : GFR normal; LDL pre cath 129; A1c normal :  : ldl 94;    :GFR normal ; mild elevation in LFT;s; A1c normal; TSH abnormal   : LDL 94  : TSH normal   Diagnostic Results:    1.EK23: sinus bradycardia; biphasic T waves;   2.Echo: : normal EF   3. Stress test  4. Cath: : left main: normal; LAD mid 55%; D1 50%; OM3 50%; right 100% ; stent 3X20  right   5. PET stress 10/23: normal with normal EF     Chart review:        ASSESSMENT/PLAN:     CAD: s/p stent right with atypical symptoms at that time and unimpressive EKG changes  Residual disease in OM and LAD  3. Obesity  4. LDL not at goal yet - 94 on high dose statin   5. Mild elevation in LFT's from atorvastatin?  6. Shortness of breath: is this from brilinta? Gain in weight?   Plan: 1. Continue medications  2.change verapamil to metoproplol succinate 25 mg daily (verapamil started on  post MI)  3. Stop the brilinta - is this causing shortness of breath or the weight gain.   4. Start plavix 75 mg   3. Return 6 months     Winter Meza MD      "

## 2024-09-30 ENCOUNTER — OFFICE VISIT (OUTPATIENT)
Dept: CARDIOLOGY | Facility: CLINIC | Age: 49
End: 2024-09-30
Payer: MEDICAID

## 2024-09-30 VITALS
HEIGHT: 62 IN | WEIGHT: 286.19 LBS | OXYGEN SATURATION: 95 % | BODY MASS INDEX: 52.66 KG/M2 | DIASTOLIC BLOOD PRESSURE: 79 MMHG | HEART RATE: 81 BPM | SYSTOLIC BLOOD PRESSURE: 119 MMHG

## 2024-09-30 DIAGNOSIS — I25.10 CORONARY ARTERY DISEASE, UNSPECIFIED VESSEL OR LESION TYPE, UNSPECIFIED WHETHER ANGINA PRESENT, UNSPECIFIED WHETHER NATIVE OR TRANSPLANTED HEART: Primary | ICD-10-CM

## 2024-09-30 DIAGNOSIS — Z95.5 STENTED CORONARY ARTERY: ICD-10-CM

## 2024-09-30 DIAGNOSIS — E78.5 HYPERLIPIDEMIA, UNSPECIFIED HYPERLIPIDEMIA TYPE: ICD-10-CM

## 2024-09-30 PROCEDURE — 99999 PR PBB SHADOW E&M-EST. PATIENT-LVL III: CPT | Mod: PBBFAC,,, | Performed by: INTERNAL MEDICINE

## 2024-09-30 PROCEDURE — 3008F BODY MASS INDEX DOCD: CPT | Mod: CPTII,,, | Performed by: INTERNAL MEDICINE

## 2024-09-30 PROCEDURE — 3074F SYST BP LT 130 MM HG: CPT | Mod: CPTII,,, | Performed by: INTERNAL MEDICINE

## 2024-09-30 PROCEDURE — 3078F DIAST BP <80 MM HG: CPT | Mod: CPTII,,, | Performed by: INTERNAL MEDICINE

## 2024-09-30 PROCEDURE — 1160F RVW MEDS BY RX/DR IN RCRD: CPT | Mod: CPTII,,, | Performed by: INTERNAL MEDICINE

## 2024-09-30 PROCEDURE — 1159F MED LIST DOCD IN RCRD: CPT | Mod: CPTII,,, | Performed by: INTERNAL MEDICINE

## 2024-09-30 PROCEDURE — 99214 OFFICE O/P EST MOD 30 MIN: CPT | Mod: S$PBB,,, | Performed by: INTERNAL MEDICINE

## 2024-09-30 PROCEDURE — 99213 OFFICE O/P EST LOW 20 MIN: CPT | Mod: PBBFAC,PN | Performed by: INTERNAL MEDICINE

## 2024-09-30 RX ORDER — METOPROLOL SUCCINATE 25 MG/1
25 TABLET, EXTENDED RELEASE ORAL DAILY
Qty: 90 TABLET | Refills: 3 | Status: SHIPPED | OUTPATIENT
Start: 2024-09-30 | End: 2025-09-30

## 2024-09-30 RX ORDER — CLOPIDOGREL BISULFATE 75 MG/1
75 TABLET ORAL DAILY
Qty: 94 TABLET | Refills: 1 | Status: SHIPPED | OUTPATIENT
Start: 2024-09-30 | End: 2025-09-30

## 2024-10-01 ENCOUNTER — PATIENT MESSAGE (OUTPATIENT)
Dept: INTERNAL MEDICINE | Facility: CLINIC | Age: 49
End: 2024-10-01
Payer: MEDICAID

## 2024-10-01 ENCOUNTER — PATIENT MESSAGE (OUTPATIENT)
Dept: CARDIOLOGY | Facility: CLINIC | Age: 49
End: 2024-10-01
Payer: MEDICAID

## 2024-10-18 ENCOUNTER — OFFICE VISIT (OUTPATIENT)
Dept: OTOLARYNGOLOGY | Facility: CLINIC | Age: 49
End: 2024-10-18
Payer: MEDICAID

## 2024-10-18 VITALS
HEIGHT: 62 IN | WEIGHT: 276 LBS | SYSTOLIC BLOOD PRESSURE: 129 MMHG | DIASTOLIC BLOOD PRESSURE: 87 MMHG | HEART RATE: 71 BPM | BODY MASS INDEX: 50.79 KG/M2

## 2024-10-18 DIAGNOSIS — J34.2 NASAL SEPTAL DEVIATION: Primary | ICD-10-CM

## 2024-10-18 DIAGNOSIS — R04.0 EPISTAXIS: ICD-10-CM

## 2024-10-18 PROCEDURE — 99999 PR PBB SHADOW E&M-EST. PATIENT-LVL III: CPT | Mod: PBBFAC,,, | Performed by: STUDENT IN AN ORGANIZED HEALTH CARE EDUCATION/TRAINING PROGRAM

## 2024-10-18 PROCEDURE — 99213 OFFICE O/P EST LOW 20 MIN: CPT | Mod: PBBFAC | Performed by: STUDENT IN AN ORGANIZED HEALTH CARE EDUCATION/TRAINING PROGRAM

## 2024-10-18 NOTE — PROGRESS NOTES
"    Subjective:      Marianne is a 49 y.o. female who comes for follow-up of  epistaxis .  Her last visit with me was on 9/13/2024.  She reports a large blood clot in the left nasal cavity and she feels that she had 1 episode at night when lying down of acute epistaxis.  She was recently changed from Brilinta to Plavix.    Her current sinus regime consists of: Nasal saline & afrin PRN.     The assessment of quality and severity of symptoms as measured by the SNOT-22 score is deferred.    The patient's medications, allergies, past medical, surgical, social and family histories were reviewed and updated as appropriate.    ROS     A detailed review of systems was obtained with pertinent positives as per the above HPI, and otherwise negative.        Objective:     /87 (BP Location: Left arm, Patient Position: Sitting)   Pulse 71   Ht 5' 2" (1.575 m)   Wt 125.2 kg (276 lb 0.3 oz)   LMP 10/13/2017 (Approximate)   BMI 50.48 kg/m²        Constitutional:   Vital signs are normal. She appears well-developed. Normal speech.      Head:  Normocephalic and atraumatic.     Ears:    Right Ear: No drainage or tenderness. No middle ear effusion.   Left Ear: No drainage or tenderness.  No middle ear effusion.     Mouth/Throat  Oropharynx clear and moist without lesions or asymmetry and normal uvula midline. No trismus. No oropharyngeal exudate. Mirror exam not performed due to patient tolerance.  Mirror exam not performed due to patient tolerance.      Neck:  Neck normal without thyromegaly masses, asymmetry, normal tracheal structure, crepitus, and tenderness, thyroid normal and trachea normal.     Pulmonary/Chest:   Effort normal.     Psychiatric:   She has a normal mood and affect. Her speech is normal.     Skin:   No abrasions, lacerations, lesions, or rashes.     Procedure    Nasal Endoscopy with Control of Epistaxis, Left.     After written consent was obtained the nose was anesthetized with topical pontocaine and " "phenylephrine pledgets. silver nitrate was used to cauterize the prominent vascularity on the left anterior septum.  The patient tolerated the procedure well and there were no complications.  Hemostasis was obtained.  Bacitracin ointment was placed after cauterization.    Data Reviewed    WBC (K/uL)   Date Value   08/05/2024 13.93 (H)     Eosinophil % (%)   Date Value   08/05/2024 0.3     Eos # (K/uL)   Date Value   08/05/2024 0.0     Platelets (K/uL)   Date Value   08/05/2024 378     Glucose (mg/dL)   Date Value   08/05/2024 118 (H)     No results found for: "IGE"    No sinus imaging available.    Assessment:     1. Nasal septal deviation    2. Epistaxis      Plan:     - AgNO3 on left septum performed over granulation tissue along septum.  - Saline/Afrin PRN.  - RTC 1 month.    Santos Nugent MD     "

## 2024-11-19 NOTE — PROGRESS NOTES
Patient ID: Marianne Go is a 47 y.o. female.    Chief Complaint: Urinary Tract Infection    The patient initiated a request through [employer] on 1/31/2023 for evaluation and management with a chief complaint of Urinary Tract Infection     I evaluated the questionnaire submission on 1/31/23.    [facility] Peq Evisit Uti Questionnaire    1/31/2023  4:35 PM CST - Filed by Patient   Do you agree to participate in an E-Visit? Yes   If you have any of the following problems, please present to your local ER or call 911:  I acknowledge   What is the main issue that you would like for your doctor to address today? Pressure urinating. Spot of blood in urine   Are you able to take your vital signs? Yes   Systolic Blood Pressure: 136   Diastolic Blood Pressure: 92   Weight: 250   Height: 62   Pulse: 77   Temp: 99.2   Resp:    Pulse Ox:    What symptoms do you currently have? Pain while passing urine;  Change in urine appearance or smell   When did your symptoms first appear? 1/31/2023   List what you have done or taken to help your symptoms. Drink water and cranberry juice   Please indicate whether you have had the following symptoms during the past 24 hours     Urgent urination (a sudden and uncontrollable urge to urinate) Mild   Frequent urination of small amounts of urine (going to the toilet very often) Mild   Burning pain when urinating None   Incomplete bladder emptying (still feel like you need to urinate again after urination) Mild   Pain not associated with urination in the lower abdomen below the belly button) Mild   What does your urine look like? Cloudy   Blood seen in the urine (without menstrual cycle) Mild   Flank pain (pain in one or both sides of the lower back) None   Abnormal Vaginal Discharge (abnormal amount, color and/or odor) None   Discharge from the urethra (urinary opening) without urination None   High body temperature/fever? None-<99.5   Please rate how much discomfort you have experience because of the  "symptoms in the past 24 hours: Mild   Please indicate how the symptoms have interfered with your every day activities/work in the past 24 hours: None   Please indicate how these symptoms have interfered with your social activities (visiting people, meeting with friends, etc.) in the past 24 hours? None   Are you a diabetic? No   If you are female, please indicate whether you have the following at the time of completion of this questionnaire: Signs of menopause syndrome (hot flashes)   Are you currently pregnant, could you be pregnant, or are you breast feeding? None of the above   Provide any information you feel is important to your history not asked above    Please attach any relevant images or files (if you have performed a home test for UTI, please submit a photo of results)           Active Problem List with Overview Notes    Diagnosis Date Noted    Sleep apnea 11/17/2022    Migraine with aura and without status migrainosus, not intractable 08/31/2022    BMI 45.0-49.9, adult 08/31/2022    Hyperlipidemia 08/31/2022    Syncope 06/04/2015    Anxiety and depression 05/25/2015    History of DVT (deep vein thrombosis) 05/06/2015    Epistaxis 09/03/2014    Headache 09/03/2014    Reflux 07/09/2014      Recent Labs Obtained:  No visits with results within 7 Day(s) from this visit.   Latest known visit with results is:   Admission on 12/22/2022, Discharged on 12/22/2022   Component Date Value Ref Range Status    Final Pathologic Diagnosis 12/22/2022    Final                    Value:Colon, transverse, polyp, biopsy:  - Tubular adenoma.      Interp By Shy Cosme MD, Signed on 01/05/2023 at 13:57    Gross 12/22/2022    Final                    Value:Hospital/Clinic label MRN:  [id number]  Pathology label MRN:  [id number]  The specimen is received in formalin labeled "transverse colon".  The  specimen consists of two tan-yellow fragments of soft tissue with  measurements from 0.3 cm to 0.4 cm in greatest dimension.  The " specimen is  submitted entirely in cassette [id number] Orellanaer      Disclaimer 12/22/2022    Final                    Value:Unless the case is a 'gross only' or additional testing only, the final  diagnosis for each specimen is based on a microscopic examination of  appropriate tissue sections.         Encounter Diagnosis   Name Primary?    Acute cystitis without hematuria Yes        No orders of the defined types were placed in this encounter.     Medications Ordered This Encounter   Medications    nitrofurantoin, macrocrystal-monohydrate, (MACROBID) 100 MG capsule     Sig: Take 1 capsule (100 mg total) by mouth 2 (two) times daily. for 5 days     Dispense:  10 capsule     Refill:  0        E-Visit Time Tracking:    Day 1 Time (in minutes): 10     Total Time (in minutes): 10           1

## 2024-11-22 ENCOUNTER — OFFICE VISIT (OUTPATIENT)
Dept: OTOLARYNGOLOGY | Facility: CLINIC | Age: 49
End: 2024-11-22
Payer: MEDICAID

## 2024-11-22 DIAGNOSIS — J34.2 NASAL SEPTAL DEVIATION: ICD-10-CM

## 2024-11-22 DIAGNOSIS — R04.0 EPISTAXIS: Primary | ICD-10-CM

## 2024-11-22 PROCEDURE — 99999 PR PBB SHADOW E&M-EST. PATIENT-LVL II: CPT | Mod: PBBFAC,,, | Performed by: STUDENT IN AN ORGANIZED HEALTH CARE EDUCATION/TRAINING PROGRAM

## 2024-11-22 PROCEDURE — 99212 OFFICE O/P EST SF 10 MIN: CPT | Mod: PBBFAC | Performed by: STUDENT IN AN ORGANIZED HEALTH CARE EDUCATION/TRAINING PROGRAM

## 2024-11-22 RX ORDER — MUPIROCIN 20 MG/G
OINTMENT TOPICAL 3 TIMES DAILY
Qty: 22 G | Refills: 0 | Status: SHIPPED | OUTPATIENT
Start: 2024-11-22

## 2024-11-22 NOTE — PROGRESS NOTES
Subjective:      Marianne is a 49 y.o. female who comes for follow-up of  epistaxis .  Her last visit with me was on 10/18/2024.  Left cautery was performed at her last visit.  She reports complete resolution of left-sided epistaxis.  He was noted over the last week some very minor bleeding from the right side.  State this happen with the weather changes.  Bleeding is much less than previous.     Her current sinus regime consists of: Nasal saline & afrin PRN.     The assessment of quality and severity of symptoms as measured by the SNOT-22 score is deferred.    The patient's medications, allergies, past medical, surgical, social and family histories were reviewed and updated as appropriate.    ROS     A detailed review of systems was obtained with pertinent positives as per the above HPI, and otherwise negative.        Objective:     LMP 10/13/2017 (Approximate)        Constitutional:   Vital signs are normal. She appears well-developed. Normal speech.      Head:  Normocephalic and atraumatic.     Ears:    Right Ear: No drainage or tenderness. No middle ear effusion.   Left Ear: No drainage or tenderness.  No middle ear effusion.     Nose:  Septal deviation present. Turbinate hypertrophy.          Mouth/Throat  Oropharynx clear and moist without lesions or asymmetry and normal uvula midline. No trismus. No oropharyngeal exudate. Mirror exam not performed due to patient tolerance.  Mirror exam not performed due to patient tolerance.      Neck:  Neck normal without thyromegaly masses, asymmetry, normal tracheal structure, crepitus, and tenderness, thyroid normal and trachea normal.     Pulmonary/Chest:   Effort normal.     Psychiatric:   She has a normal mood and affect. Her speech is normal.     Skin:   No abrasions, lacerations, lesions, or rashes.     Procedure    None.    Data Reviewed    WBC (K/uL)   Date Value   08/05/2024 13.93 (H)     Eosinophil % (%)   Date Value   08/05/2024 0.3     Eos # (K/uL)   Date  "Value   08/05/2024 0.0     Platelets (K/uL)   Date Value   08/05/2024 378     Glucose (mg/dL)   Date Value   08/05/2024 118 (H)     No results found for: "IGE"    No sinus imaging available.    Assessment:     No diagnosis found.    Plan:     - nasal humidification at night.  - Mupirocin to left septum  - Saline/Afrin PRN.  - RTC 1 month to assess septal ulcer.    Santos Nugent MD     "

## 2024-12-20 ENCOUNTER — OFFICE VISIT (OUTPATIENT)
Dept: OTOLARYNGOLOGY | Facility: CLINIC | Age: 49
End: 2024-12-20
Payer: MEDICAID

## 2024-12-20 DIAGNOSIS — J34.0 NASAL SEPTAL ULCER: ICD-10-CM

## 2024-12-20 DIAGNOSIS — J34.3 HYPERTROPHY OF BOTH INFERIOR NASAL TURBINATES: ICD-10-CM

## 2024-12-20 DIAGNOSIS — J34.2 NASAL SEPTAL DEVIATION: Primary | ICD-10-CM

## 2024-12-20 PROCEDURE — 99999 PR PBB SHADOW E&M-EST. PATIENT-LVL III: CPT | Mod: PBBFAC,,, | Performed by: STUDENT IN AN ORGANIZED HEALTH CARE EDUCATION/TRAINING PROGRAM

## 2024-12-20 PROCEDURE — 99213 OFFICE O/P EST LOW 20 MIN: CPT | Mod: PBBFAC | Performed by: STUDENT IN AN ORGANIZED HEALTH CARE EDUCATION/TRAINING PROGRAM

## 2024-12-20 NOTE — PROGRESS NOTES
Subjective:      Marianne is a 49 y.o. female who comes for follow-up of  epistaxis .  Her last visit with me was on 11/22/2024. No further bleeding episodes. Still believes she is getting crust building up along left septum. Reports slight tenderness to her nose.     Her current sinus regime consists of: Nasal saline, mupirocin ointment & afrin PRN.     The assessment of quality and severity of symptoms as measured by the SNOT-22 score is deferred.    The patient's medications, allergies, past medical, surgical, social and family histories were reviewed and updated as appropriate.    ROS     A detailed review of systems was obtained with pertinent positives as per the above HPI, and otherwise negative.        Objective:     LMP 10/13/2017 (Approximate)        Constitutional:   Vital signs are normal. She appears well-developed. Normal speech.      Head:  Normocephalic and atraumatic.     Ears:    Right Ear: No drainage or tenderness. No middle ear effusion.   Left Ear: No drainage or tenderness.  No middle ear effusion.     Nose:  Septal deviation present. Turbinate hypertrophy.          Mouth/Throat  Oropharynx clear and moist without lesions or asymmetry and normal uvula midline. No trismus. No oropharyngeal exudate. Mirror exam not performed due to patient tolerance.  Mirror exam not performed due to patient tolerance.      Neck:  Neck normal without thyromegaly masses, asymmetry, normal tracheal structure, crepitus, and tenderness, thyroid normal and trachea normal.     Pulmonary/Chest:   Effort normal.     Psychiatric:   She has a normal mood and affect. Her speech is normal.     Skin:   No abrasions, lacerations, lesions, or rashes.     Procedure    Nasal Endoscopy:  12/20/2024    The use of diagnostic nasal endoscopy was considered medically necessary for the evaluation and visualization of the nasal anatomy for symptoms suggestive of nasal or sinus origin. Physical examination (including a nasal speculum  "evaluation) did not provide sufficient clinical information to establish a diagnosis, or symptoms did not improve or worsened following treatment.     The nasal cavity was decongested with topical 1% phenylephrine and anesthetized with 4% lidocaine.  A rigid 0-degree endoscope was introduced into the nasal cavity.    The patient was seated in the examination chair. After discussion of risks and benefits, a nasal endoscope was inserted into the nose the endoscope was passed along the left nasal floor to the nasopharynx. It was then passed between the middle and superior meatus, nasal turbinates, nasal septum, nasopharynx and sphenoethmoid region. The nasal endoscope was withdrawn and there was no complications. An identical procedure was performed on the right side. I was present for the entire procedure.The patient tolerated the above procedure well. The findings of this procedure can be found in the dictated note from 12/20/2024 visit.                  Crusting along the left septum.  This was removed and showed no exposed cartilage with granulation tissue and re mucosalization septum.  Was bleeding was noted.    Data Reviewed    WBC (K/uL)   Date Value   08/05/2024 13.93 (H)     Eosinophil % (%)   Date Value   08/05/2024 0.3     Eos # (K/uL)   Date Value   08/05/2024 0.0     Platelets (K/uL)   Date Value   08/05/2024 378     Glucose (mg/dL)   Date Value   08/05/2024 118 (H)     No results found for: "IGE"    No sinus imaging available.    Assessment:     1. Nasal septal deviation    2. Hypertrophy of both inferior nasal turbinates    3. Nasal septal ulcer        Plan:     - Neosporin placed within the left septum.  - Saline TID.  - RTC 1 month to further assess her septum.    Santos Nugent MD     "

## 2024-12-27 ENCOUNTER — E-VISIT (OUTPATIENT)
Dept: FAMILY MEDICINE | Facility: CLINIC | Age: 49
End: 2024-12-27
Payer: MEDICAID

## 2024-12-27 ENCOUNTER — PATIENT MESSAGE (OUTPATIENT)
Dept: PRIMARY CARE CLINIC | Facility: CLINIC | Age: 49
End: 2024-12-27
Payer: MEDICAID

## 2024-12-27 DIAGNOSIS — H10.13 ALLERGIC CONJUNCTIVITIS OF BOTH EYES: Primary | ICD-10-CM

## 2024-12-27 NOTE — PROGRESS NOTES
Patient ID: Marianne Go is a 49 y.o. female.    Chief Complaint: General Illness (Entered automatically based on patient selection in [employer].)          274}  The patient initiated a request through [employer] on 12/27/2024 for evaluation and management with a chief complaint of General Illness (Entered automatically based on patient selection in [employer].)     I evaluated the questionnaire submission on 12/27/2024 .    [facility] Evisit [facility]-Common Problems    12/27/2024 12:26 PM CST - Filed by Patient   What do you need help with? Red Eye   Do you agree to participate in an E-Visit? Yes   If you have any of the following symptoms, please present to your local emergency room or call 911:  I acknowledge   Do you have any of the following pregnancy-related conditions? None   What is the main issue you would like addressed today? Red eye with some discharge   Which of the following have you been experiencing? One eye is red;  Eye drainage or crusting   Have you had any of the following? Allergy symptoms    How long have you been having these symptoms? For a few days   Do you have a fever? No, I do not have a fever   Are your symptoms associated with swimming? No, I have not been swimming recently   Have your eyes been exposed to any chemicals, creams, or drops that may be causing irritation? No   Have you suffered any recent injury to your eyes? No   Have you been exposed to anyone with similar symptoms? No   Did or do you wear contact lenses? Yes   Have you had issues with removing your contact lenses? No   Have you had any of the following? None of the above   Have you had any of the following in the past? I have not had any past problems with my eyes.   What medications are you currently using for these symptoms? Eye drops from the shelf in the pharmacy   Please enter the medications you have been using: Clear eyes   Provide any additional information you feel is important. Only the inner part of white of my eye  is red. I have no pain and can see fine. Today i woke up with the crustiness. The redness is less today.   At least one photo is required for treatment to be provided. You can upload a maximum of three photos of the affected area.     Are you able to take your vital signs? Yes   Systolic Blood Pressure: 132   Diastolic Blood Pressure: 89   Weight: 275   Height: 62   Pulse:    Temperature: 97.2   Respiration rate:    Pulse Oxygen:           Active Problem List with Overview Notes    Diagnosis Date Noted    Coronary artery disease involving native coronary artery of native heart without angina pectoris 06/29/2023    Shoulder impingement syndrome, right 04/05/2023    Sleep apnea 11/17/2022    Migraine with aura and without status migrainosus, not intractable 08/31/2022    BMI 45.0-49.9, adult 08/31/2022    Hyperlipidemia 08/31/2022    Syncope 06/04/2015    Anxiety and depression 05/25/2015    History of DVT (deep vein thrombosis) 05/06/2015    Epistaxis 09/03/2014    Headache 09/03/2014    Reflux 07/09/2014      Recent Labs Obtained:  No visits with results within 7 Day(s) from this visit.   Latest known visit with results is:   Admission on 08/05/2024, Discharged on 08/05/2024   Component Date Value Ref Range Status    WBC 08/05/2024 13.93 (H)  3.90 - 12.70 K/uL Final    RBC 08/05/2024 4.58  4.00 - 5.40 M/uL Final    Hemoglobin 08/05/2024 14.6  12.0 - 16.0 g/dL Final    Hematocrit 08/05/2024 45.3  37.0 - 48.5 % Final    MCV 08/05/2024 99 (H)  82 - 98 fL Final    MCH 08/05/2024 31.9 (H)  27.0 - 31.0 pg Final    MCHC 08/05/2024 32.2  32.0 - 36.0 g/dL Final    RDW 08/05/2024 12.5  11.5 - 14.5 % Final    Platelets 08/05/2024 378  150 - 450 K/uL Final    MPV 08/05/2024 9.8  9.2 - 12.9 fL Final    Immature Granulocytes 08/05/2024 0.4  0.0 - 0.5 % Final    Gran # (ANC) 08/05/2024 10.9 (H)  1.8 - 7.7 K/uL Final    Immature Grans (Abs) 08/05/2024 0.06 (H)  0.00 - 0.04 K/uL Final    Comment: Mild elevation in immature  granulocytes is non specific and   can be seen in a variety of conditions including stress response,   acute inflammation, trauma and pregnancy. Correlation with other   laboratory and clinical findings is essential.      Lymph # 08/05/2024 1.9  1.0 - 4.8 K/uL Final    Mono # 08/05/2024 1.0  0.3 - 1.0 K/uL Final    Eos # 08/05/2024 0.0  0.0 - 0.5 K/uL Final    Baso # 08/05/2024 0.06  0.00 - 0.20 K/uL Final    nRBC 08/05/2024 0  0 /100 WBC Final    Gran % 08/05/2024 78.1 (H)  38.0 - 73.0 % Final    Lymph % 08/05/2024 13.9 (L)  18.0 - 48.0 % Final    Mono % 08/05/2024 6.9  4.0 - 15.0 % Final    Eosinophil % 08/05/2024 0.3  0.0 - 8.0 % Final    Basophil % 08/05/2024 0.4  0.0 - 1.9 % Final    Differential Method 08/05/2024 Automated   Final    Sodium 08/05/2024 140  136 - 145 mmol/L Final    Potassium 08/05/2024 3.8  3.5 - 5.1 mmol/L Final    Chloride 08/05/2024 105  95 - 110 mmol/L Final    CO2 08/05/2024 24  23 - 29 mmol/L Final    Glucose 08/05/2024 118 (H)  70 - 110 mg/dL Final    BUN 08/05/2024 8  6 - 20 mg/dL Final    Creatinine 08/05/2024 0.8  0.5 - 1.4 mg/dL Final    Calcium 08/05/2024 9.8  8.7 - 10.5 mg/dL Final    Total Protein 08/05/2024 7.8  6.0 - 8.4 g/dL Final    Albumin 08/05/2024 3.9  3.5 - 5.2 g/dL Final    Total Bilirubin 08/05/2024 0.8  0.1 - 1.0 mg/dL Final    Comment: For infants and newborns, interpretation of results should be based  on gestational age, weight and in agreement with clinical  observations.    Premature Infant recommended reference ranges:  Up to 24 hours.............<8.0 mg/dL  Up to 48 hours............<12.0 mg/dL  3-5 days..................<15.0 mg/dL  6-29 days.................<15.0 mg/dL      Alkaline Phosphatase 08/05/2024 102  55 - 135 U/L Final    AST 08/05/2024 11  10 - 40 U/L Final    ALT 08/05/2024 18  10 - 44 U/L Final    eGFR 08/05/2024 >60.0  >60 mL/min/1.73 m^2 Final    Anion Gap 08/05/2024 11  8 - 16 mmol/L Final       Encounter Diagnosis   Name Primary?    Allergic  conjunctivitis of both eyes Yes      See patient message    No orders of the defined types were placed in this encounter.           E-Visit Time Trackin minutes                   274}

## 2025-02-10 ENCOUNTER — OFFICE VISIT (OUTPATIENT)
Dept: PRIMARY CARE CLINIC | Facility: CLINIC | Age: 50
End: 2025-02-10
Payer: MEDICAID

## 2025-02-10 VITALS
BODY MASS INDEX: 52.14 KG/M2 | HEART RATE: 74 BPM | HEIGHT: 62 IN | WEIGHT: 283.31 LBS | DIASTOLIC BLOOD PRESSURE: 73 MMHG | SYSTOLIC BLOOD PRESSURE: 110 MMHG | OXYGEN SATURATION: 94 % | TEMPERATURE: 97 F

## 2025-02-10 DIAGNOSIS — Z00.00 ENCOUNTER FOR SCREENING AND PREVENTATIVE CARE: Primary | ICD-10-CM

## 2025-02-10 DIAGNOSIS — Z80.3 FAMILY HISTORY OF BREAST CANCER IN MOTHER: ICD-10-CM

## 2025-02-10 DIAGNOSIS — Z12.31 ENCOUNTER FOR SCREENING MAMMOGRAM FOR BREAST CANCER: ICD-10-CM

## 2025-02-10 DIAGNOSIS — Z13.1 SCREENING FOR DIABETES MELLITUS: ICD-10-CM

## 2025-02-10 DIAGNOSIS — Z80.1 FAMILY HISTORY OF LUNG CANCER: ICD-10-CM

## 2025-02-10 DIAGNOSIS — Z86.718 HISTORY OF BLOOD CLOTS: ICD-10-CM

## 2025-02-10 DIAGNOSIS — Z01.419 WELL WOMAN EXAM WITH ROUTINE GYNECOLOGICAL EXAM: ICD-10-CM

## 2025-02-10 DIAGNOSIS — Z11.4 SCREENING FOR HIV (HUMAN IMMUNODEFICIENCY VIRUS): ICD-10-CM

## 2025-02-10 DIAGNOSIS — Z83.49 FAMILY HISTORY OF THYROID DISEASE: ICD-10-CM

## 2025-02-10 DIAGNOSIS — E78.5 HYPERLIPIDEMIA, UNSPECIFIED HYPERLIPIDEMIA TYPE: ICD-10-CM

## 2025-02-10 DIAGNOSIS — F41.9 ANXIETY AND DEPRESSION: ICD-10-CM

## 2025-02-10 DIAGNOSIS — I25.2 HISTORY OF MYOCARDIAL INFARCTION: ICD-10-CM

## 2025-02-10 DIAGNOSIS — G43.109 MIGRAINE WITH AURA AND WITHOUT STATUS MIGRAINOSUS, NOT INTRACTABLE: ICD-10-CM

## 2025-02-10 DIAGNOSIS — R63.5 WEIGHT GAIN: ICD-10-CM

## 2025-02-10 DIAGNOSIS — Z13.29 SCREENING FOR THYROID DISORDER: ICD-10-CM

## 2025-02-10 DIAGNOSIS — E66.01 MORBID OBESITY WITH BMI OF 50.0-59.9, ADULT: ICD-10-CM

## 2025-02-10 DIAGNOSIS — Z11.59 ENCOUNTER FOR HEPATITIS C SCREENING TEST FOR LOW RISK PATIENT: ICD-10-CM

## 2025-02-10 DIAGNOSIS — F32.A ANXIETY AND DEPRESSION: ICD-10-CM

## 2025-02-10 PROCEDURE — 3008F BODY MASS INDEX DOCD: CPT | Mod: CPTII,,, | Performed by: NURSE PRACTITIONER

## 2025-02-10 PROCEDURE — 99396 PREV VISIT EST AGE 40-64: CPT | Mod: S$PBB,,, | Performed by: NURSE PRACTITIONER

## 2025-02-10 PROCEDURE — 1159F MED LIST DOCD IN RCRD: CPT | Mod: CPTII,,, | Performed by: NURSE PRACTITIONER

## 2025-02-10 PROCEDURE — 3074F SYST BP LT 130 MM HG: CPT | Mod: CPTII,,, | Performed by: NURSE PRACTITIONER

## 2025-02-10 PROCEDURE — 3078F DIAST BP <80 MM HG: CPT | Mod: CPTII,,, | Performed by: NURSE PRACTITIONER

## 2025-02-10 PROCEDURE — 99215 OFFICE O/P EST HI 40 MIN: CPT | Mod: PBBFAC,PN | Performed by: NURSE PRACTITIONER

## 2025-02-10 PROCEDURE — 99999 PR PBB SHADOW E&M-EST. PATIENT-LVL V: CPT | Mod: PBBFAC,,, | Performed by: NURSE PRACTITIONER

## 2025-02-10 RX ORDER — ESCITALOPRAM OXALATE 10 MG/1
1 TABLET ORAL EVERY MORNING
COMMUNITY

## 2025-02-10 RX ORDER — LORATADINE 10 MG/1
TABLET ORAL
COMMUNITY
Start: 2024-09-10

## 2025-02-10 NOTE — PROGRESS NOTES
Subjective:       Patient ID: Marianne Go is a 49 y.o. female.    Chief Complaint: Annual Exam and Establish Care (/)    History of Present Illness    CHIEF COMPLAINT:  Patient presents today for an annual physical exam and to establish primary care    MEDICAL HISTORY:  She has history of myocardial infarction in May 2023 and blood clots following first childbirth while on birth control. She also has anxiety and depression.    FAMILY HISTORY:  Her mother has history of breast cancer requiring double mastectomy, diabetes, and hypothyroidism. Her father had lung cancer despite being a non-smoker and has hypothyroidism.    DIET AND EXERCISE:  She avoids fried foods and red meat, though acknowledges issues with consuming sweets. She denies using salt in cooking and includes vegetables with every meal. Exercise is limited by severe knee arthritis pain with walking. She uses a sit-down elliptical for physical activity to minimize knee discomfort.    WEIGHT MANAGEMENT:  She reports weight gain of 50 lbs since hysterectomy.      Past Medical History:   Diagnosis Date    Deep vein thrombosis 1990    pregnancy related     Kidney calculi     Kidney stones     Kidney stones     Migraines     perimenstrual         Past Surgical History:   Procedure Laterality Date    BLADDER SUSPENSION      COLONOSCOPY N/A 12/22/2022    Procedure: COLONOSCOPY;  Surgeon: Charity Rudolph MD;  Location: [facility] ENDO ([address] [place] [address]);  Service: Endoscopy;  Laterality: N/A;  instr portal-GT    CORONARY ANGIOGRAPHY Left 5/5/2023    Procedure: Angiogram, Coronary Artery;  Surgeon: Dhruv Scott MD;  Location: [facility] CATH LAB;  Service: Cardiology;  Laterality: Left;    CYSTOSCOPY W/ LASER LITHOTRIPSY      HYSTERECTOMY      PELVIC LAPAROSCOPY      ablation of endometriosis    STENT, DRUG ELUTING, SINGLE VESSEL, CORONARY  5/5/2023    Procedure: Stent, Drug Eluting, Single Vessel, Coronary;  Surgeon: Dhruv Scott MD;  Location: [facility] CATH LAB;   Service: Cardiology;;    TON[facility]ILLECT[facility]      [facility]         Family History   Problem Relation Name Age of Onset    Hyperlipidemia Mother Susie Go     Hypertension Mother Susie Go     Diabetes Mother Susie Go     Hypothyroidism Mother Susie Go     Breast cancer Mother Susie Go         bilateral mastectomy ,    Cancer Mother Susie Go         Breast cancer X 2( & )    Miscarriages / Stillbirths Mother Susie Go     Heart disease Father Phuc Go     Hyperlipidemia Father Phuc Go     Hypothyroidism Father Phuc Go     Cancer Father Phuc Go         Lung cancer 2015    Allergic rhinitis Father Phuc Go     Asthma Son      Colon cancer Maternal Uncle          40s     Ovarian cancer Neg Hx         Social History     Tobacco Use   Smoking Status Former    Current packs/day: 0.00    Average packs/day: 0.3 packs/day for 13.0 years (3.3 ttl pk-yrs)    Types: Cigarettes    Start date: 1990    Quit date: 2003    Years since quittin.1   Smokeless Tobacco Never   Tobacco Comments    quit 11 years ago       Social History     Social History Narrative    Not on file       Review of patient's allergies indicates:   Allergen Reactions    Topiramate Itching        Review of Systems   Constitutional:  Positive for fatigue.   Respiratory:  Negative for chest tightness and shortness of breath.    Cardiovascular:  Negative for chest pain and palpitations.   Gastrointestinal:  Negative for nausea and vomiting.   Musculoskeletal:  Positive for gait problem.   Skin: Negative.    Neurological:  Negative for dizziness, light-headedness and headaches.   Psychiatric/Behavioral:  Positive for decreased concentration. The patient is nervous/anxious.          Objective:      Vitals:    02/10/25 1323   BP: 110/73   BP Location: Right arm   Patient Position: Sitting   Pulse: 74   Temp: 97.4 °F (36.3 °C)   TempSrc: Oral   SpO2: (!) 94%   Weight: 128.5 kg (283 lb  "4.7 oz)   Height: 5' 2" (1.575 m)           Physical Exam  HENT:      Right Ear: External ear normal.      Left Ear: External ear normal.      Nose: No congestion or rhinorrhea.   Eyes:      Conjunctiva/sclera: Conjunctivae normal.   Pulmonary:      Effort: Pulmonary effort is normal. No respiratory distress.   Abdominal:      General: Bowel sounds are normal. There is no distension.      Tenderness: There is no abdominal tenderness. There is no guarding.   Musculoskeletal:         General: Normal range of motion.      Cervical back: Normal range of motion.   Skin:     General: Skin is warm and dry.   Neurological:      Mental Status: She is alert and oriented to person, place, and time.         Assessment:       1. Encounter for screening and preventative care    2. Hyperlipidemia, unspecified hyperlipidemia type    3. Migraine with aura and without status migrainosus, not intractable    4. Anxiety and depression    5. Morbid obesity with BMI of 50.0-59.9, adult    6. Well woman exam with routine gynecological exam    7. History of myocardial infarction    8. Weight gain    9. Encounter for hepatitis C screening test for low risk patient    10. Screening for diabetes mellitus    11. Screening for HIV (human immunodeficiency virus)    12. Screening for thyroid disorder    13. Encounter for screening mammogram for breast cancer    14. Family history of thyroid disease    15. Family history of breast cancer in mother    16. Family history of lung cancer        Plan:       Encounter for screening and preventative care  -     CBC Auto Differential; Future; Expected date: 02/10/2025  -     Urinalysis, Reflex to Urine Culture Urine, Clean Catch  -     Vitamin D; Future; Expected date: 02/10/2025  -     Vitamin B12; Future; Expected date: 02/10/2025    Hyperlipidemia, unspecified hyperlipidemia type  -     Comprehensive Metabolic Panel; Future; Expected date: 02/10/2025  -     Lipid Panel; Future; Expected date: " 02/10/2025    Migraine with aura and without status migrainosus, not intractable  Stable, continuing current management.       Anxiety and depression  -     Ambulatory referral/consult to Psychiatry; Future; Expected date: 02/10/2025    Morbid obesity with BMI of 50.0-59.9, adult    Well woman exam with routine gynecological exam  -     Ambulatory referral/consult to Gynecology; Future; Expected date: 02/10/2025    History of myocardial infarction    Weight gain  -     Ambulatory referral/consult to Bariatric/Obesity Medicine; Future; Expected date: 02/10/2025  -     Ambulatory referral/consult to Bariatric/Obesity Medicine; Future; Expected date: 02/10/2025    Encounter for hepatitis C screening test for low risk patient  -     Hepatitis C Antibody; Future; Expected date: 02/10/2025    Screening for diabetes mellitus  -     Hemoglobin A1C; Future; Expected date: 02/10/2025    Screening for HIV (human immunodeficiency virus)  -     HIV 1/2 Ag/Ab (4th Gen); Future; Expected date: 02/10/2025    Screening for thyroid disorder  -     T4, Free; Future; Expected date: 02/10/2025  -     TSH; Future; Expected date: 02/10/2025    Encounter for screening mammogram for breast cancer  -     Mammo Digital Screening Bilat w/ Harpreet; Future; Expected date: 02/10/2025    Family history of thyroid disease  Comments:  mother and father with hyporthyroidism    Family history of breast cancer in mother  -     Ambulatory referral/consult to Genetics; Future; Expected date: 02/10/2025    Family history of lung cancer  Comments:  Father  Orders:  -     Ambulatory referral/consult to Genetics; Future; Expected date: 02/10/2025    Assessment & Plan  CARDIOVASCULAR HEALTH (POST-MYOCARDIAL INFARCTION):  - Noted the patient's history of myocardial infarction in May 2023.  - Emphasized the importance of lifestyle changes in managing cardiovascular health post-heart attack.  - Recommend dietary modifications, including avoiding fast food, fried  food, white carbohydrates, and sweets.  - Encouraged increased physical activity to improve cardiovascular health.  - Noted that the patient's blood pressure has been well-controlled on current medication (Metoprolol).  - Reviewed current antihypertensive regimen.    DEPRESSION:  - Acknowledged the patient's reported depression and stressful life circumstances.  - Referred the patient to [facility] for virtual therapy sessions to address depression.    ANXIETY:  - Confirmed the patient's report of anxiety.  - Referred the patient to [facility] for virtual therapy sessions to address anxiety.    HISTORY OF BLOOD CLOTS:  - Noted the patient's history of blood clots after first child's birth.    BREAST CANCER RISK:  - Noted mother had breast cancer and underwent double mastectomy.  - Emphasized increased risk of breast cancer due to family history.  - Educated the patient on the link between elevated sugar intake and increased breast cancer risk.  - Referred the patient for genetic testing to check for BRCA gene mutation.  - Ordered a mammogram for breast cancer screening.    FAMILY HISTORY OF LUNG CANCER:  - Noted father had lung cancer despite never smoking.  - Attributed father's lung cancer to possible secondhand smoke exposure.    DIABETES RISK:  - Noted mother has diabetes.  - Emphasized increased risk of diabetes due to family history and current weight.  - Ordered labs to screen for diabetes.    THYROID DISORDER:  - Noted both parents have thyroid issues.  - Ordered thyroid function tests to screen for potential thyroid disorders.    STRESS MANAGEMENT:  - Acknowledged the patient's stressful life circumstances, including caring for a special needs son who is currently hospitalized and the loss of son's father three years ago.  - Recommend virtual therapy sessions to address stress and coping mechanisms.    WEIGHT MANAGEMENT:  - Noted the patient's use of a sit-down elliptical for exercise.  - Emphasized the  importance of lifestyle changes, include  ing diet and exercise, in managing cardiovascular health and weight.  - Patient to increase physical activity, starting with 10-15 minute walks or exercises at home.  - Recommend following a healthier diet: - Avoid fast food, fried foods, and foods elevated in cholesterol - Limit intake of white carbohydrates (pasta, rice, bread) - Increase consumption of vegetables and greens - Significantly reduce sweet food intake.      Medication List with Changes/Refills   Current Medications    ALBUTEROL (VENTOLIN HFA) 90 MCG/ACTUATION INHALER    Inhale 2 puffs into the lungs every 6 (six) hours as needed for Wheezing. Rescue    ASPIRIN 81 MG CHEW    Chew and swallow 1 tablet (81 mg total) by mouth once daily.    ATORVASTATIN (LIPITOR) 80 MG TABLET    TAKE 1 TABLET BY MOUTH EVERY DAY    CLOPIDOGREL (PLAVIX) 75 MG TABLET    Take 1 tablet (75 mg total) by mouth once daily.    ESCITALOPRAM OXALATE (LEXAPRO) 10 MG TABLET    Take 1 tablet by mouth every morning.    EZETIMIBE (ZETIA) 10 MG TABLET    Take 1 tablet (10 mg total) by mouth once daily.    LORATADINE (CLARITIN) 10 MG TABLET        METOPROLOL SUCCINATE (TOPROL-XL) 25 MG 24 HR TABLET    Take 1 tablet (25 mg total) by mouth once daily.    MULTIVITAMIN CAPSULE    Take 1 capsule by mouth once daily.    MUPIROCIN (BACTROBAN) 2 % OINTMENT    Apply topically 3 (three) times daily.    NITROGLYCERIN (NITROSTAT) 0.4 MG SL TABLET    Place 1 tablet (0.4 mg total) under the tongue every 5 (five) minutes as needed for Chest pain (angina). Up to 3 doses. If chest pain is not relieved or worsens 3 to 5 minutes after 1 dose, call 911 and seek immediate emergency medical attention.    VENLAFAXINE (EFFEXOR-XR) 75 MG 24 HR CAPSULE    Take 1 capsule (75 mg total) by mouth once daily.   Discontinued Medications    CETIRIZINE (ZYRTEC) 10 MG TABLET    Take 10 mg by mouth.    VERAPAMIL (CALAN-SR) 120 MG CR TABLET    Take 1 tablet (120 mg total) by mouth  nightly.        Follow up in about 1 month (around 3/10/2025) for Dr. Jake Weber.    I spent a total of 45 minutes on the day of the visit.This includes face to face time and non-face to face time preparing to see the patient (eg, review of tests), obtaining and/or reviewing separately obtained history, documenting clinical information in the electronic or other health record, independently interpreting results and communicating results to the patient/family/caregiver, or care coordinator.     TEREZA Khan, MSN, FNP-C     This note was generated with the assistance of ambient listening technology. Verbal consent was obtained by the patient and accompanying visitor(s) for the recording of patient appointment to facilitate this note. I attest to having reviewed and edited the generated note for accuracy, though some syntax or spelling errors may persist. Please contact the author of this note for any clarification.

## 2025-02-10 NOTE — PATIENT INSTRUCTIONS
You can call any of the following numbers to schedule your referral, if you could not get scheduled today: [phone] or [phone].       [facility]  [address] [place] [address]  [phone]   Fax [phone]     [address]   [facility]  [place] [address]    Ph: [phone]  Fax:[phone]    Provider: Jamilah Jordan MD      [facility]  Phone [phone], Fax:[phone]

## 2025-02-11 ENCOUNTER — PATIENT MESSAGE (OUTPATIENT)
Dept: PRIMARY CARE CLINIC | Facility: CLINIC | Age: 50
End: 2025-02-11
Payer: MEDICAID

## 2025-02-11 LAB
BACTERIA #/AREA URNS AUTO: NORMAL /HPF
BILIRUB UR QL STRIP: NEGATIVE
CAOX CRY UR QL COMP ASSIST: NORMAL
CLARITY UR REFRACT.AUTO: CLEAR
COLOR UR AUTO: YELLOW
GLUCOSE UR QL STRIP: NEGATIVE
HGB UR QL STRIP: NEGATIVE
HYALINE CASTS UR QL AUTO: 0 /LPF
KETONES UR QL STRIP: NEGATIVE
LEUKOCYTE ESTERASE UR QL STRIP: NEGATIVE
MICROSCOPIC COMMENT: NORMAL
NITRITE UR QL STRIP: NEGATIVE
PH UR STRIP: 7 [PH] (ref 5–8)
PROT UR QL STRIP: ABNORMAL
RBC #/AREA URNS AUTO: 3 /HPF (ref 0–4)
SP GR UR STRIP: 1.03 (ref 1–1.03)
SQUAMOUS #/AREA URNS AUTO: 2 /HPF
URN SPEC COLLECT METH UR: ABNORMAL
WBC #/AREA URNS AUTO: 2 /HPF (ref 0–5)

## 2025-02-14 ENCOUNTER — OFFICE VISIT (OUTPATIENT)
Dept: OBSTETRICS AND GYNECOLOGY | Facility: CLINIC | Age: 50
End: 2025-02-14
Payer: MEDICAID

## 2025-02-14 VITALS
WEIGHT: 279.56 LBS | HEIGHT: 62 IN | SYSTOLIC BLOOD PRESSURE: 128 MMHG | BODY MASS INDEX: 51.45 KG/M2 | DIASTOLIC BLOOD PRESSURE: 70 MMHG

## 2025-02-14 DIAGNOSIS — Z01.419 WELL FEMALE EXAM WITH ROUTINE GYNECOLOGICAL EXAM: Primary | ICD-10-CM

## 2025-02-14 DIAGNOSIS — Z80.3 FAMILY HISTORY OF BREAST CANCER: ICD-10-CM

## 2025-02-14 DIAGNOSIS — Z01.419 WELL WOMAN EXAM WITH ROUTINE GYNECOLOGICAL EXAM: ICD-10-CM

## 2025-02-14 PROCEDURE — 99214 OFFICE O/P EST MOD 30 MIN: CPT | Mod: PBBFAC,PN | Performed by: OBSTETRICS & GYNECOLOGY

## 2025-02-14 PROCEDURE — 99999 PR PBB SHADOW E&M-EST. PATIENT-LVL IV: CPT | Mod: PBBFAC,,, | Performed by: OBSTETRICS & GYNECOLOGY

## 2025-02-14 RX ORDER — FEZOLINETANT 45 MG/1
45 TABLET, FILM COATED ORAL DAILY
Qty: 90 TABLET | Refills: 3 | Status: SHIPPED | OUTPATIENT
Start: 2025-02-14

## 2025-02-14 NOTE — PROGRESS NOTES
SUBJECTIVE:   49 y.o. female   for routine gyn exam. Patient's last menstrual period was 10/13/2017 (approximate)..  She has hot flashes. Cannot take ERT due to h/o DVT. FH breast CA         Past Medical History:   Diagnosis Date    Deep vein thrombosis 1990    pregnancy related     Kidney stones     Migraines     perimenstrual      Past Surgical History:   Procedure Laterality Date    BLADDER SUSPENSION      COLONOSCOPY N/A 2022    Procedure: COLONOSCOPY;  Surgeon: Charity Rudolph MD;  Location: [facility] ENDO (4TH FLR);  Service: Endoscopy;  Laterality: N/A;  instr portal-GT    CORONARY ANGIOGRAPHY Left 2023    Procedure: Angiogram, Coronary Artery;  Surgeon: Dhruv Scott MD;  Location: [facility] CATH LAB;  Service: Cardiology;  Laterality: Left;    CYSTOSCOPY W/ LASER LITHOTRIPSY      HYSTERECTOMY      AUB    PELVIC LAPAROSCOPY      ablation of endometriosis    STENT, DRUG ELUTING, SINGLE VESSEL, CORONARY  2023    Procedure: Stent, Drug Eluting, Single Vessel, Coronary;  Surgeon: Dhruv Scott MD;  Location: [facility] CATH LAB;  Service: Cardiology;;    TONSILLECTOMY      TUBAL LIGATION      essure      Social History     Socioeconomic History    Marital status: Significant Other     Spouse name: 3   Occupational History    Occupation: stay at home mom     Employer: NOT EMPLOYED   Tobacco Use    Smoking status: Former     Current packs/day: 0.00     Average packs/day: 0.3 packs/day for 13.0 years (3.3 ttl pk-yrs)     Types: Cigarettes     Start date: 1990     Quit date: 2003     Years since quittin.1     Passive exposure: Past    Smokeless tobacco: Never    Tobacco comments:     quit 11 years ago   Substance and Sexual Activity    Alcohol use: Not Currently     Comment: rarely    Drug use: No    Sexual activity: Yes     Partners: Male     Birth control/protection: Post-menopausal, See Surgical Hx, None     Comment: Hysterectomy-44 yo     Social Drivers of Health      Financial Resource Strain: Low Risk  (2024)    Overall Financial Resource Strain (CARDIA)     Difficulty of Paying Living Expenses: Not very hard   Food Insecurity: No Food Insecurity (2024)    Hunger Vital Sign     Worried About Running Out of Food in the Last Year: Never true     Ran Out of Food in the Last Year: Never true   Transportation Needs: No Transportation Needs (2023)    PRAPARE - Transportation     Lack of Transportation (Medical): No     Lack of Transportation (Non-Medical): No   Physical Activity: Insufficiently Active (2024)    Exercise Vital Sign     Days of Exercise per Week: 1 day     Minutes of Exercise per Session: 20 min   Stress: No Stress Concern Present (2024)    [employer] - Occupational Stress Questionnaire     Feeling of Stress : Only a little   Housing Stability: Unknown (2023)    Housing Stability Vital Sign     Unable to Pay for Housing in the Last Year: No     Unstable Housing in the Last Year: No     Family History   Problem Relation Name Age of Onset    Hyperlipidemia Mother Susienancy Go     Hypertension Mother Susie Go     Diabetes Mother Susienancy Go     Hypothyroidism Mother Susienancy Go     Breast cancer Mother Susie Go         bilateral mastectomy ,    Cancer Mother Susie Go         Breast cancer X 2(2017 & 2019)    Miscarriages / Stillbirths Mother Susie Go     Heart disease Father Phuc Go     Hyperlipidemia Father Phuc Go     Hypothyroidism Father Phuc Go     Cancer Father Phuc Go         Lung cancer 2015    Allergic rhinitis Father Phuc Go     Asthma Son      Colon cancer Maternal Uncle          40s     Ovarian cancer Neg Hx       OB History    Para Term  AB Living   5 3 3   2 3   SAB IAB Ectopic Multiple Live Births   2       3      # Outcome Date GA Lbr Jason/2nd Weight Sex Type Anes PTL Lv   5 Term 11   2.948 kg (6 lb 8 oz) F Vag-Spont EPI  LUÍS   4  SAB 2008           3 Term 04/23/02   3.033 kg (6 lb 11 oz) M Vag-Spont EPI  LUÍS   2 SAB 1995           1 Term 07/19/90   3.147 kg (6 lb 15 oz) M Vag-Spont EPI  LUÍS      Obstetric Comments   Menarche age 11         Current Outpatient Medications   Medication Sig Dispense Refill    aspirin 81 MG Chew Chew and swallow 1 tablet (81 mg total) by mouth once daily. 360 tablet 0    atorvastatin (LIPITOR) 80 MG tablet TAKE 1 TABLET BY MOUTH EVERY DAY 90 tablet 3    clopidogreL (PLAVIX) 75 mg tablet Take 1 tablet (75 mg total) by mouth once daily. 94 tablet 1    EScitalopram oxalate (LEXAPRO) 10 MG tablet Take 1 tablet by mouth every morning.      ezetimibe (ZETIA) 10 mg tablet Take 1 tablet (10 mg total) by mouth once daily. 90 tablet 3    fezolinetant (VEOZAH) 45 mg Tab Take 1 tablet (45 mg) by mouth once daily. 90 tablet 3    loratadine (CLARITIN) 10 mg tablet       metoprolol succinate (TOPROL-XL) 25 MG 24 hr tablet Take 1 tablet (25 mg total) by mouth once daily. 90 tablet 3    multivitamin capsule Take 1 capsule by mouth once daily.      mupirocin (BACTROBAN) 2 % ointment Apply topically 3 (three) times daily. 22 g 0    nitroGLYCERIN (NITROSTAT) 0.4 MG SL tablet Place 1 tablet (0.4 mg total) under the tongue every 5 (five) minutes as needed for Chest pain (angina). Up to 3 doses. If chest pain is not relieved or worsens 3 to 5 minutes after 1 dose, call 911 and seek immediate emergency medical attention. 25 tablet 0    venlafaxine (EFFEXOR-XR) 75 MG 24 hr capsule Take 1 capsule (75 mg total) by mouth once daily. 30 capsule 11     No current facility-administered medications for this visit.     Allergies: Topiramate     ROS:  Constitutional: no weight loss, weight gain, fever, fatigue  Eyes:  No vision changes, glasses/contacts  ENT/Mouth: No ulcers, sinus problems, ears ringing, headache  Cardiovascular: No inability to lie flat, chest pain, exercise intolerance, swelling, heart palpitations  Respiratory: No wheezing,  "coughing blood, shortness of breath, or cough  Gastrointestinal: No diarrhea, bloody stool, nausea/vomiting, constipation, gas, hemorrhoids  Genitourinary: No blood in urine, painful urination, urgency of urination, frequency of urination, incomplete emptying, incontinence, abnormal bleeding, painful periods, heavy periods, vaginal discharge, vaginal odor, painful intercourse, sexual problems, bleeding after intercourse.  Musculoskeletal: No muscle weakness  Skin/Breast: No painful breasts, nipple discharge, masses, rash, ulcers  Neurological: No passing out, seizures, numbness, headache  Endocrine: No diabetes, hypothyroid, hyperthyroid, +hot flashes, hair loss, abnormal hair growth, acne  Psychiatric: No depression, crying  Hematologic: No bruises, bleeding, swollen lymph nodes, anemia.      OBJECTIVE:   The patient appears well, alert, oriented x 3, in no distress.  /70 (BP Location: Left arm, Patient Position: Sitting)   Ht 5' 2" (1.575 m)   Wt 126.8 kg (279 lb 8.7 oz)   LMP 10/13/2017 (Approximate)   BMI 51.13 kg/m²   NECK: no thyromegaly, trachea midline  SKIN: no acne, striae, hirsutism  CHEST: CTAB  CV: RRR  BREAST EXAM: breasts appear normal, no suspicious masses, no skin or nipple changes or axillary nodes  ABDOMEN: no hernias, masses, or hepatosplenomegaly  GENITALIA: normal external genitalia, no erythema, no discharge  URETHRA: normal urethra, normal urethral meatus  VAGINA: Normal  CERVIX: absent  UTERUS: absent  ADNEXA: no mass, fullness, tenderness      ASSESSMENT:   1. Well female exam with routine gynecological exam  HEPATIC FUNCTION PANEL      2. Well woman exam with routine gynecological exam  Ambulatory referral/consult to Gynecology      3. Family history of breast cancer            PLAN:   Orders Placed This Encounter    HEPATIC FUNCTION PANEL    fezolinetant (VEOZAH) 45 mg Tab     Discussed Veozah and LFT's q 3 months for 9 months. Will consider  Discussed healthy lifestyle " including regular exercise, healthy eating, etc.  Return to clinic in 1 year

## 2025-03-03 ENCOUNTER — PATIENT MESSAGE (OUTPATIENT)
Dept: HEMATOLOGY/ONCOLOGY | Facility: CLINIC | Age: 50
End: 2025-03-03
Payer: MEDICAID

## 2025-03-10 ENCOUNTER — HOSPITAL ENCOUNTER (OUTPATIENT)
Dept: RADIOLOGY | Facility: HOSPITAL | Age: 50
Discharge: HOME | End: 2025-03-10
Payer: MEDICAID

## 2025-03-10 ENCOUNTER — PATIENT MESSAGE (OUTPATIENT)
Dept: OBSTETRICS AND GYNECOLOGY | Facility: CLINIC | Age: 50
End: 2025-03-10
Payer: MEDICAID

## 2025-03-10 ENCOUNTER — OFFICE VISIT (OUTPATIENT)
Dept: PRIMARY CARE CLINIC | Facility: CLINIC | Age: 50
End: 2025-03-10
Payer: MEDICAID

## 2025-03-10 VITALS
BODY MASS INDEX: 51.35 KG/M2 | WEIGHT: 280.75 LBS | SYSTOLIC BLOOD PRESSURE: 135 MMHG | OXYGEN SATURATION: 92 % | HEART RATE: 73 BPM | DIASTOLIC BLOOD PRESSURE: 88 MMHG

## 2025-03-10 DIAGNOSIS — I25.118 CORONARY ARTERY DISEASE OF NATIVE ARTERY OF NATIVE HEART WITH STABLE ANGINA PECTORIS: Primary | ICD-10-CM

## 2025-03-10 DIAGNOSIS — E66.01 CLASS 3 SEVERE OBESITY DUE TO EXCESS CALORIES WITH SERIOUS COMORBIDITY AND BODY MASS INDEX (BMI) OF 50.0 TO 59.9 IN ADULT: ICD-10-CM

## 2025-03-10 DIAGNOSIS — M17.11 PRIMARY OSTEOARTHRITIS OF RIGHT KNEE: ICD-10-CM

## 2025-03-10 DIAGNOSIS — E66.813 CLASS 3 SEVERE OBESITY DUE TO EXCESS CALORIES WITH SERIOUS COMORBIDITY AND BODY MASS INDEX (BMI) OF 50.0 TO 59.9 IN ADULT: ICD-10-CM

## 2025-03-10 DIAGNOSIS — F32.0 CURRENT MILD EPISODE OF MAJOR DEPRESSIVE DISORDER WITHOUT PRIOR EPISODE: ICD-10-CM

## 2025-03-10 PROBLEM — M75.41 SHOULDER IMPINGEMENT SYNDROME, RIGHT: Status: RESOLVED | Noted: 2023-04-05 | Resolved: 2025-03-10

## 2025-03-10 PROCEDURE — 3079F DIAST BP 80-89 MM HG: CPT | Mod: CPTII,,,

## 2025-03-10 PROCEDURE — 99999 PR PBB SHADOW E&M-EST. PATIENT-LVL IV: CPT | Mod: PBBFAC,,,

## 2025-03-10 PROCEDURE — 73564 X-RAY EXAM KNEE 4 OR MORE: CPT | Mod: 26,50,, | Performed by: RADIOLOGY

## 2025-03-10 PROCEDURE — 3044F HG A1C LEVEL LT 7.0%: CPT | Mod: CPTII,,,

## 2025-03-10 PROCEDURE — 1160F RVW MEDS BY RX/DR IN RCRD: CPT | Mod: CPTII,,,

## 2025-03-10 PROCEDURE — 73564 X-RAY EXAM KNEE 4 OR MORE: CPT | Mod: TC,50

## 2025-03-10 PROCEDURE — 3075F SYST BP GE 130 - 139MM HG: CPT | Mod: CPTII,,,

## 2025-03-10 PROCEDURE — G2211 COMPLEX E/M VISIT ADD ON: HCPCS | Mod: S$PBB,,,

## 2025-03-10 PROCEDURE — 99215 OFFICE O/P EST HI 40 MIN: CPT | Mod: S$PBB,,,

## 2025-03-10 PROCEDURE — 3008F BODY MASS INDEX DOCD: CPT | Mod: CPTII,,,

## 2025-03-10 PROCEDURE — 99214 OFFICE O/P EST MOD 30 MIN: CPT | Mod: PBBFAC,25,PN

## 2025-03-10 PROCEDURE — 1159F MED LIST DOCD IN RCRD: CPT | Mod: CPTII,,,

## 2025-03-10 RX ORDER — SEMAGLUTIDE 0.25 MG/.5ML
0.25 INJECTION, SOLUTION SUBCUTANEOUS WEEKLY
Qty: 2 ML | Refills: 0 | Status: SHIPPED | OUTPATIENT
Start: 2025-03-10 | End: 2025-04-09

## 2025-03-10 RX ORDER — SEMAGLUTIDE 0.5 MG/.5ML
0.5 INJECTION, SOLUTION SUBCUTANEOUS WEEKLY
Qty: 2 ML | Refills: 0 | Status: SHIPPED | OUTPATIENT
Start: 2025-04-10 | End: 2025-03-10

## 2025-03-10 RX ORDER — SEMAGLUTIDE 0.25 MG/.5ML
0.25 INJECTION, SOLUTION SUBCUTANEOUS WEEKLY
Qty: 2 ML | Refills: 0 | Status: SHIPPED | OUTPATIENT
Start: 2025-03-10 | End: 2025-03-10

## 2025-03-10 RX ORDER — SEMAGLUTIDE 0.5 MG/.5ML
0.5 INJECTION, SOLUTION SUBCUTANEOUS WEEKLY
Qty: 2 ML | Refills: 0 | Status: SHIPPED | OUTPATIENT
Start: 2025-04-10 | End: 2025-05-10

## 2025-03-10 NOTE — PROGRESS NOTES
Subjective:       Patient ID: Marianne Go is a 49 y.o. female.    Chief Complaint: Blood work follow up, knee pain, CAD    Marianne Go is a 49 y.o. year old female  who comes to clinic for Blood work follow up, knee pain, CAD    Patient had NSTEMI with stents placed x3 on RCA on May/2023.  Patient has been taking Plavix, aspirin and atorvastatin and metoprolol.  Had echo at that time that showed normal ejection fraction.  Cath: 5/23: left main: normal; LAD mid 55%; D1 50%; OM3 50%; right 100% ; stent 3X20 right   Patient stated that  the past month she has been having chest pain on exertion. The pain occurs consistently during her 30-minute walks.    She has sleep apnea with a CPAP machine prescribed one year ago, which she uses sparingly due to difficulty sleeping with it, resulting in daytime fatigue.     She has knee arthritis, previously treated with hyaluronic acid injection, and reports symptom improvement.  States her knee pain has been getting worse.    She also history of depression, she takes Lexapro 10 mg and Effexor 75 mg.  States her symptoms are well-controlled.      ROS negative except as above  Objective:      /88   Pulse 73   Wt 127.3 kg (280 lb 12.1 oz)   LMP 10/13/2017 (Approximate)   SpO2 (!) 92%   BMI 51.35 kg/m²    Physical Exam  Vitals reviewed.   Constitutional:       Appearance: Normal appearance. She is obese.   HENT:      Head: Normocephalic.      Mouth/Throat:      Mouth: Mucous membranes are moist.   Cardiovascular:      Rate and Rhythm: Normal rate and regular rhythm.      Pulses: Normal pulses.      Heart sounds: Normal heart sounds.   Pulmonary:      Effort: Pulmonary effort is normal.      Breath sounds: Normal breath sounds. No wheezing or rhonchi.   Abdominal:      General: Abdomen is flat. There is no distension.   Musculoskeletal:         General: No swelling. Normal range of motion.      Cervical back: Normal range of motion.   Skin:     General: Skin is warm.       Coloration: Skin is not jaundiced.   Neurological:      Mental Status: She is alert.         Assessment:       1. Coronary artery disease of native artery of native heart with stable angina pectoris    2. Class 3 severe obesity due to excess calories with serious comorbidity and body mass index (BMI) of 50.0 to 59.9 in adult    3. Primary osteoarthritis of right knee    4. Current mild episode of major depressive disorder without prior episode          Plan:       1. Coronary artery disease of native artery of native heart with stable angina pectoris (Primary)  Symptoms not controlled.  She develop new symptoms 1 month ago as described in HPI.  Last appointment with Cardiology was September/2024  LDL is at goal (50)  Continue taking metoprolol 25 mg, aspirin, Lipitor and Plavix.  Tolerating medications well.  Patient will significantly benefit from weight loss, particularly from semaglutide as it has been approved for cardiovascular disease prevention.  Her BMI is 51 and she has difficulty exercising due to knee arthritis and CAD.  Putting urgent referral to Cardiology for her typical angina symptoms, also counseled patient to reach out to her cardiologist.  - Ambulatory referral/consult to Cardiology; Future  - semaglutide, weight loss, (WEGOVY) 0.25 mg/0.5 mL PnIj; Inject 0.25 mg into the skin once a week.  Dispense: 2 mL; Refill: 0  - semaglutide, weight loss, (WEGOVY) 0.5 mg/0.5 mL PnIj; Inject 0.5 mg into the skin once a week.  Dispense: 2 mL; Refill: 0    2. Class 3 severe obesity due to excess calories with serious comorbidity and body mass index (BMI) of 50.0 to 59.9 in adult  Her BMI is 51 and she has difficulty exercising due to knee arthritis and CAD.  Patient will significantly benefit from weight loss, particularly from semaglutide as it has been approved for cardiovascular disease prevention.  - semaglutide, weight loss, (WEGOVY) 0.25 mg/0.5 mL PnIj; Inject 0.25 mg into the skin once a week.   Dispense: 2 mL; Refill: 0  - semaglutide, weight loss, (WEGOVY) 0.5 mg/0.5 mL PnIj; Inject 0.5 mg into the skin once a week.  Dispense: 2 mL; Refill: 0    3. Primary osteoarthritis of right knee  Patient unable to take NSAIDs due to her comorbidities  We will get x-ray and patient to do physical therapy.  If insurance approves Wegovy, she will significantly benefit from weight loss  - Ambulatory referral/consult to Physical/Occupational Therapy; Future    4. Current mild episode of major depressive disorder without prior episode  Continue taking Lexapro 10 mg and Effexor 75 mg.  Tolerating medications well.  Condition well-controlled        Follow up in about 4 weeks (around 4/7/2025) for wt loss, medication refill follow up (wegovy/), appt with cardiology follow up, NERY Weber M.D.    I spent a total of 50 minutes on the day of the visit.This includes face to face time and non-face to face time preparing to see the patient (eg, review of tests), obtaining and/or reviewing separately obtained history, documenting clinical information in the electronic or other health record, independently interpreting results and communicating results to the patient/family/caregiver, or care coordinator.    This note was generated with the assistance of ambient listening technology. Verbal consent was obtained by the patient and accompanying visitor(s) for the recording of patient appointment to facilitate this note. I attest to having reviewed and edited the generated note for accuracy, though some syntax or spelling errors may persist. Please contact the author of this note for any clarification.

## 2025-03-17 NOTE — PROGRESS NOTES
[facility] Cardiology [address]     SUBJECTIVE:     History of Present Illness:  Patient is a 49 y.o. female presents to followup for CAD . About one month ago, started brisk walking . After 10 mins of walk, gets short of breath and notices chest pain in the mid chest: sharp pain; no radiation; lasts for 1 min or so; once her breathing is better, it is resolved. None with any other physical activity such as housework; yoga; this happens everytime she walks. Maybe similar to the discomfort in 2023. None at rest.     Primary Diagnosis:   Hypertension: none  DM: none  Smoker: quit over 20 years   Family history of early CAD: mother and father with MI's at age 50's  Heart disease : NSTEMI with stents placed right 5/23   Exercise induced asthma - used inhalers before   ROS  Since last visit in 9/24  Has had chest pains - see above   Started with shortness of breath for the past few months - walking, lifting etc. - has gained over 20 lbs however. Some wheezing noted . None at rest; no PND or orthopnea  No syncope  No palpitations  Activity: see above  Noted swelling in lower exts for the past few months: on and off.   Review of patient's allergies indicates:   Allergen Reactions    Topiramate Itching       Past Medical History:   Diagnosis Date    Deep vein thrombosis 01/01/1990    pregnancy related     Kidney stones     Migraines     perimenstrual        Past Surgical History:   Procedure Laterality Date    BLADDER SUSPENSION      COLONOSCOPY N/A 12/22/2022    Procedure: COLONOSCOPY;  Surgeon: Charity Rudolph MD;  Location: [facility] ENDO ([address] [place] [address]);  Service: Endoscopy;  Laterality: N/A;  instr portal-GT    CORONARY ANGIOGRAPHY Left 05/05/2023    Procedure: Angiogram, Coronary Artery;  Surgeon: Dhruv Scott MD;  Location: [facility] CATH LAB;  Service: Cardiology;  Laterality: Left;    CYSTOSCOPY W/ LASER LITHOTRIPSY      HYSTERECTOMY  2020    AUB    PELVIC LAPAROSCOPY      ablation of endometriosis    STENT, DRUG ELUTING, SINGLE  "VESSEL, CORONARY  2023    Procedure: Stent, Drug Eluting, Single Vessel, Coronary;  Surgeon: Dhruv Scott MD;  Location: [facility] CATH LAB;  Service: Cardiology;;    TONSILLECTOMY      TUBAL LIGATION  2011    essure            Past Hospitalization:     : chest pains; chronic cough for months from covid; troponin 0.4 ; cath done   : ER visit: flu like symptoms   : nasal bleeding; seen by ENT   Cardiac meds:    ASA 81 mg  Atorvastatin 80 mg  Plavix 75 mg   Metoprolol succinate 25 mg   Zetia 10 mg         OBJECTIVE:     Vital Signs (Most Recent)  Vitals:    25 0915   BP: (!) 143/81   Pulse: 71   SpO2: 96%   Weight: 125.8 kg (277 lb 7.2 oz)   Height: 5' 2" (1.575 m)               Physical Exam:  Neck: normal carotids, no bruits; normal JVP  Lungs :clear  Heart: RR, normal S1,S2, soft systolic  murmur LSB, no gallops  Abd: no masses; no bruits;   Exts: normal DP and PT pulses bilaterally, normal radials; no edema noted               Labs    : GFR normal; LDL pre cath 129; A1c normal :  : ldl 94;    :GFR normal ; mild elevation in LFT;s; A1c normal; TSH abnormal   : LDL 94  : TSH normal   : TSH a little high; A1c 5.7, LDL 50 ; ;   Diagnostic Results:    1.EK23: sinus bradycardia; biphasic T waves;   2.Echo: : normal EF   3. Stress test  4. Cath: : left main: normal; LAD mid 55%; D1 50%; OM3 50%; right 100% ; stent 3X20  right   5. PET stress 10/23: normal with normal EF     Chart review:        ASSESSMENT/PLAN:     CAD: s/p stent right with new symptoms of chest shart pain with walking  Residual disease in OM and LAD  3. Obesity  4. LDL at goal ; elevated TG   5. Mild elevation in LFT's from atorvastatin?-resolved   6.DM : almost at goal    Plan: 1. Continue medications  2. Start Vascepa to lower the TG till she gets started on semiglutides  3. PET stress to check for any issues   4. Return 6 months     Pramilla N. Derrick, MD        "

## 2025-03-18 ENCOUNTER — PATIENT MESSAGE (OUTPATIENT)
Dept: PRIMARY CARE CLINIC | Facility: CLINIC | Age: 50
End: 2025-03-18
Payer: MEDICAID

## 2025-03-18 ENCOUNTER — OFFICE VISIT (OUTPATIENT)
Dept: CARDIOLOGY | Facility: CLINIC | Age: 50
End: 2025-03-18
Payer: MEDICAID

## 2025-03-18 ENCOUNTER — OFFICE VISIT (OUTPATIENT)
Dept: OTOLARYNGOLOGY | Facility: CLINIC | Age: 50
End: 2025-03-18
Payer: MEDICAID

## 2025-03-18 VITALS
SYSTOLIC BLOOD PRESSURE: 143 MMHG | HEIGHT: 62 IN | HEART RATE: 71 BPM | WEIGHT: 277.44 LBS | OXYGEN SATURATION: 96 % | DIASTOLIC BLOOD PRESSURE: 81 MMHG | BODY MASS INDEX: 51.05 KG/M2

## 2025-03-18 VITALS — WEIGHT: 280.19 LBS | BODY MASS INDEX: 51.25 KG/M2

## 2025-03-18 DIAGNOSIS — J34.2 NASAL SEPTAL DEVIATION: Primary | ICD-10-CM

## 2025-03-18 DIAGNOSIS — J34.0 NASAL SEPTAL ULCER: ICD-10-CM

## 2025-03-18 DIAGNOSIS — Z95.5 STENTED CORONARY ARTERY: Primary | ICD-10-CM

## 2025-03-18 DIAGNOSIS — R04.0 EPISTAXIS: ICD-10-CM

## 2025-03-18 DIAGNOSIS — R07.2 PRECORDIAL PAIN: ICD-10-CM

## 2025-03-18 DIAGNOSIS — I25.118 CORONARY ARTERY DISEASE OF NATIVE ARTERY OF NATIVE HEART WITH STABLE ANGINA PECTORIS: ICD-10-CM

## 2025-03-18 PROCEDURE — 3079F DIAST BP 80-89 MM HG: CPT | Mod: CPTII,,, | Performed by: INTERNAL MEDICINE

## 2025-03-18 PROCEDURE — 1160F RVW MEDS BY RX/DR IN RCRD: CPT | Mod: CPTII,,, | Performed by: INTERNAL MEDICINE

## 2025-03-18 PROCEDURE — 99213 OFFICE O/P EST LOW 20 MIN: CPT | Mod: PBBFAC,PN | Performed by: INTERNAL MEDICINE

## 2025-03-18 PROCEDURE — 99213 OFFICE O/P EST LOW 20 MIN: CPT | Mod: PBBFAC,27 | Performed by: STUDENT IN AN ORGANIZED HEALTH CARE EDUCATION/TRAINING PROGRAM

## 2025-03-18 PROCEDURE — 99999 PR PBB SHADOW E&M-EST. PATIENT-LVL III: CPT | Mod: PBBFAC,,, | Performed by: STUDENT IN AN ORGANIZED HEALTH CARE EDUCATION/TRAINING PROGRAM

## 2025-03-18 PROCEDURE — 31238 NSL/SINS NDSC SRG NSL HEMRRG: CPT | Mod: PBBFAC,LT | Performed by: STUDENT IN AN ORGANIZED HEALTH CARE EDUCATION/TRAINING PROGRAM

## 2025-03-18 PROCEDURE — 99999 PR PBB SHADOW E&M-EST. PATIENT-LVL III: CPT | Mod: PBBFAC,,, | Performed by: INTERNAL MEDICINE

## 2025-03-18 PROCEDURE — 3077F SYST BP >= 140 MM HG: CPT | Mod: CPTII,,, | Performed by: INTERNAL MEDICINE

## 2025-03-18 PROCEDURE — 99214 OFFICE O/P EST MOD 30 MIN: CPT | Mod: S$PBB,,, | Performed by: INTERNAL MEDICINE

## 2025-03-18 PROCEDURE — 3044F HG A1C LEVEL LT 7.0%: CPT | Mod: CPTII,,, | Performed by: INTERNAL MEDICINE

## 2025-03-18 PROCEDURE — 3008F BODY MASS INDEX DOCD: CPT | Mod: CPTII,,, | Performed by: INTERNAL MEDICINE

## 2025-03-18 PROCEDURE — 1159F MED LIST DOCD IN RCRD: CPT | Mod: CPTII,,, | Performed by: INTERNAL MEDICINE

## 2025-03-18 RX ORDER — ICOSAPENT ETHYL 1 G/1
2 CAPSULE ORAL 2 TIMES DAILY
Qty: 360 CAPSULE | Refills: 3 | Status: SHIPPED | OUTPATIENT
Start: 2025-03-18

## 2025-03-18 NOTE — PROGRESS NOTES
Subjective:      Marianne is a 49 y.o. female who comes for follow-up of  epistaxis .  Her last visit with me was on 12/20/2024.  Started having more left-greater-than-right episodes of epistaxis.  Been worse over the last month. Noticed bleeding when outside watching soccer. Last episode was yesterday. Usually self resolved.     Her current sinus regime consists of:  Nasal saline.    The patient's medications, allergies, past medical, surgical, social and family histories were reviewed and updated as appropriate.    ROS     A detailed review of systems was obtained with pertinent positives as per the above HPI, and otherwise negative.        Objective:     Wt 127.1 kg (280 lb 3.3 oz)   LMP 10/13/2017 (Approximate)   BMI 51.25 kg/m²        Constitutional:   Vital signs are normal. She appears well-developed. Normal speech.      Head:  Normocephalic and atraumatic.     Ears:    Right Ear: No drainage or tenderness. No middle ear effusion.   Left Ear: No drainage or tenderness.  No middle ear effusion.     Mouth/Throat  Oropharynx clear and moist without lesions or asymmetry and normal uvula midline. No trismus. No oropharyngeal exudate. Mirror exam not performed due to patient tolerance.  Mirror exam not performed due to patient tolerance.      Neck:  Neck normal without thyromegaly masses, asymmetry, normal tracheal structure, crepitus, and tenderness, thyroid normal and trachea normal.     Pulmonary/Chest:   Effort normal.     Psychiatric:   She has a normal mood and affect. Her speech is normal.     Skin:   No abrasions, lacerations, lesions, or rashes.       Procedure    Nasal endoscopy performed.  See procedure note.    Nasal Endoscopy:  3/18/2025    The use of diagnostic nasal endoscopy was considered medically necessary for the evaluation and visualization of the nasal anatomy for symptoms suggestive of nasal or sinus origin. Physical examination (including a nasal speculum evaluation) did not provide  "sufficient clinical information to establish a diagnosis, or symptoms did not improve or worsened following treatment.     The nasal cavity was decongested with topical 1% phenylephrine and anesthetized with 4% lidocaine.  A rigid 0-degree endoscope was introduced into the nasal cavity.    The patient was seated in the examination chair. After discussion of risks and benefits, a nasal endoscope was inserted into the nose the endoscope was passed along the left nasal floor to the nasopharynx. It was then passed between the middle and superior meatus, nasal turbinates, nasal septum, nasopharynx and sphenoethmoid region. The nasal endoscope was withdrawn and there was no complications. An identical procedure was performed on the right side. I was present for the entire procedure.The patient tolerated the above procedure well. The findings of this procedure can be found in the dictated note from 3/18/2025 visit.      Nasal Endoscopy with Control of Epistaxis    After written consent was obtained the nose was anesthetized with topical pontocaine and phenylephrine pledgets. silver nitrate was used to cauterize the prominent vascularity on the left anterior septum.  The patient tolerated the procedure well and there were no complications.  Hemostasis was obtained.  Bacitracin ointment was placed after cauterization.    Data Reviewed    WBC (K/uL)   Date Value   02/10/2025 8.90     Eosinophil % (%)   Date Value   02/10/2025 2.7     Eos # (K/uL)   Date Value   02/10/2025 0.2     Platelets (K/uL)   Date Value   02/10/2025 359     Glucose (mg/dL)   Date Value   02/10/2025 79     No results found for: "IGE"    No sinus imaging available.      Assessment:     1. Nasal septal deviation    2. Epistaxis    3. Nasal septal ulcer         Plan:     - Left cautery performed  - RTC 1 month to assess nose for possible right cautery.  - Bacitracin ointment  - Motrin/tylenol for pain    Santos Nugent MD     "

## 2025-03-24 RX ORDER — CLOPIDOGREL BISULFATE 75 MG/1
TABLET ORAL
Qty: 94 TABLET | Refills: 1 | Status: SHIPPED | OUTPATIENT
Start: 2025-03-24

## 2025-03-25 ENCOUNTER — PATIENT MESSAGE (OUTPATIENT)
Dept: ADMINISTRATIVE | Facility: OTHER | Age: 50
End: 2025-03-25
Payer: MEDICAID

## 2025-03-26 DIAGNOSIS — E66.813 CLASS 3 SEVERE OBESITY DUE TO EXCESS CALORIES WITH SERIOUS COMORBIDITY AND BODY MASS INDEX (BMI) OF 50.0 TO 59.9 IN ADULT: Primary | ICD-10-CM

## 2025-03-26 DIAGNOSIS — E66.01 CLASS 3 SEVERE OBESITY DUE TO EXCESS CALORIES WITH SERIOUS COMORBIDITY AND BODY MASS INDEX (BMI) OF 50.0 TO 59.9 IN ADULT: Primary | ICD-10-CM

## 2025-03-31 ENCOUNTER — PATIENT MESSAGE (OUTPATIENT)
Dept: CARDIOLOGY | Facility: CLINIC | Age: 50
End: 2025-03-31
Payer: MEDICAID

## 2025-04-01 RX ORDER — ATORVASTATIN CALCIUM 80 MG/1
80 TABLET, FILM COATED ORAL DAILY
Qty: 90 TABLET | Refills: 3 | Status: SHIPPED | OUTPATIENT
Start: 2025-04-01

## 2025-04-17 ENCOUNTER — PATIENT MESSAGE (OUTPATIENT)
Dept: OBSTETRICS AND GYNECOLOGY | Facility: CLINIC | Age: 50
End: 2025-04-17

## 2025-04-17 RX ORDER — FEZOLINETANT 45 MG/1
45 TABLET, FILM COATED ORAL DAILY
Qty: 30 TABLET | Refills: 0 | Status: SHIPPED | OUTPATIENT
Start: 2025-04-17

## 2025-05-07 ENCOUNTER — HOSPITAL ENCOUNTER (OUTPATIENT)
Dept: CARDIOLOGY | Facility: HOSPITAL | Age: 50
Discharge: HOME | End: 2025-05-07
Attending: INTERNAL MEDICINE
Payer: MEDICAID

## 2025-05-07 ENCOUNTER — RESULTS FOLLOW-UP (OUTPATIENT)
Dept: CARDIOLOGY | Facility: CLINIC | Age: 50
End: 2025-05-07

## 2025-05-07 VITALS
HEART RATE: 82 BPM | SYSTOLIC BLOOD PRESSURE: 151 MMHG | WEIGHT: 280.19 LBS | BODY MASS INDEX: 51.56 KG/M2 | DIASTOLIC BLOOD PRESSURE: 81 MMHG | HEIGHT: 62 IN

## 2025-05-07 DIAGNOSIS — R07.2 PRECORDIAL PAIN: ICD-10-CM

## 2025-05-07 DIAGNOSIS — Z95.5 STENTED CORONARY ARTERY: ICD-10-CM

## 2025-05-07 LAB
CFR FLOW - ANTERIOR: 3.05
CFR FLOW - INFERIOR: 2.76
CFR FLOW - LATERAL: 2.49
CFR FLOW - MAX: 3.52
CFR FLOW - MIN: 1.8
CFR FLOW - SEPTAL: 2.76
CFR FLOW - WHOLE HEART: 2.77
CV STRESS BASE HR: 75 BPM
DIASTOLIC BLOOD PRESSURE: 74 MMHG
EJECTION FRACTION- HIGH: 59 %
END DIASTOLIC INDEX-HIGH: 155 ML/M2
END DIASTOLIC INDEX-LOW: 91 ML/M2
END SYSTOLIC INDEX-HIGH: 78 ML/M2
END SYSTOLIC INDEX-LOW: 40 ML/M2
NUC REST DIASTOLIC VOLUME INDEX: 83
NUC REST EJECTION FRACTION: 73
NUC REST SYSTOLIC VOLUME INDEX: 23
NUC STRESS DIASTOLIC VOLUME INDEX: 94
NUC STRESS EJECTION FRACTION: 75 %
NUC STRESS SYSTOLIC VOLUME INDEX: 23
OHS CV CPX 1 MINUTE RECOVERY HEART RATE: 82 BPM
OHS CV CPX 85 PERCENT MAX PREDICTED HEART RATE MALE: 145
OHS CV CPX MAX PREDICTED HEART RATE: 170
OHS CV CPX PATIENT IS FEMALE: 1
OHS CV CPX PATIENT IS MALE: 0
OHS CV CPX PEAK DIASTOLIC BLOOD PRESSURE: 62 MMHG
OHS CV CPX PEAK HEAR RATE: 100 BPM
OHS CV CPX PEAK RATE PRESSURE PRODUCT: NORMAL
OHS CV CPX PEAK SYSTOLIC BLOOD PRESSURE: 139 MMHG
OHS CV CPX PERCENT MAX PREDICTED HEART RATE ACHIEVED: 62
OHS CV CPX RATE PRESSURE PRODUCT PRESENTING: NORMAL
OHS CV INITIAL DOSE: 38.9 MCG/KG/MIN
OHS CV MODERATELY REDUCED FLOW CAPACITY: 0 %
OHS CV MYOCARDIAL STEAL: 0 %
OHS CV NO ISCHEMIA MILDLY REDUCED FLOW CAPACTY: 8 %
OHS CV NO ISCHEMIA MINIMALLY REDUCED FLOW CAPACITY: 62 %
OHS CV NON-TRANSMURAL MYOCARDIAL INFARCTION SINGLE CONTINUOUS REGION: 0 %
OHS CV NORMAL FLOW CAPACITY COMPARABLE TO HEALTHY YOUNG VOLUNTEERS: 30 %
OHS CV PEAK DOSE: 38.9 MCG/KG/MIN
OHS CV PET ID: 8774
OHS CV PRE-DOMINANTLY MYOCARDIAL SCAR: 0 %
OHS CV SEVERELY REDUCED FLOW CAPACITY LARGEST SINGLE CONTINUOUS REGION: 0 %
OHS CV SEVERELY REDUCED FLOW CAPACITY: 0 %
OHS CV TOTAL EXAM DLP: 586.9 MGY-CM
REST FLOW - ANTERIOR: 0.59 CC/MIN/G
REST FLOW - INFERIOR: 0.62 CC/MIN/G
REST FLOW - LATERAL: 0.74 CC/MIN/G
REST FLOW - MAX: 0.87 CC/MIN/G
REST FLOW - MIN: 0.43 CC/MIN/G
REST FLOW - SEPTAL: 0.6 CC/MIN/G
REST FLOW - WHOLE HEART: 0.64 CC/MIN/G
RETIRED EF AND QEF - SEE NOTES: 47 %
STRESS FLOW - ANTERIOR: 1.8 CC/MIN/G
STRESS FLOW - INFERIOR: 1.69 CC/MIN/G
STRESS FLOW - LATERAL: 1.85 CC/MIN/G
STRESS FLOW - MAX: 2.17 CC/MIN/G
STRESS FLOW - MIN: 0.86 CC/MIN/G
STRESS FLOW - SEPTAL: 1.65 CC/MIN/G
STRESS FLOW - WHOLE HEART: 1.75 CC/MIN/G
SYSTOLIC BLOOD PRESSURE: 150 MMHG

## 2025-05-07 PROCEDURE — 63600175 PHARM REV CODE 636 W HCPCS: Performed by: INTERNAL MEDICINE

## 2025-05-07 PROCEDURE — 93016 CV STRESS TEST SUPVJ ONLY: CPT | Mod: ,,, | Performed by: INTERNAL MEDICINE

## 2025-05-07 PROCEDURE — 93017 CV STRESS TEST TRACING ONLY: CPT

## 2025-05-07 PROCEDURE — 78431 MYOCRD IMG PET RST&STRS CT: CPT | Mod: 26,,, | Performed by: INTERNAL MEDICINE

## 2025-05-07 PROCEDURE — A9555 RB82 RUBIDIUM: HCPCS | Performed by: INTERNAL MEDICINE

## 2025-05-07 PROCEDURE — 93018 CV STRESS TEST I&R ONLY: CPT | Mod: ,,, | Performed by: INTERNAL MEDICINE

## 2025-05-07 PROCEDURE — 78434 AQMBF PET REST & RX STRESS: CPT | Mod: 26,,, | Performed by: INTERNAL MEDICINE

## 2025-05-07 RX ORDER — AMINOPHYLLINE 25 MG/ML
75 INJECTION, SOLUTION INTRAVENOUS ONCE
Status: COMPLETED | OUTPATIENT
Start: 2025-05-07 | End: 2025-05-07

## 2025-05-07 RX ORDER — REGADENOSON 0.08 MG/ML
0.4 INJECTION, SOLUTION INTRAVENOUS ONCE
Status: COMPLETED | OUTPATIENT
Start: 2025-05-07 | End: 2025-05-07

## 2025-05-07 RX ADMIN — REGADENOSON 0.4 MG: 0.08 INJECTION, SOLUTION INTRAVENOUS at 11:05

## 2025-05-07 RX ADMIN — AMINOPHYLLINE 75 MG: 25 INJECTION, SOLUTION INTRAVENOUS at 11:05

## 2025-05-07 RX ADMIN — RUBIDIUM CHLORIDE RB-82 38.9 MILLICURIE: 150 INJECTION, SOLUTION INTRAVENOUS at 10:05

## 2025-05-07 RX ADMIN — RUBIDIUM CHLORIDE RB-82 38.9 MILLICURIE: 150 INJECTION, SOLUTION INTRAVENOUS at 11:05

## 2025-05-09 ENCOUNTER — HOSPITAL ENCOUNTER (OUTPATIENT)
Dept: RADIOLOGY | Facility: HOSPITAL | Age: 50
Discharge: HOME | End: 2025-05-09
Attending: NURSE PRACTITIONER
Payer: MEDICAID

## 2025-05-09 VITALS — WEIGHT: 280 LBS | BODY MASS INDEX: 51.53 KG/M2 | HEIGHT: 62 IN

## 2025-05-09 DIAGNOSIS — Z12.31 ENCOUNTER FOR SCREENING MAMMOGRAM FOR BREAST CANCER: ICD-10-CM

## 2025-05-09 PROCEDURE — 77063 BREAST TOMOSYNTHESIS BI: CPT | Mod: 26,,, | Performed by: RADIOLOGY

## 2025-05-09 PROCEDURE — 77067 SCR MAMMO BI INCL CAD: CPT | Mod: TC

## 2025-05-09 PROCEDURE — 77067 SCR MAMMO BI INCL CAD: CPT | Mod: 26,,, | Performed by: RADIOLOGY

## 2025-05-12 ENCOUNTER — LAB VISIT (OUTPATIENT)
Dept: LAB | Facility: HOSPITAL | Age: 50
End: 2025-05-12
Attending: OBSTETRICS & GYNECOLOGY
Payer: MEDICAID

## 2025-05-12 DIAGNOSIS — Z01.419 WELL FEMALE EXAM WITH ROUTINE GYNECOLOGICAL EXAM: ICD-10-CM

## 2025-05-12 LAB
ALBUMIN SERPL BCP-MCNC: 3.7 G/DL (ref 3.5–5.2)
ALP SERPL-CCNC: 132 UNIT/L (ref 40–150)
ALT SERPL W/O P-5'-P-CCNC: 24 UNIT/L (ref 10–44)
AST SERPL-CCNC: 17 UNIT/L (ref 11–45)
BILIRUB DIRECT SERPL-MCNC: 0.2 MG/DL (ref 0.1–0.3)
BILIRUB SERPL-MCNC: 0.5 MG/DL (ref 0.1–1)
PROT SERPL-MCNC: 7.2 GM/DL (ref 6–8.4)

## 2025-05-12 PROCEDURE — 84450 TRANSFERASE (AST) (SGOT): CPT

## 2025-05-12 PROCEDURE — 36415 COLL VENOUS BLD VENIPUNCTURE: CPT

## 2025-05-14 ENCOUNTER — RESULTS FOLLOW-UP (OUTPATIENT)
Dept: PRIMARY CARE CLINIC | Facility: CLINIC | Age: 50
End: 2025-05-14

## 2025-05-17 ENCOUNTER — RESULTS FOLLOW-UP (OUTPATIENT)
Dept: OBSTETRICS AND GYNECOLOGY | Facility: HOSPITAL | Age: 50
End: 2025-05-17

## 2025-05-20 ENCOUNTER — OFFICE VISIT (OUTPATIENT)
Dept: PRIMARY CARE CLINIC | Facility: CLINIC | Age: 50
End: 2025-05-20
Payer: MEDICAID

## 2025-05-20 VITALS
HEART RATE: 85 BPM | TEMPERATURE: 98 F | HEIGHT: 62 IN | BODY MASS INDEX: 51.88 KG/M2 | WEIGHT: 281.94 LBS | SYSTOLIC BLOOD PRESSURE: 118 MMHG | OXYGEN SATURATION: 95 % | DIASTOLIC BLOOD PRESSURE: 83 MMHG

## 2025-05-20 DIAGNOSIS — R53.83 FATIGUE, UNSPECIFIED TYPE: ICD-10-CM

## 2025-05-20 DIAGNOSIS — E66.813 CLASS 3 SEVERE OBESITY WITH SERIOUS COMORBIDITY AND BODY MASS INDEX (BMI) OF 50.0 TO 59.9 IN ADULT, UNSPECIFIED OBESITY TYPE: Primary | ICD-10-CM

## 2025-05-20 DIAGNOSIS — E66.01 CLASS 3 SEVERE OBESITY WITH SERIOUS COMORBIDITY AND BODY MASS INDEX (BMI) OF 50.0 TO 59.9 IN ADULT, UNSPECIFIED OBESITY TYPE: Primary | ICD-10-CM

## 2025-05-20 PROCEDURE — G0447 BEHAVIOR COUNSEL OBESITY 15M: HCPCS | Mod: PBBFAC,PN

## 2025-05-20 PROCEDURE — 99213 OFFICE O/P EST LOW 20 MIN: CPT | Mod: PBBFAC,PN

## 2025-05-20 PROCEDURE — 99999 PR PBB SHADOW E&M-EST. PATIENT-LVL III: CPT | Mod: PBBFAC,,,

## 2025-05-20 RX ORDER — TIRZEPATIDE 2.5 MG/.5ML
INJECTION, SOLUTION SUBCUTANEOUS
Qty: 2 ML | Refills: 0 | Status: SHIPPED | OUTPATIENT
Start: 2025-05-20

## 2025-05-26 NOTE — PROGRESS NOTES
"Subjective:       Patient ID: Marianne Go is a 49 y.o. female.    Chief Complaint: obesity  HPI    Marianne Go is a 49 y.o. year old female  who comes to clinic for obesity    WEIGHT MANAGEMENT:  She had an initial consultation with a weight loss specialist. Wegovy was discussed but not covered by insurance. She has been referred to bariatric surgery with an appointment scheduled for June 4th.  Patient had NSTEMI with stents placed x3 on RCA on May/2023.  Patient has been taking Plavix, aspirin and atorvastatin and metoprolol.  Had echo at that time that showed normal ejection fraction.  Cath: 5/23: left main: normal; LAD mid 55%; D1 50%; OM3 50%; right 100% ; stent 3X20 right     EXERCISE AND RESPIRATORY SYMPTOMS:  Her current exercise routine includes walking and strength training with weights. She completed extensive walking during recent graduation events without knee pain. However, she experiences severe breathlessness during cardio exercises, sometimes requiring her to stop due to difficulty catching her breath.       ROS negative except as above  Objective:      /83 (BP Location: Left arm, Patient Position: Sitting)   Pulse 85   Temp 98 °F (36.7 °C) (Oral)   Ht 5' 2" (1.575 m)   Wt 127.9 kg (281 lb 15.5 oz)   LMP 10/13/2017 (Approximate)   SpO2 95%   BMI 51.57 kg/m²    Physical Exam  Vitals reviewed.   Constitutional:       Appearance: Normal appearance. She is obese.   HENT:      Head: Normocephalic.      Mouth/Throat:      Mouth: Mucous membranes are moist.   Cardiovascular:      Rate and Rhythm: Normal rate and regular rhythm.      Pulses: Normal pulses.      Heart sounds: Normal heart sounds.   Pulmonary:      Effort: Pulmonary effort is normal.      Breath sounds: Normal breath sounds. No wheezing or rhonchi.   Abdominal:      General: Abdomen is flat. There is no distension.   Musculoskeletal:         General: No swelling. Normal range of motion.      Cervical back: Normal range of " motion.   Skin:     General: Skin is warm.      Coloration: Skin is not jaundiced.   Neurological:      Mental Status: She is alert.         Assessment:       1. Class 3 severe obesity with serious comorbidity and body mass index (BMI) of 50.0 to 59.9 in adult, unspecified obesity type    2. Fatigue, unspecified type          Plan:       1. Class 3 severe obesity with serious comorbidity and body mass index (BMI) of 50.0 to 59.9 in adult, unspecified obesity type (Primary)  Body mass index is 51.57 kg/m². , comorbidity includes CAD  Discussed the following:  Goal of 150 minutes aerobic exercise weekly. Recommend at least 30 mins, 3 days weekly and work up to 5 days.  Target heart rate while performing aerobic activity.   Monitoring caloric intake  Organizing plate with half vegetables, quarter whole grain starches or starchy vegetables, and quarter lean protein.   Goal of at least 1 vegetarian meal weekly and emphasizing fresh vegetables and fruits in diet.   Emphasize decreasing processed foods, soda, and eating out as these frequently have added unnecessary calories.  Time Spent: A total of 16 minutes was spent in face-to-face behavioral counseling focused on weight loss and obesity management  - tirzepatide, weight loss, (ZEPBOUND) 2.5 mg/0.5 mL PnIj;  Dispense: 2 mL; Refill: 0    2. Fatigue, unspecified type  Fatigue most likely related to obesity. We will decreased appetite to the pharmacy  - tirzepatide, weight loss, (ZEPBOUND) 2.5 mg/0.5 mL PnIj;  Dispense: 2 mL; Refill: 0        Follow up in about 4 weeks (around 6/17/2025) for wt loss f/u.      Jake Weber M.D.    This note was generated with the assistance of ambient listening technology. Verbal consent was obtained by the patient and accompanying visitor(s) for the recording of patient appointment to facilitate this note. I attest to having reviewed and edited the generated note for accuracy, though some syntax or spelling errors may persist. Please  contact the author of this note for any clarification.     I spent a total of 30 minutes on the day of the visit.This includes face to face time and non-face to face time preparing to see the patient (eg, review of tests), obtaining and/or reviewing separately obtained history, documenting clinical information in the electronic or other health record, independently interpreting results and communicating results to the patient/family/caregiver, or care coordinator.

## 2025-06-07 DIAGNOSIS — F32.0 CURRENT MILD EPISODE OF MAJOR DEPRESSIVE DISORDER WITHOUT PRIOR EPISODE: Primary | ICD-10-CM

## 2025-06-07 RX ORDER — VENLAFAXINE HYDROCHLORIDE 75 MG/1
75 CAPSULE, EXTENDED RELEASE ORAL DAILY
Qty: 90 CAPSULE | Refills: 3 | Status: SHIPPED | OUTPATIENT
Start: 2025-06-07 | End: 2026-06-07

## 2025-06-07 NOTE — TELEPHONE ENCOUNTER
Refill Routing Note   Medication(s) are not appropriate for processing by [facility] for the following reason(s):        Non-participating provider    [facility] action(s):  Route               Appointments  past 12m or future 3m with PCP    Date Provider   Last Visit   5/20/2025 Jake Weber MD   Next Visit   6/17/2025 Jake Weber MD   ED visits in past 90 days: 0        Note composed:12:28 PM 06/07/2025

## 2025-06-17 ENCOUNTER — OFFICE VISIT (OUTPATIENT)
Dept: PRIMARY CARE CLINIC | Facility: CLINIC | Age: 50
End: 2025-06-17
Payer: MEDICAID

## 2025-06-17 ENCOUNTER — PATIENT OUTREACH (OUTPATIENT)
Dept: ADMINISTRATIVE | Facility: OTHER | Age: 50
End: 2025-06-17
Payer: MEDICAID

## 2025-06-17 VITALS
HEIGHT: 62 IN | OXYGEN SATURATION: 95 % | HEART RATE: 70 BPM | DIASTOLIC BLOOD PRESSURE: 74 MMHG | BODY MASS INDEX: 51.38 KG/M2 | WEIGHT: 279.19 LBS | SYSTOLIC BLOOD PRESSURE: 111 MMHG

## 2025-06-17 DIAGNOSIS — K21.9 GASTROESOPHAGEAL REFLUX DISEASE, UNSPECIFIED WHETHER ESOPHAGITIS PRESENT: ICD-10-CM

## 2025-06-17 DIAGNOSIS — E66.01 CLASS 3 SEVERE OBESITY WITH SERIOUS COMORBIDITY AND BODY MASS INDEX (BMI) OF 50.0 TO 59.9 IN ADULT, UNSPECIFIED OBESITY TYPE: Primary | ICD-10-CM

## 2025-06-17 DIAGNOSIS — E66.813 CLASS 3 SEVERE OBESITY WITH SERIOUS COMORBIDITY AND BODY MASS INDEX (BMI) OF 50.0 TO 59.9 IN ADULT, UNSPECIFIED OBESITY TYPE: Primary | ICD-10-CM

## 2025-06-17 DIAGNOSIS — R53.83 FATIGUE, UNSPECIFIED TYPE: ICD-10-CM

## 2025-06-17 PROCEDURE — G2211 COMPLEX E/M VISIT ADD ON: HCPCS | Mod: ,,,

## 2025-06-17 PROCEDURE — 3074F SYST BP LT 130 MM HG: CPT | Mod: CPTII,,,

## 2025-06-17 PROCEDURE — 99999 PR PBB SHADOW E&M-EST. PATIENT-LVL III: CPT | Mod: PBBFAC,,,

## 2025-06-17 PROCEDURE — 3078F DIAST BP <80 MM HG: CPT | Mod: CPTII,,,

## 2025-06-17 PROCEDURE — 99214 OFFICE O/P EST MOD 30 MIN: CPT | Mod: S$PBB,,,

## 2025-06-17 PROCEDURE — 3008F BODY MASS INDEX DOCD: CPT | Mod: CPTII,,,

## 2025-06-17 PROCEDURE — 1159F MED LIST DOCD IN RCRD: CPT | Mod: CPTII,,,

## 2025-06-17 PROCEDURE — 1160F RVW MEDS BY RX/DR IN RCRD: CPT | Mod: CPTII,,,

## 2025-06-17 PROCEDURE — G0447 BEHAVIOR COUNSEL OBESITY 15M: HCPCS | Mod: PBBFAC,PN

## 2025-06-17 PROCEDURE — 99213 OFFICE O/P EST LOW 20 MIN: CPT | Mod: PBBFAC,PN

## 2025-06-17 PROCEDURE — 3044F HG A1C LEVEL LT 7.0%: CPT | Mod: CPTII,,,

## 2025-06-17 RX ORDER — PANTOPRAZOLE SODIUM 40 MG/1
40 TABLET, DELAYED RELEASE ORAL DAILY
Qty: 90 TABLET | Refills: 0 | Status: SHIPPED | OUTPATIENT
Start: 2025-06-17 | End: 2025-06-19 | Stop reason: SDUPTHER

## 2025-06-17 NOTE — PROGRESS NOTES
CHW - Initial Contact    This Community Health Worker updated the Social Determinant of Health questionnaire with patient during clinic visit today.    Pt identified barriers of most importance are: NONE   Referrals to community agencies completed with patient/caregiver consent outside of [facility] include: NONE  Referrals were put through [facility] - : NO  Support and Services: No support & services have been documented.  Other information discussed the patient needs / wants help with: NONE   Follow up required: NO  No future outreach task assigned

## 2025-06-19 ENCOUNTER — PATIENT MESSAGE (OUTPATIENT)
Dept: PRIMARY CARE CLINIC | Facility: CLINIC | Age: 50
End: 2025-06-19
Payer: MEDICAID

## 2025-06-19 DIAGNOSIS — K21.9 GASTROESOPHAGEAL REFLUX DISEASE WITHOUT ESOPHAGITIS: Primary | ICD-10-CM

## 2025-06-19 DIAGNOSIS — K21.9 GASTROESOPHAGEAL REFLUX DISEASE, UNSPECIFIED WHETHER ESOPHAGITIS PRESENT: ICD-10-CM

## 2025-06-19 RX ORDER — PANTOPRAZOLE SODIUM 40 MG/1
40 TABLET, DELAYED RELEASE ORAL DAILY
Qty: 90 TABLET | Refills: 0 | Status: SHIPPED | OUTPATIENT
Start: 2025-06-19 | End: 2025-09-17

## 2025-06-30 NOTE — PROGRESS NOTES
"Subjective:       Patient ID: Marianne Go is a 49 y.o. female.    Chief Complaint: Obesity  HPI    Marianne Go is a 49 y.o. year old female  who comes to clinic for obesity follow up and weight loss medication.    WEIGHT MANAGEMENT:  She had an initial consultation with a weight loss specialist. Wegovy was discussed but not covered by insurance. She has been referred to bariatric surgery.  Patient had NSTEMI with stents placed x3 on RCA on May/2023.  Patient has been taking Plavix, aspirin and atorvastatin and metoprolol.  Had echo at that time that showed normal ejection fraction.  Cath: 5/23: left main: normal; LAD mid 55%; D1 50%; OM3 50%; right 100% ; stent 3X20 right     GERD:  She reports heartburn with any food consumed, particularly worse after dinner and when lying down. She experiences nocturnal heartburn. Specific triggers include onions (both cooked and raw), tomato-based foods (paste, sauce, red sauce), and spicy foods since her heart stent placement. Tums provides minimal relief. She denies throat pain or raspy voice.    GASTROINTESTINAL:  Her bowel movement frequency has decreased from 3 times daily to barely daily, though she denies straining, constipation, or diarrhea.    CARDIOVASCULAR:  She has a history of myocardial infarction with subsequent stent placement.    MUSCULOSKELETAL:  She experienced knee pain and significant swelling after her dog jumped at her knee during a walk, which persisted for approximately one week. Despite this, she continues to exercise and reports feeling stronger overall.    ROS negative except as above  Objective:      /74 (BP Location: Right arm, Patient Position: Sitting)   Pulse 70   Ht 5' 2" (1.575 m)   Wt 126.6 kg (279 lb 3.4 oz)   LMP 10/13/2017 (Approximate)   SpO2 95%   BMI 51.07 kg/m²    Physical Exam  Vitals reviewed.   Constitutional:       Appearance: Normal appearance. She is obese.   HENT:      Head: Normocephalic.      Mouth/Throat:    "   Mouth: Mucous membranes are moist.   Cardiovascular:      Rate and Rhythm: Normal rate and regular rhythm.      Pulses: Normal pulses.      Heart sounds: Normal heart sounds.   Pulmonary:      Effort: Pulmonary effort is normal.      Breath sounds: Normal breath sounds. No wheezing or rhonchi.   Abdominal:      General: Abdomen is flat. There is no distension.   Musculoskeletal:         General: No swelling. Normal range of motion.      Cervical back: Normal range of motion.   Skin:     General: Skin is warm.      Coloration: Skin is not jaundiced.   Neurological:      Mental Status: She is alert.         Assessment:       1. Class 3 severe obesity with serious comorbidity and body mass index (BMI) of 50.0 to 59.9 in adult, unspecified obesity type    2. Gastroesophageal reflux disease, unspecified whether esophagitis present    3. Fatigue, unspecified type          Plan:       1. Class 3 severe obesity with serious comorbidity and body mass index (BMI) of 50.0 to 59.9 in adult, unspecified obesity type (Primary)  Body mass index is 51.57 kg/m². , comorbidity includes CAD  Discussed the following:  Goal of 150 minutes aerobic exercise weekly. Recommend at least 30 mins, 3 days weekly and work up to 5 days.  Target heart rate while performing aerobic activity.   Monitoring caloric intake  Organizing plate with half vegetables, quarter whole grain starches or starchy vegetables, and quarter lean protein.   Goal of at least 1 vegetarian meal weekly and emphasizing fresh vegetables and fruits in diet.   Emphasize decreasing processed foods, soda, and eating out as these frequently have added unnecessary calories.  Time Spent: A total of 16 minutes was spent in face-to-face behavioral counseling focused on weight loss and obesity management  - tirzepatide 5 mg/0.5 mL PnIj; Inject 5 mg into the skin every 7 days. Tirzepatide-methylcobalamin 16.75 mg - 1mg/ml (Inject 24 units [4 mg] subcutaneously every week.  Dispense:  2 mL; Refill: 0    2. Gastroesophageal reflux disease, unspecified whether esophagitis present  Counseled on dietary modifications to improve acid reflux. Obesity most likely contributing to this condition as well. We will start a trial of Protonix    3. Fatigue, unspecified type  Fatigue could be related to obesity. Counseled patient on weight loss and exercise as noted above.  - tirzepatide 5 mg/0.5 mL PnIj; Inject 5 mg into the skin every 7 days. Tirzepatide-methylcobalamin 16.75 mg - 1mg/ml (Inject 24 units [4 mg] subcutaneously every week.  Dispense: 2 mL; Refill: 0        Follow up in about 4 weeks (around 7/15/2025) for wt loss .      Jake Weber M.D.    This note was generated with the assistance of ambient listening technology. Verbal consent was obtained by the patient and accompanying visitor(s) for the recording of patient appointment to facilitate this note. I attest to having reviewed and edited the generated note for accuracy, though some syntax or spelling errors may persist. Please contact the author of this note for any clarification.

## 2025-07-22 ENCOUNTER — OFFICE VISIT (OUTPATIENT)
Dept: PRIMARY CARE CLINIC | Facility: CLINIC | Age: 50
End: 2025-07-22
Payer: MEDICAID

## 2025-07-22 VITALS
HEART RATE: 68 BPM | HEIGHT: 62 IN | WEIGHT: 275 LBS | BODY MASS INDEX: 50.61 KG/M2 | OXYGEN SATURATION: 93 % | SYSTOLIC BLOOD PRESSURE: 119 MMHG | DIASTOLIC BLOOD PRESSURE: 83 MMHG

## 2025-07-22 DIAGNOSIS — M72.2 PLANTAR FASCIITIS: Primary | ICD-10-CM

## 2025-07-22 DIAGNOSIS — R53.83 FATIGUE, UNSPECIFIED TYPE: ICD-10-CM

## 2025-07-22 DIAGNOSIS — E66.813 CLASS 3 SEVERE OBESITY DUE TO EXCESS CALORIES WITH SERIOUS COMORBIDITY AND BODY MASS INDEX (BMI) OF 50.0 TO 59.9 IN ADULT: ICD-10-CM

## 2025-07-22 PROCEDURE — G0447 BEHAVIOR COUNSEL OBESITY 15M: HCPCS | Mod: PBBFAC,PN

## 2025-07-22 PROCEDURE — 3008F BODY MASS INDEX DOCD: CPT | Mod: CPTII,,,

## 2025-07-22 PROCEDURE — 99214 OFFICE O/P EST MOD 30 MIN: CPT | Mod: S$PBB,,,

## 2025-07-22 PROCEDURE — 3074F SYST BP LT 130 MM HG: CPT | Mod: CPTII,,,

## 2025-07-22 PROCEDURE — G2211 COMPLEX E/M VISIT ADD ON: HCPCS | Mod: ,,,

## 2025-07-22 PROCEDURE — 3079F DIAST BP 80-89 MM HG: CPT | Mod: CPTII,,,

## 2025-07-22 PROCEDURE — 1160F RVW MEDS BY RX/DR IN RCRD: CPT | Mod: CPTII,,,

## 2025-07-22 PROCEDURE — 99214 OFFICE O/P EST MOD 30 MIN: CPT | Mod: PBBFAC,PN

## 2025-07-22 PROCEDURE — 99999 PR PBB SHADOW E&M-EST. PATIENT-LVL IV: CPT | Mod: PBBFAC,,,

## 2025-07-22 PROCEDURE — 1159F MED LIST DOCD IN RCRD: CPT | Mod: CPTII,,,

## 2025-07-22 PROCEDURE — 3044F HG A1C LEVEL LT 7.0%: CPT | Mod: CPTII,,,

## 2025-07-27 NOTE — PROGRESS NOTES
Subjective:       Patient ID: Marianne Go is a 49 y.o. female.    Chief Complaint: Weight loss follow up  HPI    WEIGHT MANAGEMENT:  She had an initial consultation with a weight loss specialist. Wegovy was discussed but not covered by insurance. She has been referred to bariatric surgery.  Patient had NSTEMI with stents placed x3 on RCA on May/2023.  Patient has been taking Plavix, aspirin and atorvastatin and metoprolol.  Had echo at that time that showed normal ejection fraction.  Cath: 5/23: left main: normal; LAD mid 55%; D1 50%; OM3 50%; right 100% ; stent 3X20 right   Body mass index is 50.3 kg/m².  Today she weight is 275 lb. Has lost approximately 8 lb in the past 6 months.  She notes positive changes in body composition with decreased clothing sizes, despite weight remaining relatively stable on her tracking chart.    EXERCISE:  She maintains an active exercise regimen 5 days per week, including 30-60 minutes of cardio using recumbent bike and elliptical. She performs weight training once weekly and acknowledges need to increase resistance training frequency.    GASTROINTESTINAL:  She reports new onset lettuce intolerance starting approximately two weeks ago. She experiences severe diarrhea after consuming any type of lettuce (spinach, brittany, iceberg), with undigested lettuce pieces visible in stool. Symptoms occur consistently with all lettuce varieties, regardless of preparation. She tolerates other vegetables without issue, including cucumbers, tomatoes, and steamed vegetables.  Last visit she was complaining of heartburn.  Protonix has controlled her symptoms.    MUSCULOSKELETAL:  She reports right plantar fasciitis for two weeks with severe morning pain significantly impacting mobility. She is limited to wearing tennis shoes and Vion[employer] due to foot discomfort. She experiences sharp pain with movement.    RESOLVED SYMPTOMS:  She reports resolution of previous acid reflux, nighttime attacks, and  "morning sore throat with current medication. Previous chest pain with exertion has resolved, which she attributes to improved breathing resistance and techniques. She also reports significant improvement in knee pain.    ROS negative except as above  Objective:      /83 (BP Location: Left arm, Patient Position: Sitting)   Pulse 68   Ht 5' 2" (1.575 m)   Wt 124.7 kg (275 lb)   LMP 10/13/2017 (Approximate)   SpO2 (!) 93%   BMI 50.30 kg/m²    Physical Exam  Vitals reviewed.   Constitutional:       Appearance: Normal appearance. She is obese.   HENT:      Head: Normocephalic.      Mouth/Throat:      Mouth: Mucous membranes are moist.   Cardiovascular:      Rate and Rhythm: Normal rate and regular rhythm.      Pulses: Normal pulses.      Heart sounds: Normal heart sounds.   Pulmonary:      Effort: Pulmonary effort is normal.      Breath sounds: Normal breath sounds. No wheezing or rhonchi.   Abdominal:      General: Abdomen is flat. There is no distension.   Musculoskeletal:         General: No swelling. Normal range of motion.      Cervical back: Normal range of motion.   Skin:     General: Skin is warm.      Coloration: Skin is not jaundiced.   Neurological:      Mental Status: She is alert.         Assessment:       1. Plantar fasciitis    2. Class 3 severe obesity due to excess calories with serious comorbidity and body mass index (BMI) of 50.0 to 59.9 in adult    3. Fatigue, unspecified type          Plan:       1. Class 3 severe obesity with serious comorbidity and body mass index (BMI) of 50.0 to 59.9 in adult, unspecified obesity type  Body mass index is 50.3 kg/m²., comorbidity includes CAD  Has lost approximately 8 lb in the past 6 months  Discussed the following:  Goal of 150 minutes aerobic exercise weekly. Recommend at least 30 mins, 3 days weekly and work up to 5 days.  Target heart rate while performing aerobic activity.   Monitoring caloric intake  Organizing plate with half vegetables, " quarter whole grain starches or starchy vegetables, and quarter lean protein.   Goal of at least 1 vegetarian meal weekly and emphasizing fresh vegetables and fruits in diet.   Emphasize decreasing processed foods, soda, and eating out as these frequently have added unnecessary calories.  Time Spent: A total of 16 minutes was spent in face-to-face behavioral counseling focused on weight loss and obesity management  - tirzepatide 7.5 mg/0.5 mL PnIj; Inject 7.5 mg into the skin every 7 days. Tirzepatide-methylcobalamin 16.75 mg - 1mg/ml (Inject 36 units [6 mg] subcutaneously every week  Dispense: 2 mL; Refill: 3    2. Fatigue, unspecified type  Body mass index is 50.3 kg/m²., comorbidity includes CAD  Discussed the following:  Goal of 150 minutes aerobic exercise weekly. Recommend at least 30 mins, 3 days weekly and work up to 5 days.  Target heart rate while performing aerobic activity.   Monitoring caloric intake  Organizing plate with half vegetables, quarter whole grain starches or starchy vegetables, and quarter lean protein.   Goal of at least 1 vegetarian meal weekly and emphasizing fresh vegetables and fruits in diet.   Emphasize decreasing processed foods, soda, and eating out as these frequently have added unnecessary calories.  Time Spent: A total of 16 minutes was spent in face-to-face behavioral counseling focused on weight loss and obesity management  - tirzepatide 7.5 mg/0.5 mL PnIj; Inject 7.5 mg into the skin every 7 days. Tirzepatide-methylcobalamin 16.75 mg - 1mg/ml (Inject 36 units [6 mg] subcutaneously every week  Dispense: 2 mL; Refill: 3    3. Plantar fasciitis (Primary)  - Advised pt to adhere to exercise regimen for long-term.   - Advised foot stretching exercises, which was demonstrated for pt, as well as ice pack, rest.  Heel stretching with towels, calf stretching against wall, frozen can roll/icing.  - Avoid high heels if at all possible.   - Patient was recommended to wear comfortable  shoe and inserts, avoid flip flops and flat sandals, high heel/high arch shoes, prolonged weight bearing and walking on hard surfaces.  - Handouts given for stretches.      Follow up in about 4 weeks (around 8/19/2025) for wt loss follow up .      Jake Weber M.D.    This note was generated with the assistance of ambient listening technology. Verbal consent was obtained by the patient and accompanying visitor(s) for the recording of patient appointment to facilitate this note. I attest to having reviewed and edited the generated note for accuracy, though some syntax or spelling errors may persist. Please contact the author of this note for any clarification.

## 2025-07-28 RX ORDER — NAPROXEN SODIUM 220 MG/1
81 TABLET, FILM COATED ORAL DAILY
Qty: 90 TABLET | Refills: 3 | Status: SHIPPED | OUTPATIENT
Start: 2025-07-28 | End: 2026-07-28

## 2025-08-08 ENCOUNTER — OFFICE VISIT (OUTPATIENT)
Dept: OTOLARYNGOLOGY | Facility: CLINIC | Age: 50
End: 2025-08-08
Payer: MEDICAID

## 2025-08-08 VITALS
SYSTOLIC BLOOD PRESSURE: 110 MMHG | DIASTOLIC BLOOD PRESSURE: 72 MMHG | BODY MASS INDEX: 50.28 KG/M2 | WEIGHT: 274.94 LBS

## 2025-08-08 DIAGNOSIS — J34.2 NASAL SEPTAL DEVIATION: Primary | ICD-10-CM

## 2025-08-08 DIAGNOSIS — R04.0 EPISTAXIS: ICD-10-CM

## 2025-08-08 PROCEDURE — 99212 OFFICE O/P EST SF 10 MIN: CPT | Mod: PBBFAC | Performed by: STUDENT IN AN ORGANIZED HEALTH CARE EDUCATION/TRAINING PROGRAM

## 2025-08-08 PROCEDURE — 99999 PR PBB SHADOW E&M-EST. PATIENT-LVL II: CPT | Mod: PBBFAC,,, | Performed by: STUDENT IN AN ORGANIZED HEALTH CARE EDUCATION/TRAINING PROGRAM

## 2025-08-08 RX ORDER — MUPIROCIN 20 MG/G
OINTMENT TOPICAL 3 TIMES DAILY
Qty: 22 G | Refills: 6 | Status: SHIPPED | OUTPATIENT
Start: 2025-08-08

## 2025-08-08 NOTE — PROGRESS NOTES
"      Subjective:      Marianne is a 49 y.o. female who comes for follow-up of epistaxis.  Her last visit with me was on 3/18/25.  Overall doing well.  Bleeding much improved.  Does still report having some episodic left-sided bleeding after blowing her nose likely related to crusting buildup.  Using saline irrigations.    Her current sinus regime consists of:  Nasal saline.    The patient's medications, allergies, past medical, surgical, social and family histories were reviewed and updated as appropriate.    ROS     A detailed review of systems was obtained with pertinent positives as per the above HPI, and otherwise negative.        Objective:     /72   Wt 124.7 kg (274 lb 14.6 oz)   LMP 10/13/2017 (Approximate)   BMI 50.28 kg/m²        Constitutional:   Vital signs are normal. She appears well-developed. Normal speech.      Head:  Normocephalic and atraumatic.     Ears:    Right Ear: No drainage or tenderness. No middle ear effusion.   Left Ear: No drainage or tenderness.  No middle ear effusion.     Mouth/Throat  Oropharynx clear and moist without lesions or asymmetry and normal uvula midline. No trismus. No oropharyngeal exudate. Mirror exam not performed due to patient tolerance.  Mirror exam not performed due to patient tolerance.      Neck:  Neck normal without thyromegaly masses, asymmetry, normal tracheal structure, crepitus, and tenderness, thyroid normal and trachea normal.     Pulmonary/Chest:   Effort normal.     Psychiatric:   She has a normal mood and affect. Her speech is normal.     Skin:   No abrasions, lacerations, lesions, or rashes.       Procedure    None      Data Reviewed    WBC (K/uL)   Date Value   02/10/2025 8.90     Eosinophil % (%)   Date Value   02/10/2025 2.7     Eos # (K/uL)   Date Value   02/10/2025 0.2     Platelets (K/uL)   Date Value   02/10/2025 359     Glucose (mg/dL)   Date Value   02/10/2025 79     No results found for: "IGE"    No sinus imaging " available.      Assessment:     1. Nasal septal deviation    2. Epistaxis        Plan:     - Bacitracin ointment nightly.  - aggressive saline irrigations.  - RTC 6 months.    Santos Nugent MD

## 2025-08-11 ENCOUNTER — LAB VISIT (OUTPATIENT)
Dept: LAB | Facility: HOSPITAL | Age: 50
End: 2025-08-11
Attending: OBSTETRICS & GYNECOLOGY
Payer: MEDICAID

## 2025-08-11 DIAGNOSIS — Z01.419 WELL FEMALE EXAM WITH ROUTINE GYNECOLOGICAL EXAM: ICD-10-CM

## 2025-08-11 LAB
ALBUMIN SERPL BCP-MCNC: 3.8 G/DL (ref 3.5–5.2)
ALP SERPL-CCNC: 104 UNIT/L (ref 40–150)
ALT SERPL W/O P-5'-P-CCNC: 27 UNIT/L (ref 0–55)
AST SERPL-CCNC: 25 UNIT/L (ref 0–50)
BILIRUB DIRECT SERPL-MCNC: 0.3 MG/DL (ref 0.1–0.3)
BILIRUB SERPL-MCNC: 0.6 MG/DL (ref 0.1–1)
PROT SERPL-MCNC: 7.2 GM/DL (ref 6–8.4)

## 2025-08-11 PROCEDURE — 82040 ASSAY OF SERUM ALBUMIN: CPT

## 2025-08-11 PROCEDURE — 36415 COLL VENOUS BLD VENIPUNCTURE: CPT

## 2025-08-19 ENCOUNTER — OFFICE VISIT (OUTPATIENT)
Dept: PRIMARY CARE CLINIC | Facility: CLINIC | Age: 50
End: 2025-08-19
Payer: MEDICAID

## 2025-08-19 ENCOUNTER — LAB VISIT (OUTPATIENT)
Dept: PRIMARY CARE CLINIC | Facility: CLINIC | Age: 50
End: 2025-08-19
Payer: MEDICAID

## 2025-08-19 VITALS
HEIGHT: 62 IN | WEIGHT: 273.63 LBS | SYSTOLIC BLOOD PRESSURE: 108 MMHG | BODY MASS INDEX: 50.35 KG/M2 | OXYGEN SATURATION: 96 % | DIASTOLIC BLOOD PRESSURE: 80 MMHG | HEART RATE: 70 BPM

## 2025-08-19 DIAGNOSIS — R73.03 PREDIABETES: ICD-10-CM

## 2025-08-19 DIAGNOSIS — I25.10 CORONARY ARTERY DISEASE INVOLVING NATIVE CORONARY ARTERY OF NATIVE HEART WITHOUT ANGINA PECTORIS: ICD-10-CM

## 2025-08-19 DIAGNOSIS — E78.1 HYPERTRIGLYCERIDEMIA: ICD-10-CM

## 2025-08-19 DIAGNOSIS — E66.813 CLASS 3 SEVERE OBESITY DUE TO EXCESS CALORIES WITH SERIOUS COMORBIDITY AND BODY MASS INDEX (BMI) OF 50.0 TO 59.9 IN ADULT: Primary | ICD-10-CM

## 2025-08-19 DIAGNOSIS — R53.83 FATIGUE, UNSPECIFIED TYPE: ICD-10-CM

## 2025-08-19 LAB
ABSOLUTE EOSINOPHIL (OHS): 0.26 K/UL
ABSOLUTE MONOCYTE (OHS): 0.53 K/UL (ref 0.3–1)
ABSOLUTE NEUTROPHIL COUNT (OHS): 4.93 K/UL (ref 1.8–7.7)
ALBUMIN SERPL BCP-MCNC: 4 G/DL (ref 3.5–5.2)
ALP SERPL-CCNC: 109 UNIT/L (ref 40–150)
ALT SERPL W/O P-5'-P-CCNC: 32 UNIT/L (ref 0–55)
ANION GAP (OHS): 13 MMOL/L (ref 8–16)
AST SERPL-CCNC: 35 UNIT/L (ref 0–50)
BASOPHILS # BLD AUTO: 0.06 K/UL
BASOPHILS NFR BLD AUTO: 0.7 %
BILIRUB SERPL-MCNC: 0.6 MG/DL (ref 0.1–1)
BUN SERPL-MCNC: 17 MG/DL (ref 6–20)
CALCIUM SERPL-MCNC: 9.5 MG/DL (ref 8.7–10.5)
CHLORIDE SERPL-SCNC: 106 MMOL/L (ref 95–110)
CHOLEST SERPL-MCNC: 114 MG/DL (ref 120–199)
CHOLEST/HDLC SERPL: 3.7 {RATIO} (ref 2–5)
CO2 SERPL-SCNC: 19 MMOL/L (ref 23–29)
CREAT SERPL-MCNC: 0.8 MG/DL (ref 0.5–1.4)
EAG (OHS): 100 MG/DL (ref 68–131)
ERYTHROCYTE [DISTWIDTH] IN BLOOD BY AUTOMATED COUNT: 12.8 % (ref 11.5–14.5)
GFR SERPLBLD CREATININE-BSD FMLA CKD-EPI: >60 ML/MIN/1.73/M2
GLUCOSE SERPL-MCNC: 54 MG/DL (ref 70–110)
HBA1C MFR BLD: 5.1 % (ref 4–5.6)
HCT VFR BLD AUTO: 41.5 % (ref 37–48.5)
HDLC SERPL-MCNC: 31 MG/DL (ref 40–75)
HDLC SERPL: 27.2 % (ref 20–50)
HGB BLD-MCNC: 13.9 GM/DL (ref 12–16)
IMM GRANULOCYTES # BLD AUTO: 0.01 K/UL (ref 0–0.04)
IMM GRANULOCYTES NFR BLD AUTO: 0.1 % (ref 0–0.5)
LDLC SERPL CALC-MCNC: 58.6 MG/DL (ref 63–159)
LYMPHOCYTES # BLD AUTO: 2.48 K/UL (ref 1–4.8)
MCH RBC QN AUTO: 32.2 PG (ref 27–31)
MCHC RBC AUTO-ENTMCNC: 33.5 G/DL (ref 32–36)
MCV RBC AUTO: 96 FL (ref 82–98)
NONHDLC SERPL-MCNC: 83 MG/DL
NUCLEATED RBC (/100WBC) (OHS): 0 /100 WBC
PLATELET # BLD AUTO: 343 K/UL (ref 150–450)
PMV BLD AUTO: 10.3 FL (ref 9.2–12.9)
POTASSIUM SERPL-SCNC: 3.8 MMOL/L (ref 3.5–5.1)
PROT SERPL-MCNC: 7.6 GM/DL (ref 6–8.4)
RBC # BLD AUTO: 4.32 M/UL (ref 4–5.4)
RELATIVE EOSINOPHIL (OHS): 3.1 %
RELATIVE LYMPHOCYTE (OHS): 30 % (ref 18–48)
RELATIVE MONOCYTE (OHS): 6.4 % (ref 4–15)
RELATIVE NEUTROPHIL (OHS): 59.7 % (ref 38–73)
SODIUM SERPL-SCNC: 138 MMOL/L (ref 136–145)
TRIGL SERPL-MCNC: 122 MG/DL (ref 30–150)
WBC # BLD AUTO: 8.27 K/UL (ref 3.9–12.7)

## 2025-08-19 PROCEDURE — 99214 OFFICE O/P EST MOD 30 MIN: CPT | Mod: S$PBB,,,

## 2025-08-19 PROCEDURE — G2211 COMPLEX E/M VISIT ADD ON: HCPCS | Mod: S$PBB,,,

## 2025-08-19 PROCEDURE — 1160F RVW MEDS BY RX/DR IN RCRD: CPT | Mod: CPTII,,,

## 2025-08-19 PROCEDURE — 82040 ASSAY OF SERUM ALBUMIN: CPT

## 2025-08-19 PROCEDURE — 99499 UNLISTED E&M SERVICE: CPT | Mod: S$PBB,,,

## 2025-08-19 PROCEDURE — 83036 HEMOGLOBIN GLYCOSYLATED A1C: CPT

## 2025-08-19 PROCEDURE — 99213 OFFICE O/P EST LOW 20 MIN: CPT | Mod: PBBFAC,PN,25

## 2025-08-19 PROCEDURE — 3079F DIAST BP 80-89 MM HG: CPT | Mod: CPTII,,,

## 2025-08-19 PROCEDURE — G0447 BEHAVIOR COUNSEL OBESITY 15M: HCPCS | Mod: PBBFAC,PN

## 2025-08-19 PROCEDURE — 80061 LIPID PANEL: CPT

## 2025-08-19 PROCEDURE — 3044F HG A1C LEVEL LT 7.0%: CPT | Mod: CPTII,,,

## 2025-08-19 PROCEDURE — 3074F SYST BP LT 130 MM HG: CPT | Mod: CPTII,,,

## 2025-08-19 PROCEDURE — 3008F BODY MASS INDEX DOCD: CPT | Mod: CPTII,,,

## 2025-08-19 PROCEDURE — 99999 PR PBB SHADOW E&M-EST. PATIENT-LVL III: CPT | Mod: PBBFAC,,,

## 2025-08-19 PROCEDURE — 85025 COMPLETE CBC W/AUTO DIFF WBC: CPT

## 2025-08-19 PROCEDURE — 1159F MED LIST DOCD IN RCRD: CPT | Mod: CPTII,,,

## (undated) DEVICE — CATH DXTERITY JR40 100CM 6FR

## (undated) DEVICE — GUIDE VISTA 6FR ARI

## (undated) DEVICE — SPIKE CONTRAST CONTROLLER

## (undated) DEVICE — WIRE GUIDE SAFE-T-J .035 260CM

## (undated) DEVICE — INFLATOR ENCORE 26 BLLN INFL

## (undated) DEVICE — TRANSDUCER ADULT DISP

## (undated) DEVICE — KIT GLIDESHEATH SLEND 6FR 10CM

## (undated) DEVICE — TRAY CATH LAB OMC

## (undated) DEVICE — PROTECTION STATION PLUS

## (undated) DEVICE — CATH JACKY RADIAL 5FR 100CM

## (undated) DEVICE — KIT CUSTOM MANIFOLD

## (undated) DEVICE — DRAPE ANGIO BRACH 38X44IN

## (undated) DEVICE — PAD DEFIB CADENCE ADULT R2

## (undated) DEVICE — CATH EMERGE MR 20 X 2.50

## (undated) DEVICE — VISIPAQUE 320 200ML +PK

## (undated) DEVICE — KIT COPILOT VALVE HEMO TOOL

## (undated) DEVICE — CATH NC EMERGE MR 3X8MM

## (undated) DEVICE — HEMOSTAT VASC BAND LONG 27CM

## (undated) DEVICE — GUIDE WIRE BMW 014 X190

## (undated) DEVICE — CATH IMA INFINITI 4FRX100CM